# Patient Record
Sex: FEMALE | Race: WHITE | NOT HISPANIC OR LATINO | Employment: UNEMPLOYED | URBAN - NONMETROPOLITAN AREA
[De-identification: names, ages, dates, MRNs, and addresses within clinical notes are randomized per-mention and may not be internally consistent; named-entity substitution may affect disease eponyms.]

---

## 2017-01-27 ENCOUNTER — TRANSCRIBE ORDERS (OUTPATIENT)
Dept: INFUSION THERAPY | Facility: HOSPITAL | Age: 82
End: 2017-01-27

## 2017-01-27 DIAGNOSIS — M81.0 OSTEOPOROSIS, UNSPECIFIED: Primary | ICD-10-CM

## 2017-02-01 ENCOUNTER — HOSPITAL ENCOUNTER (OUTPATIENT)
Dept: INFUSION THERAPY | Facility: HOSPITAL | Age: 82
Discharge: HOME | End: 2017-02-01
Attending: INTERNAL MEDICINE | Admitting: INTERNAL MEDICINE

## 2017-02-01 VITALS
HEIGHT: 62 IN | WEIGHT: 106.8 LBS | HEART RATE: 86 BPM | TEMPERATURE: 97.6 F | DIASTOLIC BLOOD PRESSURE: 84 MMHG | SYSTOLIC BLOOD PRESSURE: 166 MMHG | BODY MASS INDEX: 19.65 KG/M2 | RESPIRATION RATE: 20 BRPM

## 2017-02-01 DIAGNOSIS — M81.0 OSTEOPOROSIS, UNSPECIFIED: ICD-10-CM

## 2017-02-01 PROCEDURE — 96401 CHEMO ANTI-NEOPL SQ/IM: CPT

## 2017-02-01 PROCEDURE — 25010000002 DENOSUMAB 60 MG/ML SOLUTION: Performed by: INTERNAL MEDICINE

## 2017-02-01 RX ORDER — DOXYCYCLINE HYCLATE 50 MG/1
50 CAPSULE ORAL DAILY
COMMUNITY
Start: 2017-01-31 | End: 2017-09-19

## 2017-02-01 RX ORDER — CELECOXIB 200 MG/1
200 CAPSULE ORAL NIGHTLY
COMMUNITY
End: 2018-11-11 | Stop reason: HOSPADM

## 2017-02-01 RX ORDER — LOSARTAN POTASSIUM 25 MG/1
50 TABLET ORAL DAILY
COMMUNITY
End: 2018-11-11 | Stop reason: HOSPADM

## 2017-02-01 RX ORDER — POTASSIUM CHLORIDE 750 MG/1
10 CAPSULE, EXTENDED RELEASE ORAL DAILY PRN
COMMUNITY
End: 2018-11-11 | Stop reason: HOSPADM

## 2017-02-01 RX ORDER — BUDESONIDE AND FORMOTEROL FUMARATE DIHYDRATE 160; 4.5 UG/1; UG/1
2 AEROSOL RESPIRATORY (INHALATION) 2 TIMES DAILY
Status: ON HOLD | COMMUNITY
End: 2018-11-11

## 2017-02-01 RX ORDER — FUROSEMIDE 20 MG/1
20 TABLET ORAL DAILY PRN
COMMUNITY
End: 2018-11-11 | Stop reason: HOSPADM

## 2017-02-01 RX ADMIN — DENOSUMAB 60 MG: 60 INJECTION SUBCUTANEOUS at 10:46

## 2017-02-01 NOTE — PATIENT INSTRUCTIONS
Denosumab injection  What is this medicine?  DENOSUMAB (den oh christy mab) slows bone breakdown. Prolia is used to treat osteoporosis in women after menopause and in men. Xgeva is used to prevent bone fractures and other bone problems caused by cancer bone metastases. Xgeva is also used to treat giant cell tumor of the bone.  This medicine may be used for other purposes; ask your health care provider or pharmacist if you have questions.  What should I tell my health care provider before I take this medicine?  They need to know if you have any of these conditions:  -dental disease  -eczema  -infection or history of infections  -kidney disease or on dialysis  -low blood calcium or vitamin D  -malabsorption syndrome  -scheduled to have surgery or tooth extraction  -taking medicine that contains denosumab  -thyroid or parathyroid disease  -an unusual reaction to denosumab, other medicines, foods, dyes, or preservatives  -pregnant or trying to get pregnant  -breast-feeding  How should I use this medicine?  This medicine is for injection under the skin. It is given by a health care professional in a hospital or clinic setting.  If you are getting Prolia, a special MedGuide will be given to you by the pharmacist with each prescription and refill. Be sure to read this information carefully each time.  For Prolia, talk to your pediatrician regarding the use of this medicine in children. Special care may be needed. For Xgeva, talk to your pediatrician regarding the use of this medicine in children. While this drug may be prescribed for children as young as 13 years for selected conditions, precautions do apply.  Overdosage: If you think you have taken too much of this medicine contact a poison control center or emergency room at once.  NOTE: This medicine is only for you. Do not share this medicine with others.  What if I miss a dose?  It is important not to miss your dose. Call your doctor or health care professional if you are  unable to keep an appointment.  What may interact with this medicine?  Do not take this medicine with any of the following medications:  -other medicines containing denosumab  This medicine may also interact with the following medications:  -medicines that suppress the immune system  -medicines that treat cancer  -steroid medicines like prednisone or cortisone  This list may not describe all possible interactions. Give your health care provider a list of all the medicines, herbs, non-prescription drugs, or dietary supplements you use. Also tell them if you smoke, drink alcohol, or use illegal drugs. Some items may interact with your medicine.  What should I watch for while using this medicine?  Visit your doctor or health care professional for regular checks on your progress. Your doctor or health care professional may order blood tests and other tests to see how you are doing.  Call your doctor or health care professional if you get a cold or other infection while receiving this medicine. Do not treat yourself. This medicine may decrease your body's ability to fight infection.  You should make sure you get enough calcium and vitamin D while you are taking this medicine, unless your doctor tells you not to. Discuss the foods you eat and the vitamins you take with your health care professional.  See your dentist regularly. Brush and floss your teeth as directed. Before you have any dental work done, tell your dentist you are receiving this medicine.  Do not become pregnant while taking this medicine or for 5 months after stopping it. Women should inform their doctor if they wish to become pregnant or think they might be pregnant. There is a potential for serious side effects to an unborn child. Talk to your health care professional or pharmacist for more information.  What side effects may I notice from receiving this medicine?  Side effects that you should report to your doctor or health care professional as soon as  possible:  -allergic reactions like skin rash, itching or hives, swelling of the face, lips, or tongue  -breathing problems  -chest pain  -fast, irregular heartbeat  -feeling faint or lightheaded, falls  -fever, chills, or any other sign of infection  -muscle spasms, tightening, or twitches  -numbness or tingling  -skin blisters or bumps, or is dry, peels, or red  -slow healing or unexplained pain in the mouth or jaw  -unusual bleeding or bruising  Side effects that usually do not require medical attention (Report these to your doctor or health care professional if they continue or are bothersome.):  -muscle pain  -stomach upset, gas  This list may not describe all possible side effects. Call your doctor for medical advice about side effects. You may report side effects to FDA at 6-700-FDA-5093.  Where should I keep my medicine?  This medicine is only given in a clinic, doctor's office, or other health care setting and will not be stored at home.  NOTE: This sheet is a summary. It may not cover all possible information. If you have questions about this medicine, talk to your doctor, pharmacist, or health care provider.     © 2016, [employer]. (2013-06-17 12:37:47)

## 2017-08-25 ENCOUNTER — TRANSCRIBE ORDERS (OUTPATIENT)
Dept: ADMINISTRATIVE | Facility: HOSPITAL | Age: 82
End: 2017-08-25

## 2017-08-25 DIAGNOSIS — Z12.31 VISIT FOR SCREENING MAMMOGRAM: Primary | ICD-10-CM

## 2017-09-11 ENCOUNTER — TRANSCRIBE ORDERS (OUTPATIENT)
Dept: INFUSION THERAPY | Facility: HOSPITAL | Age: 82
End: 2017-09-11

## 2017-09-11 DIAGNOSIS — M81.0 OSTEOPOROSIS, UNSPECIFIED: Primary | ICD-10-CM

## 2017-09-19 ENCOUNTER — HOSPITAL ENCOUNTER (OUTPATIENT)
Dept: INFUSION THERAPY | Facility: HOSPITAL | Age: 82
Discharge: HOME | End: 2017-09-19
Attending: INTERNAL MEDICINE | Admitting: INTERNAL MEDICINE

## 2017-09-19 VITALS
WEIGHT: 101 LBS | RESPIRATION RATE: 20 BRPM | BODY MASS INDEX: 18.58 KG/M2 | HEART RATE: 80 BPM | SYSTOLIC BLOOD PRESSURE: 161 MMHG | HEIGHT: 62 IN | TEMPERATURE: 98.3 F | DIASTOLIC BLOOD PRESSURE: 88 MMHG

## 2017-09-19 DIAGNOSIS — M81.0 OSTEOPOROSIS, UNSPECIFIED: ICD-10-CM

## 2017-09-19 PROCEDURE — 96401 CHEMO ANTI-NEOPL SQ/IM: CPT

## 2017-09-19 PROCEDURE — 25010000002 DENOSUMAB 60 MG/ML SOLUTION: Performed by: INTERNAL MEDICINE

## 2017-09-19 RX ORDER — PREDNISONE 10 MG/1
10 TABLET ORAL DAILY PRN
COMMUNITY
End: 2018-11-11

## 2017-09-19 RX ORDER — PANTOPRAZOLE SODIUM 40 MG/1
40 TABLET, DELAYED RELEASE ORAL DAILY
COMMUNITY

## 2017-09-19 RX ADMIN — DENOSUMAB 60 MG: 60 INJECTION SUBCUTANEOUS at 14:04

## 2017-09-19 NOTE — PATIENT INSTRUCTIONS
Denosumab injection  What is this medicine?  DENOSUMAB (den oh christy mab) slows bone breakdown. Prolia is used to treat osteoporosis in women after menopause and in men. Xgeva is used to prevent bone fractures and other bone problems caused by cancer bone metastases. Xgeva is also used to treat giant cell tumor of the bone.  This medicine may be used for other purposes; ask your health care provider or pharmacist if you have questions.  COMMON BRAND NAME(S): Prolia, XGEVA  What should I tell my health care provider before I take this medicine?  They need to know if you have any of these conditions:  -dental disease  -eczema  -infection or history of infections  -kidney disease or on dialysis  -low blood calcium or vitamin D  -malabsorption syndrome  -scheduled to have surgery or tooth extraction  -taking medicine that contains denosumab  -thyroid or parathyroid disease  -an unusual reaction to denosumab, other medicines, foods, dyes, or preservatives  -pregnant or trying to get pregnant  -breast-feeding  How should I use this medicine?  This medicine is for injection under the skin. It is given by a health care professional in a hospital or clinic setting.  If you are getting Prolia, a special MedGuide will be given to you by the pharmacist with each prescription and refill. Be sure to read this information carefully each time.  For Prolia, talk to your pediatrician regarding the use of this medicine in children. Special care may be needed. For Xgeva, talk to your pediatrician regarding the use of this medicine in children. While this drug may be prescribed for children as young as 13 years for selected conditions, precautions do apply.  Overdosage: If you think you have taken too much of this medicine contact a poison control center or emergency room at once.  NOTE: This medicine is only for you. Do not share this medicine with others.  What if I miss a dose?  It is important not to miss your dose. Call your doctor  or health care professional if you are unable to keep an appointment.  What may interact with this medicine?  Do not take this medicine with any of the following medications:  -other medicines containing denosumab  This medicine may also interact with the following medications:  -medicines that suppress the immune system  -medicines that treat cancer  -steroid medicines like prednisone or cortisone  This list may not describe all possible interactions. Give your health care provider a list of all the medicines, herbs, non-prescription drugs, or dietary supplements you use. Also tell them if you smoke, drink alcohol, or use illegal drugs. Some items may interact with your medicine.  What should I watch for while using this medicine?  Visit your doctor or health care professional for regular checks on your progress. Your doctor or health care professional may order blood tests and other tests to see how you are doing.  Call your doctor or health care professional if you get a cold or other infection while receiving this medicine. Do not treat yourself. This medicine may decrease your body's ability to fight infection.  You should make sure you get enough calcium and vitamin D while you are taking this medicine, unless your doctor tells you not to. Discuss the foods you eat and the vitamins you take with your health care professional.  See your dentist regularly. Brush and floss your teeth as directed. Before you have any dental work done, tell your dentist you are receiving this medicine.  Do not become pregnant while taking this medicine or for 5 months after stopping it. Women should inform their doctor if they wish to become pregnant or think they might be pregnant. There is a potential for serious side effects to an unborn child. Talk to your health care professional or pharmacist for more information.  What side effects may I notice from receiving this medicine?  Side effects that you should report to your doctor  or health care professional as soon as possible:  -allergic reactions like skin rash, itching or hives, swelling of the face, lips, or tongue  -breathing problems  -chest pain  -fast, irregular heartbeat  -feeling faint or lightheaded, falls  -fever, chills, or any other sign of infection  -muscle spasms, tightening, or twitches  -numbness or tingling  -skin blisters or bumps, or is dry, peels, or red  -slow healing or unexplained pain in the mouth or jaw  -unusual bleeding or bruising  Side effects that usually do not require medical attention (report to your doctor or health care professional if they continue or are bothersome):  -muscle pain  -stomach upset, gas  This list may not describe all possible side effects. Call your doctor for medical advice about side effects. You may report side effects to FDA at 1-342-FDA-1091.  Where should I keep my medicine?  This medicine is only given in a clinic, doctor's office, or other health care setting and will not be stored at home.  NOTE: This sheet is a summary. It may not cover all possible information. If you have questions about this medicine, talk to your doctor, pharmacist, or health care provider.     © 2017, [employer]. (2017-01-19 10:06:55)

## 2017-11-14 ENCOUNTER — TRANSCRIBE ORDERS (OUTPATIENT)
Dept: ADMINISTRATIVE | Facility: HOSPITAL | Age: 82
End: 2017-11-14

## 2017-11-14 DIAGNOSIS — M81.0 OSTEOPOROSIS, POST-MENOPAUSAL: Primary | ICD-10-CM

## 2017-12-18 ENCOUNTER — HOSPITAL ENCOUNTER (OUTPATIENT)
Dept: BONE DENSITY | Facility: HOSPITAL | Age: 82
Discharge: HOME | End: 2017-12-18
Attending: INTERNAL MEDICINE

## 2017-12-18 ENCOUNTER — HOSPITAL ENCOUNTER (OUTPATIENT)
Dept: MAMMOGRAPHY | Facility: HOSPITAL | Age: 82
Discharge: HOME | End: 2017-12-18
Attending: INTERNAL MEDICINE | Admitting: INTERNAL MEDICINE

## 2017-12-18 DIAGNOSIS — M81.0 OSTEOPOROSIS, POST-MENOPAUSAL: ICD-10-CM

## 2017-12-18 DIAGNOSIS — Z12.31 VISIT FOR SCREENING MAMMOGRAM: ICD-10-CM

## 2017-12-18 PROCEDURE — 77080 DXA BONE DENSITY AXIAL: CPT | Performed by: RADIOLOGY

## 2017-12-18 PROCEDURE — 77063 BREAST TOMOSYNTHESIS BI: CPT | Performed by: RADIOLOGY

## 2017-12-18 PROCEDURE — G0202 SCR MAMMO BI INCL CAD: HCPCS | Performed by: RADIOLOGY

## 2017-12-18 PROCEDURE — 77063 BREAST TOMOSYNTHESIS BI: CPT

## 2017-12-18 PROCEDURE — 77080 DXA BONE DENSITY AXIAL: CPT

## 2017-12-18 PROCEDURE — G0202 SCR MAMMO BI INCL CAD: HCPCS

## 2018-01-26 ENCOUNTER — TRANSCRIBE ORDERS (OUTPATIENT)
Dept: ADMINISTRATIVE | Facility: HOSPITAL | Age: 83
End: 2018-01-26

## 2018-01-26 DIAGNOSIS — I11.0 BENIGN HYPERTENSIVE HEART DISEASE WITH CONGESTIVE HEART FAILURE (HCC): ICD-10-CM

## 2018-01-26 DIAGNOSIS — R06.02 SHORTNESS OF BREATH: Primary | ICD-10-CM

## 2018-01-30 ENCOUNTER — HOSPITAL ENCOUNTER (OUTPATIENT)
Dept: CARDIOLOGY | Facility: HOSPITAL | Age: 83
Discharge: HOME | End: 2018-01-30
Attending: INTERNAL MEDICINE | Admitting: INTERNAL MEDICINE

## 2018-01-30 DIAGNOSIS — R06.02 SHORTNESS OF BREATH: ICD-10-CM

## 2018-01-30 DIAGNOSIS — I11.0 BENIGN HYPERTENSIVE HEART DISEASE WITH CONGESTIVE HEART FAILURE (HCC): ICD-10-CM

## 2018-01-30 PROCEDURE — 93306 TTE W/DOPPLER COMPLETE: CPT | Performed by: INTERNAL MEDICINE

## 2018-01-30 PROCEDURE — 93306 TTE W/DOPPLER COMPLETE: CPT

## 2018-01-31 LAB
BH CV ECHO MEAS - % IVS THICK: 47.1 %
BH CV ECHO MEAS - % LVPW THICK: 122.2 %
BH CV ECHO MEAS - ACS: 1.8 CM
BH CV ECHO MEAS - AO MAX PG: 11.1 MMHG
BH CV ECHO MEAS - AO MEAN PG: 6 MMHG
BH CV ECHO MEAS - AO ROOT AREA (BSA CORRECTED): 2.2
BH CV ECHO MEAS - AO ROOT AREA: 7.8 CM^2
BH CV ECHO MEAS - AO ROOT DIAM: 3.2 CM
BH CV ECHO MEAS - AO V2 MAX: 166.5 CM/SEC
BH CV ECHO MEAS - AO V2 MEAN: 114.1 CM/SEC
BH CV ECHO MEAS - AO V2 VTI: 39 CM
BH CV ECHO MEAS - BSA(HAYCOCK): 1.4 M^2
BH CV ECHO MEAS - BSA: 1.4 M^2
BH CV ECHO MEAS - BZI_BMI: 18.5 KILOGRAMS/M^2
BH CV ECHO MEAS - BZI_METRIC_HEIGHT: 157.5 CM
BH CV ECHO MEAS - BZI_METRIC_WEIGHT: 45.8 KG
BH CV ECHO MEAS - CONTRAST EF 4CH: 72.5 ML/M^2
BH CV ECHO MEAS - EDV(CUBED): 200.8 ML
BH CV ECHO MEAS - EDV(MOD-SP4): 51 ML
BH CV ECHO MEAS - EDV(TEICH): 170.3 ML
BH CV ECHO MEAS - EF(CUBED): 74 %
BH CV ECHO MEAS - EF(MOD-SP4): 72.5 %
BH CV ECHO MEAS - EF(TEICH): 65 %
BH CV ECHO MEAS - ESV(CUBED): 52.3 ML
BH CV ECHO MEAS - ESV(MOD-SP4): 14 ML
BH CV ECHO MEAS - ESV(TEICH): 59.6 ML
BH CV ECHO MEAS - FS: 36.2 %
BH CV ECHO MEAS - IVS/LVPW: 0.94
BH CV ECHO MEAS - IVSD: 0.56 CM
BH CV ECHO MEAS - IVSS: 0.83 CM
BH CV ECHO MEAS - LA DIMENSION: 4.1 CM
BH CV ECHO MEAS - LA/AO: 1.3
BH CV ECHO MEAS - LV DIASTOLIC VOL/BSA (35-75): 35.7 ML/M^2
BH CV ECHO MEAS - LV MASS(C)D: 120.6 GRAMS
BH CV ECHO MEAS - LV MASS(C)DI: 84.3 GRAMS/M^2
BH CV ECHO MEAS - LV MASS(C)S: 127 GRAMS
BH CV ECHO MEAS - LV MASS(C)SI: 88.9 GRAMS/M^2
BH CV ECHO MEAS - LV SYSTOLIC VOL/BSA (12-30): 9.8 ML/M^2
BH CV ECHO MEAS - LVIDD: 5.9 CM
BH CV ECHO MEAS - LVIDS: 3.7 CM
BH CV ECHO MEAS - LVLD AP4: 7.3 CM
BH CV ECHO MEAS - LVLS AP4: 6.4 CM
BH CV ECHO MEAS - LVOT AREA (M): 2.8 CM^2
BH CV ECHO MEAS - LVOT AREA: 2.7 CM^2
BH CV ECHO MEAS - LVOT DIAM: 1.9 CM
BH CV ECHO MEAS - LVPWD: 0.6 CM
BH CV ECHO MEAS - LVPWS: 1.3 CM
BH CV ECHO MEAS - MV A MAX VEL: 106.1 CM/SEC
BH CV ECHO MEAS - MV DEC SLOPE: 332.3 CM/SEC^2
BH CV ECHO MEAS - MV E MAX VEL: 75.5 CM/SEC
BH CV ECHO MEAS - MV E/A: 0.71
BH CV ECHO MEAS - MV P1/2T MAX VEL: 101.3 CM/SEC
BH CV ECHO MEAS - MV P1/2T: 89.3 MSEC
BH CV ECHO MEAS - MVA P1/2T LCG: 2.2 CM^2
BH CV ECHO MEAS - MVA(P1/2T): 2.5 CM^2
BH CV ECHO MEAS - PA ACC SLOPE: 1496 CM/SEC^2
BH CV ECHO MEAS - PA ACC TIME: 0.07 SEC
BH CV ECHO MEAS - PA PR(ACCEL): 47.3 MMHG
BH CV ECHO MEAS - RAP SYSTOLE: 10 MMHG
BH CV ECHO MEAS - RVDD: 1.7 CM
BH CV ECHO MEAS - RVSP: 51.2 MMHG
BH CV ECHO MEAS - SI(AO): 213.5 ML/M^2
BH CV ECHO MEAS - SI(CUBED): 103.9 ML/M^2
BH CV ECHO MEAS - SI(MOD-SP4): 25.9 ML/M^2
BH CV ECHO MEAS - SI(TEICH): 77.4 ML/M^2
BH CV ECHO MEAS - SV(AO): 305.3 ML
BH CV ECHO MEAS - SV(CUBED): 148.6 ML
BH CV ECHO MEAS - SV(MOD-SP4): 37 ML
BH CV ECHO MEAS - SV(TEICH): 110.7 ML
BH CV ECHO MEAS - TR MAX VEL: 320.8 CM/SEC
MAXIMAL PREDICTED HEART RATE: 137 BPM
STRESS TARGET HR: 116 BPM

## 2018-03-29 ENCOUNTER — TRANSCRIBE ORDERS (OUTPATIENT)
Dept: INFUSION THERAPY | Facility: HOSPITAL | Age: 83
End: 2018-03-29

## 2018-03-29 DIAGNOSIS — M81.0 SENILE OSTEOPOROSIS: Primary | ICD-10-CM

## 2018-04-02 ENCOUNTER — HOSPITAL ENCOUNTER (OUTPATIENT)
Dept: INFUSION THERAPY | Facility: HOSPITAL | Age: 83
Discharge: HOME | End: 2018-04-02
Attending: INTERNAL MEDICINE | Admitting: INTERNAL MEDICINE

## 2018-04-02 VITALS
WEIGHT: 106 LBS | BODY MASS INDEX: 20.81 KG/M2 | DIASTOLIC BLOOD PRESSURE: 87 MMHG | HEART RATE: 76 BPM | HEIGHT: 60 IN | TEMPERATURE: 98.3 F | SYSTOLIC BLOOD PRESSURE: 180 MMHG | RESPIRATION RATE: 20 BRPM

## 2018-04-02 DIAGNOSIS — M81.0 OSTEOPOROSIS, UNSPECIFIED OSTEOPOROSIS TYPE, UNSPECIFIED PATHOLOGICAL FRACTURE PRESENCE: ICD-10-CM

## 2018-04-02 PROCEDURE — 96372 THER/PROPH/DIAG INJ SC/IM: CPT

## 2018-04-02 PROCEDURE — 25010000002 DENOSUMAB 60 MG/ML SOLUTION: Performed by: INTERNAL MEDICINE

## 2018-04-02 RX ADMIN — DENOSUMAB 60 MG: 60 INJECTION SUBCUTANEOUS at 14:32

## 2018-04-02 NOTE — CODE DOCUMENTATION
4/2/18@ 4191  Dr. Medellin notified of rash like areas ntd on arms bilat. Pt denies having rash anywhere else on body.  Pt voices she has had it for a long time & that it Dr. Medellin gave her some cream for it but that it didn't help.  Dr. Medellin notified of rash & request for dermatologist consult.  She prefers Dr. Lackey in [place]. Orders to give shot rec.

## 2018-04-02 NOTE — PATIENT INSTRUCTIONS
Denosumab injection  What is this medicine?  DENOSUMAB (den oh christy mab) slows bone breakdown. Prolia is used to treat osteoporosis in women after menopause and in men. Xgeva is used to treat a high calcium level due to cancer and to prevent bone fractures and other bone problems caused by multiple myeloma or cancer bone metastases. Xgeva is also used to treat giant cell tumor of the bone.  This medicine may be used for other purposes; ask your health care provider or pharmacist if you have questions.  COMMON BRAND NAME(S): Prolia, XGEVA  What should I tell my health care provider before I take this medicine?  They need to know if you have any of these conditions:  -dental disease  -having surgery or tooth extraction  -infection  -kidney disease  -low levels of calcium or Vitamin D in the blood  -malnutrition  -on hemodialysis  -skin conditions or sensitivity  -thyroid or parathyroid disease  -an unusual reaction to denosumab, other medicines, foods, dyes, or preservatives  -pregnant or trying to get pregnant  -breast-feeding  How should I use this medicine?  This medicine is for injection under the skin. It is given by a health care professional in a hospital or clinic setting.  If you are getting Prolia, a special MedGuide will be given to you by the pharmacist with each prescription and refill. Be sure to read this information carefully each time.  For Prolia, talk to your pediatrician regarding the use of this medicine in children. Special care may be needed. For Xgeva, talk to your pediatrician regarding the use of this medicine in children. While this drug may be prescribed for children as young as 13 years for selected conditions, precautions do apply.  Overdosage: If you think you have taken too much of this medicine contact a poison control center or emergency room at once.  NOTE: This medicine is only for you. Do not share this medicine with others.  What if I miss a dose?  It is important not to miss your  dose. Call your doctor or health care professional if you are unable to keep an appointment.  What may interact with this medicine?  Do not take this medicine with any of the following medications:  -other medicines containing denosumab  This medicine may also interact with the following medications:  -medicines that lower your chance of fighting infection  -steroid medicines like prednisone or cortisone  This list may not describe all possible interactions. Give your health care provider a list of all the medicines, herbs, non-prescription drugs, or dietary supplements you use. Also tell them if you smoke, drink alcohol, or use illegal drugs. Some items may interact with your medicine.  What should I watch for while using this medicine?  Visit your doctor or health care professional for regular checks on your progress. Your doctor or health care professional may order blood tests and other tests to see how you are doing.  Call your doctor or health care professional for advice if you get a fever, chills or sore throat, or other symptoms of a cold or flu. Do not treat yourself. This drug may decrease your body's ability to fight infection. Try to avoid being around people who are sick.  You should make sure you get enough calcium and vitamin D while you are taking this medicine, unless your doctor tells you not to. Discuss the foods you eat and the vitamins you take with your health care professional.  See your dentist regularly. Brush and floss your teeth as directed. Before you have any dental work done, tell your dentist you are receiving this medicine.  Do not become pregnant while taking this medicine or for 5 months after stopping it. Talk with your doctor or health care professional about your birth control options while taking this medicine. Women should inform their doctor if they wish to become pregnant or think they might be pregnant. There is a potential for serious side effects to an unborn child. Talk  to your health care professional or pharmacist for more information.  What side effects may I notice from receiving this medicine?  Side effects that you should report to your doctor or health care professional as soon as possible:  -allergic reactions like skin rash, itching or hives, swelling of the face, lips, or tongue  -bone pain  -breathing problems  -dizziness  -jaw pain, especially after dental work  -redness, blistering, peeling of the skin  -signs and symptoms of infection like fever or chills; cough; sore throat; pain or trouble passing urine  -signs of low calcium like fast heartbeat, muscle cramps or muscle pain; pain, tingling, numbness in the hands or feet; seizures  -unusual bleeding or bruising  -unusually weak or tired  Side effects that usually do not require medical attention (report to your doctor or health care professional if they continue or are bothersome):  -constipation  -diarrhea  -headache  -joint pain  -loss of appetite  -muscle pain  -runny nose  -tiredness  -upset stomach  This list may not describe all possible side effects. Call your doctor for medical advice about side effects. You may report side effects to FDA at 3-120-FDA-1663.  Where should I keep my medicine?  This medicine is only given in a clinic, doctor's office, or other health care setting and will not be stored at home.  NOTE: This sheet is a summary. It may not cover all possible information. If you have questions about this medicine, talk to your doctor, pharmacist, or health care provider.  © 2018 [employer] (2018-01-09 19:17:21)

## 2018-10-30 ENCOUNTER — TRANSCRIBE ORDERS (OUTPATIENT)
Dept: INFUSION THERAPY | Facility: HOSPITAL | Age: 83
End: 2018-10-30

## 2018-10-30 DIAGNOSIS — M81.0 SENILE OSTEOPOROSIS: Primary | ICD-10-CM

## 2018-11-10 ENCOUNTER — APPOINTMENT (OUTPATIENT)
Dept: GENERAL RADIOLOGY | Facility: HOSPITAL | Age: 83
End: 2018-11-10

## 2018-11-10 ENCOUNTER — HOSPITAL ENCOUNTER (OUTPATIENT)
Facility: HOSPITAL | Age: 83
Setting detail: OBSERVATION
Discharge: HOME | End: 2018-11-11
Attending: EMERGENCY MEDICINE | Admitting: EMERGENCY MEDICINE

## 2018-11-10 DIAGNOSIS — I16.0 HYPERTENSIVE URGENCY: Primary | ICD-10-CM

## 2018-11-10 LAB
ALBUMIN SERPL-MCNC: 4.1 G/DL (ref 3.4–4.8)
ALBUMIN/GLOB SERPL: 1.2 G/DL (ref 1.5–2.5)
ALP SERPL-CCNC: 69 U/L (ref 35–104)
ALT SERPL W P-5'-P-CCNC: 12 U/L (ref 10–36)
ANION GAP SERPL CALCULATED.3IONS-SCNC: 7.4 MMOL/L (ref 3.6–11.2)
APTT PPP: 33.6 SECONDS (ref 23.8–36.1)
AST SERPL-CCNC: 22 U/L (ref 10–30)
BASOPHILS # BLD AUTO: 0.01 10*3/MM3 (ref 0–0.3)
BASOPHILS NFR BLD AUTO: 0.2 % (ref 0–2)
BILIRUB SERPL-MCNC: 0.5 MG/DL (ref 0.2–1.8)
BUN BLD-MCNC: 27 MG/DL (ref 7–21)
BUN/CREAT SERPL: 33.8 (ref 7–25)
CALCIUM SPEC-SCNC: 9.4 MG/DL (ref 7.7–10)
CHLORIDE SERPL-SCNC: 105 MMOL/L (ref 99–112)
CK MB SERPL-CCNC: 4.64 NG/ML (ref 0–5)
CK MB SERPL-RTO: 7.9 % (ref 0–3)
CK SERPL-CCNC: 59 U/L (ref 24–173)
CO2 SERPL-SCNC: 28.6 MMOL/L (ref 24.3–31.9)
CREAT BLD-MCNC: 0.8 MG/DL (ref 0.43–1.29)
DEPRECATED RDW RBC AUTO: 42.8 FL (ref 37–54)
EOSINOPHIL # BLD AUTO: 0.07 10*3/MM3 (ref 0–0.7)
EOSINOPHIL NFR BLD AUTO: 1.2 % (ref 0–7)
ERYTHROCYTE [DISTWIDTH] IN BLOOD BY AUTOMATED COUNT: 13.6 % (ref 11.5–14.5)
GFR SERPL CREATININE-BSD FRML MDRD: 69 ML/MIN/1.73
GLOBULIN UR ELPH-MCNC: 3.4 GM/DL
GLUCOSE BLD-MCNC: 92 MG/DL (ref 70–110)
HCT VFR BLD AUTO: 37.9 % (ref 37–47)
HGB BLD-MCNC: 12.4 G/DL (ref 12–16)
IMM GRANULOCYTES # BLD: 0.02 10*3/MM3 (ref 0–0.03)
IMM GRANULOCYTES NFR BLD: 0.3 % (ref 0–0.5)
INR PPP: 0.98 (ref 0.9–1.1)
LYMPHOCYTES # BLD AUTO: 1.57 10*3/MM3 (ref 1–3)
LYMPHOCYTES NFR BLD AUTO: 27.4 % (ref 16–46)
MCH RBC QN AUTO: 28.8 PG (ref 27–33)
MCHC RBC AUTO-ENTMCNC: 32.7 G/DL (ref 33–37)
MCV RBC AUTO: 87.9 FL (ref 80–94)
MONOCYTES # BLD AUTO: 0.6 10*3/MM3 (ref 0.1–0.9)
MONOCYTES NFR BLD AUTO: 10.5 % (ref 0–12)
NEUTROPHILS # BLD AUTO: 3.45 10*3/MM3 (ref 1.4–6.5)
NEUTROPHILS NFR BLD AUTO: 60.4 % (ref 40–75)
OSMOLALITY SERPL CALC.SUM OF ELEC: 286 MOSM/KG (ref 273–305)
PLATELET # BLD AUTO: 283 10*3/MM3 (ref 130–400)
PMV BLD AUTO: 10.2 FL (ref 6–10)
POTASSIUM BLD-SCNC: 4.1 MMOL/L (ref 3.5–5.3)
PROT SERPL-MCNC: 7.5 G/DL (ref 6–8)
PROTHROMBIN TIME: 13.2 SECONDS (ref 11–15.4)
RBC # BLD AUTO: 4.31 10*6/MM3 (ref 4.2–5.4)
SODIUM BLD-SCNC: 141 MMOL/L (ref 135–153)
TROPONIN I SERPL-MCNC: 0.25 NG/ML
WBC NRBC COR # BLD: 5.72 10*3/MM3 (ref 4.5–12.5)

## 2018-11-10 PROCEDURE — 93005 ELECTROCARDIOGRAM TRACING: CPT | Performed by: EMERGENCY MEDICINE

## 2018-11-10 PROCEDURE — 93010 ELECTROCARDIOGRAM REPORT: CPT | Performed by: INTERNAL MEDICINE

## 2018-11-10 PROCEDURE — 85610 PROTHROMBIN TIME: CPT | Performed by: EMERGENCY MEDICINE

## 2018-11-10 PROCEDURE — 71045 X-RAY EXAM CHEST 1 VIEW: CPT

## 2018-11-10 PROCEDURE — 82550 ASSAY OF CK (CPK): CPT | Performed by: EMERGENCY MEDICINE

## 2018-11-10 PROCEDURE — 99284 EMERGENCY DEPT VISIT MOD MDM: CPT

## 2018-11-10 PROCEDURE — 80053 COMPREHEN METABOLIC PANEL: CPT | Performed by: EMERGENCY MEDICINE

## 2018-11-10 PROCEDURE — 85025 COMPLETE CBC W/AUTO DIFF WBC: CPT | Performed by: EMERGENCY MEDICINE

## 2018-11-10 PROCEDURE — 85730 THROMBOPLASTIN TIME PARTIAL: CPT | Performed by: EMERGENCY MEDICINE

## 2018-11-10 PROCEDURE — 84484 ASSAY OF TROPONIN QUANT: CPT | Performed by: EMERGENCY MEDICINE

## 2018-11-10 PROCEDURE — 71045 X-RAY EXAM CHEST 1 VIEW: CPT | Performed by: RADIOLOGY

## 2018-11-10 PROCEDURE — 96374 THER/PROPH/DIAG INJ IV PUSH: CPT

## 2018-11-10 PROCEDURE — 82553 CREATINE MB FRACTION: CPT | Performed by: EMERGENCY MEDICINE

## 2018-11-10 RX ORDER — SODIUM CHLORIDE 0.9 % (FLUSH) 0.9 %
10 SYRINGE (ML) INJECTION AS NEEDED
Status: DISCONTINUED | OUTPATIENT
Start: 2018-11-10 | End: 2018-11-11 | Stop reason: HOSPADM

## 2018-11-10 RX ORDER — ASPIRIN 81 MG/1
TABLET, CHEWABLE ORAL
Status: DISPENSED
Start: 2018-11-10 | End: 2018-11-11

## 2018-11-10 RX ORDER — ASPIRIN 81 MG/1
324 TABLET, CHEWABLE ORAL ONCE
Status: COMPLETED | OUTPATIENT
Start: 2018-11-10 | End: 2018-11-10

## 2018-11-10 RX ORDER — LABETALOL HYDROCHLORIDE 5 MG/ML
20 INJECTION, SOLUTION INTRAVENOUS ONCE
Status: COMPLETED | OUTPATIENT
Start: 2018-11-10 | End: 2018-11-10

## 2018-11-10 RX ADMIN — ASPIRIN 324 MG: 81 TABLET, CHEWABLE ORAL at 23:42

## 2018-11-10 RX ADMIN — LABETALOL HYDROCHLORIDE 20 MG: 5 INJECTION, SOLUTION INTRAVENOUS at 23:11

## 2018-11-11 VITALS
HEIGHT: 60 IN | DIASTOLIC BLOOD PRESSURE: 66 MMHG | OXYGEN SATURATION: 95 % | HEART RATE: 82 BPM | RESPIRATION RATE: 18 BRPM | SYSTOLIC BLOOD PRESSURE: 109 MMHG | BODY MASS INDEX: 19.38 KG/M2 | TEMPERATURE: 97.5 F | WEIGHT: 98.7 LBS

## 2018-11-11 PROBLEM — I16.0 HYPERTENSIVE URGENCY: Status: ACTIVE | Noted: 2018-11-11

## 2018-11-11 LAB
BACTERIA UR QL AUTO: ABNORMAL /HPF
BILIRUB UR QL STRIP: NEGATIVE
CK MB SERPL-CCNC: 3.43 NG/ML (ref 0–5)
CK MB SERPL-CCNC: 4.09 NG/ML (ref 0–5)
CK MB SERPL-RTO: 7.5 % (ref 0–3)
CK MB SERPL-RTO: 8.5 % (ref 0–3)
CK SERPL-CCNC: 46 U/L (ref 24–173)
CK SERPL-CCNC: 48 U/L (ref 24–173)
CLARITY UR: CLEAR
COLOR UR: YELLOW
GLUCOSE UR STRIP-MCNC: NEGATIVE MG/DL
HGB UR QL STRIP.AUTO: NEGATIVE
HYALINE CASTS UR QL AUTO: ABNORMAL /LPF
KETONES UR QL STRIP: ABNORMAL
LEUKOCYTE ESTERASE UR QL STRIP.AUTO: ABNORMAL
MYOGLOBIN SERPL-MCNC: 85 NG/ML (ref 0–109)
NITRITE UR QL STRIP: POSITIVE
PH UR STRIP.AUTO: 7 [PH] (ref 5–8)
PROT UR QL STRIP: NEGATIVE
RBC # UR: ABNORMAL /HPF
REF LAB TEST METHOD: ABNORMAL
SP GR UR STRIP: 1.01 (ref 1–1.03)
SQUAMOUS #/AREA URNS HPF: ABNORMAL /HPF
TROPONIN I SERPL-MCNC: 0.18 NG/ML
TROPONIN I SERPL-MCNC: 0.21 NG/ML
TSH SERPL DL<=0.05 MIU/L-ACNC: 2.77 MIU/ML (ref 0.55–4.78)
UROBILINOGEN UR QL STRIP: ABNORMAL
WBC UR QL AUTO: ABNORMAL /HPF

## 2018-11-11 PROCEDURE — G0378 HOSPITAL OBSERVATION PER HR: HCPCS

## 2018-11-11 PROCEDURE — 99236 HOSP IP/OBS SAME DATE HI 85: CPT | Performed by: INTERNAL MEDICINE

## 2018-11-11 PROCEDURE — 93010 ELECTROCARDIOGRAM REPORT: CPT | Performed by: INTERNAL MEDICINE

## 2018-11-11 PROCEDURE — 94799 UNLISTED PULMONARY SVC/PX: CPT

## 2018-11-11 PROCEDURE — 82550 ASSAY OF CK (CPK): CPT | Performed by: INTERNAL MEDICINE

## 2018-11-11 PROCEDURE — 84443 ASSAY THYROID STIM HORMONE: CPT | Performed by: INTERNAL MEDICINE

## 2018-11-11 PROCEDURE — 96375 TX/PRO/DX INJ NEW DRUG ADDON: CPT

## 2018-11-11 PROCEDURE — 81001 URINALYSIS AUTO W/SCOPE: CPT | Performed by: EMERGENCY MEDICINE

## 2018-11-11 PROCEDURE — 83874 ASSAY OF MYOGLOBIN: CPT | Performed by: INTERNAL MEDICINE

## 2018-11-11 PROCEDURE — 84484 ASSAY OF TROPONIN QUANT: CPT | Performed by: INTERNAL MEDICINE

## 2018-11-11 PROCEDURE — 82553 CREATINE MB FRACTION: CPT | Performed by: INTERNAL MEDICINE

## 2018-11-11 RX ORDER — SODIUM CHLORIDE 0.9 % (FLUSH) 0.9 %
3 SYRINGE (ML) INJECTION EVERY 12 HOURS SCHEDULED
Status: DISCONTINUED | OUTPATIENT
Start: 2018-11-11 | End: 2018-11-11 | Stop reason: HOSPADM

## 2018-11-11 RX ORDER — BUDESONIDE AND FORMOTEROL FUMARATE DIHYDRATE 160; 4.5 UG/1; UG/1
2 AEROSOL RESPIRATORY (INHALATION)
Status: DISCONTINUED | OUTPATIENT
Start: 2018-11-11 | End: 2018-11-11 | Stop reason: HOSPADM

## 2018-11-11 RX ORDER — METOPROLOL TARTRATE 5 MG/5ML
5 INJECTION INTRAVENOUS ONCE
Status: COMPLETED | OUTPATIENT
Start: 2018-11-11 | End: 2018-11-11

## 2018-11-11 RX ORDER — PANTOPRAZOLE SODIUM 40 MG/1
40 TABLET, DELAYED RELEASE ORAL DAILY
Status: DISCONTINUED | OUTPATIENT
Start: 2018-11-11 | End: 2018-11-11 | Stop reason: HOSPADM

## 2018-11-11 RX ORDER — ASCORBIC ACID 500 MG
500 TABLET ORAL DAILY
Status: ON HOLD | COMMUNITY
End: 2018-11-11

## 2018-11-11 RX ORDER — NITROFURANTOIN 25; 75 MG/1; MG/1
100 CAPSULE ORAL EVERY 12 HOURS SCHEDULED
Qty: 10 CAPSULE | Refills: 0 | Status: SHIPPED | OUTPATIENT
Start: 2018-11-11 | End: 2018-11-16

## 2018-11-11 RX ORDER — ASPIRIN 81 MG/1
81 TABLET ORAL DAILY
Status: DISCONTINUED | OUTPATIENT
Start: 2018-11-11 | End: 2018-11-11

## 2018-11-11 RX ORDER — NITROFURANTOIN 25; 75 MG/1; MG/1
100 CAPSULE ORAL EVERY 12 HOURS SCHEDULED
Status: DISCONTINUED | OUTPATIENT
Start: 2018-11-11 | End: 2018-11-11 | Stop reason: HOSPADM

## 2018-11-11 RX ORDER — BUDESONIDE AND FORMOTEROL FUMARATE DIHYDRATE 160; 4.5 UG/1; UG/1
2 AEROSOL RESPIRATORY (INHALATION)
Status: CANCELLED | OUTPATIENT
Start: 2018-11-11

## 2018-11-11 RX ORDER — NITROGLYCERIN 0.4 MG/1
0.4 TABLET SUBLINGUAL
Status: DISCONTINUED | OUTPATIENT
Start: 2018-11-11 | End: 2018-11-11 | Stop reason: HOSPADM

## 2018-11-11 RX ORDER — ASPIRIN 81 MG/1
81 TABLET ORAL DAILY
Status: CANCELLED | OUTPATIENT
Start: 2018-11-11

## 2018-11-11 RX ORDER — ASCORBIC ACID 500 MG
500 TABLET ORAL DAILY
Status: CANCELLED | OUTPATIENT
Start: 2018-11-11

## 2018-11-11 RX ORDER — ASPIRIN 81 MG/1
81 TABLET ORAL DAILY
COMMUNITY
End: 2018-11-11 | Stop reason: HOSPADM

## 2018-11-11 RX ORDER — SODIUM CHLORIDE 0.9 % (FLUSH) 0.9 %
3-10 SYRINGE (ML) INJECTION AS NEEDED
Status: DISCONTINUED | OUTPATIENT
Start: 2018-11-11 | End: 2018-11-11 | Stop reason: HOSPADM

## 2018-11-11 RX ADMIN — PANTOPRAZOLE SODIUM 40 MG: 40 TABLET, DELAYED RELEASE ORAL at 07:34

## 2018-11-11 RX ADMIN — APIXABAN 2.5 MG: 2.5 TABLET, FILM COATED ORAL at 12:23

## 2018-11-11 RX ADMIN — METOPROLOL TARTRATE 5 MG: 1 INJECTION, SOLUTION INTRAVENOUS at 00:51

## 2018-11-11 RX ADMIN — Medication 3 ML: at 07:36

## 2018-11-11 RX ADMIN — METOPROLOL TARTRATE 25 MG: 25 TABLET, FILM COATED ORAL at 12:23

## 2018-11-11 RX ADMIN — BUDESONIDE AND FORMOTEROL FUMARATE DIHYDRATE 2 PUFF: 160; 4.5 AEROSOL RESPIRATORY (INHALATION) at 07:00

## 2018-11-11 RX ADMIN — METOPROLOL TARTRATE 25 MG: 25 TABLET, FILM COATED ORAL at 00:47

## 2018-11-11 NOTE — NURSING NOTE
MADE DONTE STARKS AWARE THAT TELEMTRY CALLED AND STATED THAT PT. IS IN AFIB AND HAS BEEN SINCE 7AM. EKG ORDERED.

## 2018-11-11 NOTE — ED PROVIDER NOTES
"Subjective   Pt comes from home for concern for elevated Blood Pressure and Heart rate.  Pt has known htn and takes he BP nightly.  She noticed her HR was 113 last night.    Pt reports she has had \"sinus\".  She has had more frequent use of her inhaler.  She has not had any particular related symptoms        History provided by:  Patient and relative  Hypertension   Severity:  Moderate  Onset quality:  Gradual  Duration:  1 day  Timing:  Intermittent  Progression since onset: waxing and waning.  Chronicity:  Recurrent  Context: normal sodium, not caffeine, not drug abuse, not herbal remedies, not noncompliance, not OTC medications used and not stress    Relieved by:  Nothing  Worsened by:  Nothing  Ineffective treatments:  None tried  Associated symptoms: no abdominal pain, no anxiety, no blurred vision, no chest pain, no confusion, no dizziness, no ear pain, no epistaxis, no fatigue, no fever, no headaches, no hematuria, no hypokalemia, no loss of consciousness, no nausea, no neck pain, no palpitations, no peripheral edema, no shortness of breath, no syncope, no tinnitus, not vomiting and no weakness    Risk factors: no cocaine use, no decongestant use, no diabetes, no kidney disease, no obesity and no tobacco use        Review of Systems   Constitutional: Negative.  Negative for activity change, appetite change, chills, fatigue and fever.   HENT: Positive for congestion and sinus pressure. Negative for ear pain, nosebleeds, tinnitus, trouble swallowing and voice change.    Eyes: Negative.  Negative for blurred vision and visual disturbance.   Respiratory: Negative.  Negative for chest tightness and shortness of breath.    Cardiovascular: Negative.  Negative for chest pain, palpitations and syncope.   Gastrointestinal: Negative.  Negative for abdominal pain, nausea and vomiting.   Endocrine: Negative.    Genitourinary: Negative.  Negative for difficulty urinating and hematuria.   Musculoskeletal: Positive for " arthralgias and myalgias. Negative for neck pain.   Skin: Negative.    Allergic/Immunologic: Negative.    Neurological: Negative.  Negative for dizziness, loss of consciousness, weakness and headaches.   Hematological: Negative.    Psychiatric/Behavioral: Negative.  Negative for confusion. The patient is not nervous/anxious.    All other systems reviewed and are negative.      Past Medical History:   Diagnosis Date   • Arthritis    • Breast cancer (CMS/[facility]) 2011   • CHF (congestive heart failure) (CMS/[facility])    • Chronic kidney disease     acute kidney failure   • Hypertension    • Osteoarthritis    • Osteoporosis    • Rheumatoid arthritis (CMS/[facility])    • Shortness of breath     sleeps with oxygen at night       Allergies   Allergen Reactions   • Penicillins Rash       Past Surgical History:   Procedure Laterality Date   • BREAST LUMPECTOMY Left     benign   • CATARACT EXTRACTION W/ INTRAOCULAR LENS  IMPLANT, BILATERAL Bilateral    • SHOULDER SURGERY Left     metal plate in left shoulder       Family History   Problem Relation Age of Onset   • Breast cancer Maternal Aunt        Social History     Socioeconomic History   • Marital status:      Spouse name: Not on file   • Number of children: Not on file   • Years of education: Not on file   • Highest education level: Not on file   Tobacco Use   • Smoking status: Never Smoker   Substance and Sexual Activity   • Alcohol use: No   • Drug use: No   • Sexual activity: Defer           Objective   Physical Exam   Constitutional: She is oriented to person, place, and time. She appears well-developed and well-nourished. No distress.   HENT:   Head: Normocephalic and atraumatic.   Nose: Nose normal.   Mouth/Throat: Oropharynx is clear and moist.   Eyes: Conjunctivae and EOM are normal. Right eye exhibits no discharge. Left eye exhibits no discharge. No scleral icterus.   Neck: Normal range of motion. Neck supple. No JVD present. No tracheal deviation present. No  thyromegaly present.   Cardiovascular: Regular rhythm, normal heart sounds and intact distal pulses. Exam reveals no friction rub.   No murmur heard.  Regular tachycardia   Pulmonary/Chest: Effort normal and breath sounds normal. No stridor. No respiratory distress. She has no wheezes.   Abdominal: Soft. She exhibits no distension. There is no tenderness. There is no guarding.   Musculoskeletal: Normal range of motion.   Lymphadenopathy:     She has no cervical adenopathy.   Neurological: She is alert and oriented to person, place, and time. No cranial nerve deficit or sensory deficit. She exhibits normal muscle tone. Coordination normal.   Skin: Skin is warm and dry. Capillary refill takes less than 2 seconds. No pallor.   Psychiatric: She has a normal mood and affect. Her behavior is normal. Judgment and thought content normal.   Nursing note and vitals reviewed.      Procedures           ED Course  ED Course as of Nov 11 0133   Sun Nov 11, 2018   0128 Manual /90  [TZ]   0129 D/W Dr Ordaz accepting to tele  [TZ]      ED Course User Index  [TZ] David Newell MD      XR Chest 1 View    (Results Pending)     Labs Reviewed   URINALYSIS W/ MICROSCOPIC IF INDICATED (NO CULTURE) - Abnormal; Notable for the following components:       Result Value    Ketones, UA Trace (*)     Leuk Esterase, UA Trace (*)     Nitrite, UA Positive (*)     All other components within normal limits   COMPREHENSIVE METABOLIC PANEL - Abnormal; Notable for the following components:    BUN 27 (*)     A/G Ratio 1.2 (*)     BUN/Creatinine Ratio 33.8 (*)     All other components within normal limits    Narrative:     The MDRD GFR formula is only valid for adults with stable renal function between ages 18 and 70.   TROPONIN (IN-HOUSE) - Abnormal; Notable for the following components:    Troponin I 0.251 (*)     All other components within normal limits    Narrative:     Ultra Troponin I Reference Range:         <=0.039 ng/mL:  Negative    0.04-0.779 ng/mL: Indeterminate Range. Suspicious of MI.  Clinical correlation required.       >=0.78  ng/mL: Consistent with myocardial injury.  Clinical correlation required.   CBC WITH AUTO DIFFERENTIAL - Abnormal; Notable for the following components:    MCHC 32.7 (*)     MPV 10.2 (*)     All other components within normal limits   CKMB INDEX CALCULATION - Abnormal; Notable for the following components:    CK-MB Index 7.9 (*)     All other components within normal limits   TROPONIN (IN-HOUSE) - Abnormal; Notable for the following components:    Troponin I 0.215 (*)     All other components within normal limits    Narrative:     Ultra Troponin I Reference Range:         <=0.039 ng/mL: Negative    0.04-0.779 ng/mL: Indeterminate Range. Suspicious of MI.  Clinical correlation required.       >=0.78  ng/mL: Consistent with myocardial injury.  Clinical correlation required.   CKMB INDEX CALCULATION - Abnormal; Notable for the following components:    CK-MB Index 8.5 (*)     All other components within normal limits   URINALYSIS, MICROSCOPIC ONLY - Abnormal; Notable for the following components:    Bacteria, UA 4+ (*)     All other components within normal limits   PROTIME-INR - Normal    Narrative:     Suggested INR therapeutic range for stable oral anticoagulant therapy:    Low Intensity therapy:   1.5-2.0  Moderate Intensity therapy:   2.0-3.0  High Intensity therapy:   2.5-4.0   APTT - Normal    Narrative:     PTT Heparin Therapeutic Range:  59 - 95 seconds   CK - Normal   CK MB - Normal   OSMOLALITY, CALCULATED - Normal   CK - Normal   CK MB - Normal   CBC AND DIFFERENTIAL    Narrative:     The following orders were created for panel order CBC & Differential.  Procedure                               Abnormality         Status                     ---------                               -----------         ------                     CBC Auto Differential[[id number]]        Abnormal            Final  result                 Please view results for these tests on the individual orders.        Medication List      ASK your doctor about these medications    calcium citrate-vitamin d 200-250 MG-UNIT tablet tablet  Commonly known as:  CITRACAL     celecoxib 200 MG capsule  Commonly known as:  CeleBREX     furosemide 20 MG tablet  Commonly known as:  LASIX     losartan 25 MG tablet  Commonly known as:  COZAAR     pantoprazole 40 MG EC tablet  Commonly known as:  PROTONIX     potassium chloride 10 MEQ CR capsule  Commonly known as:  MICRO-K     SYMBICORT 160-4.5 MCG/ACT inhaler  Generic drug:  budesonide-formoterol                    MDM  Number of Diagnoses or Management Options  Hypertensive urgency: established and worsening     Amount and/or Complexity of Data Reviewed  Clinical lab tests: ordered and reviewed  Tests in the radiology section of CPT®: ordered and reviewed  Obtain history from someone other than the patient: yes    Risk of Complications, Morbidity, and/or Mortality  Presenting problems: high  Diagnostic procedures: high  Management options: high    Patient Progress  Patient progress: (Fair)        Final diagnoses:   Hypertensive urgency            David Newell MD  11/11/18 0133

## 2018-11-11 NOTE — H&P
"    [facility] HOSPITALIST HISTORY AND PHYSICAL    Patient Identification:  Name:  Anh Saldana  Age:  83 y.o.  Sex:  female  :  1934  MRN:  [id number]   Visit Number:  [id number]  Room number:  3314/2S  Primary Care Physician:  Meme Medellin MD     Subjective     Chief complaint:    Chief Complaint   Patient presents with   • Hypertension   • Rapid Heart Rate     History of presenting illness:  83 y.o. female who presented to [facility] with a high blood pressure and high heart rate.  The patient is hard of hearing but her sister-in-law was at bedside and aided in the history of presenting illness.  Please note that the patient herself is a poor historian.  The patient measures her blood pressure and heart rate frequently and noticed that over the last couple of days her heart rate and blood pressure due to higher.  She did not remember the numbers.  Her family begged her to come to the hospital and thus she came to [facility] emergency department.  Despite the high blood pressures and high heart rates, the patient had no chest pain, no visual changes, and no headaches.  She has experienced some nasal drainage and \"head stuffiness\".  She thought maybe she was catching a virus.  She has been coughing a little bit but not producing any sputum.  She denies nausea, denies vomiting, denies diarrhea.  She denies abdominal pain.  In the emergency department, her heart rate was 129 and her blood pressure is 170/106.  EKG showed sinus tachycardia.  She was given Lopressor 25 by mouth once, Lopressor 5 mg IV once, and labetalol 20 mg IV once.  Patient was then admitted to [facility] with hypertensive urgency.    The patient denies taking any recent cold medication preparations for her cold-like symptoms.  She also denies missing any of her medication.  She only takes half a blood pressure pill a day, though she can't tell me the name of the pill.  She also takes half " a water pill every other day for swelling in her feet.  She says sometimes she'll take a whole water pill.  The last time she took a water pill was 2 days prior to admission.  When her blood pressure was so high, she took a whole blood pressure pill at home as well as an aspirin.  ---------------------------------------------------------------------------------------------------------------------   Review of Systems   Constitutional: Negative for chills, fatigue and fever.        Fast heart rate and high blood pressure   HENT: Positive for hearing loss (Chronic). Negative for postnasal drip, rhinorrhea, sneezing and sore throat.    Eyes: Negative for discharge and redness.   Respiratory: Negative for cough, shortness of breath and wheezing.    Cardiovascular: Negative for chest pain, palpitations and leg swelling.   Gastrointestinal: Negative for diarrhea, nausea and vomiting.   Genitourinary: Negative for decreased urine volume, dysuria and hematuria.   Musculoskeletal: Negative for arthralgias and joint swelling.   Skin: Negative for pallor, rash and wound.   Neurological: Negative for seizures, speech difficulty and headaches.   Hematological: Does not bruise/bleed easily.   Psychiatric/Behavioral: Negative for confusion, self-injury and suicidal ideas. The patient is not nervous/anxious.    ---------------------------------------------------------------------------------------------------------------------   Past Medical History:   Diagnosis Date   • Breast cancer (CMS/[facility]) 2011   • CHF (congestive heart failure) (CMS/H[facility])    • Chronic kidney disease     acute kidney failure   • [place] (hard of hearing)    • Hypertension    • Osteoarthritis    • Osteoporosis    • Rheumatoid arthritis (CMS/H[facility])    • Right-sided Bell's palsy    • Shortness of breath     sleeps with oxygen at night     Past Surgical History:   Procedure Laterality Date   • BREAST LUMPECTOMY Left     benign   • CATARACT EXTRACTION W/ INTRAOCULAR LENS   IMPLANT, BILATERAL Bilateral    • SHOULDER SURGERY Left     metal plate in left shoulder     Family History   Problem Relation Age of Onset   • Breast cancer Maternal Aunt      Socioeconomic History   • Marital status:    Tobacco Use   • Smoking status: Never Smoker   • Smokeless tobacco: Never Used   Substance and Sexual Activity   • Alcohol use: No   • Drug use: No   • Sexual activity: Defer   ---------------------------------------------------------------------------------------------------------------------   Allergies:  Penicillins  ---------------------------------------------------------------------------------------------------------------------   Medications below are reported home medications pulling from within the system; at this time, these medications have not been reconciled unless otherwise specified and are in the verification process for further verifcation as current home medications.    Prior to Admission Medications     Prescriptions Last Dose Informant Patient Reported? Taking?    budesonide-formoterol (SYMBICORT) 160-4.5 MCG/ACT inhaler   Yes No    Inhale 2 puffs 2 (Two) Times a Day.    calcium citrate-vitamin d (CITRACAL) 200-250 MG-UNIT tablet tablet   Yes No    Take 1 tablet by mouth Daily.    celecoxib (CeleBREX) 200 MG capsule   Yes No    Take 200 mg by mouth Daily.    furosemide (LASIX) 20 MG tablet   Yes No    Take 20 mg by mouth Daily As Needed.    losartan (COZAAR) 25 MG tablet   Yes No    Take 50 mg by mouth Daily.    pantoprazole (PROTONIX) 40 MG EC tablet   Yes No    Take 40 mg by mouth Daily.    potassium chloride (MICRO-K) 10 MEQ CR capsule   Yes No    Take 10 mEq by mouth Daily As Needed.     Objective     Vital Signs:  Temp:  [97.5 °F (36.4 °C)-98.3 °F (36.8 °C)] 98.3 °F (36.8 °C)  Heart Rate:  [] 95  Resp:  [16-18] 18  BP: (124-178)/() 135/90    Mean Arterial Pressure (Non-Invasive) for the past 24 hrs (Last 3 readings):   Noninvasive MAP (mmHg)   11/11/18  0203 128   11/11/18 0131 112   11/11/18 0101 104     SpO2:  [96 %-100 %] 96 %  Device (Oxygen Therapy): room air  Body mass index is 19.28 kg/m².    Wt Readings from Last 3 Encounters:   11/11/18 44.8 kg (98 lb 11.2 oz)   04/02/18 48.1 kg (106 lb)   09/19/17 45.8 kg (101 lb)               ---------------------------------------------------------------------------------------------------------------------   Physical Exam:  Constitutional:  Well-developed and well-nourished.  No respiratory distress.  Appears her stated age.  Some temporal muscle wasting bilaterally.   HENT:  Head: Normocephalic and atraumatic.  Mouth:  Moist mucous membranes.  Hard of hearing  Eyes:  Conjunctivae and EOM are normal.  Pupils are equal, round, and reactive to light.  No scleral icterus.  Neck:  Neck supple.  No JVD present.    Cardiovascular:  Normal rate, regular rhythm and normal heart sounds with no murmur.  Pulmonary/Chest:  No respiratory distress, no wheezes, no crackles, with normal breath sounds and good air movement.  Abdominal:  Soft.  Bowel sounds are normal.  No distension and no tenderness.   Musculoskeletal:  No edema, no tenderness, and no deformity.  No red or swollen joints anywhere.    Neurological:  Alert and oriented to person, place, and time.  No cranial nerve deficit except for bilateral hearing impairment.  No tongue deviation.  No facial droop.  No slurred speech.  Able to move her arms and legs on both sides.  Muscle and fat wasting on her bilateral arms.  Skin:  Skin is warm and dry.  No rash noted.  No pallor.   Peripheral vascular:  No edema and strong pulses on all 4 extremities.  Genitourinary:  No mello catheter in place.  ---------------------------------------------------------------------------------------------------------------------  EKG:  sinus tachycardia, heart rate 122, first-degree AV block, QTc 416 ms.  Telemetry:  NS 80's.  I have personally looked at both the EKG and the telemetry  strips.    ECHO:    --------------------------------------------------------------------------------------------------------------------  Results from last 7 days   Lab Units  11/10/18   2340  11/10/18   2145   CK TOTAL U/L  48  59   CKMB ng/mL  4.09  4.64   CK MB INDEX %  8.5*  7.9*   TROPONIN I ng/mL  0.215*  0.251*     Results from last 7 days   Lab Units  11/10/18   2145   WBC 10*3/mm3  5.72   HEMOGLOBIN g/dL  12.4   HEMATOCRIT %  37.9   MCV fL  87.9   MCHC g/dL  32.7*   PLATELETS 10*3/mm3  283   INR   0.98     Results from last 7 days   Lab Units  11/10/18   2145   SODIUM mmol/L  141   POTASSIUM mmol/L  4.1   CHLORIDE mmol/L  105   CO2 mmol/L  28.6   BUN mg/dL  27*   CREATININE mg/dL  0.80   EGFR IF NONAFRICN AM mL/min/1.73  69   CALCIUM mg/dL  9.4   GLUCOSE mg/dL  92   ALBUMIN g/dL  4.10   BILIRUBIN mg/dL  0.5   ALK PHOS U/L  69   AST (SGOT) U/L  22   ALT (SGPT) U/L  12   Estimated Creatinine Clearance: 37.7 mL/min (by C-G formula based on SCr of 0.8 mg/dL).          I have personally looked at the labs and they are summarized above.  ----------------------------------------------------------------------------------------------------------------------  Imaging Results (last 24 hours)     Procedure Component Value Units Date/Time    XR Chest 1 View [[id number]]:  I have personally looked at the chest x-ray.  There is a left proximal humerus plate and screws from repair of a fracture.  There are chronic changes versus fluid throughout the lungs.  There are no obvious infiltrates.  There is not a preliminary radiology read.  I await the final radiology read.   Updated:  11/10/18 3672     Assessment & Plan      -Hypertensive urgency  -Sinus tachycardia  -Viral rhinitis that probably is causing the above  -Indeterminate troponins   -Acute kidney injury with baseline creatinine 0.59 and admission creatinine 0.8  -Moderate tricuspid regurgitation causing moderate pulmonary hypertension  -Advanced  stage  -Osteoporosis  -Rheumatoid arthritis/osteoarthritis  -Wears dentures     I will hold the patient's home blood pressure medication of losartan and also hold the Lasix as she does have kidney failure.  We may be able to add a beta blocker for blood pressure control as she did well with this medication in the emergency department but since her blood pressures are currently controlled we will make this decision and a letter point in her hospitalization.  We will repeat her blood work in the morning.  We will monitor her for any worsening infectious symptoms that she does have a viral rhinitis.  She was given aspirin and we will continue this for now she does have the indeterminate troponins.  We will perform serial cardiac enzymes.  Suspect that the indeterminate troponins may be related to the acute kidney injury as the patient does not have chest pain.  Will use sequential compression devices for DVT prophylaxis.    Tobi Ordaz MD  [facility] Medicine Team  11/11/18  6:12 AM

## 2018-11-11 NOTE — PLAN OF CARE
Problem: Patient Care Overview  Goal: Plan of Care Review  Outcome: Ongoing (interventions implemented as appropriate)    Goal: Individualization and Mutuality  Outcome: Ongoing (interventions implemented as appropriate)    Goal: Interprofessional Rounds/Family Conf  Outcome: Ongoing (interventions implemented as appropriate)      Problem: Fall Risk (Adult)  Goal: Identify Related Risk Factors and Signs and Symptoms  Outcome: Ongoing (interventions implemented as appropriate)    Goal: Absence of Fall  Outcome: Ongoing (interventions implemented as appropriate)      Problem: Cardiac Output Decreased (Adult)  Goal: Identify Related Risk Factors and Signs and Symptoms  Outcome: Ongoing (interventions implemented as appropriate)    Goal: Effective Tissue Perfusion  Outcome: Ongoing (interventions implemented as appropriate)      Problem: Skin Injury Risk (Adult)  Goal: Identify Related Risk Factors and Signs and Symptoms  Outcome: Ongoing (interventions implemented as appropriate)    Goal: Skin Health and Integrity  Outcome: Ongoing (interventions implemented as appropriate)

## 2018-11-11 NOTE — PLAN OF CARE
Problem: Patient Care Overview  Goal: Plan of Care Review   11/11/18 0233   Coping/Psychosocial   Plan of Care Reviewed With patient;family     Goal: Individualization and Mutuality  Outcome: Ongoing (interventions implemented as appropriate)    Goal: Discharge Needs Assessment   11/11/18 0215 11/11/18 0216   Disability   Equipment Currently Used at Home oxygen --    Discharge Needs Assessment   Patient/Family Anticipates Transition to --  home   Patient/Family Anticipated Services at Transition --     Transportation Concerns --  car, none   Transportation Anticipated --  family or friend will provide     Goal: Interprofessional Rounds/Family Conf   11/11/18 0250   Interdisciplinary Rounds/Family Conf   Participants patient       Problem: Fall Risk (Adult)  Goal: Identify Related Risk Factors and Signs and Symptoms   11/11/18 0250   Fall Risk (Adult)   Related Risk Factors (Fall Risk) age-related changes     Goal: Absence of Fall   11/11/18 0250   Fall Risk (Adult)   Absence of Fall making progress toward outcome       Problem: Cardiac Output Decreased (Adult)  Goal: Identify Related Risk Factors and Signs and Symptoms   11/11/18 0250   Cardiac Output Decreased (Adult)   Related Risk Factors (Cardiac Output Decreased) heart failure   Signs and Symptoms (Cardiac Output Decreased) shortness of breath     Goal: Effective Tissue Perfusion   11/11/18 0250   Cardiac Output Decreased (Adult)   Effective Tissue Perfusion making progress toward outcome       Problem: Skin Injury Risk (Adult)  Goal: Identify Related Risk Factors and Signs and Symptoms  Outcome: Ongoing (interventions implemented as appropriate)    Goal: Skin Health and Integrity   11/11/18 0250   Skin Injury Risk (Adult)   Skin Health and Integrity making progress toward outcome

## 2018-11-11 NOTE — DISCHARGE SUMMARY
[facility] HOSPITALISTS DISCHARGE SUMMARY    Patient Identification:  Name:  Anh Saldana  Age:  83 y.o.  Sex:  female  :  1934  MRN:  [id number]  Visit Number:  [id number]    Date of Admission: 11/10/2018  Date of Discharge:  2018     PCP: Meme Medellin MD    DISCHARGE DIAGNOSIS  -Hypertensive urgency with sinus tachycardia, meds induced with bronchodilator vs viral URTI, stable now  -PAF/A.flutter, rate controlled, detected on tele, stable vitals  -possible UTI, early  -mild troponenemia, pt doesn't prefer stress test at this time, no bump, suspected demanding ischemia, no chest pain  -Moderate tricuspid regurgitation causing moderate pulmonary hypertension  -Osteoporosis  -Rheumatoid arthritis/osteoarthritis        HOSPITAL COURSE  Patient is a 83 y.o. female presented to [facility] complaining of elevated BP per home machine check.  Please see the admitting history and physical for further details.  She was noted to have SBP In the 180's/110's with HR in the 120's.  The pt responded nicely to IV labetalol.  She was stopped from her lasix / losartan, placed on metoprolol 25 BID and monitored over the day.  She was noted to have A.flutter per ECG and PAF on tele with controlled rate. TSH was normal and troponin trended down.  She was started on nitrofurantoin for suspected early UTI.  She was counseled about the stress test but preferred not to have it now.  Will refer her to Dr. Jenkins in [facility] for such.  After discussing her rhythm and clotting risk with elevated [id number], she agreed to be on apixaban and will stop aspirin.         VITAL SIGNS:  Temp:  [97.5 °F (36.4 °C)-98.3 °F (36.8 °C)] 97.5 °F (36.4 °C)  Heart Rate:  [] 82  Resp:  [16-18] 18  BP: (109-178)/() 109/66  SpO2:  [95 %-100 %] 95 %  on   ;   Device (Oxygen Therapy): room air    Body mass index is 19.28 kg/m².  Wt Readings from Last 3 Encounters:   18 44.8 kg (98 lb 11.2 oz)   18  48.1 kg (106 lb)   09/19/17 45.8 kg (101 lb)       PHYSICAL EXAM:  Constitutional:  Well-developed and well-nourished. underweight, pleasant not in respiratory distress.  Appears her stated age.   HENT:  Head: Normocephalic and atraumatic.  Mouth:  Moist mucous membranes.  Hard of hearing  Eyes:  Conjunctivae and EOM are normal.  Pupils are equal, round, and reactive to light.  No scleral icterus.  Neck:  Neck supple.  No JVD present.    Cardiovascular:  Normal rate, regular rhythm and normal heart sounds with MDM at MV/TR 2/6 and ESM at AV 2/6  Pulmonary/Chest:  No respiratory distress, no wheezes, no crackles, with normal breath sounds and good air movement.  Abdominal:  Soft.  Bowel sounds are normal.  No distension and no tenderness.   Musculoskeletal:  No edema, no tenderness, and no deformity.  No red or swollen joints anywhere.    Neurological:  Alert and oriented to person, place, and time.  No cranial nerve deficit except for bilateral hearing impairment.  No tongue deviation.  No facial droop.  No slurred speech.  Able to move her arms and legs on both sides.  Muscle and fat wasting on her bilateral arms.  Skin:  Skin is warm and dry.  No rash noted.  No pallor.   Peripheral vascular:  No edema and strong pulses on all 4 extremities.            DISCHARGE DISPOSITION   Stable    DISCHARGE MEDICATIONS:     Discharge Medications      New Medications      Instructions Start Date   apixaban 2.5 MG tablet tablet  Commonly known as:  ELIQUIS   2.5 mg, Oral, Every 12 Hours Scheduled      metoprolol tartrate 25 MG tablet  Commonly known as:  LOPRESSOR   25 mg, Oral, Every 12 Hours Scheduled      nitrofurantoin (macrocrystal-monohydrate) 100 MG capsule  Commonly known as:  MACROBID   100 mg, Oral, Every 12 Hours Scheduled         Continue These Medications      Instructions Start Date   calcium citrate-vitamin d 200-250 MG-UNIT tablet tablet  Commonly known as:  CITRACAL   1 tablet, Oral, Daily      Fluticasone  Furoate-Vilanterol 200-25 MCG/INH aerosol powder    1 puff, Inhalation, Daily      pantoprazole 40 MG EC tablet  Commonly known as:  PROTONIX   40 mg, Oral, Daily         Stop These Medications    aspirin 81 MG EC tablet     celecoxib 200 MG capsule  Commonly known as:  CeleBREX     furosemide 20 MG tablet  Commonly known as:  LASIX     losartan 25 MG tablet  Commonly known as:  COZAAR     potassium chloride 10 MEQ CR capsule  Commonly known as:  MICRO-K            Diet Instructions     Diet: Regular      Discharge Diet:  Regular    2gm Na        Activity Instructions     Activity as Tolerated          No future appointments.    Additional Instructions for the Follow-ups that You Need to Schedule     Discharge Follow-up with PCP   As directed       Currently Documented PCP:    Meme Medellin MD    PCP Phone Number:    [phone]     Follow Up Details:  in a week         Discharge Follow-up with Specified Provider: Dr. Jenkins; 1 Week   As directed      To:  Dr. Jenkins    Follow Up:  1 Week    Follow Up Details:  Stress test eval           Follow-up Information     Meme Medellin MD Follow up in 1 week(s).    Specialty:  Geriatric Medicine  Why:  PATIENT WILL BE NOTIFIED ABOUT APPT ON MONDAY 11/12  Contact information:  [address] [place] [address]  [phone]             Alex Jenkins MD Follow up in 1 week(s).    Specialty:  Cardiology  Why:  stress test eval: PATIENT WILL BE NOTIFIED ABOUT APPT ON MONDAY 11/12  Contact information:  [address] [place] [address]  [phone]                    TEST  RESULTS PENDING AT DISCHARGE       CODE STATUS  Code Status and Medical Interventions:   Ordered at: 11/11/18 0548     Level Of Support Discussed With:    Patient     Code Status:    CPR     Medical Interventions (Level of Support Prior to Arrest):    Full       Mhd Cassie Asher MD  11/11/18  3:08 PM    Please note that this discharge summary required more than 30 minutes to complete.    Please  send a copy of this dictation to the following providers:  Meme Medellin MD

## 2019-01-07 ENCOUNTER — OFFICE VISIT (OUTPATIENT)
Dept: CARDIOLOGY | Facility: CLINIC | Age: 84
End: 2019-01-07

## 2019-01-07 ENCOUNTER — HOSPITAL ENCOUNTER (OUTPATIENT)
Dept: GENERAL RADIOLOGY | Facility: HOSPITAL | Age: 84
Discharge: HOME | End: 2019-01-07
Attending: INTERNAL MEDICINE | Admitting: INTERNAL MEDICINE

## 2019-01-07 VITALS
HEART RATE: 100 BPM | DIASTOLIC BLOOD PRESSURE: 120 MMHG | BODY MASS INDEX: 19.44 KG/M2 | SYSTOLIC BLOOD PRESSURE: 197 MMHG | HEIGHT: 60 IN | OXYGEN SATURATION: 97 % | WEIGHT: 99 LBS

## 2019-01-07 DIAGNOSIS — R06.09 DYSPNEA ON EXERTION: ICD-10-CM

## 2019-01-07 DIAGNOSIS — I10 UNCONTROLLED HYPERTENSION: ICD-10-CM

## 2019-01-07 DIAGNOSIS — I48.19 PERSISTENT ATRIAL FIBRILLATION (HCC): Primary | ICD-10-CM

## 2019-01-07 PROCEDURE — 71046 X-RAY EXAM CHEST 2 VIEWS: CPT | Performed by: RADIOLOGY

## 2019-01-07 PROCEDURE — 71046 X-RAY EXAM CHEST 2 VIEWS: CPT

## 2019-01-07 PROCEDURE — 99204 OFFICE O/P NEW MOD 45 MIN: CPT | Performed by: INTERNAL MEDICINE

## 2019-01-07 PROCEDURE — 93000 ELECTROCARDIOGRAM COMPLETE: CPT | Performed by: INTERNAL MEDICINE

## 2019-01-07 RX ORDER — AMLODIPINE BESYLATE 5 MG/1
5 TABLET ORAL DAILY
Qty: 30 TABLET | Refills: 5 | Status: SHIPPED | OUTPATIENT
Start: 2019-01-07 | End: 2020-01-07 | Stop reason: SDUPTHER

## 2019-01-07 RX ORDER — METOPROLOL TARTRATE 50 MG/1
50 TABLET, FILM COATED ORAL EVERY MORNING
COMMUNITY

## 2019-01-07 NOTE — PROGRESS NOTES
Meme Medellin MD  Anh Saldana  1934 01/07/2019    Patient Active Problem List   Diagnosis   • Osteoporosis, unspecified   • Hypertensive urgency   • Uncontrolled hypertension   • Dyspnea on exertion       Dear Meme Medellin MD:    Ravinder Saldana is a 84 y.o. female with the problems as listed above, presents     Chief complaint: Recent onset atrial fibrillation and uncontrolled hypertension.    History of Present Illness: Ms. Saldana is a pleasant 84-year-old  female who was recently diagnosed of atrial fibrillation for which she was started on metoprolol and Eliquis.  She has been referred here now to address her atrial fibrillation.  On further questioning she denies any significant palpitations, dizziness, shortness of breath or chest pains associated with atrial fibrillation.  She has some intermittent dizziness especially when she stands up but denies any syncope.  She denies any bleeding problems from the Eliquis.  Her blood pressure in the office today is elevated at 197/120.  She has not taken her metoprolol this morning yet.  Her blood pressure at home is reportedly running around 170s systolicand 90s to 100s diastolic.  She denies any chest pains.  She has chronic dyspnea with mild exertion.  Her exercise capacity is limited by arthritis (rheumatoid arthritis) .      Allergies   Allergen Reactions   • Penicillins Swelling and Rash   • Doxycycline Other (See Comments)     ?   • Hydroxychloroquine Unknown (See Comments)     ?   • Latex Rash     ?   :      Current Outpatient Medications:   •  apixaban (ELIQUIS) 2.5 MG tablet tablet, Take 1 tablet by mouth Every 12 (Twelve) Hours., Disp: 60 tablet, Rfl: 0  •  calcium citrate-vitamin d (CITRACAL) 200-250 MG-UNIT tablet tablet, Take 1 tablet by mouth Daily., Disp: , Rfl:   •  Fluticasone Furoate-Vilanterol 200-25 MCG/INH aerosol powder , Inhale 1 puff Daily., Disp: , Rfl:   •  metoprolol tartrate (LOPRESSOR) 50 MG tablet,  Take 50 mg by mouth 2 (Two) Times a Day., Disp: , Rfl:   •  O2 (OXYGEN), Inhale 2 L/min Every Night., Disp: , Rfl:   •  pantoprazole (PROTONIX) 40 MG EC tablet, Take 40 mg by mouth Daily., Disp: , Rfl:   •  amLODIPine (NORVASC) 5 MG tablet, Take 1 tablet by mouth Daily., Disp: 30 tablet, Rfl: 5    Past Medical History:   Diagnosis Date   • Breast cancer (CMS/[facility]) 2011   • CHF (congestive heart failure) (CMS/[facility])    • Chronic kidney disease     acute kidney failure   • [place] (hard of hearing)    • Hypertension    • Osteoarthritis    • Osteoporosis    • Rheumatoid arthritis (CMS/[facility])    • Right-sided Bell's palsy    • Shortness of breath     sleeps with oxygen at night     Past Surgical History:   Procedure Laterality Date   • BREAST LUMPECTOMY Left     benign   • CATARACT EXTRACTION W/ INTRAOCULAR LENS  IMPLANT, BILATERAL Bilateral    • SHOULDER SURGERY Left     metal plate in left shoulder     Family History   Problem Relation Age of Onset   • Breast cancer Maternal Aunt    • Hypertension Mother    • Hypertension Father    • Heart disease Sister         MI in her 80's     Social History     Tobacco Use   • Smoking status: Never Smoker   • Smokeless tobacco: Never Used   Substance Use Topics   • Alcohol use: No   • Drug use: No       Review of Systems   Constitution: Positive for decreased appetite and malaise/fatigue.   Eyes:        Recent eye infection     Cardiovascular: Positive for dyspnea on exertion and palpitations. Negative for chest pain, irregular heartbeat and leg swelling.   Respiratory: Positive for cough and shortness of breath.    Hematologic/Lymphatic: Bruises/bleeds easily.   Musculoskeletal: Positive for arthritis, back pain, joint pain, joint swelling, muscle weakness and stiffness.        Difficulty in walking     Gastrointestinal: Positive for change in bowel habit, constipation and heartburn.   Genitourinary: Positive for bladder incontinence and frequency.   Neurological: Positive for dizziness  "(at times/ upon standing) and headaches.       Objective   Blood pressure (!) 197/120, pulse 100, height 152.4 cm (60\"), weight 44.9 kg (99 lb), SpO2 97 %.  Body mass index is 19.33 kg/m².        Physical Exam   Constitutional: She is oriented to person, place, and time.   Pleasant elderly lady who is thin built, alert, oriented ×3 and is in no apparent distress at this time.   HENT:   Mouth/Throat: Oropharynx is clear and moist.   Eyes: EOM are normal. Pupils are equal, round, and reactive to light.   Neck: Neck supple. No JVD present. No tracheal deviation present. No thyromegaly present.   Cardiovascular: Normal rate, regular rhythm, S1 normal and S2 normal. Exam reveals no gallop and no friction rub.   No murmur heard.  Pulmonary/Chest: Effort normal. She has rales (rales the right base.).   Abdominal: Soft. Bowel sounds are normal. She exhibits no mass. There is no tenderness.   Musculoskeletal: Normal range of motion. She exhibits no edema.   Lymphadenopathy:     She has no cervical adenopathy.   Neurological: She is alert and oriented to person, place, and time.   Skin: Skin is warm and dry. No rash noted.   Psychiatric: She has a normal mood and affect.       Lab Results   Component Value Date     11/10/2018    K 4.1 11/10/2018     11/10/2018    CO2 28.6 11/10/2018    BUN 27 (H) 11/10/2018    CREATININE 0.80 11/10/2018    GLUCOSE 92 11/10/2018    CALCIUM 9.4 11/10/2018    AST 22 11/10/2018    ALT 12 11/10/2018    ALKPHOS 69 11/10/2018     Lab Results   Component Value Date    CKTOTAL 46 11/11/2018     Lab Results   Component Value Date    WBC 5.72 11/10/2018    HGB 12.4 11/10/2018    HCT 37.9 11/10/2018     11/10/2018     Lab Results   Component Value Date    INR 0.98 11/10/2018     No results found for: MG  Lab Results   Component Value Date    TSH 2.775 11/11/2018     During this visit the following were done:  Labs Reviewed [x]    Hospital Records Reviewed [x]          ECG 12 " Lead  Date/Time: 1/7/2019 9:14 AM  Performed by: Alex Jenkins MD  Authorized by: Alex Jenkins MD   Rhythm: atrial fibrillation  BPM: 96  Conduction: conduction normal  ST Segments: ST segments normal              Assessment/Plan :   Diagnosis Plan   1. Persistent atrial fibrillation..    2. Uncontrolled hypertension     3. Dyspnea on exertion  XR Chest PA & Lateral     Recommendations:    1. Will add amlodipine 5 mg daily.    2. Continue with metoprolol tartrate 2 mg by mouth twice a day.  Continue with Eliquis as well.  3. Continue to keep a check on the blood pressure at home with a goal to keep her blood pressure on the top less than 140 and on the bottom less than 90.  4. Given her age, and with patient being asymptomatic with that rate controlled atrial fibrillation, would only aim for rate control and not consider cardioversion or antiarrhythmic therapy.  5. Will get a chest x-ray since she has some rales at the right base.    Return in about 2 months (around 3/7/2019).    As always, Meme Medellin MD  I appreciate very much the opportunity to participate in the cardiovascular care of your patients. Please do not hesitate to call me with any questions with regards to Anh Saldana evaluation and management.       With Best Regards,        Alex Jenkins MD, [facility]    Dragon disclaimer:  Much of this encounter note is an electronic transcription/translation of spoken language to printed text. The electronic translation of spoken language may permit erroneous, or at times, nonsensical words or phrases to be inadvertently transcribed; Although I have reviewed the note for such errors, some may still exist.

## 2019-01-08 ENCOUNTER — DOCUMENTATION (OUTPATIENT)
Dept: CARDIOLOGY | Facility: CLINIC | Age: 84
End: 2019-01-08

## 2019-01-08 ENCOUNTER — TELEPHONE (OUTPATIENT)
Dept: CARDIOLOGY | Facility: CLINIC | Age: 84
End: 2019-01-08

## 2019-01-08 DIAGNOSIS — R06.09 DYSPNEA ON EXERTION: Primary | ICD-10-CM

## 2019-01-08 RX ORDER — FUROSEMIDE 20 MG/1
20 TABLET ORAL 2 TIMES DAILY
Qty: 180 TABLET | Refills: 3 | Status: SHIPPED | OUTPATIENT
Start: 2019-01-08 | End: 2019-11-21 | Stop reason: SDUPTHER

## 2019-01-09 ENCOUNTER — TELEPHONE (OUTPATIENT)
Dept: CARDIOLOGY | Facility: CLINIC | Age: 84
End: 2019-01-09

## 2019-01-09 RX ORDER — POTASSIUM CHLORIDE 750 MG/1
10 TABLET, FILM COATED, EXTENDED RELEASE ORAL 2 TIMES DAILY
Qty: 180 TABLET | Refills: 3 | Status: SHIPPED | OUTPATIENT
Start: 2019-01-09 | End: 2021-06-17 | Stop reason: SDUPTHER

## 2019-01-09 NOTE — TELEPHONE ENCOUNTER
Called pt to advise her and she wanted me to speak with Octavio. I advised him Olivier did send in her some potassium. I advised him that I am going to check with the instructions on how to take the potassium due to theRX stating for her to to take 1 tab BID. He expressed understanding.

## 2019-01-09 NOTE — TELEPHONE ENCOUNTER
Pt called back and wanted to know if she needed to be on Potassium with her water pail due to in the past her PCP would put her on both when she needed to take a water pill. I advised her that one of us will giver her a call back. She expressed understanding.

## 2019-01-09 NOTE — TELEPHONE ENCOUNTER
Per verbal from Olivier, pt ok to take her Potassium 10 mEq once daily for now.  I asked Octavio if he understood the instructions for pt.  He said he was a little confused.  I advised him of Olivier's phone message to pt 1/8/19 about the lasix instructions and labs recheck on Friday.  Also advised him of sooner appt w/Dr. Lopez per Olivier's CXR result note.  Octavio repeated the instructions for pt back to me correctly.  He v/u.  Pt was standing near Octavio during this call, she voiced understanding as well and was agreeable.

## 2019-01-09 NOTE — TELEPHONE ENCOUNTER
----- Message from Kary Ellsworth sent at 1/9/2019 11:24 AM EST -----  Dr.Reddy nation have anything so I put her with  on 1/16/19 at 10:20 is this ok ?

## 2019-01-09 NOTE — TELEPHONE ENCOUNTER
Notes recorded by Kary Ellsworth on 1/9/2019 at 11:24 AM EST   dosent have anything so I put her with  on 1/16/19 at 10:20 is this ok ?  ------    Notes recorded by Tea Tomlin CMA on 1/9/2019 at 11:06 AM EST  Appt req sent to registration.  Will remind pt of Olivier's instructions for her lasix when I receive her new appt.  ------    Notes recorded by Olivier Gardner PA-C on 1/8/2019 at 5:49 PM EST  Please move patient's apointment up to be seen in a week with Dr. Jenkins. Also remind her to take lasix 40 mg daily for 3 days then 20 mg until her apointment. I already called her and told her but just remind her.

## 2019-01-11 ENCOUNTER — LAB (OUTPATIENT)
Dept: LAB | Facility: HOSPITAL | Age: 84
End: 2019-01-11

## 2019-01-11 DIAGNOSIS — R06.09 DYSPNEA ON EXERTION: ICD-10-CM

## 2019-01-11 LAB
ANION GAP SERPL CALCULATED.3IONS-SCNC: 8 MMOL/L (ref 3.6–11.2)
BUN BLD-MCNC: 24 MG/DL (ref 7–21)
BUN/CREAT SERPL: 29.6 (ref 7–25)
CALCIUM SPEC-SCNC: 11 MG/DL (ref 7.7–10)
CHLORIDE SERPL-SCNC: 99 MMOL/L (ref 99–112)
CO2 SERPL-SCNC: 29 MMOL/L (ref 24.3–31.9)
CREAT BLD-MCNC: 0.81 MG/DL (ref 0.43–1.29)
GFR SERPL CREATININE-BSD FRML MDRD: 67 ML/MIN/1.73
GLUCOSE BLD-MCNC: 92 MG/DL (ref 70–110)
OSMOLALITY SERPL CALC.SUM OF ELEC: 275.6 MOSM/KG (ref 273–305)
POTASSIUM BLD-SCNC: 3.9 MMOL/L (ref 3.5–5.3)
SODIUM BLD-SCNC: 136 MMOL/L (ref 135–153)

## 2019-01-11 PROCEDURE — 36415 COLL VENOUS BLD VENIPUNCTURE: CPT

## 2019-01-11 PROCEDURE — 80048 BASIC METABOLIC PNL TOTAL CA: CPT

## 2019-01-16 ENCOUNTER — OFFICE VISIT (OUTPATIENT)
Dept: CARDIOLOGY | Facility: CLINIC | Age: 84
End: 2019-01-16

## 2019-01-16 VITALS
SYSTOLIC BLOOD PRESSURE: 111 MMHG | WEIGHT: 96 LBS | RESPIRATION RATE: 16 BRPM | HEART RATE: 61 BPM | HEIGHT: 60 IN | DIASTOLIC BLOOD PRESSURE: 69 MMHG | BODY MASS INDEX: 18.85 KG/M2

## 2019-01-16 DIAGNOSIS — I10 ESSENTIAL HYPERTENSION: ICD-10-CM

## 2019-01-16 DIAGNOSIS — H91.90 HEARING LOSS, UNSPECIFIED HEARING LOSS TYPE, UNSPECIFIED LATERALITY: ICD-10-CM

## 2019-01-16 DIAGNOSIS — I48.19 PERSISTENT ATRIAL FIBRILLATION (HCC): Primary | ICD-10-CM

## 2019-01-16 DIAGNOSIS — R06.09 DYSPNEA ON EXERTION: ICD-10-CM

## 2019-01-16 DIAGNOSIS — I50.9 CONGESTIVE HEART FAILURE, UNSPECIFIED HF CHRONICITY, UNSPECIFIED HEART FAILURE TYPE (HCC): ICD-10-CM

## 2019-01-16 DIAGNOSIS — N18.9 CHRONIC KIDNEY DISEASE, UNSPECIFIED CKD STAGE: ICD-10-CM

## 2019-01-16 PROCEDURE — 99213 OFFICE O/P EST LOW 20 MIN: CPT | Performed by: INTERNAL MEDICINE

## 2019-01-16 NOTE — PROGRESS NOTES
Anh Saldana  1934 01/16/2019   Ref:No referring provider defined for this encounter.  Pcp: Meme Medellin MD  [address] [place] [address]    Follow up for:  Chief Complaint   Patient presents with   • Follow-up     chest x ray and lab findings   • Fatigue     tires q uickly   • Med Management     list provided        Patient Active Problem List   Diagnosis   • Osteoporosis, unspecified   • Hypertensive urgency   • Essential hypertension   • Dyspnea on exertion   • CHF (congestive heart failure) (CMS/HCC)   • Persistent atrial fibrillation (CMS/HCC)   • Hearing loss   • Chronic kidney disease       HPI     Ms. Saldana presents to office for cardiology follow up. It is noted at last office visit her blood pressure was poorly controlled.  Home readings presented range [id number] systolic. She notes she feels better.  She feels less nervousness and she is more relaxed.  She notes her energy is ok. She is able to ambulate well at home with the aid of a cane.  She has her brother who lives close to her, across the street.  She carries her cordless phone with her everywhere she goes at home, in case she was to fall and/or need help.  She uses home O2, 2L, and an inhaler once daily as needed.  She denies chest pain.  Denies SAINI with ambulation.  She says she sleeps with 1 pillow and is able to breathe well while lying down. She denies significant LE edema, reporting she noticed improvement after Lasix therapy.  She denies palpitations.  She notes some dizziness upon standing.  She notes she tries to hold onto something sturdy when standing, to aid in her balance.  She reports her appetite is good.      Review of Systems   Constitution: Negative for decreased appetite.   HENT: Positive for hearing loss.    Cardiovascular: Negative for chest pain, leg swelling, orthopnea and palpitations. Syncope: denies syncopal episodes.   Neurological: Dizziness: only upon standing.         Current Outpatient Medications:  "  •  amLODIPine (NORVASC) 5 MG tablet, Take 1 tablet by mouth Daily., Disp: 30 tablet, Rfl: 5  •  apixaban (ELIQUIS) 2.5 MG tablet tablet, Take 1 tablet by mouth Every 12 (Twelve) Hours., Disp: 60 tablet, Rfl: 0  •  calcium citrate-vitamin d (CITRACAL) 200-250 MG-UNIT tablet tablet, Take 1 tablet by mouth Daily., Disp: , Rfl:   •  Fluticasone Furoate-Vilanterol 200-25 MCG/INH aerosol powder , Inhale 1 puff Daily., Disp: , Rfl:   •  furosemide (LASIX) 20 MG tablet, Take 1 tablet by mouth 2 (Two) Times a Day., Disp: 180 tablet, Rfl: 3  •  metoprolol tartrate (LOPRESSOR) 50 MG tablet, Take 50 mg by mouth 2 (Two) Times a Day., Disp: , Rfl:   •  O2 (OXYGEN), Inhale 2 L/min Every Night., Disp: , Rfl:   •  pantoprazole (PROTONIX) 40 MG EC tablet, Take 40 mg by mouth Daily., Disp: , Rfl:   •  potassium chloride (K-DUR) 10 MEQ CR tablet, Take 1 tablet by mouth 2 (Two) Times a Day. (Patient taking differently: Take 10 mEq by mouth Daily.), Disp: 180 tablet, Rfl: 3    Allergies   Allergen Reactions   • Penicillins Swelling and Rash   • Doxycycline Other (See Comments)     ?   • Hydroxychloroquine Unknown (See Comments)     ?   • Latex Rash     ?       /69   Pulse 61   Resp 16   Ht 152.4 cm (60\")   Wt 43.5 kg (96 lb)   BMI 18.75 kg/m²        Physical Exam   Constitutional: She is oriented to person, place, and time. She appears well-developed. No distress.   Very thin and frail   HENT:   Extremely hard of hearing   Cardiovascular:   Pulses:       Carotid pulses are 1+ on the right side.  Pulmonary/Chest: Rales: bibasilar coarse rales.   Neurological: She is alert and oriented to person, place, and time.   Psychiatric: She has a normal mood and affect. Her behavior is normal. Judgment and thought content normal.   :    Lab Review:    Procedures    Lab Results   Component Value Date     01/11/2019    K 3.9 01/11/2019    CL 99 01/11/2019    CO2 29.0 01/11/2019    BUN 24 (H) 01/11/2019    CREATININE 0.81 01/11/2019 "    GLUCOSE 92 01/11/2019    CALCIUM 11.0 (H) 01/11/2019    AST 22 11/10/2018    ALT 12 11/10/2018    ALKPHOS 69 11/10/2018     Lab Results   Component Value Date    CKTOTAL 46 11/11/2018     Lab Results   Component Value Date    WBC 5.72 11/10/2018    HGB 12.4 11/10/2018    HCT 37.9 11/10/2018     11/10/2018     Lab Results   Component Value Date    INR 0.98 11/10/2018     No results found for: MG  Lab Results   Component Value Date    TSH 2.775 11/11/2018      No results found for: BNP    Chemistry        Component Value Date/Time     01/11/2019 1028    K 3.9 01/11/2019 1028    CL 99 01/11/2019 1028    CO2 29.0 01/11/2019 1028    BUN 24 (H) 01/11/2019 1028    CREATININE 0.81 01/11/2019 1028        Component Value Date/Time    CALCIUM 11.0 (H) 01/11/2019 1028    ALKPHOS 69 11/10/2018 2145    AST 22 11/10/2018 2145    ALT 12 11/10/2018 2145    BILITOT 0.5 11/10/2018 2145        Echocardiogram 1/31/18:    · Normal left ventricular cavity size and wall thickness noted. All left ventricular wall segments contract normally.  · Estimated EF appears to be in the range of greater than 70%.  · The aortic valve is abnormal in structure. The valve exhibits sclerosis. There is mild calcification of the aortic valve mainly affecting the non coronary cusp(s).Mild aortic valve regurgitation is present. Mild aortic valve stenosis is present.  · The mitral valve is abnormal in structure. Mild mitral annular calcification is present. Mild mitral valve regurgitation is present. Mild mitral valve stenosis is present.  · The tricuspid valve is grossly normal. Moderate tricuspid valve regurgitation is present. Estimated right ventricular systolic pressure from tricuspid regurgitation is moderately elevated (45-55 mmHg).  · Moderate pulmonary hypertension is present.  · There is no evidence of pericardial effusion.        Impression:   Diagnosis Plan   1. Persistent atrial fibrillation (CMS/HCC)     2. Congestive heart  failure, unspecified HF chronicity, unspecified heart failure type (CMS/HCC)     3. Essential hypertension recently suboptimally controlled  Basic Metabolic Panel   4. Dyspnea on exertion  Basic Metabolic Panel   5. Hearing loss, unspecified hearing loss type, unspecified laterality     6. Chronic kidney disease, unspecified CKD stage         Plan:    Continue current therapy  Check BMP.  Keep follow up with PCP; ask pcp about the possible reinitiation of Celebrex for her arthritis.  Return in 1 month.        Return in 4 weeks (on 2/13/2019) for recheck.     This document signed by Surendra Lopez MD January 16, 2019 2:53 PM     Scribed for MD Tea Jurado by . 1/16/2019  2:54 PM    I, Surendra Lopez MD, personally performed the services described in this documentation as scribed by the above named individual in my presence, and it is both accurate and complete.  1/16/2019  2:54 PM

## 2019-02-25 ENCOUNTER — OFFICE VISIT (OUTPATIENT)
Dept: CARDIOLOGY | Facility: CLINIC | Age: 84
End: 2019-02-25

## 2019-02-25 VITALS
DIASTOLIC BLOOD PRESSURE: 82 MMHG | HEART RATE: 95 BPM | WEIGHT: 100 LBS | HEIGHT: 60 IN | OXYGEN SATURATION: 97 % | BODY MASS INDEX: 19.63 KG/M2 | SYSTOLIC BLOOD PRESSURE: 126 MMHG | RESPIRATION RATE: 16 BRPM

## 2019-02-25 DIAGNOSIS — I48.19 PERSISTENT ATRIAL FIBRILLATION (HCC): Primary | ICD-10-CM

## 2019-02-25 DIAGNOSIS — Z79.01 LONG TERM CURRENT USE OF ANTICOAGULANT: ICD-10-CM

## 2019-02-25 DIAGNOSIS — N18.9 CHRONIC KIDNEY DISEASE, UNSPECIFIED CKD STAGE: ICD-10-CM

## 2019-02-25 DIAGNOSIS — I10 ESSENTIAL HYPERTENSION: ICD-10-CM

## 2019-02-25 DIAGNOSIS — I35.0 NONRHEUMATIC AORTIC VALVE STENOSIS: ICD-10-CM

## 2019-02-25 PROCEDURE — 99213 OFFICE O/P EST LOW 20 MIN: CPT | Performed by: PHYSICIAN ASSISTANT

## 2019-02-25 NOTE — PROGRESS NOTES
Meme Medellin MD  Anh Saldana  1934 02/25/2019    Patient Active Problem List   Diagnosis   • Osteoporosis, unspecified   • Hypertensive urgency   • Essential hypertension   • Dyspnea on exertion   • CHF (congestive heart failure) (CMS/HCC)   • Persistent atrial fibrillation (CMS/HCC)   • Hearing loss   • Chronic kidney disease     Dear Meme Medellin MD:    Chief Complaint   Patient presents with   • Atrial Fibrillation   • Shortness of Breath     Subjective     Anh Saldana is a 84 y.o. female with a past medical history significant for persistent/possibly chronic atrial fibrillation, congestive heart failure, essential hypertension, and CKD. She presents to the office today for a follow up visit. She has kept a log of her BP and HR. BP runs 130s-140s systolic and heart rate runs 70s-80s. She has occasional mild palpitations when she gets excited. No chest pains, shortness of breath, dizziness, or near syncope. She has not had any pedal edema. No bleeding issues appreciated.       Current Outpatient Medications:   •  amLODIPine (NORVASC) 5 MG tablet, Take 1 tablet by mouth Daily., Disp: 30 tablet, Rfl: 5  •  apixaban (ELIQUIS) 2.5 MG tablet tablet, Take 1 tablet by mouth Every 12 (Twelve) Hours., Disp: 60 tablet, Rfl: 0  •  calcium citrate-vitamin d (CITRACAL) 200-250 MG-UNIT tablet tablet, Take 1 tablet by mouth Daily., Disp: , Rfl:   •  Fluticasone Furoate-Vilanterol 200-25 MCG/INH aerosol powder , Inhale 1 puff Daily., Disp: , Rfl:   •  furosemide (LASIX) 20 MG tablet, Take 1 tablet by mouth 2 (Two) Times a Day., Disp: 180 tablet, Rfl: 3  •  metoprolol tartrate (LOPRESSOR) 50 MG tablet, Take 50 mg by mouth 2 (Two) Times a Day., Disp: , Rfl:   •  O2 (OXYGEN), Inhale 2 L/min Every Night., Disp: , Rfl:   •  pantoprazole (PROTONIX) 40 MG EC tablet, Take 40 mg by mouth Daily., Disp: , Rfl:   •  potassium chloride (K-DUR) 10 MEQ CR tablet, Take 1 tablet by mouth 2 (Two) Times a Day. (Patient taking  "differently: Take 10 mEq by mouth Daily.), Disp: 180 tablet, Rfl: 3    The following portions of the patient's history were reviewed and updated as appropriate: allergies, current medications, past family history, past medical history, past social history, past surgical history and problem list.    Social History     Socioeconomic History   • Marital status:      Spouse name: Not on file   • Number of children: Not on file   • Years of education: Not on file   • Highest education level: Not on file   Social Needs   • Financial resource strain: Not on file   • Food insecurity - worry: Not on file   • Food insecurity - inability: Not on file   • Transportation needs - medical: Not on file   • Transportation needs - non-medical: Not on file   Occupational History   • Not on file   Tobacco Use   • Smoking status: Never Smoker   • Smokeless tobacco: Never Used   Substance and Sexual Activity   • Alcohol use: No   • Drug use: No   • Sexual activity: Defer   Other Topics Concern   • Not on file   Social History Narrative   • Not on file     Review of Systems   Cardiovascular: Positive for dyspnea on exertion. Negative for chest pain, irregular heartbeat, leg swelling, near-syncope, palpitations and syncope.   Respiratory: Positive for shortness of breath.      Objective   Blood pressure 126/82, pulse 95, resp. rate 16, height 152.4 cm (60\"), weight 45.4 kg (100 lb), SpO2 97 %.  Body mass index is 19.53 kg/m².    Physical Exam   Constitutional: She is oriented to person, place, and time. She appears well-developed and well-nourished. No distress.   HENT:   Head: Normocephalic and atraumatic.   Eyes: Conjunctivae are normal. Right eye exhibits no discharge. Left eye exhibits no discharge.   Neck: Normal range of motion. Neck supple. Carotid bruit is not present.   Cardiovascular: Normal rate and normal heart sounds. Exam reveals no gallop and no friction rub.   No murmur heard.  Irregularly irregular rhythm.  "   Pulmonary/Chest: Effort normal and breath sounds normal. No respiratory distress. She has no wheezes. She has no rales. She exhibits no tenderness.   Musculoskeletal: Normal range of motion. She exhibits no edema.   Thoracic kyphosis noted. Arthritic changes of the hands noted.    Neurological: She is alert and oriented to person, place, and time.   Skin: Skin is warm and dry. No rash noted. She is not diaphoretic. No erythema. No pallor.   Psychiatric: She has a normal mood and affect. Her behavior is normal.   Nursing note and vitals reviewed.    Procedures  Transthoracic Echocardiogram 01/30/18    Assessment:          Diagnosis Plan   1. Persistent atrial fibrillation (CMS/HCC)     2. Long term current use of anticoagulant     3. Nonrheumatic aortic valve stenosis      Mild.    4. Essential hypertension      5. Chronic kidney disease, unspecified CKD stage          Plan:       1. Continue current therapy with metoprolol for rate control and Eliquis for stroke prevention. Eliquis is at low dose due to age 84 years and weight <60kg. Monitor for bleeding.   2. BP has improved on current regimen, will continue to monitor.   3. Follow up in 3 months or sooner if needed.     No Follow-up on file.    I appreciate the opportunity to participate in this patient's cardiovascular care.    Best Regards,    Jami Garcia PA-C

## 2019-05-20 ENCOUNTER — OFFICE VISIT (OUTPATIENT)
Dept: CARDIOLOGY | Facility: CLINIC | Age: 84
End: 2019-05-20

## 2019-05-20 VITALS
RESPIRATION RATE: 16 BRPM | DIASTOLIC BLOOD PRESSURE: 79 MMHG | WEIGHT: 103 LBS | BODY MASS INDEX: 18.25 KG/M2 | HEIGHT: 63 IN | HEART RATE: 83 BPM | SYSTOLIC BLOOD PRESSURE: 121 MMHG

## 2019-05-20 DIAGNOSIS — I50.9 CONGESTIVE HEART FAILURE, UNSPECIFIED HF CHRONICITY, UNSPECIFIED HEART FAILURE TYPE (HCC): ICD-10-CM

## 2019-05-20 DIAGNOSIS — N18.9 CHRONIC KIDNEY DISEASE, UNSPECIFIED CKD STAGE: ICD-10-CM

## 2019-05-20 DIAGNOSIS — I10 ESSENTIAL HYPERTENSION: Primary | ICD-10-CM

## 2019-05-20 DIAGNOSIS — I48.19 PERSISTENT ATRIAL FIBRILLATION (HCC): ICD-10-CM

## 2019-05-20 PROCEDURE — 99213 OFFICE O/P EST LOW 20 MIN: CPT | Performed by: NURSE PRACTITIONER

## 2019-05-20 NOTE — PROGRESS NOTES
Meme Medellin MD  Anh Saldana  1934 05/20/2019    Patient Active Problem List   Diagnosis   • Osteoporosis, unspecified   • Hypertensive urgency   • Essential hypertension   • Dyspnea on exertion   • CHF (congestive heart failure) (CMS/HCC)   • Persistent atrial fibrillation (CMS/HCC)   • Hearing loss   • Chronic kidney disease       Dear Meme Medellin MD:    Subjective     Chief Complaint   Patient presents with   • Atrial Fibrillation     3 mo follow up   • Med Management   • Palpitations     minimal, unchanged   • Shortness of Breath     some           History of Present Illness:    Anh Saldana is a 84 y.o. female with a history of hypertension, congestive heart failure, chronic kidney disease, and chronic atrial fibrillation.  She presents today for routine cardiology follow-up.  She reports she has been doing very well.  She denies any chest pains or shortness of breath.  She has occasional palpitations when she becomes very excited.  Her blood pressure has been well controlled.  She denies any bleeding issues with the Eliquis.          Allergies   Allergen Reactions   • Penicillins Swelling and Rash   • Doxycycline Other (See Comments)     ?   • Hydroxychloroquine Unknown (See Comments)     ?   • Latex Rash     ?   :      Current Outpatient Medications:   •  amLODIPine (NORVASC) 5 MG tablet, Take 1 tablet by mouth Daily., Disp: 30 tablet, Rfl: 5  •  apixaban (ELIQUIS) 2.5 MG tablet tablet, Take 1 tablet by mouth Every 12 (Twelve) Hours., Disp: 60 tablet, Rfl: 0  •  calcium citrate-vitamin d (CITRACAL) 200-250 MG-UNIT tablet tablet, Take 1 tablet by mouth Daily., Disp: , Rfl:   •  Fluticasone Furoate-Vilanterol 200-25 MCG/INH aerosol powder , Inhale 1 puff Daily., Disp: , Rfl:   •  furosemide (LASIX) 20 MG tablet, Take 1 tablet by mouth 2 (Two) Times a Day. (Patient taking differently: Take 20 mg by mouth Daily.), Disp: 180 tablet, Rfl: 3  •  metoprolol tartrate (LOPRESSOR) 50 MG tablet, Take  "50 mg by mouth 2 (Two) Times a Day., Disp: , Rfl:   •  O2 (OXYGEN), Inhale 2 L/min Every Night., Disp: , Rfl:   •  pantoprazole (PROTONIX) 40 MG EC tablet, Take 40 mg by mouth Daily., Disp: , Rfl:   •  potassium chloride (K-DUR) 10 MEQ CR tablet, Take 1 tablet by mouth 2 (Two) Times a Day. (Patient taking differently: Take 10 mEq by mouth Daily.), Disp: 180 tablet, Rfl: 3      The following portions of the patient's history were reviewed and updated as appropriate: allergies, current medications, past family history, past medical history, past social history, past surgical history and problem list.    Social History     Tobacco Use   • Smoking status: Never Smoker   • Smokeless tobacco: Never Used   Substance Use Topics   • Alcohol use: No   • Drug use: No       Review of Systems   Constitution: Negative for weakness and malaise/fatigue.   Cardiovascular: Positive for palpitations. Negative for chest pain, dyspnea on exertion, irregular heartbeat, leg swelling, near-syncope, orthopnea, paroxysmal nocturnal dyspnea and syncope.   Respiratory: Negative for cough, shortness of breath and wheezing.    Neurological: Negative for dizziness and light-headedness.       Objective   Vitals:    05/20/19 1201   BP: 121/79   Pulse: 83   Resp: 16   Weight: 46.7 kg (103 lb)   Height: 160 cm (63\")     Body mass index is 18.25 kg/m².        Physical Exam   Constitutional: She is oriented to person, place, and time. She appears well-developed and well-nourished.   HENT:   Head: Normocephalic and atraumatic.   Cardiovascular: Normal rate and normal heart sounds. An irregularly irregular rhythm present. Exam reveals no S3 and no S4.   No murmur heard.  Pulmonary/Chest: Effort normal and breath sounds normal. She has no wheezes. She has no rales.   Abdominal: Soft. Bowel sounds are normal.   Musculoskeletal: She exhibits no edema.   Neurological: She is alert and oriented to person, place, and time.   Skin: Skin is warm and dry. "   Psychiatric: She has a normal mood and affect. Her behavior is normal.       Lab Results   Component Value Date     01/11/2019    K 3.9 01/11/2019    CL 99 01/11/2019    CO2 29.0 01/11/2019    BUN 24 (H) 01/11/2019    CREATININE 0.81 01/11/2019    GLUCOSE 92 01/11/2019    CALCIUM 11.0 (H) 01/11/2019    AST 22 11/10/2018    ALT 12 11/10/2018    ALKPHOS 69 11/10/2018     Lab Results   Component Value Date    CKTOTAL 46 11/11/2018     Lab Results   Component Value Date    WBC 5.72 11/10/2018    HGB 12.4 11/10/2018    HCT 37.9 11/10/2018     11/10/2018     Lab Results   Component Value Date    INR 0.98 11/10/2018       Lab Results   Component Value Date    TSH 2.775 11/11/2018         Procedures      Assessment/Plan    Diagnosis Plan   1. Essential hypertension     2. Congestive heart failure, unspecified HF chronicity, unspecified heart failure type (CMS/HCC)     3. Persistent atrial fibrillation (CMS/HCC)     4. Chronic kidney disease, unspecified CKD stage                  Recommendations:    1. Continue with amlodipine, Eliquis, and metoprolol.    2. Follow up in 3 months and PRN.      Patient's Body mass index is 18.25 kg/m². BMI is within normal parameters. No follow-up required..         Return in about 3 months (around 8/20/2019) for Recheck.    As always, I appreciate very much the opportunity to participate in the cardiovascular care of your patients.      With Best Regards,    ANA Albetr

## 2019-08-21 ENCOUNTER — OFFICE VISIT (OUTPATIENT)
Dept: CARDIOLOGY | Facility: CLINIC | Age: 84
End: 2019-08-21

## 2019-08-21 VITALS
WEIGHT: 99 LBS | RESPIRATION RATE: 16 BRPM | HEART RATE: 61 BPM | SYSTOLIC BLOOD PRESSURE: 123 MMHG | BODY MASS INDEX: 17.54 KG/M2 | DIASTOLIC BLOOD PRESSURE: 71 MMHG | HEIGHT: 63 IN

## 2019-08-21 DIAGNOSIS — I48.19 PERSISTENT ATRIAL FIBRILLATION (HCC): ICD-10-CM

## 2019-08-21 DIAGNOSIS — R94.31 ABNORMAL EKG: ICD-10-CM

## 2019-08-21 DIAGNOSIS — I10 ESSENTIAL HYPERTENSION: Primary | ICD-10-CM

## 2019-08-21 PROBLEM — N18.9 CHRONIC KIDNEY DISEASE: Status: RESOLVED | Noted: 2019-01-16 | Resolved: 2019-08-21

## 2019-08-21 PROCEDURE — 99214 OFFICE O/P EST MOD 30 MIN: CPT | Performed by: NURSE PRACTITIONER

## 2019-08-21 PROCEDURE — 93000 ELECTROCARDIOGRAM COMPLETE: CPT | Performed by: NURSE PRACTITIONER

## 2019-08-21 RX ORDER — PREDNISONE 1 MG/1
5 TABLET ORAL DAILY
COMMUNITY

## 2019-08-21 NOTE — PROGRESS NOTES
Meme Medellin MD  Anh Saldana  1934 08/21/2019    Patient Active Problem List   Diagnosis   • Osteoporosis, unspecified   • Hypertensive urgency   • Essential hypertension   • Dyspnea on exertion   • CHF (congestive heart failure) (CMS/HCC)   • Persistent atrial fibrillation (CMS/HCC)   • Hearing loss       Dear Meme Medellin MD:    Subjective     Chief Complaint   Patient presents with   • Atrial Fibrillation   • Hypertension           History of Present Illness:    Anh Saldana is a 84 y.o. female with a history of hypertension, congestive heart failure, chronic kidney disease, and chronic atrial fibrillation anticoagulated with Eliquis. She reports she has been doing well. The patient denies chest pain, shortness of breath, palpitations, dizziness, lightheadedness, near syncope, and syncope. She denies any bleeding issues with Eliquis.          Allergies   Allergen Reactions   • Penicillins Swelling and Rash   • Doxycycline Other (See Comments)     ?   • Hydroxychloroquine Unknown (See Comments)     ?   • Latex Rash     ?   :      Current Outpatient Medications:   •  amLODIPine (NORVASC) 5 MG tablet, Take 1 tablet by mouth Daily., Disp: 30 tablet, Rfl: 5  •  apixaban (ELIQUIS) 2.5 MG tablet tablet, Take 1 tablet by mouth Every 12 (Twelve) Hours., Disp: 60 tablet, Rfl: 0  •  calcium citrate-vitamin d (CITRACAL) 200-250 MG-UNIT tablet tablet, Take 1 tablet by mouth Daily., Disp: , Rfl:   •  Cholecalciferol (VITAMIN D PO), Take 50,000 Units by mouth Daily., Disp: , Rfl:   •  Fluticasone Furoate-Vilanterol 200-25 MCG/INH aerosol powder , Inhale 1 puff Daily., Disp: , Rfl:   •  furosemide (LASIX) 20 MG tablet, Take 1 tablet by mouth 2 (Two) Times a Day. (Patient taking differently: Take 20 mg by mouth Daily.), Disp: 180 tablet, Rfl: 3  •  metoprolol tartrate (LOPRESSOR) 50 MG tablet, Take 50 mg by mouth 2 (Two) Times a Day., Disp: , Rfl:   •  O2 (OXYGEN), Inhale 2 L/min Every Night., Disp: , Rfl:   •  " pantoprazole (PROTONIX) 40 MG EC tablet, Take 40 mg by mouth Daily., Disp: , Rfl:   •  potassium chloride (K-DUR) 10 MEQ CR tablet, Take 1 tablet by mouth 2 (Two) Times a Day. (Patient taking differently: Take 10 mEq by mouth Daily.), Disp: 180 tablet, Rfl: 3  •  predniSONE (DELTASONE) 5 MG tablet, Take 5 mg by mouth Daily., Disp: , Rfl:       The following portions of the patient's history were reviewed and updated as appropriate: allergies, current medications, past family history, past medical history, past social history, past surgical history and problem list.    Social History     Tobacco Use   • Smoking status: Never Smoker   • Smokeless tobacco: Never Used   Substance Use Topics   • Alcohol use: No   • Drug use: No       Review of Systems   Constitution: Negative for weakness and malaise/fatigue.   Cardiovascular: Negative for chest pain, dyspnea on exertion, irregular heartbeat, leg swelling, near-syncope, orthopnea, palpitations, paroxysmal nocturnal dyspnea and syncope.   Respiratory: Negative for cough, shortness of breath and wheezing.    Musculoskeletal: Positive for arthritis and myalgias.   Neurological: Negative for dizziness and light-headedness.       Objective   Vitals:    08/21/19 1305   BP: 123/71   BP Location: Right arm   Pulse: 61   Resp: 16   Weight: 44.9 kg (99 lb)   Height: 160 cm (62.99\")     Body mass index is 17.54 kg/m².        Physical Exam   Constitutional: She is oriented to person, place, and time. She appears well-developed and well-nourished.   HENT:   Head: Normocephalic and atraumatic.   Hard of hearing   Cardiovascular: Normal rate, regular rhythm and normal heart sounds. Exam reveals no S3 and no S4.   No murmur heard.  Pulmonary/Chest: Effort normal and breath sounds normal. She has no wheezes. She has no rales.   Abdominal: Soft. Bowel sounds are normal.   Musculoskeletal: She exhibits no edema.   Ambulates with walker   Neurological: She is alert and oriented to person, " place, and time.   Skin: Skin is warm and dry.   Psychiatric: She has a normal mood and affect. Her behavior is normal.             ECG 12 Lead  Date/Time: 8/21/2019 1:18 PM  Performed by: Alicia Lopez APRN  Authorized by: Alicia Lopez APRN   Comparison: compared with previous ECG   Rhythm: atrial flutter  BPM: 61  Comments: Possible old inferior wall MI              Assessment/Plan    Diagnosis Plan   1. Essential hypertension  ECG 12 Lead   2. Persistent atrial fibrillation (CMS/HCC)  ECG 12 Lead    Adult Transthoracic Echo Complete W/ Cont if Necessary Per Protocol   3. Abnormal EKG  ECG 12 Lead    Adult Transthoracic Echo Complete W/ Cont if Necessary Per Protocol                Recommendations:    1. Will evaluate new EKG changes with echocardiogram.    2. Continue with amlodipine, metoprolol, and Eliquis.    3. Follow up in 3 months and PRN.        No Follow-up on file.    As always, I appreciate very much the opportunity to participate in the cardiovascular care of your patients.      With Best Regards,    ANA Albert

## 2019-09-05 ENCOUNTER — HOSPITAL ENCOUNTER (OUTPATIENT)
Dept: CARDIOLOGY | Facility: HOSPITAL | Age: 84
Discharge: HOME | End: 2019-09-05
Admitting: NURSE PRACTITIONER

## 2019-09-05 DIAGNOSIS — R94.31 ABNORMAL EKG: ICD-10-CM

## 2019-09-05 DIAGNOSIS — I48.19 PERSISTENT ATRIAL FIBRILLATION (HCC): ICD-10-CM

## 2019-09-05 PROCEDURE — 93306 TTE W/DOPPLER COMPLETE: CPT

## 2019-09-05 PROCEDURE — 93306 TTE W/DOPPLER COMPLETE: CPT | Performed by: INTERNAL MEDICINE

## 2019-09-08 LAB
BH CV ECHO MEAS - ACS: 1.7 CM
BH CV ECHO MEAS - AO ROOT AREA (BSA CORRECTED): 2.2
BH CV ECHO MEAS - AO ROOT AREA: 7.8 CM^2
BH CV ECHO MEAS - AO ROOT DIAM: 3.1 CM
BH CV ECHO MEAS - BSA(HAYCOCK): 1.4 M^2
BH CV ECHO MEAS - BSA: 1.4 M^2
BH CV ECHO MEAS - BZI_BMI: 18.1 KILOGRAMS/M^2
BH CV ECHO MEAS - BZI_METRIC_HEIGHT: 157.5 CM
BH CV ECHO MEAS - BZI_METRIC_WEIGHT: 44.9 KG
BH CV ECHO MEAS - EDV(CUBED): 12.2 ML
BH CV ECHO MEAS - EDV(MOD-SP4): 27 ML
BH CV ECHO MEAS - EDV(TEICH): 18.2 ML
BH CV ECHO MEAS - EF(CUBED): -6.6 %
BH CV ECHO MEAS - EF(MOD-SP4): 70.4 %
BH CV ECHO MEAS - EF(TEICH): -5.5 %
BH CV ECHO MEAS - ESV(CUBED): 13 ML
BH CV ECHO MEAS - ESV(MOD-SP4): 8 ML
BH CV ECHO MEAS - ESV(TEICH): 19.2 ML
BH CV ECHO MEAS - FS: -2.2 %
BH CV ECHO MEAS - IVS/LVPW: 1
BH CV ECHO MEAS - IVSD: 1.6 CM
BH CV ECHO MEAS - LA DIMENSION: 5.1 CM
BH CV ECHO MEAS - LA/AO: 1.6
BH CV ECHO MEAS - LV DIASTOLIC VOL/BSA (35-75): 19 ML/M^2
BH CV ECHO MEAS - LV MASS(C)D: 130.5 GRAMS
BH CV ECHO MEAS - LV MASS(C)DI: 92 GRAMS/M^2
BH CV ECHO MEAS - LV SYSTOLIC VOL/BSA (12-30): 5.6 ML/M^2
BH CV ECHO MEAS - LVIDD: 2.3 CM
BH CV ECHO MEAS - LVIDS: 2.4 CM
BH CV ECHO MEAS - LVLD AP4: 6.9 CM
BH CV ECHO MEAS - LVLS AP4: 6.3 CM
BH CV ECHO MEAS - LVOT AREA (M): 3.1 CM^2
BH CV ECHO MEAS - LVOT AREA: 3 CM^2
BH CV ECHO MEAS - LVOT DIAM: 2 CM
BH CV ECHO MEAS - LVPWD: 1.6 CM
BH CV ECHO MEAS - MV A MAX VEL: 49.9 CM/SEC
BH CV ECHO MEAS - MV E MAX VEL: 99.7 CM/SEC
BH CV ECHO MEAS - MV E/A: 2
BH CV ECHO MEAS - PA ACC SLOPE: 1414 CM/SEC^2
BH CV ECHO MEAS - PA ACC TIME: 0.06 SEC
BH CV ECHO MEAS - PA PR(ACCEL): 50.5 MMHG
BH CV ECHO MEAS - RAP SYSTOLE: 10 MMHG
BH CV ECHO MEAS - RVDD: 2.7 CM
BH CV ECHO MEAS - RVSP: 70.3 MMHG
BH CV ECHO MEAS - SI(CUBED): -0.57 ML/M^2
BH CV ECHO MEAS - SI(MOD-SP4): 13.4 ML/M^2
BH CV ECHO MEAS - SI(TEICH): -0.71 ML/M^2
BH CV ECHO MEAS - SV(CUBED): -0.81 ML
BH CV ECHO MEAS - SV(MOD-SP4): 19 ML
BH CV ECHO MEAS - SV(TEICH): -1 ML
BH CV ECHO MEAS - TR MAX VEL: 388.2 CM/SEC
MAXIMAL PREDICTED HEART RATE: 136 BPM
STRESS TARGET HR: 116 BPM

## 2019-09-10 ENCOUNTER — TELEPHONE (OUTPATIENT)
Dept: CARDIOLOGY | Facility: CLINIC | Age: 84
End: 2019-09-10

## 2019-09-10 NOTE — TELEPHONE ENCOUNTER
Notes recorded by Bela Soto MA on 9/10/2019 at 3:11 PM EDT  Octavio called back and stated that he would call us back at a later time to let us know if Anh decides to have the [facility] due to her being sick right now.  ------    Notes recorded by Tea Tomlin CMA on 9/10/2019 at 2:39 PM EDT  Called pt to check on status of this. No answer, no VM pickup.  ------    Notes recorded by Tea Tomlin CMA on 9/9/2019 at 2:49 PM EDT  Pt unable to get to phone, pt's brother Octavio (on [employer]) took this message and states he will explain it to pt and get back w/us to let us know if she is willing to have the scan done or not.  ------    Notes recorded by Clare Hartmann MA on 9/9/2019 at 9:44 AM EDT  Called patient no answer left VM        ----- Message from ANA Albert sent at 9/9/2019  8:56 AM EDT -----  Echo shows severe pulmonary hypertension. I would like to evaluate with VQ scan to rule out PE. Thanks.

## 2019-11-21 ENCOUNTER — OFFICE VISIT (OUTPATIENT)
Dept: CARDIOLOGY | Facility: CLINIC | Age: 84
End: 2019-11-21

## 2019-11-21 VITALS
BODY MASS INDEX: 20.18 KG/M2 | HEIGHT: 60 IN | SYSTOLIC BLOOD PRESSURE: 123 MMHG | WEIGHT: 102.8 LBS | RESPIRATION RATE: 16 BRPM | DIASTOLIC BLOOD PRESSURE: 75 MMHG | HEART RATE: 62 BPM

## 2019-11-21 DIAGNOSIS — N18.9 CHRONIC KIDNEY DISEASE, UNSPECIFIED CKD STAGE: ICD-10-CM

## 2019-11-21 DIAGNOSIS — I10 ESSENTIAL HYPERTENSION: ICD-10-CM

## 2019-11-21 DIAGNOSIS — I50.32 CHRONIC DIASTOLIC HF (HEART FAILURE) (HCC): ICD-10-CM

## 2019-11-21 DIAGNOSIS — I48.19 PERSISTENT ATRIAL FIBRILLATION (HCC): Primary | ICD-10-CM

## 2019-11-21 PROCEDURE — 99213 OFFICE O/P EST LOW 20 MIN: CPT | Performed by: NURSE PRACTITIONER

## 2019-11-21 RX ORDER — FUROSEMIDE 20 MG/1
20 TABLET ORAL 2 TIMES DAILY
Qty: 60 TABLET | Refills: 11 | Status: SHIPPED | OUTPATIENT
Start: 2019-11-21 | End: 2020-12-22 | Stop reason: SDUPTHER

## 2019-11-21 NOTE — PROGRESS NOTES
Meme Medellin MD  Anh Saldana  1934 11/21/2019    Patient Active Problem List   Diagnosis   • Osteoporosis, unspecified   • Hypertensive urgency   • Essential hypertension   • Dyspnea on exertion   • CHF (congestive heart failure) (CMS/[facility])   • Persistent atrial fibrillation   • Hearing loss       Dear Meme Medellin MD:    Subjective     Chief Complaint   Patient presents with   • Results     echo  findings   • Congestive Heart Failure     3 mos follow           History of Present Illness:    Anh Saldana is a 84 y.o. female with a history of hypertension, congestive heart failure, chronic kidney disease, and chronic atrial fibrillation anticoagulated with Eliquis.  She presents today for routine cardiology follow-up.  She reports she has been feeling much better recently.  She denies any chest pain, shortness of breath, palpitations, dizziness, or lightheadedness.  Her blood pressure has been very well controlled.  Previously, an EKG revealed possible old myocardial infarction.  She was evaluated further with an echocardiogram.  This revealed a normal EF with grade 1 diastolic dysfunction, mild to moderate mitral valve regurgitation and moderate tricuspid valve regurgitation with severe pulmonary hypertension.          Allergies   Allergen Reactions   • Penicillins Swelling and Rash   • Doxycycline Other (See Comments)     ?   • Hydroxychloroquine Unknown (See Comments)     ?   • Latex Rash     ?   :      Current Outpatient Medications:   •  amLODIPine (NORVASC) 5 MG tablet, Take 1 tablet by mouth Daily., Disp: 30 tablet, Rfl: 5  •  apixaban (ELIQUIS) 2.5 MG tablet tablet, Take 1 tablet by mouth Every 12 (Twelve) Hours., Disp: 60 tablet, Rfl: 0  •  calcium citrate-vitamin d (CITRACAL) 200-250 MG-UNIT tablet tablet, Take 1 tablet by mouth Daily., Disp: , Rfl:   •  Cholecalciferol (VITAMIN D PO), Take 50,000 Units by mouth Daily., Disp: , Rfl:   •  Fluticasone Furoate-Vilanterol 200-25 MCG/INH  "aerosol powder , Inhale 1 puff Daily., Disp: , Rfl:   •  furosemide (LASIX) 20 MG tablet, Take 1 tablet by mouth 2 (Two) Times a Day., Disp: 60 tablet, Rfl: 11  •  metoprolol tartrate (LOPRESSOR) 25 MG tablet, Take 25 mg by mouth Every Night. Takes 1/2 of 50mg tab, Disp: , Rfl:   •  O2 (OXYGEN), Inhale 2 L/min Every Night., Disp: , Rfl:   •  pantoprazole (PROTONIX) 40 MG EC tablet, Take 40 mg by mouth Daily., Disp: , Rfl:   •  potassium chloride (K-DUR) 10 MEQ CR tablet, Take 1 tablet by mouth 2 (Two) Times a Day. (Patient taking differently: Take 10 mEq by mouth Daily.), Disp: 180 tablet, Rfl: 3  •  predniSONE (DELTASONE) 5 MG tablet, Take 5 mg by mouth Daily., Disp: , Rfl:   •  metoprolol tartrate (LOPRESSOR) 50 MG tablet, Take 50 mg by mouth Every Morning., Disp: , Rfl:       The following portions of the patient's history were reviewed and updated as appropriate: allergies, current medications, past family history, past medical history, past social history, past surgical history and problem list.    Social History     Tobacco Use   • Smoking status: Never Smoker   • Smokeless tobacco: Never Used   Substance Use Topics   • Alcohol use: No   • Drug use: No       Review of Systems   Constitution: Negative for weakness and malaise/fatigue.   HENT: Positive for hearing loss.    Cardiovascular: Negative for chest pain, dyspnea on exertion, irregular heartbeat, leg swelling, near-syncope, orthopnea, palpitations, paroxysmal nocturnal dyspnea and syncope.   Respiratory: Positive for shortness of breath. Negative for cough and wheezing.    Neurological: Negative for dizziness and light-headedness.       Objective   Vitals:    11/21/19 1318   BP: 123/75   Pulse: 62   Resp: 16   Weight: 46.6 kg (102 lb 12.8 oz)   Height: 152.4 cm (60\")     Body mass index is 20.08 kg/m².        Physical Exam   Constitutional: She is oriented to person, place, and time. She appears well-developed and well-nourished.   HENT:   Head: " Normocephalic and atraumatic.   Cardiovascular: Normal rate, regular rhythm and normal heart sounds. Exam reveals no S3 and no S4.   No murmur heard.  Pulmonary/Chest: Effort normal and breath sounds normal. She has no wheezes. She has no rales.   Abdominal: Soft. Bowel sounds are normal.   Musculoskeletal: She exhibits no edema.   Neurological: She is alert and oriented to person, place, and time.   Skin: Skin is warm and dry.   Psychiatric: She has a normal mood and affect. Her behavior is normal.       Lab Results   Component Value Date     01/11/2019    K 3.9 01/11/2019    CL 99 01/11/2019    CO2 29.0 01/11/2019    BUN 24 (H) 01/11/2019    CREATININE 0.81 01/11/2019    GLUCOSE 92 01/11/2019    CALCIUM 11.0 (H) 01/11/2019    AST 22 11/10/2018    ALT 12 11/10/2018    ALKPHOS 69 11/10/2018     Lab Results   Component Value Date    CKTOTAL 46 11/11/2018     Lab Results   Component Value Date    WBC 5.72 11/10/2018    HGB 12.4 11/10/2018    HCT 37.9 11/10/2018     11/10/2018     Lab Results   Component Value Date    INR 0.98 11/10/2018        Lab Results   Component Value Date    TSH 2.775 11/11/2018             Procedures      Assessment/Plan    Diagnosis Plan   1. Persistent atrial fibrillation     2. Chronic kidney disease, unspecified CKD stage     3. Essential hypertension     4. Chronic diastolic HF (heart failure) (CMS/[facility])  furosemide (LASIX) 20 MG tablet                Recommendations:    1.  I have reviewed echocardiogram results with the patient and her brother at length today.  I have recommended further evaluation of the pulmonary hypertension with VQ scan and overnight pulse oximetry.  However, the patient states due to her age and overall health status she does not wish to pursue any further testing.  She will let us know if she decides differently in the future.    2.  We will continue amlodipine, low-dose Eliquis, and metoprolol.    3.  Follow-up in 6 months and if needed.      Patient's  Body mass index is 20.08 kg/m². BMI is within normal parameters. No follow-up required..         Return in about 6 months (around 5/21/2020) for Recheck.    As always, I appreciate very much the opportunity to participate in the cardiovascular care of your patients.      With Best Regards,    ANA Albert

## 2020-01-07 RX ORDER — AMLODIPINE BESYLATE 5 MG/1
5 TABLET ORAL DAILY
Qty: 30 TABLET | Refills: 1 | Status: SHIPPED | OUTPATIENT
Start: 2020-01-07 | End: 2020-02-24

## 2020-02-22 DIAGNOSIS — I10 ESSENTIAL HYPERTENSION: Primary | ICD-10-CM

## 2020-02-24 RX ORDER — AMLODIPINE BESYLATE 5 MG/1
TABLET ORAL
Qty: 30 TABLET | Refills: 3 | Status: SHIPPED | OUTPATIENT
Start: 2020-02-24 | End: 2020-06-26

## 2020-04-06 RX ORDER — POTASSIUM CHLORIDE 750 MG/1
10 TABLET, EXTENDED RELEASE ORAL 2 TIMES DAILY
Qty: 60 TABLET | Refills: 3 | Status: SHIPPED | OUTPATIENT
Start: 2020-04-06 | End: 2020-12-22 | Stop reason: SDUPTHER

## 2020-06-24 DIAGNOSIS — I10 ESSENTIAL HYPERTENSION: ICD-10-CM

## 2020-06-26 RX ORDER — AMLODIPINE BESYLATE 5 MG/1
TABLET ORAL
Qty: 30 TABLET | Refills: 3 | Status: SHIPPED | OUTPATIENT
Start: 2020-06-26 | End: 2020-07-06

## 2020-07-05 DIAGNOSIS — I10 ESSENTIAL HYPERTENSION: ICD-10-CM

## 2020-07-06 RX ORDER — AMLODIPINE BESYLATE 5 MG/1
TABLET ORAL
Qty: 30 TABLET | Refills: 2 | Status: SHIPPED | OUTPATIENT
Start: 2020-07-06 | End: 2020-09-28

## 2020-09-26 DIAGNOSIS — I10 ESSENTIAL HYPERTENSION: ICD-10-CM

## 2020-09-28 RX ORDER — AMLODIPINE BESYLATE 5 MG/1
TABLET ORAL
Qty: 30 TABLET | Refills: 0 | Status: SHIPPED | OUTPATIENT
Start: 2020-09-28 | End: 2020-10-15 | Stop reason: SDUPTHER

## 2020-10-15 ENCOUNTER — OFFICE VISIT (OUTPATIENT)
Dept: CARDIOLOGY | Facility: CLINIC | Age: 85
End: 2020-10-15

## 2020-10-15 VITALS
HEIGHT: 60 IN | HEART RATE: 67 BPM | BODY MASS INDEX: 19.83 KG/M2 | WEIGHT: 101 LBS | DIASTOLIC BLOOD PRESSURE: 82 MMHG | SYSTOLIC BLOOD PRESSURE: 157 MMHG

## 2020-10-15 DIAGNOSIS — I10 ESSENTIAL HYPERTENSION: ICD-10-CM

## 2020-10-15 DIAGNOSIS — I48.19 PERSISTENT ATRIAL FIBRILLATION (HCC): Primary | ICD-10-CM

## 2020-10-15 DIAGNOSIS — I50.32 CHRONIC DIASTOLIC HF (HEART FAILURE) (HCC): ICD-10-CM

## 2020-10-15 DIAGNOSIS — N18.9 CHRONIC KIDNEY DISEASE, UNSPECIFIED CKD STAGE: ICD-10-CM

## 2020-10-15 PROCEDURE — 99441 PR PHYS/QHP TELEPHONE EVALUATION 5-10 MIN: CPT | Performed by: NURSE PRACTITIONER

## 2020-10-15 RX ORDER — AMLODIPINE BESYLATE 5 MG/1
5 TABLET ORAL DAILY
Qty: 30 TABLET | Refills: 11 | Status: SHIPPED | OUTPATIENT
Start: 2020-10-15 | End: 2021-07-26

## 2020-10-15 NOTE — PROGRESS NOTES
You have chosen to receive care through a telephone visit. Do you consent to use a telephone visit for your medical care today? No  Meme Medellin MD  Anh Saldana  1934  10/15/2020    Patient Active Problem List   Diagnosis   • Osteoporosis, unspecified   • Hypertensive urgency   • Essential hypertension   • Dyspnea on exertion   • CHF (congestive heart failure) (CMS/HCC)   • Persistent atrial fibrillation (CMS/HCC)   • Hearing loss       Dear Meme Medellin MD:    Subjective     Chief Complaint   Patient presents with   • Follow-up     ROUTINE   • Med Management     VERBAL           History of Present Illness:    Anh Saldana is a 85 y.o. female with a history of hypertension, congestive heart failure, chronic kidney disease, and chronic atrial fibrillation anticoagulated with Eliquis.  She presents today for routine cardiology follow-up via telephone visit due to the ongoing pandemic.  She reports she has been doing very well.  She has been trying to stay home and limit her exposure to others.  She denies any chest pains, palpitations, edema, or weight gain.  She has chronic dyspnea with mild to moderate exertion she states is not changed from her baseline.  She is tolerating her medications well and states her blood pressure has been very well controlled.  She denies any bleeding issues with Eliquis.  She reports her PCP just recently obtained labs.          Allergies   Allergen Reactions   • Penicillins Swelling and Rash   • Doxycycline Other (See Comments)     ?   • Hydroxychloroquine Unknown (See Comments)     ?   • Latex Rash     ?   :      Current Outpatient Medications:   •  amLODIPine (NORVASC) 5 MG tablet, Take 1 tablet by mouth Daily. for blood pressure, Disp: 30 tablet, Rfl: 11  •  apixaban (ELIQUIS) 2.5 MG tablet tablet, Take 1 tablet by mouth Every 12 (Twelve) Hours., Disp: 60 tablet, Rfl: 0  •  calcium citrate-vitamin d (CITRACAL) 200-250 MG-UNIT tablet tablet, Take 1 tablet by mouth  Daily., Disp: , Rfl:   •  Fluticasone Furoate-Vilanterol 200-25 MCG/INH aerosol powder , Inhale 1 puff Daily., Disp: , Rfl:   •  furosemide (LASIX) 20 MG tablet, Take 1 tablet by mouth 2 (Two) Times a Day., Disp: 60 tablet, Rfl: 11  •  metoprolol tartrate (LOPRESSOR) 25 MG tablet, Take 25 mg by mouth Every Night. Takes 1/2 of 50mg tab, Disp: , Rfl:   •  metoprolol tartrate (LOPRESSOR) 50 MG tablet, Take 50 mg by mouth Every Morning., Disp: , Rfl:   •  O2 (OXYGEN), Inhale 2 L/min Every Night., Disp: , Rfl:   •  pantoprazole (PROTONIX) 40 MG EC tablet, Take 40 mg by mouth Daily., Disp: , Rfl:   •  potassium chloride (K-DUR) 10 MEQ CR tablet, Take 1 tablet by mouth 2 (Two) Times a Day. (Patient taking differently: Take 10 mEq by mouth Daily.), Disp: 180 tablet, Rfl: 3  •  potassium chloride (K-DUR,KLOR-CON) 10 MEQ CR tablet, Take 1 tablet by mouth 2 (Two) Times a Day., Disp: 60 tablet, Rfl: 3  •  predniSONE (DELTASONE) 5 MG tablet, Take 5 mg by mouth Daily., Disp: , Rfl:   •  Cholecalciferol (VITAMIN D PO), Take 50,000 Units by mouth Daily., Disp: , Rfl:       The following portions of the patient's history were reviewed and updated as appropriate: allergies, current medications, past family history, past medical history, past social history, past surgical history and problem list.    Social History     Tobacco Use   • Smoking status: Never Smoker   • Smokeless tobacco: Never Used   Substance Use Topics   • Alcohol use: No   • Drug use: No       Review of Systems   Constitution: Negative for decreased appetite and malaise/fatigue.   Cardiovascular: Positive for dyspnea on exertion. Negative for chest pain, irregular heartbeat, leg swelling, near-syncope, orthopnea, palpitations, paroxysmal nocturnal dyspnea and syncope.   Respiratory: Positive for shortness of breath. Negative for cough and wheezing.    Neurological: Negative for dizziness, light-headedness and weakness.       Objective   Vitals:    10/15/20 1415   BP:  "157/82   Pulse: 67   Weight: 45.8 kg (101 lb)   Height: 152.4 cm (60\")     Body mass index is 19.73 kg/m².        Physical Exam    Lab Results   Component Value Date     01/11/2019    K 3.9 01/11/2019    CL 99 01/11/2019    CO2 29.0 01/11/2019    BUN 24 (H) 01/11/2019    CREATININE 0.81 01/11/2019    GLUCOSE 92 01/11/2019    CALCIUM 11.0 (H) 01/11/2019    AST 22 11/10/2018    ALT 12 11/10/2018    ALKPHOS 69 11/10/2018     Lab Results   Component Value Date    CKTOTAL 46 11/11/2018     Lab Results   Component Value Date    WBC 5.72 11/10/2018    HGB 12.4 11/10/2018    HCT 37.9 11/10/2018     11/10/2018     Lab Results   Component Value Date    INR 0.98 11/10/2018     Lab Results   Component Value Date    TSH 2.775 11/11/2018            Procedures      Assessment/Plan    Diagnosis Plan   1. Persistent atrial fibrillation (CMS/HCC)     2. Chronic diastolic HF (heart failure) (CMS/HCC)     3. Essential hypertension  amLODIPine (NORVASC) 5 MG tablet   4. Chronic kidney disease, unspecified CKD stage                  Recommendations:    1. Will continue with low dose Eliquis, amlodipine, and metoprolol.    2. Follow up in 6 months with EKG or sooner if needed.          Patient's Body mass index is 19.73 kg/m². BMI is within normal parameters. No follow-up required..     This visit has been rescheduled as a phone visit to comply with patient safety concerns in accordance with CDC recommendations. Total time of discussion was 6 minutes.        Return in about 6 months (around 4/15/2021) for Recheck.    As always, I appreciate very much the opportunity to participate in the cardiovascular care of your patients.      With Best Regards,    ANA Albert            "

## 2020-12-22 DIAGNOSIS — I50.32 CHRONIC DIASTOLIC HF (HEART FAILURE) (HCC): ICD-10-CM

## 2020-12-22 RX ORDER — FUROSEMIDE 20 MG/1
20 TABLET ORAL 2 TIMES DAILY
Qty: 60 TABLET | Refills: 11 | Status: SHIPPED | OUTPATIENT
Start: 2020-12-22 | End: 2021-07-26 | Stop reason: SDUPTHER

## 2020-12-22 RX ORDER — POTASSIUM CHLORIDE 750 MG/1
10 TABLET, EXTENDED RELEASE ORAL 2 TIMES DAILY
Qty: 60 TABLET | Refills: 3 | Status: SHIPPED | OUTPATIENT
Start: 2020-12-22

## 2021-06-17 DIAGNOSIS — N18.9 CHRONIC KIDNEY DISEASE, UNSPECIFIED CKD STAGE: Primary | ICD-10-CM

## 2021-06-17 RX ORDER — POTASSIUM CHLORIDE 750 MG/1
10 TABLET, FILM COATED, EXTENDED RELEASE ORAL 2 TIMES DAILY
Qty: 60 TABLET | Refills: 0 | Status: SHIPPED | OUTPATIENT
Start: 2021-06-17 | End: 2021-07-26 | Stop reason: SDUPTHER

## 2021-06-17 NOTE — TELEPHONE ENCOUNTER
I will send in one month, but we need labs (last are 2019) before we can send any further refills.

## 2021-07-08 ENCOUNTER — TELEPHONE (OUTPATIENT)
Dept: CARDIOLOGY | Facility: CLINIC | Age: 86
End: 2021-07-08

## 2021-07-08 NOTE — TELEPHONE ENCOUNTER
Called pt back and she gave me verbal permission to speak to her brother. He stated that she has had labs done at her PCP office. I will call them and request her labs.     Requested her labs. Spoke with Rafaela.

## 2021-07-26 ENCOUNTER — OFFICE VISIT (OUTPATIENT)
Dept: CARDIOLOGY | Facility: CLINIC | Age: 86
End: 2021-07-26

## 2021-07-26 VITALS
SYSTOLIC BLOOD PRESSURE: 142 MMHG | WEIGHT: 94 LBS | DIASTOLIC BLOOD PRESSURE: 72 MMHG | TEMPERATURE: 97.6 F | BODY MASS INDEX: 18.46 KG/M2 | HEART RATE: 60 BPM | HEIGHT: 60 IN

## 2021-07-26 DIAGNOSIS — I48.19 PERSISTENT ATRIAL FIBRILLATION (HCC): ICD-10-CM

## 2021-07-26 DIAGNOSIS — I10 ESSENTIAL HYPERTENSION: ICD-10-CM

## 2021-07-26 DIAGNOSIS — I50.32 CHRONIC DIASTOLIC HF (HEART FAILURE) (HCC): ICD-10-CM

## 2021-07-26 DIAGNOSIS — I50.32 CHRONIC HEART FAILURE WITH PRESERVED EJECTION FRACTION (HFPEF) (HCC): Primary | ICD-10-CM

## 2021-07-26 PROCEDURE — 93000 ELECTROCARDIOGRAM COMPLETE: CPT | Performed by: PHYSICIAN ASSISTANT

## 2021-07-26 PROCEDURE — 99214 OFFICE O/P EST MOD 30 MIN: CPT | Performed by: PHYSICIAN ASSISTANT

## 2021-07-26 RX ORDER — SPIRONOLACTONE 25 MG/1
12.5 TABLET ORAL DAILY
Qty: 30 TABLET | Refills: 11 | Status: SHIPPED | OUTPATIENT
Start: 2021-07-26

## 2021-07-26 RX ORDER — FUROSEMIDE 20 MG/1
20 TABLET ORAL DAILY
Qty: 60 TABLET | Refills: 11 | Status: SHIPPED | OUTPATIENT
Start: 2021-07-26

## 2021-07-26 NOTE — PROGRESS NOTES
Meme Medellin MD  Anh Saldana  1934 07/26/2021    Patient Active Problem List   Diagnosis   • Osteoporosis, unspecified   • Hypertensive urgency   • Essential hypertension   • Dyspnea on exertion   • CHF (congestive heart failure) (CMS/HCC)   • Persistent atrial fibrillation (CMS/HCC)   • Hearing loss       Dear Meme Medellin MD:    Subjective     History of Present Illness:    Chief Complaint   Patient presents with   • Follow-up     routine    • Med Management     pt provided list       Anh Saldana is a pleasant 86 y.o. female with a past medical history significant for chronic HFpEF, chronic atrial fibrillation anticoagulated with Eliquis.  She also has history of essential hypertension and GERD.  She comes in for routine cardiology follow-up.    Ms. Saldana is concerned mostly today due to increased pedal edema.  She does deny worsening shortness of breath from baseline however she reports that she has been sleeping with roughly 2 pillows each night on her couch whereas in the past she has only required 1 pillow.  She reports that she did see her PCP who did increase her Lasix to 20 mg twice daily from once daily.  She reports that she has been on this higher dose for roughly 1 month and does still have pedal edema.  She denies PND, chest pains, or syncope.  I do not have any recent blood work with me today however renal function in 2019 was stable.    Allergies   Allergen Reactions   • Penicillins Swelling and Rash   • Doxycycline Other (See Comments)     ?   • Hydroxychloroquine Unknown (See Comments)     ?   • Latex Rash     ?   :      Current Outpatient Medications:   •  apixaban (ELIQUIS) 2.5 MG tablet tablet, Take 1 tablet by mouth Every 12 (Twelve) Hours., Disp: 60 tablet, Rfl: 0  •  calcium citrate-vitamin d (CITRACAL) 200-250 MG-UNIT tablet tablet, Take 1 tablet by mouth Daily., Disp: , Rfl:   •  Fluticasone Furoate-Vilanterol 200-25 MCG/INH aerosol powder , Inhale 1 puff Daily., Disp:  ", Rfl:   •  furosemide (LASIX) 20 MG tablet, Take 1 tablet by mouth Daily., Disp: 60 tablet, Rfl: 11  •  metoprolol tartrate (LOPRESSOR) 50 MG tablet, Take 50 mg by mouth Every Morning., Disp: , Rfl:   •  O2 (OXYGEN), Inhale 2 L/min Every Night., Disp: , Rfl:   •  pantoprazole (PROTONIX) 40 MG EC tablet, Take 40 mg by mouth Daily., Disp: , Rfl:   •  potassium chloride (K-DUR,KLOR-CON) 10 MEQ CR tablet, Take 1 tablet by mouth 2 (Two) Times a Day., Disp: 60 tablet, Rfl: 3  •  Cholecalciferol (VITAMIN D PO), Take 50,000 Units by mouth Daily., Disp: , Rfl:   •  metoprolol tartrate (LOPRESSOR) 25 MG tablet, Take 25 mg by mouth Every Night. Takes 1/2 of 50mg tab, Disp: , Rfl:   •  predniSONE (DELTASONE) 5 MG tablet, Take 5 mg by mouth Daily., Disp: , Rfl:   •  spironolactone (ALDACTONE) 25 MG tablet, Take 0.5 tablets by mouth Daily., Disp: 30 tablet, Rfl: 11    The following portions of the patient's history were reviewed and updated as appropriate: allergies, current medications, past family history, past medical history, past social history, past surgical history and problem list.    Social History     Tobacco Use   • Smoking status: Never Smoker   • Smokeless tobacco: Never Used   Substance Use Topics   • Alcohol use: No   • Drug use: No         Objective   Vitals:    07/26/21 1316   BP: 142/72   Pulse: 60   Temp: 97.6 °F (36.4 °C)   Weight: 42.6 kg (94 lb)   Height: 152.4 cm (60\")     Body mass index is 18.36 kg/m².    Constitutional:       General: Not in acute distress.     Appearance: Healthy appearance. Well-developed and not in distress. Not diaphoretic.   Eyes:      Conjunctiva/sclera: Conjunctivae normal.      Pupils: Pupils are equal, round, and reactive to light.   HENT:      Head: Normocephalic and atraumatic.   Neck:      Vascular: No carotid bruit or JVD.   Pulmonary:      Effort: Pulmonary effort is normal. No respiratory distress.      Breath sounds: Normal breath sounds.   Cardiovascular:      Normal " rate. Regular rhythm.   Edema:     Pretibial: bilateral 2+ edema of the pretibial area.     Ankle: bilateral 2+ edema of the ankle.     Feet: bilateral 2+ edema of the feet.  Skin:     General: Skin is cool.   Neurological:      Mental Status: Alert, oriented to person, place, and time and oriented to person, place and time.         Lab Results   Component Value Date     01/11/2019    K 3.9 01/11/2019    CL 99 01/11/2019    CO2 29.0 01/11/2019    BUN 24 (H) 01/11/2019    CREATININE 0.81 01/11/2019    GLUCOSE 92 01/11/2019    CALCIUM 11.0 (H) 01/11/2019    AST 22 11/10/2018    ALT 12 11/10/2018    ALKPHOS 69 11/10/2018     Lab Results   Component Value Date    CKTOTAL 46 11/11/2018     Lab Results   Component Value Date    WBC 5.72 11/10/2018    HGB 12.4 11/10/2018    HCT 37.9 11/10/2018     11/10/2018     Lab Results   Component Value Date    INR 0.98 11/10/2018     No results found for: MG  Lab Results   Component Value Date    TSH 2.775 11/11/2018      No results found for: BNP    During this visit the following were done:  Labs Reviewed []    Labs Ordered []    Radiology Reports Reviewed []    Radiology Ordered []    PCP Records Reviewed []    Referring Provider Records Reviewed []    ER Records Reviewed []    Hospital Records Reviewed []    History Obtained From Family []    Radiology Images Reviewed []    Other Reviewed []    Records Requested []         ECG 12 Lead    Date/Time: 7/26/2021 1:17 PM  Performed by: Olivier Gardner PA-C  Authorized by: Olivier Gardner PA-C   Comparison: compared with previous ECG   Comparison to previous ECG: Patient is now in sinus rhythm  Rhythm: sinus rhythm  Conduction: non-specific intraventricular conduction delay  Q waves: I and aVL    ST Depression: III, aVF and V3  T inversion: aVF, III and V3    Clinical impression: abnormal EKG            Assessment/Plan    Diagnosis Plan   1. Chronic heart failure with preserved ejection fraction (HFpEF) (CMS/[facility])   Adult Transthoracic Echo Complete W/ Cont if Necessary Per Protocol    Comprehensive Metabolic Panel   2. Essential hypertension     3. Persistent atrial fibrillation (CMS/HCC)     4. Chronic diastolic HF (heart failure) (CMS/HCC)  furosemide (LASIX) 20 MG tablet            Recommendations:  1. Chronic HFpEF  1. I do think it would benefit Ms. Saldana to proceed with echocardiogram to tailor therapy accordingly, although, she did inform me that she does not want invasive procedures performed.  I will order this.  2. I will also discontinue amlodipine which is likely exacerbating her pedal edema.  3. I will start Aldactone at a lower dose of 12.5 mg an hour check BMP in 1 week.  4. I also asked her to reduce Lasix dose to once daily until renal function is checked.  2. Abnormal EKG  1. Patient's EKG does show signs concerning for possible underlying ischemia in inferior leads and septal leads.  I did ask her if she would consider any kind of invasive procedure which she informed that she would not.  I will respect her wishes and thus will forego stress test at this time and continue with medical therapy.  I will initiate aspirin 81 mg.      Return in about 4 weeks (around 8/23/2021).    As always, I appreciate very much the opportunity to participate in the cardiovascular care of your patients.      With Best Regards,    Olivier Gardner PA-C

## 2021-07-27 RX ORDER — ASPIRIN 81 MG/1
81 TABLET ORAL DAILY
Qty: 90 TABLET | Refills: 2 | Status: SHIPPED | OUTPATIENT
Start: 2021-07-27

## 2021-07-30 ENCOUNTER — HOSPITAL ENCOUNTER (OUTPATIENT)
Dept: CARDIOLOGY | Facility: HOSPITAL | Age: 86
Discharge: HOME | End: 2021-07-30
Admitting: PHYSICIAN ASSISTANT

## 2021-07-30 DIAGNOSIS — I50.32 CHRONIC HEART FAILURE WITH PRESERVED EJECTION FRACTION (HFPEF) (HCC): ICD-10-CM

## 2021-07-30 PROCEDURE — 93306 TTE W/DOPPLER COMPLETE: CPT

## 2021-07-30 PROCEDURE — 93306 TTE W/DOPPLER COMPLETE: CPT | Performed by: INTERNAL MEDICINE

## 2021-08-01 LAB
BH CV ECHO MEAS - % IVS THICK: 6.2 %
BH CV ECHO MEAS - % LVPW THICK: 53.4 %
BH CV ECHO MEAS - ACS: 1.6 CM
BH CV ECHO MEAS - AO MAX PG (FULL): 4.6 MMHG
BH CV ECHO MEAS - AO MAX PG: 11.3 MMHG
BH CV ECHO MEAS - AO MEAN PG (FULL): 2 MMHG
BH CV ECHO MEAS - AO MEAN PG: 6 MMHG
BH CV ECHO MEAS - AO ROOT AREA (BSA CORRECTED): 2.3
BH CV ECHO MEAS - AO ROOT AREA: 7.3 CM^2
BH CV ECHO MEAS - AO ROOT DIAM: 3.1 CM
BH CV ECHO MEAS - AO V2 MAX: 168 CM/SEC
BH CV ECHO MEAS - AO V2 MEAN: 113 CM/SEC
BH CV ECHO MEAS - AO V2 VTI: 43 CM
BH CV ECHO MEAS - AVA(I,A): 2.8 CM^2
BH CV ECHO MEAS - AVA(I,D): 2.8 CM^2
BH CV ECHO MEAS - AVA(V,A): 2.7 CM^2
BH CV ECHO MEAS - AVA(V,D): 2.7 CM^2
BH CV ECHO MEAS - BSA(HAYCOCK): 1.3 M^2
BH CV ECHO MEAS - BSA: 1.4 M^2
BH CV ECHO MEAS - BZI_BMI: 18.4 KILOGRAMS/M^2
BH CV ECHO MEAS - BZI_METRIC_HEIGHT: 152.4 CM
BH CV ECHO MEAS - BZI_METRIC_WEIGHT: 42.6 KG
BH CV ECHO MEAS - EDV(CUBED): 68.7 ML
BH CV ECHO MEAS - EDV(MOD-SP4): 36.3 ML
BH CV ECHO MEAS - EDV(TEICH): 74 ML
BH CV ECHO MEAS - EF(CUBED): 82.6 %
BH CV ECHO MEAS - EF(MOD-SP4): 73.7 %
BH CV ECHO MEAS - EF(TEICH): 75.9 %
BH CV ECHO MEAS - ESV(CUBED): 11.9 ML
BH CV ECHO MEAS - ESV(MOD-SP4): 9.6 ML
BH CV ECHO MEAS - ESV(TEICH): 17.8 ML
BH CV ECHO MEAS - FS: 44.2 %
BH CV ECHO MEAS - IVS/LVPW: 1
BH CV ECHO MEAS - IVSD: 1 CM
BH CV ECHO MEAS - IVSS: 1.1 CM
BH CV ECHO MEAS - LA DIMENSION: 5 CM
BH CV ECHO MEAS - LA/AO: 1.2
BH CV ECHO MEAS - LV DIASTOLIC VOL/BSA (35-75): 26.8 ML/M^2
BH CV ECHO MEAS - LV MASS(C)D: 140.8 GRAMS
BH CV ECHO MEAS - LV MASS(C)DI: 104 GRAMS/M^2
BH CV ECHO MEAS - LV MASS(C)S: 95 GRAMS
BH CV ECHO MEAS - LV MASS(C)SI: 70.1 GRAMS/M^2
BH CV ECHO MEAS - LV MAX PG: 6.7 MMHG
BH CV ECHO MEAS - LV MEAN PG: 4 MMHG
BH CV ECHO MEAS - LV SYSTOLIC VOL/BSA (12-30): 7.1 ML/M^2
BH CV ECHO MEAS - LV V1 MAX: 129 CM/SEC
BH CV ECHO MEAS - LV V1 MEAN: 89.4 CM/SEC
BH CV ECHO MEAS - LV V1 VTI: 34.6 CM
BH CV ECHO MEAS - LVIDD: 4.1 CM
BH CV ECHO MEAS - LVIDS: 2.3 CM
BH CV ECHO MEAS - LVLD AP4: 6.2 CM
BH CV ECHO MEAS - LVLS AP4: 4.7 CM
BH CV ECHO MEAS - LVOT AREA (M): 3.5 CM^2
BH CV ECHO MEAS - LVOT AREA: 3.5 CM^2
BH CV ECHO MEAS - LVOT DIAM: 2.1 CM
BH CV ECHO MEAS - LVPWD: 1 CM
BH CV ECHO MEAS - LVPWS: 1.6 CM
BH CV ECHO MEAS - MV A MAX VEL: 85.2 CM/SEC
BH CV ECHO MEAS - MV E MAX VEL: 118 CM/SEC
BH CV ECHO MEAS - MV E/A: 1.4
BH CV ECHO MEAS - PA ACC TIME: 0.06 SEC
BH CV ECHO MEAS - PA PR(ACCEL): 53.8 MMHG
BH CV ECHO MEAS - RAP SYSTOLE: 10 MMHG
BH CV ECHO MEAS - RVSP: 87 MMHG
BH CV ECHO MEAS - SI(AO): 232 ML/M^2
BH CV ECHO MEAS - SI(CUBED): 41.9 ML/M^2
BH CV ECHO MEAS - SI(LVOT): 88.5 ML/M^2
BH CV ECHO MEAS - SI(MOD-SP4): 19.8 ML/M^2
BH CV ECHO MEAS - SI(TEICH): 41.5 ML/M^2
BH CV ECHO MEAS - SV(AO): 314.2 ML
BH CV ECHO MEAS - SV(CUBED): 56.7 ML
BH CV ECHO MEAS - SV(LVOT): 119.8 ML
BH CV ECHO MEAS - SV(MOD-SP4): 26.8 ML
BH CV ECHO MEAS - SV(TEICH): 56.2 ML
BH CV ECHO MEAS - TR MAX VEL: 437 CM/SEC
MAXIMAL PREDICTED HEART RATE: 134 BPM
STRESS TARGET HR: 114 BPM

## 2021-08-02 ENCOUNTER — LAB (OUTPATIENT)
Dept: LAB | Facility: HOSPITAL | Age: 86
End: 2021-08-02

## 2021-08-02 DIAGNOSIS — I50.32 CHRONIC HEART FAILURE WITH PRESERVED EJECTION FRACTION (HFPEF) (HCC): ICD-10-CM

## 2021-08-02 LAB
ALBUMIN SERPL-MCNC: 3.3 G/DL (ref 3.5–5.2)
ALBUMIN/GLOB SERPL: 0.9 G/DL
ALP SERPL-CCNC: 43 U/L (ref 39–117)
ALT SERPL W P-5'-P-CCNC: 13 U/L (ref 1–33)
ANION GAP SERPL CALCULATED.3IONS-SCNC: 9.3 MMOL/L (ref 5–15)
AST SERPL-CCNC: 25 U/L (ref 1–32)
BILIRUB SERPL-MCNC: 0.6 MG/DL (ref 0–1.2)
BUN SERPL-MCNC: 24 MG/DL (ref 8–23)
BUN/CREAT SERPL: 30 (ref 7–25)
CALCIUM SPEC-SCNC: 8.9 MG/DL (ref 8.6–10.5)
CHLORIDE SERPL-SCNC: 103 MMOL/L (ref 98–107)
CO2 SERPL-SCNC: 28.7 MMOL/L (ref 22–29)
CREAT SERPL-MCNC: 0.8 MG/DL (ref 0.57–1)
GFR SERPL CREATININE-BSD FRML MDRD: 68 ML/MIN/1.73
GLOBULIN UR ELPH-MCNC: 3.8 GM/DL
GLUCOSE SERPL-MCNC: 80 MG/DL (ref 65–99)
POTASSIUM SERPL-SCNC: 3.6 MMOL/L (ref 3.5–5.2)
PROT SERPL-MCNC: 7.1 G/DL (ref 6–8.5)
SODIUM SERPL-SCNC: 141 MMOL/L (ref 136–145)

## 2021-08-02 PROCEDURE — 80053 COMPREHEN METABOLIC PANEL: CPT

## 2021-08-02 PROCEDURE — 36415 COLL VENOUS BLD VENIPUNCTURE: CPT

## 2021-08-10 ENCOUNTER — TELEPHONE (OUTPATIENT)
Dept: CARDIOLOGY | Facility: CLINIC | Age: 86
End: 2021-08-10

## 2021-08-10 NOTE — TELEPHONE ENCOUNTER
She can take an additional tablet to her Lasix for the next 2 to 3 days if she develops increased shortness of breath she needs to go to the ER.

## 2022-09-28 ENCOUNTER — TRANSCRIBE ORDERS (OUTPATIENT)
Dept: ADMINISTRATIVE | Facility: HOSPITAL | Age: 87
End: 2022-09-28

## 2022-09-28 DIAGNOSIS — M81.0 SENILE OSTEOPOROSIS: ICD-10-CM

## 2022-09-28 DIAGNOSIS — Z12.31 SCREENING MAMMOGRAM FOR HIGH-RISK PATIENT: Primary | ICD-10-CM

## 2023-01-01 ENCOUNTER — LAB REQUISITION (OUTPATIENT)
Dept: LAB | Facility: HOSPITAL | Age: 88
DRG: 177 | End: 2023-01-01
Payer: MEDICARE

## 2023-01-01 ENCOUNTER — APPOINTMENT (OUTPATIENT)
Dept: GENERAL RADIOLOGY | Facility: HOSPITAL | Age: 88
DRG: 177 | End: 2023-01-01
Payer: MEDICARE

## 2023-01-01 ENCOUNTER — HOSPITAL ENCOUNTER (INPATIENT)
Facility: HOSPITAL | Age: 88
LOS: 6 days | DRG: 177 | End: 2023-05-31
Attending: EMERGENCY MEDICINE | Admitting: STUDENT IN AN ORGANIZED HEALTH CARE EDUCATION/TRAINING PROGRAM
Payer: MEDICARE

## 2023-01-01 ENCOUNTER — APPOINTMENT (OUTPATIENT)
Dept: CT IMAGING | Facility: HOSPITAL | Age: 88
DRG: 177 | End: 2023-01-01
Payer: MEDICARE

## 2023-01-01 VITALS
HEART RATE: 70 BPM | SYSTOLIC BLOOD PRESSURE: 108 MMHG | WEIGHT: 93.8 LBS | TEMPERATURE: 97.7 F | DIASTOLIC BLOOD PRESSURE: 60 MMHG | OXYGEN SATURATION: 86 % | HEIGHT: 61 IN | BODY MASS INDEX: 17.71 KG/M2 | RESPIRATION RATE: 20 BRPM

## 2023-01-01 DIAGNOSIS — J96.21 ACUTE ON CHRONIC RESPIRATORY FAILURE WITH HYPOXIA: ICD-10-CM

## 2023-01-01 DIAGNOSIS — J96.21 ACUTE AND CHRONIC RESPIRATORY FAILURE WITH HYPOXIA: ICD-10-CM

## 2023-01-01 DIAGNOSIS — I50.9 ACUTE ON CHRONIC CONGESTIVE HEART FAILURE, UNSPECIFIED HEART FAILURE TYPE: Primary | ICD-10-CM

## 2023-01-01 DIAGNOSIS — N18.2 CHRONIC KIDNEY DISEASE, STAGE 2 (MILD): ICD-10-CM

## 2023-01-01 LAB
1,25(OH)2D SERPL-MCNC: 23.4 PG/ML (ref 24.8–81.5)
A-A DO2: 189.8 MMHG (ref 0–300)
A-A DO2: 83.7 MMHG (ref 0–300)
A-A DO2: 91.9 MMHG (ref 0–300)
ABSOLUTE LUNG FLUID CONTENT: 30 % (ref 20–35)
ALBUMIN SERPL-MCNC: 3 G/DL (ref 3.5–5.2)
ALBUMIN SERPL-MCNC: 3.1 G/DL (ref 3.5–5.2)
ALBUMIN SERPL-MCNC: 3.1 G/DL (ref 3.5–5.2)
ALBUMIN SERPL-MCNC: 3.2 G/DL (ref 3.5–5.2)
ALBUMIN SERPL-MCNC: 3.3 G/DL (ref 3.5–5.2)
ALBUMIN/GLOB SERPL: 0.9 G/DL
ALBUMIN/GLOB SERPL: 1 G/DL
ALP SERPL-CCNC: 101 U/L (ref 39–117)
ALP SERPL-CCNC: 102 U/L (ref 39–117)
ALP SERPL-CCNC: 102 U/L (ref 39–117)
ALP SERPL-CCNC: 107 U/L (ref 39–117)
ALP SERPL-CCNC: 108 U/L (ref 39–117)
ALP SERPL-CCNC: 108 U/L (ref 39–117)
ALP SERPL-CCNC: 109 U/L (ref 39–117)
ALP SERPL-CCNC: 110 U/L (ref 39–117)
ALT SERPL W P-5'-P-CCNC: 13 U/L (ref 1–33)
ALT SERPL W P-5'-P-CCNC: 15 U/L (ref 1–33)
ALT SERPL W P-5'-P-CCNC: 15 U/L (ref 1–33)
ALT SERPL W P-5'-P-CCNC: 17 U/L (ref 1–33)
ALT SERPL W P-5'-P-CCNC: 22 U/L (ref 1–33)
ANION GAP SERPL CALCULATED.3IONS-SCNC: 10.7 MMOL/L (ref 5–15)
ANION GAP SERPL CALCULATED.3IONS-SCNC: 13.5 MMOL/L (ref 5–15)
ANION GAP SERPL CALCULATED.3IONS-SCNC: 14.2 MMOL/L (ref 5–15)
ANION GAP SERPL CALCULATED.3IONS-SCNC: 16.8 MMOL/L (ref 5–15)
ANION GAP SERPL CALCULATED.3IONS-SCNC: 17.1 MMOL/L (ref 5–15)
ANION GAP SERPL CALCULATED.3IONS-SCNC: 17.6 MMOL/L (ref 5–15)
ANION GAP SERPL CALCULATED.3IONS-SCNC: 18.5 MMOL/L (ref 5–15)
ANION GAP SERPL CALCULATED.3IONS-SCNC: 9.8 MMOL/L (ref 5–15)
ARTERIAL PATENCY WRIST A: POSITIVE
AST SERPL-CCNC: 23 U/L (ref 1–32)
AST SERPL-CCNC: 25 U/L (ref 1–32)
AST SERPL-CCNC: 26 U/L (ref 1–32)
AST SERPL-CCNC: 26 U/L (ref 1–32)
AST SERPL-CCNC: 27 U/L (ref 1–32)
AST SERPL-CCNC: 35 U/L (ref 1–32)
AST SERPL-CCNC: 39 U/L (ref 1–32)
AST SERPL-CCNC: 45 U/L (ref 1–32)
ATMOSPHERIC PRESS: 726 MMHG
ATMOSPHERIC PRESS: 726 MMHG
ATMOSPHERIC PRESS: 729 MMHG
BACTERIA SPEC AEROBE CULT: NORMAL
BACTERIA SPEC AEROBE CULT: NORMAL
BASE EXCESS BLDA CALC-SCNC: 1.5 MMOL/L (ref 0–2)
BASE EXCESS BLDA CALC-SCNC: 2.1 MMOL/L (ref 0–2)
BASE EXCESS BLDA CALC-SCNC: 6.1 MMOL/L (ref 0–2)
BASOPHILS # BLD AUTO: 0.07 10*3/MM3 (ref 0–0.2)
BASOPHILS NFR BLD AUTO: 0.7 % (ref 0–1.5)
BDY SITE: ABNORMAL
BILIRUB SERPL-MCNC: 0.4 MG/DL (ref 0–1.2)
BILIRUB SERPL-MCNC: 0.5 MG/DL (ref 0–1.2)
BILIRUB SERPL-MCNC: 0.6 MG/DL (ref 0–1.2)
BILIRUB SERPL-MCNC: 0.6 MG/DL (ref 0–1.2)
BUN SERPL-MCNC: 43 MG/DL (ref 8–23)
BUN SERPL-MCNC: 44 MG/DL (ref 8–23)
BUN SERPL-MCNC: 44 MG/DL (ref 8–23)
BUN SERPL-MCNC: 51 MG/DL (ref 8–23)
BUN SERPL-MCNC: 57 MG/DL (ref 8–23)
BUN SERPL-MCNC: 64 MG/DL (ref 8–23)
BUN SERPL-MCNC: 71 MG/DL (ref 8–23)
BUN SERPL-MCNC: 80 MG/DL (ref 8–23)
BUN/CREAT SERPL: 32.8 (ref 7–25)
BUN/CREAT SERPL: 33.1 (ref 7–25)
BUN/CREAT SERPL: 33.6 (ref 7–25)
BUN/CREAT SERPL: 35 (ref 7–25)
BUN/CREAT SERPL: 35.2 (ref 7–25)
BUN/CREAT SERPL: 36.8 (ref 7–25)
BUN/CREAT SERPL: 37.7 (ref 7–25)
BUN/CREAT SERPL: 38 (ref 7–25)
CALCIUM SPEC-SCNC: 8.9 MG/DL (ref 8.6–10.5)
CALCIUM SPEC-SCNC: 9 MG/DL (ref 8.6–10.5)
CALCIUM SPEC-SCNC: 9 MG/DL (ref 8.6–10.5)
CALCIUM SPEC-SCNC: 9.1 MG/DL (ref 8.6–10.5)
CALCIUM SPEC-SCNC: 9.3 MG/DL (ref 8.6–10.5)
CHLORIDE SERPL-SCNC: 100 MMOL/L (ref 98–107)
CHLORIDE SERPL-SCNC: 101 MMOL/L (ref 98–107)
CHLORIDE SERPL-SCNC: 96 MMOL/L (ref 98–107)
CHLORIDE SERPL-SCNC: 98 MMOL/L (ref 98–107)
CHLORIDE SERPL-SCNC: 99 MMOL/L (ref 98–107)
CO2 BLDA-SCNC: 28.4 MMOL/L (ref 22–33)
CO2 BLDA-SCNC: 29.1 MMOL/L (ref 22–33)
CO2 BLDA-SCNC: 32.3 MMOL/L (ref 22–33)
CO2 SERPL-SCNC: 22.5 MMOL/L (ref 22–29)
CO2 SERPL-SCNC: 22.9 MMOL/L (ref 22–29)
CO2 SERPL-SCNC: 23.4 MMOL/L (ref 22–29)
CO2 SERPL-SCNC: 24.2 MMOL/L (ref 22–29)
CO2 SERPL-SCNC: 25.8 MMOL/L (ref 22–29)
CO2 SERPL-SCNC: 26.5 MMOL/L (ref 22–29)
CO2 SERPL-SCNC: 27.2 MMOL/L (ref 22–29)
CO2 SERPL-SCNC: 27.3 MMOL/L (ref 22–29)
COHGB MFR BLD: 0.9 % (ref 0–5)
COHGB MFR BLD: 1.2 % (ref 0–5)
COHGB MFR BLD: 1.3 % (ref 0–5)
CREAT SERPL-MCNC: 1.28 MG/DL (ref 0.57–1)
CREAT SERPL-MCNC: 1.33 MG/DL (ref 0.57–1)
CREAT SERPL-MCNC: 1.34 MG/DL (ref 0.57–1)
CREAT SERPL-MCNC: 1.45 MG/DL (ref 0.57–1)
CREAT SERPL-MCNC: 1.63 MG/DL (ref 0.57–1)
CREAT SERPL-MCNC: 1.74 MG/DL (ref 0.57–1)
CREAT SERPL-MCNC: 1.87 MG/DL (ref 0.57–1)
CREAT SERPL-MCNC: 2.12 MG/DL (ref 0.57–1)
CRP SERPL-MCNC: 2.61 MG/DL (ref 0–0.5)
CRP SERPL-MCNC: 3.71 MG/DL (ref 0–0.5)
CRP SERPL-MCNC: 3.84 MG/DL (ref 0–0.5)
D-LACTATE SERPL-SCNC: 1.1 MMOL/L (ref 0.5–2)
D-LACTATE SERPL-SCNC: 2.1 MMOL/L (ref 0.5–2)
DEPRECATED RDW RBC AUTO: 62.3 FL (ref 37–54)
DEPRECATED RDW RBC AUTO: 63.9 FL (ref 37–54)
DEPRECATED RDW RBC AUTO: 64.3 FL (ref 37–54)
DEPRECATED RDW RBC AUTO: 64.5 FL (ref 37–54)
DEPRECATED RDW RBC AUTO: 65.1 FL (ref 37–54)
EGFRCR SERPLBLD CKD-EPI 2021: 22 ML/MIN/1.73
EGFRCR SERPLBLD CKD-EPI 2021: 25.6 ML/MIN/1.73
EGFRCR SERPLBLD CKD-EPI 2021: 27.9 ML/MIN/1.73
EGFRCR SERPLBLD CKD-EPI 2021: 30.2 ML/MIN/1.73
EGFRCR SERPLBLD CKD-EPI 2021: 34.8 ML/MIN/1.73
EGFRCR SERPLBLD CKD-EPI 2021: 38.2 ML/MIN/1.73
EGFRCR SERPLBLD CKD-EPI 2021: 38.6 ML/MIN/1.73
EGFRCR SERPLBLD CKD-EPI 2021: 40.4 ML/MIN/1.73
EOSINOPHIL # BLD AUTO: 0.04 10*3/MM3 (ref 0–0.4)
EOSINOPHIL NFR BLD AUTO: 0.4 % (ref 0.3–6.2)
ERYTHROCYTE [DISTWIDTH] IN BLOOD BY AUTOMATED COUNT: 16.7 % (ref 12.3–15.4)
ERYTHROCYTE [DISTWIDTH] IN BLOOD BY AUTOMATED COUNT: 16.8 % (ref 12.3–15.4)
FLUAV RNA RESP QL NAA+PROBE: NOT DETECTED
FLUBV RNA RESP QL NAA+PROBE: NOT DETECTED
FOLATE SERPL-MCNC: >20 NG/ML (ref 4.78–24.2)
GAS FLOW AIRWAY: 2 LPM
GAS FLOW AIRWAY: 5 LPM
GAS FLOW AIRWAY: 6 LPM
GEN 5 2HR TROPONIN T REFLEX: 77 NG/L
GLOBULIN UR ELPH-MCNC: 3.3 GM/DL
GLOBULIN UR ELPH-MCNC: 3.3 GM/DL
GLOBULIN UR ELPH-MCNC: 3.4 GM/DL
GLOBULIN UR ELPH-MCNC: 3.5 GM/DL
GLOBULIN UR ELPH-MCNC: 3.6 GM/DL
GLUCOSE BLDC GLUCOMTR-MCNC: 98 MG/DL (ref 70–130)
GLUCOSE SERPL-MCNC: 109 MG/DL (ref 65–99)
GLUCOSE SERPL-MCNC: 109 MG/DL (ref 65–99)
GLUCOSE SERPL-MCNC: 118 MG/DL (ref 65–99)
GLUCOSE SERPL-MCNC: 126 MG/DL (ref 65–99)
GLUCOSE SERPL-MCNC: 152 MG/DL (ref 65–99)
GLUCOSE SERPL-MCNC: 155 MG/DL (ref 65–99)
GLUCOSE SERPL-MCNC: 158 MG/DL (ref 65–99)
GLUCOSE SERPL-MCNC: 166 MG/DL (ref 65–99)
HCO3 BLDA-SCNC: 27 MMOL/L (ref 20–26)
HCO3 BLDA-SCNC: 27.7 MMOL/L (ref 20–26)
HCO3 BLDA-SCNC: 31 MMOL/L (ref 20–26)
HCT VFR BLD AUTO: 37 % (ref 34–46.6)
HCT VFR BLD AUTO: 37.3 % (ref 34–46.6)
HCT VFR BLD AUTO: 38.1 % (ref 34–46.6)
HCT VFR BLD AUTO: 39 % (ref 34–46.6)
HCT VFR BLD AUTO: 40.3 % (ref 34–46.6)
HCT VFR BLD CALC: 35.5 % (ref 38–51)
HCT VFR BLD CALC: 38.4 % (ref 38–51)
HCT VFR BLD CALC: 39.9 % (ref 38–51)
HGB BLD-MCNC: 11.4 G/DL (ref 12–15.9)
HGB BLD-MCNC: 11.6 G/DL (ref 12–15.9)
HGB BLD-MCNC: 12 G/DL (ref 12–15.9)
HGB BLD-MCNC: 12.1 G/DL (ref 12–15.9)
HGB BLD-MCNC: 12.7 G/DL (ref 12–15.9)
HGB BLDA-MCNC: 11.6 G/DL (ref 13.5–17.5)
HGB BLDA-MCNC: 12.5 G/DL (ref 13.5–17.5)
HGB BLDA-MCNC: 13 G/DL (ref 13.5–17.5)
IMM GRANULOCYTES # BLD AUTO: 0.13 10*3/MM3 (ref 0–0.05)
IMM GRANULOCYTES NFR BLD AUTO: 1.2 % (ref 0–0.5)
INHALED O2 CONCENTRATION: 28 %
INHALED O2 CONCENTRATION: 40 %
INHALED O2 CONCENTRATION: 44 %
LYMPHOCYTES # BLD AUTO: 1.13 10*3/MM3 (ref 0.7–3.1)
LYMPHOCYTES NFR BLD AUTO: 10.7 % (ref 19.6–45.3)
Lab: ABNORMAL
MAGNESIUM SERPL-MCNC: 2.1 MG/DL (ref 1.6–2.4)
MAGNESIUM SERPL-MCNC: 2.2 MG/DL (ref 1.6–2.4)
MAGNESIUM SERPL-MCNC: 2.3 MG/DL (ref 1.6–2.4)
MCH RBC QN AUTO: 32.3 PG (ref 26.6–33)
MCH RBC QN AUTO: 32.3 PG (ref 26.6–33)
MCH RBC QN AUTO: 33 PG (ref 26.6–33)
MCH RBC QN AUTO: 33.1 PG (ref 26.6–33)
MCH RBC QN AUTO: 33.2 PG (ref 26.6–33)
MCHC RBC AUTO-ENTMCNC: 30.6 G/DL (ref 31.5–35.7)
MCHC RBC AUTO-ENTMCNC: 30.8 G/DL (ref 31.5–35.7)
MCHC RBC AUTO-ENTMCNC: 31.4 G/DL (ref 31.5–35.7)
MCHC RBC AUTO-ENTMCNC: 31.5 G/DL (ref 31.5–35.7)
MCHC RBC AUTO-ENTMCNC: 31.8 G/DL (ref 31.5–35.7)
MCV RBC AUTO: 103.1 FL (ref 79–97)
MCV RBC AUTO: 104.4 FL (ref 79–97)
MCV RBC AUTO: 104.7 FL (ref 79–97)
MCV RBC AUTO: 105.7 FL (ref 79–97)
MCV RBC AUTO: 107.7 FL (ref 79–97)
METHGB BLD QL: 0 % (ref 0–3)
METHGB BLD QL: 0.3 % (ref 0–3)
METHGB BLD QL: 0.3 % (ref 0–3)
MODALITY: ABNORMAL
MONOCYTES # BLD AUTO: 0.65 10*3/MM3 (ref 0.1–0.9)
MONOCYTES NFR BLD AUTO: 6.1 % (ref 5–12)
NEUTROPHILS NFR BLD AUTO: 8.57 10*3/MM3 (ref 1.7–7)
NEUTROPHILS NFR BLD AUTO: 80.9 % (ref 42.7–76)
NOTE: ABNORMAL
NOTIFIED WHO: ABNORMAL
NRBC BLD AUTO-RTO: 0 /100 WBC (ref 0–0.2)
NT-PROBNP SERPL-MCNC: ABNORMAL PG/ML (ref 0–1800)
NT-PROBNP SERPL-MCNC: ABNORMAL PG/ML (ref 0–1800)
OXYHGB MFR BLDV: 88.7 % (ref 94–99)
OXYHGB MFR BLDV: 89.3 % (ref 94–99)
OXYHGB MFR BLDV: 98.5 % (ref 94–99)
PCO2 BLDA: 44.8 MM HG (ref 35–45)
PCO2 BLDA: 44.9 MM HG (ref 35–45)
PCO2 BLDA: 46.4 MM HG (ref 35–45)
PCO2 TEMP ADJ BLD: ABNORMAL MM[HG]
PH BLDA: 7.38 PH UNITS (ref 7.35–7.45)
PH BLDA: 7.39 PH UNITS (ref 7.35–7.45)
PH BLDA: 7.45 PH UNITS (ref 7.35–7.45)
PH, TEMP CORRECTED: ABNORMAL
PHOSPHATE SERPL-MCNC: 5 MG/DL (ref 2.5–4.5)
PLATELET # BLD AUTO: 190 10*3/MM3 (ref 140–450)
PLATELET # BLD AUTO: 242 10*3/MM3 (ref 140–450)
PLATELET # BLD AUTO: 270 10*3/MM3 (ref 140–450)
PLATELET # BLD AUTO: 271 10*3/MM3 (ref 140–450)
PLATELET # BLD AUTO: 277 10*3/MM3 (ref 140–450)
PMV BLD AUTO: 10 FL (ref 6–12)
PMV BLD AUTO: 10.2 FL (ref 6–12)
PMV BLD AUTO: 10.8 FL (ref 6–12)
PMV BLD AUTO: 11.6 FL (ref 6–12)
PMV BLD AUTO: 9.7 FL (ref 6–12)
PO2 BLDA: 129 MM HG (ref 83–108)
PO2 BLDA: 57.2 MM HG (ref 83–108)
PO2 BLDA: 59.8 MM HG (ref 83–108)
PO2 TEMP ADJ BLD: ABNORMAL MM[HG]
POTASSIUM SERPL-SCNC: 3.5 MMOL/L (ref 3.5–5.2)
POTASSIUM SERPL-SCNC: 3.7 MMOL/L (ref 3.5–5.2)
POTASSIUM SERPL-SCNC: 3.8 MMOL/L (ref 3.5–5.2)
POTASSIUM SERPL-SCNC: 3.9 MMOL/L (ref 3.5–5.2)
POTASSIUM SERPL-SCNC: 3.9 MMOL/L (ref 3.5–5.2)
POTASSIUM SERPL-SCNC: 4 MMOL/L (ref 3.5–5.2)
POTASSIUM SERPL-SCNC: 4.4 MMOL/L (ref 3.5–5.2)
POTASSIUM SERPL-SCNC: 4.4 MMOL/L (ref 3.5–5.2)
PROCALCITONIN SERPL-MCNC: 0.21 NG/ML (ref 0–0.25)
PROCALCITONIN SERPL-MCNC: 0.21 NG/ML (ref 0–0.25)
PROT SERPL-MCNC: 6.3 G/DL (ref 6–8.5)
PROT SERPL-MCNC: 6.5 G/DL (ref 6–8.5)
PROT SERPL-MCNC: 6.5 G/DL (ref 6–8.5)
PROT SERPL-MCNC: 6.6 G/DL (ref 6–8.5)
PROT SERPL-MCNC: 6.7 G/DL (ref 6–8.5)
PROT SERPL-MCNC: 6.9 G/DL (ref 6–8.5)
QT INTERVAL: 366 MS
QT INTERVAL: 398 MS
QT INTERVAL: 414 MS
QT INTERVAL: 420 MS
QTC INTERVAL: 436 MS
QTC INTERVAL: 447 MS
QTC INTERVAL: 459 MS
QTC INTERVAL: 465 MS
RBC # BLD AUTO: 3.53 10*6/MM3 (ref 3.77–5.28)
RBC # BLD AUTO: 3.59 10*6/MM3 (ref 3.77–5.28)
RBC # BLD AUTO: 3.62 10*6/MM3 (ref 3.77–5.28)
RBC # BLD AUTO: 3.65 10*6/MM3 (ref 3.77–5.28)
RBC # BLD AUTO: 3.85 10*6/MM3 (ref 3.77–5.28)
SAO2 % BLDCOA: 90 % (ref 94–99)
SAO2 % BLDCOA: 90.8 % (ref 94–99)
SAO2 % BLDCOA: >99.2 % (ref 94–99)
SARS-COV-2 RNA RESP QL NAA+PROBE: DETECTED
SODIUM SERPL-SCNC: 136 MMOL/L (ref 136–145)
SODIUM SERPL-SCNC: 136 MMOL/L (ref 136–145)
SODIUM SERPL-SCNC: 137 MMOL/L (ref 136–145)
SODIUM SERPL-SCNC: 140 MMOL/L (ref 136–145)
SODIUM SERPL-SCNC: 140 MMOL/L (ref 136–145)
SODIUM SERPL-SCNC: 141 MMOL/L (ref 136–145)
TROPONIN T DELTA: -1 NG/L
TROPONIN T SERPL HS-MCNC: 78 NG/L
VENTILATOR MODE: ABNORMAL
VIT B12 BLD-MCNC: 1247 PG/ML (ref 211–946)
WBC NRBC COR # BLD: 10.59 10*3/MM3 (ref 3.4–10.8)
WBC NRBC COR # BLD: 10.73 10*3/MM3 (ref 3.4–10.8)
WBC NRBC COR # BLD: 11.42 10*3/MM3 (ref 3.4–10.8)
WBC NRBC COR # BLD: 8.63 10*3/MM3 (ref 3.4–10.8)
WBC NRBC COR # BLD: 9.82 10*3/MM3 (ref 3.4–10.8)

## 2023-01-01 PROCEDURE — 99285 EMERGENCY DEPT VISIT HI MDM: CPT

## 2023-01-01 PROCEDURE — 82607 VITAMIN B-12: CPT | Performed by: INTERNAL MEDICINE

## 2023-01-01 PROCEDURE — 99232 SBSQ HOSP IP/OBS MODERATE 35: CPT | Performed by: INTERNAL MEDICINE

## 2023-01-01 PROCEDURE — 36600 WITHDRAWAL OF ARTERIAL BLOOD: CPT

## 2023-01-01 PROCEDURE — 93010 ELECTROCARDIOGRAM REPORT: CPT | Performed by: INTERNAL MEDICINE

## 2023-01-01 PROCEDURE — 25010000002 REMDESIVIR 100 MG RECONSTITUTED SOLUTION: Performed by: STUDENT IN AN ORGANIZED HEALTH CARE EDUCATION/TRAINING PROGRAM

## 2023-01-01 PROCEDURE — 94799 UNLISTED PULMONARY SVC/PX: CPT

## 2023-01-01 PROCEDURE — 25010000002 ADENOSINE PER 6 MG

## 2023-01-01 PROCEDURE — 71045 X-RAY EXAM CHEST 1 VIEW: CPT

## 2023-01-01 PROCEDURE — 94761 N-INVAS EAR/PLS OXIMETRY MLT: CPT

## 2023-01-01 PROCEDURE — 84484 ASSAY OF TROPONIN QUANT: CPT | Performed by: EMERGENCY MEDICINE

## 2023-01-01 PROCEDURE — 99497 ADVNCD CARE PLAN 30 MIN: CPT

## 2023-01-01 PROCEDURE — 97162 PT EVAL MOD COMPLEX 30 MIN: CPT

## 2023-01-01 PROCEDURE — 82805 BLOOD GASES W/O2 SATURATION: CPT

## 2023-01-01 PROCEDURE — 99239 HOSP IP/OBS DSCHRG MGMT >30: CPT | Performed by: INTERNAL MEDICINE

## 2023-01-01 PROCEDURE — 86140 C-REACTIVE PROTEIN: CPT | Performed by: STUDENT IN AN ORGANIZED HEALTH CARE EDUCATION/TRAINING PROGRAM

## 2023-01-01 PROCEDURE — 36415 COLL VENOUS BLD VENIPUNCTURE: CPT

## 2023-01-01 PROCEDURE — 25010000002 DEXAMETHASONE SODIUM PHOSPHATE 10 MG/ML SOLUTION: Performed by: STUDENT IN AN ORGANIZED HEALTH CARE EDUCATION/TRAINING PROGRAM

## 2023-01-01 PROCEDURE — 93005 ELECTROCARDIOGRAM TRACING: CPT | Performed by: EMERGENCY MEDICINE

## 2023-01-01 PROCEDURE — 93005 ELECTROCARDIOGRAM TRACING: CPT | Performed by: STUDENT IN AN ORGANIZED HEALTH CARE EDUCATION/TRAINING PROGRAM

## 2023-01-01 PROCEDURE — 94726 PLETHYSMOGRAPHY LUNG VOLUMES: CPT

## 2023-01-01 PROCEDURE — 85025 COMPLETE CBC W/AUTO DIFF WBC: CPT | Performed by: INTERNAL MEDICINE

## 2023-01-01 PROCEDURE — 80053 COMPREHEN METABOLIC PANEL: CPT | Performed by: STUDENT IN AN ORGANIZED HEALTH CARE EDUCATION/TRAINING PROGRAM

## 2023-01-01 PROCEDURE — 83050 HGB METHEMOGLOBIN QUAN: CPT

## 2023-01-01 PROCEDURE — 71045 X-RAY EXAM CHEST 1 VIEW: CPT | Performed by: RADIOLOGY

## 2023-01-01 PROCEDURE — 85025 COMPLETE CBC W/AUTO DIFF WBC: CPT | Performed by: EMERGENCY MEDICINE

## 2023-01-01 PROCEDURE — 99233 SBSQ HOSP IP/OBS HIGH 50: CPT | Performed by: STUDENT IN AN ORGANIZED HEALTH CARE EDUCATION/TRAINING PROGRAM

## 2023-01-01 PROCEDURE — 25010000002 REMDESIVIR 100 MG RECONSTITUTED SOLUTION: Performed by: INTERNAL MEDICINE

## 2023-01-01 PROCEDURE — 25010000002 MORPHINE PER 10 MG: Performed by: INTERNAL MEDICINE

## 2023-01-01 PROCEDURE — 99232 SBSQ HOSP IP/OBS MODERATE 35: CPT | Performed by: STUDENT IN AN ORGANIZED HEALTH CARE EDUCATION/TRAINING PROGRAM

## 2023-01-01 PROCEDURE — 83735 ASSAY OF MAGNESIUM: CPT

## 2023-01-01 PROCEDURE — 94640 AIRWAY INHALATION TREATMENT: CPT

## 2023-01-01 PROCEDURE — 83735 ASSAY OF MAGNESIUM: CPT | Performed by: STUDENT IN AN ORGANIZED HEALTH CARE EDUCATION/TRAINING PROGRAM

## 2023-01-01 PROCEDURE — 82375 ASSAY CARBOXYHB QUANT: CPT

## 2023-01-01 PROCEDURE — 85027 COMPLETE CBC AUTOMATED: CPT | Performed by: STUDENT IN AN ORGANIZED HEALTH CARE EDUCATION/TRAINING PROGRAM

## 2023-01-01 PROCEDURE — 71250 CT THORAX DX C-: CPT

## 2023-01-01 PROCEDURE — 86140 C-REACTIVE PROTEIN: CPT | Performed by: INTERNAL MEDICINE

## 2023-01-01 PROCEDURE — 84145 PROCALCITONIN (PCT): CPT | Performed by: INTERNAL MEDICINE

## 2023-01-01 PROCEDURE — 83880 ASSAY OF NATRIURETIC PEPTIDE: CPT | Performed by: EMERGENCY MEDICINE

## 2023-01-01 PROCEDURE — 71250 CT THORAX DX C-: CPT | Performed by: RADIOLOGY

## 2023-01-01 PROCEDURE — 97530 THERAPEUTIC ACTIVITIES: CPT

## 2023-01-01 PROCEDURE — 80053 COMPREHEN METABOLIC PANEL: CPT | Performed by: EMERGENCY MEDICINE

## 2023-01-01 PROCEDURE — 83605 ASSAY OF LACTIC ACID: CPT | Performed by: EMERGENCY MEDICINE

## 2023-01-01 PROCEDURE — 84145 PROCALCITONIN (PCT): CPT | Performed by: STUDENT IN AN ORGANIZED HEALTH CARE EDUCATION/TRAINING PROGRAM

## 2023-01-01 PROCEDURE — 87040 BLOOD CULTURE FOR BACTERIA: CPT | Performed by: EMERGENCY MEDICINE

## 2023-01-01 PROCEDURE — 82746 ASSAY OF FOLIC ACID SERUM: CPT | Performed by: INTERNAL MEDICINE

## 2023-01-01 PROCEDURE — 99223 1ST HOSP IP/OBS HIGH 75: CPT | Performed by: STUDENT IN AN ORGANIZED HEALTH CARE EDUCATION/TRAINING PROGRAM

## 2023-01-01 PROCEDURE — 87636 SARSCOV2 & INF A&B AMP PRB: CPT | Performed by: EMERGENCY MEDICINE

## 2023-01-01 PROCEDURE — 80053 COMPREHEN METABOLIC PANEL: CPT | Performed by: INTERNAL MEDICINE

## 2023-01-01 PROCEDURE — 92610 EVALUATE SWALLOWING FUNCTION: CPT

## 2023-01-01 PROCEDURE — 25010000002 LEVOFLOXACIN PER 250 MG: Performed by: EMERGENCY MEDICINE

## 2023-01-01 PROCEDURE — 93005 ELECTROCARDIOGRAM TRACING: CPT | Performed by: HOSPITALIST

## 2023-01-01 PROCEDURE — 84100 ASSAY OF PHOSPHORUS: CPT | Performed by: STUDENT IN AN ORGANIZED HEALTH CARE EDUCATION/TRAINING PROGRAM

## 2023-01-01 PROCEDURE — 82652 VIT D 1 25-DIHYDROXY: CPT | Performed by: INTERNAL MEDICINE

## 2023-01-01 PROCEDURE — 83735 ASSAY OF MAGNESIUM: CPT | Performed by: HOSPITALIST

## 2023-01-01 PROCEDURE — 82948 REAGENT STRIP/BLOOD GLUCOSE: CPT

## 2023-01-01 PROCEDURE — 83880 ASSAY OF NATRIURETIC PEPTIDE: CPT | Performed by: STUDENT IN AN ORGANIZED HEALTH CARE EDUCATION/TRAINING PROGRAM

## 2023-01-01 RX ORDER — HYDRALAZINE HYDROCHLORIDE 10 MG/1
10 TABLET, FILM COATED ORAL EVERY 8 HOURS SCHEDULED
Status: DISCONTINUED | OUTPATIENT
Start: 2023-01-01 | End: 2023-01-01 | Stop reason: HOSPADM

## 2023-01-01 RX ORDER — SODIUM CHLORIDE 0.9 % (FLUSH) 0.9 %
10 SYRINGE (ML) INJECTION AS NEEDED
Status: DISCONTINUED | OUTPATIENT
Start: 2023-01-01 | End: 2023-01-01 | Stop reason: HOSPADM

## 2023-01-01 RX ORDER — SODIUM CHLORIDE, SODIUM LACTATE, POTASSIUM CHLORIDE, CALCIUM CHLORIDE 600; 310; 30; 20 MG/100ML; MG/100ML; MG/100ML; MG/100ML
75 INJECTION, SOLUTION INTRAVENOUS CONTINUOUS
Status: DISCONTINUED | OUTPATIENT
Start: 2023-01-01 | End: 2023-01-01

## 2023-01-01 RX ORDER — ASPIRIN 81 MG/1
81 TABLET ORAL DAILY
Status: CANCELLED | OUTPATIENT
Start: 2023-01-01

## 2023-01-01 RX ORDER — METOPROLOL TARTRATE 50 MG/1
50 TABLET, FILM COATED ORAL 2 TIMES DAILY
Status: DISCONTINUED | OUTPATIENT
Start: 2023-01-01 | End: 2023-01-01

## 2023-01-01 RX ORDER — POLYETHYLENE GLYCOL 3350 17 G/17G
17 POWDER, FOR SOLUTION ORAL DAILY
Status: CANCELLED | OUTPATIENT
Start: 2023-01-01 | End: 2023-06-20

## 2023-01-01 RX ORDER — IPRATROPIUM BROMIDE AND ALBUTEROL SULFATE 2.5; .5 MG/3ML; MG/3ML
3 SOLUTION RESPIRATORY (INHALATION)
Status: DISCONTINUED | OUTPATIENT
Start: 2023-01-01 | End: 2023-01-01

## 2023-01-01 RX ORDER — NYSTATIN 100000 [USP'U]/G
POWDER TOPICAL EVERY 12 HOURS SCHEDULED
Status: DISCONTINUED | OUTPATIENT
Start: 2023-01-01 | End: 2023-01-01 | Stop reason: HOSPADM

## 2023-01-01 RX ORDER — BUDESONIDE 0.5 MG/2ML
0.5 INHALANT ORAL 2 TIMES DAILY
COMMUNITY

## 2023-01-01 RX ORDER — PREDNISONE 5 MG/1
2.5 TABLET ORAL
Status: CANCELLED | OUTPATIENT
Start: 2023-01-01

## 2023-01-01 RX ORDER — BUDESONIDE 0.5 MG/2ML
0.5 INHALANT ORAL 2 TIMES DAILY
Status: DISCONTINUED | OUTPATIENT
Start: 2023-01-01 | End: 2023-01-01 | Stop reason: HOSPADM

## 2023-01-01 RX ORDER — SODIUM CHLORIDE 0.9 % (FLUSH) 0.9 %
10 SYRINGE (ML) INJECTION EVERY 12 HOURS SCHEDULED
Status: DISCONTINUED | OUTPATIENT
Start: 2023-01-01 | End: 2023-01-01 | Stop reason: HOSPADM

## 2023-01-01 RX ORDER — PANTOPRAZOLE SODIUM 40 MG/1
40 TABLET, DELAYED RELEASE ORAL DAILY
Status: CANCELLED | OUTPATIENT
Start: 2023-01-01

## 2023-01-01 RX ORDER — BUMETANIDE 1 MG/1
1 TABLET ORAL DAILY
Status: DISCONTINUED | OUTPATIENT
Start: 2023-01-01 | End: 2023-01-01

## 2023-01-01 RX ORDER — BUMETANIDE 1 MG/1
1 TABLET ORAL
Status: CANCELLED | OUTPATIENT
Start: 2023-01-01

## 2023-01-01 RX ORDER — ACETAMINOPHEN 500 MG
500 TABLET ORAL EVERY 6 HOURS PRN
COMMUNITY

## 2023-01-01 RX ORDER — AMIODARONE HYDROCHLORIDE 200 MG/1
400 TABLET ORAL
Status: DISCONTINUED | OUTPATIENT
Start: 2023-01-01 | End: 2023-01-01

## 2023-01-01 RX ORDER — DEXAMETHASONE SODIUM PHOSPHATE 10 MG/ML
6 INJECTION, SOLUTION INTRAMUSCULAR; INTRAVENOUS
Status: DISCONTINUED | OUTPATIENT
Start: 2023-01-01 | End: 2023-01-01 | Stop reason: HOSPADM

## 2023-01-01 RX ORDER — BISACODYL 10 MG
10 SUPPOSITORY, RECTAL RECTAL DAILY PRN
COMMUNITY

## 2023-01-01 RX ORDER — MORPHINE SULFATE 2 MG/ML
1 INJECTION, SOLUTION INTRAMUSCULAR; INTRAVENOUS ONCE
Status: COMPLETED | OUTPATIENT
Start: 2023-01-01 | End: 2023-01-01

## 2023-01-01 RX ORDER — BUMETANIDE 0.25 MG/ML
2 INJECTION INTRAMUSCULAR; INTRAVENOUS ONCE
Status: COMPLETED | OUTPATIENT
Start: 2023-01-01 | End: 2023-01-01

## 2023-01-01 RX ORDER — ACETAMINOPHEN 500 MG
500 TABLET ORAL EVERY 6 HOURS PRN
Status: DISCONTINUED | OUTPATIENT
Start: 2023-01-01 | End: 2023-01-01 | Stop reason: HOSPADM

## 2023-01-01 RX ORDER — ASPIRIN 81 MG/1
81 TABLET ORAL DAILY
Status: DISCONTINUED | OUTPATIENT
Start: 2023-01-01 | End: 2023-01-01 | Stop reason: HOSPADM

## 2023-01-01 RX ORDER — AMOXICILLIN 250 MG
2 CAPSULE ORAL 2 TIMES DAILY
Status: DISCONTINUED | OUTPATIENT
Start: 2023-01-01 | End: 2023-01-01 | Stop reason: HOSPADM

## 2023-01-01 RX ORDER — BISACODYL 10 MG
10 SUPPOSITORY, RECTAL RECTAL DAILY PRN
Status: CANCELLED | OUTPATIENT
Start: 2023-01-01

## 2023-01-01 RX ORDER — BUDESONIDE 0.5 MG/2ML
0.5 INHALANT ORAL 2 TIMES DAILY
Status: CANCELLED | OUTPATIENT
Start: 2023-01-01

## 2023-01-01 RX ORDER — ATORVASTATIN CALCIUM 20 MG/1
20 TABLET, FILM COATED ORAL NIGHTLY
Status: DISCONTINUED | OUTPATIENT
Start: 2023-01-01 | End: 2023-01-01 | Stop reason: HOSPADM

## 2023-01-01 RX ORDER — LEVOTHYROXINE SODIUM 0.03 MG/1
TABLET ORAL
COMMUNITY
Start: 2023-01-01 | End: 2023-01-01

## 2023-01-01 RX ORDER — BUMETANIDE 1 MG/1
1 TABLET ORAL
Status: DISCONTINUED | OUTPATIENT
Start: 2023-01-01 | End: 2023-01-01

## 2023-01-01 RX ORDER — POLYETHYLENE GLYCOL 3350 17 G/17G
17 POWDER, FOR SOLUTION ORAL DAILY PRN
Status: DISCONTINUED | OUTPATIENT
Start: 2023-01-01 | End: 2023-01-01 | Stop reason: HOSPADM

## 2023-01-01 RX ORDER — BISACODYL 5 MG/1
5 TABLET, DELAYED RELEASE ORAL DAILY PRN
Status: DISCONTINUED | OUTPATIENT
Start: 2023-01-01 | End: 2023-01-01 | Stop reason: HOSPADM

## 2023-01-01 RX ORDER — AMIODARONE HYDROCHLORIDE 200 MG/1
200 TABLET ORAL
Status: DISCONTINUED | OUTPATIENT
Start: 2023-06-06 | End: 2023-01-01

## 2023-01-01 RX ORDER — LACTULOSE 10 G/15ML
20 SOLUTION ORAL DAILY PRN
COMMUNITY

## 2023-01-01 RX ORDER — ARFORMOTEROL TARTRATE 15 UG/2ML
15 SOLUTION RESPIRATORY (INHALATION)
Status: CANCELLED | OUTPATIENT
Start: 2023-01-01

## 2023-01-01 RX ORDER — SODIUM CHLORIDE 9 MG/ML
40 INJECTION, SOLUTION INTRAVENOUS AS NEEDED
Status: DISCONTINUED | OUTPATIENT
Start: 2023-01-01 | End: 2023-01-01 | Stop reason: HOSPADM

## 2023-01-01 RX ORDER — ACETAMINOPHEN 500 MG
500 TABLET ORAL EVERY 6 HOURS PRN
Status: CANCELLED | OUTPATIENT
Start: 2023-01-01

## 2023-01-01 RX ORDER — PREDNISONE 5 MG/1
2.5 TABLET ORAL DAILY
Status: CANCELLED | OUTPATIENT
Start: 2023-01-01

## 2023-01-01 RX ORDER — ISOSORBIDE MONONITRATE 30 MG/1
30 TABLET, EXTENDED RELEASE ORAL
Status: DISCONTINUED | OUTPATIENT
Start: 2023-01-01 | End: 2023-01-01 | Stop reason: HOSPADM

## 2023-01-01 RX ORDER — ARFORMOTEROL TARTRATE 15 UG/2ML
15 SOLUTION RESPIRATORY (INHALATION)
COMMUNITY

## 2023-01-01 RX ORDER — ALBUMIN (HUMAN) 12.5 G/50ML
12.5 SOLUTION INTRAVENOUS ONCE
Status: DISCONTINUED | OUTPATIENT
Start: 2023-01-01 | End: 2023-01-01 | Stop reason: HOSPADM

## 2023-01-01 RX ORDER — AMOXICILLIN 250 MG
1 CAPSULE ORAL 2 TIMES DAILY
Status: CANCELLED | OUTPATIENT
Start: 2023-01-01 | End: 2023-06-18

## 2023-01-01 RX ORDER — LACTULOSE 10 G/15ML
20 SOLUTION ORAL DAILY PRN
Status: CANCELLED | OUTPATIENT
Start: 2023-01-01

## 2023-01-01 RX ORDER — PANTOPRAZOLE SODIUM 40 MG/1
40 TABLET, DELAYED RELEASE ORAL
Status: DISCONTINUED | OUTPATIENT
Start: 2023-01-01 | End: 2023-01-01 | Stop reason: HOSPADM

## 2023-01-01 RX ORDER — ADENOSINE 3 MG/ML
INJECTION, SOLUTION INTRAVENOUS
Status: COMPLETED
Start: 2023-01-01 | End: 2023-01-01

## 2023-01-01 RX ORDER — HYDRALAZINE HYDROCHLORIDE 10 MG/1
10 TABLET, FILM COATED ORAL 3 TIMES DAILY
COMMUNITY

## 2023-01-01 RX ORDER — LEVOFLOXACIN 5 MG/ML
750 INJECTION, SOLUTION INTRAVENOUS ONCE
Status: COMPLETED | OUTPATIENT
Start: 2023-01-01 | End: 2023-01-01

## 2023-01-01 RX ORDER — BISACODYL 10 MG
10 SUPPOSITORY, RECTAL RECTAL DAILY PRN
Status: DISCONTINUED | OUTPATIENT
Start: 2023-01-01 | End: 2023-01-01 | Stop reason: HOSPADM

## 2023-01-01 RX ORDER — MORPHINE SULFATE 2 MG/ML
2 INJECTION, SOLUTION INTRAMUSCULAR; INTRAVENOUS ONCE
Status: COMPLETED | OUTPATIENT
Start: 2023-01-01 | End: 2023-01-01

## 2023-01-01 RX ORDER — ISOSORBIDE MONONITRATE 30 MG/1
30 TABLET, EXTENDED RELEASE ORAL
Status: CANCELLED | OUTPATIENT
Start: 2023-01-01

## 2023-01-01 RX ORDER — ATORVASTATIN CALCIUM 20 MG/1
20 TABLET, FILM COATED ORAL NIGHTLY
Status: CANCELLED | OUTPATIENT
Start: 2023-01-01

## 2023-01-01 RX ADMIN — ISOSORBIDE MONONITRATE 30 MG: 30 TABLET, EXTENDED RELEASE ORAL at 09:41

## 2023-01-01 RX ADMIN — BUMETANIDE 1 MG: 1 TABLET ORAL at 12:16

## 2023-01-01 RX ADMIN — VITAMINS A AND D OINTMENT: 15.5; 53.4 OINTMENT TOPICAL at 17:57

## 2023-01-01 RX ADMIN — MORPHINE SULFATE 2 MG: 2 INJECTION, SOLUTION INTRAMUSCULAR; INTRAVENOUS at 03:01

## 2023-01-01 RX ADMIN — IPRATROPIUM BROMIDE 0.5 MG: 0.5 SOLUTION RESPIRATORY (INHALATION) at 18:39

## 2023-01-01 RX ADMIN — ASPIRIN 81 MG: 81 TABLET, COATED ORAL at 15:53

## 2023-01-01 RX ADMIN — BUDESONIDE 0.5 MG: 0.5 SUSPENSION RESPIRATORY (INHALATION) at 07:03

## 2023-01-01 RX ADMIN — IPRATROPIUM BROMIDE 0.5 MG: 0.5 SOLUTION RESPIRATORY (INHALATION) at 18:33

## 2023-01-01 RX ADMIN — IPRATROPIUM BROMIDE 0.5 MG: 0.5 SOLUTION RESPIRATORY (INHALATION) at 07:02

## 2023-01-01 RX ADMIN — NYSTATIN: 100000 POWDER TOPICAL at 09:41

## 2023-01-01 RX ADMIN — IPRATROPIUM BROMIDE AND ALBUTEROL SULFATE 3 ML: .5; 2.5 SOLUTION RESPIRATORY (INHALATION) at 14:30

## 2023-01-01 RX ADMIN — METOPROLOL TARTRATE 25 MG: 50 TABLET, FILM COATED ORAL at 09:13

## 2023-01-01 RX ADMIN — ASPIRIN 81 MG: 81 TABLET, COATED ORAL at 08:05

## 2023-01-01 RX ADMIN — PANTOPRAZOLE SODIUM 40 MG: 40 TABLET, DELAYED RELEASE ORAL at 05:38

## 2023-01-01 RX ADMIN — VITAMINS A AND D OINTMENT: 15.5; 53.4 OINTMENT TOPICAL at 15:22

## 2023-01-01 RX ADMIN — DEXAMETHASONE SODIUM PHOSPHATE 6 MG: 10 INJECTION INTRAMUSCULAR; INTRAVENOUS at 09:40

## 2023-01-01 RX ADMIN — ASPIRIN 81 MG: 81 TABLET, COATED ORAL at 09:41

## 2023-01-01 RX ADMIN — APIXABAN 2.5 MG: 2.5 TABLET, FILM COATED ORAL at 21:08

## 2023-01-01 RX ADMIN — SODIUM CHLORIDE, POTASSIUM CHLORIDE, SODIUM LACTATE AND CALCIUM CHLORIDE 75 ML/HR: 600; 310; 30; 20 INJECTION, SOLUTION INTRAVENOUS at 10:18

## 2023-01-01 RX ADMIN — HYDRALAZINE HYDROCHLORIDE 10 MG: 25 TABLET, FILM COATED ORAL at 22:39

## 2023-01-01 RX ADMIN — ASPIRIN 81 MG: 81 TABLET, COATED ORAL at 09:05

## 2023-01-01 RX ADMIN — SODIUM CHLORIDE 5 MG/HR: 900 INJECTION, SOLUTION INTRAVENOUS at 14:51

## 2023-01-01 RX ADMIN — IPRATROPIUM BROMIDE AND ALBUTEROL SULFATE 3 ML: .5; 2.5 SOLUTION RESPIRATORY (INHALATION) at 20:17

## 2023-01-01 RX ADMIN — BUMETANIDE 1 MG: 1 TABLET ORAL at 09:13

## 2023-01-01 RX ADMIN — IPRATROPIUM BROMIDE 0.5 MG: 0.5 SOLUTION RESPIRATORY (INHALATION) at 07:03

## 2023-01-01 RX ADMIN — ATORVASTATIN CALCIUM 20 MG: 20 TABLET, FILM COATED ORAL at 22:39

## 2023-01-01 RX ADMIN — VITAMINS A AND D OINTMENT: 15.5; 53.4 OINTMENT TOPICAL at 21:05

## 2023-01-01 RX ADMIN — VITAMINS A AND D OINTMENT: 15.5; 53.4 OINTMENT TOPICAL at 15:59

## 2023-01-01 RX ADMIN — NYSTATIN: 100000 POWDER TOPICAL at 20:35

## 2023-01-01 RX ADMIN — METOPROLOL TARTRATE 50 MG: 50 TABLET, FILM COATED ORAL at 08:06

## 2023-01-01 RX ADMIN — APIXABAN 2.5 MG: 2.5 TABLET, FILM COATED ORAL at 08:06

## 2023-01-01 RX ADMIN — VITAMINS A AND D OINTMENT: 15.5; 53.4 OINTMENT TOPICAL at 15:20

## 2023-01-01 RX ADMIN — IPRATROPIUM BROMIDE 0.5 MG: 0.5 SOLUTION RESPIRATORY (INHALATION) at 07:40

## 2023-01-01 RX ADMIN — HYDRALAZINE HYDROCHLORIDE 10 MG: 25 TABLET, FILM COATED ORAL at 06:24

## 2023-01-01 RX ADMIN — ISOSORBIDE MONONITRATE 30 MG: 30 TABLET, EXTENDED RELEASE ORAL at 09:05

## 2023-01-01 RX ADMIN — Medication 10 ML: at 09:41

## 2023-01-01 RX ADMIN — IPRATROPIUM BROMIDE 0.5 MG: 0.5 SOLUTION RESPIRATORY (INHALATION) at 18:47

## 2023-01-01 RX ADMIN — APIXABAN 2.5 MG: 2.5 TABLET, FILM COATED ORAL at 22:46

## 2023-01-01 RX ADMIN — VITAMINS A AND D OINTMENT: 15.5; 53.4 OINTMENT TOPICAL at 22:17

## 2023-01-01 RX ADMIN — PANTOPRAZOLE SODIUM 40 MG: 40 TABLET, DELAYED RELEASE ORAL at 08:06

## 2023-01-01 RX ADMIN — LEVOFLOXACIN 750 MG: 750 INJECTION, SOLUTION INTRAVENOUS at 14:26

## 2023-01-01 RX ADMIN — ATORVASTATIN CALCIUM 20 MG: 20 TABLET, FILM COATED ORAL at 21:08

## 2023-01-01 RX ADMIN — IPRATROPIUM BROMIDE 0.5 MG: 0.5 SOLUTION RESPIRATORY (INHALATION) at 13:20

## 2023-01-01 RX ADMIN — HYDRALAZINE HYDROCHLORIDE 10 MG: 25 TABLET, FILM COATED ORAL at 15:21

## 2023-01-01 RX ADMIN — METOPROLOL TARTRATE 50 MG: 50 TABLET, FILM COATED ORAL at 22:17

## 2023-01-01 RX ADMIN — METOPROLOL TARTRATE 25 MG: 25 TABLET, FILM COATED ORAL at 09:07

## 2023-01-01 RX ADMIN — IPRATROPIUM BROMIDE 0.5 MG: 0.5 SOLUTION RESPIRATORY (INHALATION) at 00:11

## 2023-01-01 RX ADMIN — ISOSORBIDE MONONITRATE 30 MG: 30 TABLET, EXTENDED RELEASE ORAL at 09:12

## 2023-01-01 RX ADMIN — VITAMINS A AND D OINTMENT: 15.5; 53.4 OINTMENT TOPICAL at 09:46

## 2023-01-01 RX ADMIN — PANTOPRAZOLE SODIUM 40 MG: 40 TABLET, DELAYED RELEASE ORAL at 09:13

## 2023-01-01 RX ADMIN — DOCUSATE SODIUM 50 MG AND SENNOSIDES 8.6 MG 2 TABLET: 8.6; 5 TABLET, FILM COATED ORAL at 09:05

## 2023-01-01 RX ADMIN — HYDRALAZINE HYDROCHLORIDE 10 MG: 25 TABLET, FILM COATED ORAL at 14:45

## 2023-01-01 RX ADMIN — BUMETANIDE 2 MG: 0.25 INJECTION INTRAMUSCULAR; INTRAVENOUS at 16:26

## 2023-01-01 RX ADMIN — Medication 10 ML: at 09:40

## 2023-01-01 RX ADMIN — IPRATROPIUM BROMIDE 0.5 MG: 0.5 SOLUTION RESPIRATORY (INHALATION) at 00:36

## 2023-01-01 RX ADMIN — Medication 10 ML: at 08:42

## 2023-01-01 RX ADMIN — METOPROLOL TARTRATE 25 MG: 25 TABLET, FILM COATED ORAL at 20:36

## 2023-01-01 RX ADMIN — HYDRALAZINE HYDROCHLORIDE 10 MG: 25 TABLET, FILM COATED ORAL at 13:54

## 2023-01-01 RX ADMIN — IPRATROPIUM BROMIDE 0.5 MG: 0.5 SOLUTION RESPIRATORY (INHALATION) at 00:16

## 2023-01-01 RX ADMIN — MORPHINE SULFATE 1 MG: 2 INJECTION, SOLUTION INTRAMUSCULAR; INTRAVENOUS at 23:57

## 2023-01-01 RX ADMIN — METOPROLOL TARTRATE 25 MG: 50 TABLET, FILM COATED ORAL at 22:39

## 2023-01-01 RX ADMIN — IPRATROPIUM BROMIDE 0.5 MG: 0.5 SOLUTION RESPIRATORY (INHALATION) at 13:40

## 2023-01-01 RX ADMIN — APIXABAN 2.5 MG: 2.5 TABLET, FILM COATED ORAL at 09:13

## 2023-01-01 RX ADMIN — BUDESONIDE 0.5 MG: 0.5 SUSPENSION RESPIRATORY (INHALATION) at 18:27

## 2023-01-01 RX ADMIN — METOPROLOL TARTRATE 25 MG: 25 TABLET, FILM COATED ORAL at 09:41

## 2023-01-01 RX ADMIN — PANTOPRAZOLE SODIUM 40 MG: 40 TABLET, DELAYED RELEASE ORAL at 09:41

## 2023-01-01 RX ADMIN — IPRATROPIUM BROMIDE 0.5 MG: 0.5 SOLUTION RESPIRATORY (INHALATION) at 10:29

## 2023-01-01 RX ADMIN — IPRATROPIUM BROMIDE AND ALBUTEROL SULFATE 3 ML: .5; 2.5 SOLUTION RESPIRATORY (INHALATION) at 18:35

## 2023-01-01 RX ADMIN — BUDESONIDE 0.5 MG: 0.5 SUSPENSION RESPIRATORY (INHALATION) at 18:40

## 2023-01-01 RX ADMIN — BUDESONIDE 0.5 MG: 0.5 SUSPENSION RESPIRATORY (INHALATION) at 07:39

## 2023-01-01 RX ADMIN — ISOSORBIDE MONONITRATE 30 MG: 30 TABLET, EXTENDED RELEASE ORAL at 08:06

## 2023-01-01 RX ADMIN — SODIUM CHLORIDE, POTASSIUM CHLORIDE, SODIUM LACTATE AND CALCIUM CHLORIDE 500 ML: 600; 310; 30; 20 INJECTION, SOLUTION INTRAVENOUS at 09:00

## 2023-01-01 RX ADMIN — ADENOSINE 6 MG: 3 INJECTION INTRAVENOUS at 06:47

## 2023-01-01 RX ADMIN — Medication 10 ML: at 20:36

## 2023-01-01 RX ADMIN — IPRATROPIUM BROMIDE 0.5 MG: 0.5 SOLUTION RESPIRATORY (INHALATION) at 18:27

## 2023-01-01 RX ADMIN — APIXABAN 2.5 MG: 2.5 TABLET, FILM COATED ORAL at 09:05

## 2023-01-01 RX ADMIN — DOCUSATE SODIUM 50 MG AND SENNOSIDES 8.6 MG 2 TABLET: 8.6; 5 TABLET, FILM COATED ORAL at 09:41

## 2023-01-01 RX ADMIN — APIXABAN 2.5 MG: 2.5 TABLET, FILM COATED ORAL at 22:39

## 2023-01-01 RX ADMIN — BUMETANIDE 1 MG: 1 TABLET ORAL at 09:41

## 2023-01-01 RX ADMIN — DOCUSATE SODIUM 50 MG AND SENNOSIDES 8.6 MG 2 TABLET: 8.6; 5 TABLET, FILM COATED ORAL at 08:05

## 2023-01-01 RX ADMIN — ATORVASTATIN CALCIUM 20 MG: 20 TABLET, FILM COATED ORAL at 20:36

## 2023-01-01 RX ADMIN — VITAMINS A AND D OINTMENT: 15.5; 53.4 OINTMENT TOPICAL at 08:42

## 2023-01-01 RX ADMIN — METOPROLOL TARTRATE 50 MG: 50 TABLET, FILM COATED ORAL at 22:44

## 2023-01-01 RX ADMIN — BUDESONIDE 0.5 MG: 0.5 SUSPENSION RESPIRATORY (INHALATION) at 07:20

## 2023-01-01 RX ADMIN — REMDESIVIR 200 MG: 100 INJECTION, POWDER, LYOPHILIZED, FOR SOLUTION INTRAVENOUS at 20:45

## 2023-01-01 RX ADMIN — NYSTATIN: 100000 POWDER TOPICAL at 09:07

## 2023-01-01 RX ADMIN — IPRATROPIUM BROMIDE AND ALBUTEROL SULFATE 3 ML: .5; 2.5 SOLUTION RESPIRATORY (INHALATION) at 00:31

## 2023-01-01 RX ADMIN — DEXAMETHASONE SODIUM PHOSPHATE 6 MG: 10 INJECTION INTRAMUSCULAR; INTRAVENOUS at 22:43

## 2023-01-01 RX ADMIN — VITAMINS A AND D OINTMENT: 15.5; 53.4 OINTMENT TOPICAL at 09:41

## 2023-01-01 RX ADMIN — BUMETANIDE 2 MG: 0.25 INJECTION INTRAMUSCULAR; INTRAVENOUS at 09:06

## 2023-01-01 RX ADMIN — HYDRALAZINE HYDROCHLORIDE 10 MG: 25 TABLET, FILM COATED ORAL at 22:44

## 2023-01-01 RX ADMIN — Medication 10 ML: at 21:05

## 2023-01-01 RX ADMIN — Medication 10 ML: at 22:17

## 2023-01-01 RX ADMIN — DOCUSATE SODIUM 50 MG AND SENNOSIDES 8.6 MG 2 TABLET: 8.6; 5 TABLET, FILM COATED ORAL at 22:39

## 2023-01-01 RX ADMIN — DOCUSATE SODIUM 50 MG AND SENNOSIDES 8.6 MG 2 TABLET: 8.6; 5 TABLET, FILM COATED ORAL at 09:12

## 2023-01-01 RX ADMIN — APIXABAN 2.5 MG: 2.5 TABLET, FILM COATED ORAL at 09:41

## 2023-01-01 RX ADMIN — APIXABAN 2.5 MG: 2.5 TABLET, FILM COATED ORAL at 09:40

## 2023-01-01 RX ADMIN — IPRATROPIUM BROMIDE 0.5 MG: 0.5 SOLUTION RESPIRATORY (INHALATION) at 00:09

## 2023-01-01 RX ADMIN — IPRATROPIUM BROMIDE AND ALBUTEROL SULFATE 3 ML: .5; 2.5 SOLUTION RESPIRATORY (INHALATION) at 07:20

## 2023-01-01 RX ADMIN — DOCUSATE SODIUM 50 MG AND SENNOSIDES 8.6 MG 2 TABLET: 8.6; 5 TABLET, FILM COATED ORAL at 22:17

## 2023-01-01 RX ADMIN — HYDRALAZINE HYDROCHLORIDE 10 MG: 25 TABLET, FILM COATED ORAL at 05:48

## 2023-01-01 RX ADMIN — REMDESIVIR 100 MG: 100 INJECTION, POWDER, LYOPHILIZED, FOR SOLUTION INTRAVENOUS at 22:38

## 2023-01-01 RX ADMIN — REMDESIVIR 200 MG: 100 INJECTION, POWDER, LYOPHILIZED, FOR SOLUTION INTRAVENOUS at 10:55

## 2023-01-01 RX ADMIN — NYSTATIN: 100000 POWDER TOPICAL at 21:05

## 2023-01-01 RX ADMIN — Medication 10 ML: at 22:46

## 2023-01-01 RX ADMIN — ATORVASTATIN CALCIUM 20 MG: 20 TABLET, FILM COATED ORAL at 22:44

## 2023-01-01 RX ADMIN — IPRATROPIUM BROMIDE 0.5 MG: 0.5 SOLUTION RESPIRATORY (INHALATION) at 13:29

## 2023-01-01 RX ADMIN — IPRATROPIUM BROMIDE AND ALBUTEROL SULFATE 3 ML: .5; 2.5 SOLUTION RESPIRATORY (INHALATION) at 00:16

## 2023-01-01 RX ADMIN — BUDESONIDE 0.5 MG: 0.5 SUSPENSION RESPIRATORY (INHALATION) at 20:17

## 2023-01-01 RX ADMIN — HYDRALAZINE HYDROCHLORIDE 10 MG: 25 TABLET, FILM COATED ORAL at 15:53

## 2023-01-01 RX ADMIN — VITAMINS A AND D OINTMENT: 15.5; 53.4 OINTMENT TOPICAL at 20:36

## 2023-01-01 RX ADMIN — DOCUSATE SODIUM 50 MG AND SENNOSIDES 8.6 MG 2 TABLET: 8.6; 5 TABLET, FILM COATED ORAL at 22:46

## 2023-01-01 RX ADMIN — ISOSORBIDE MONONITRATE 30 MG: 30 TABLET, EXTENDED RELEASE ORAL at 09:40

## 2023-01-01 RX ADMIN — BUDESONIDE 0.5 MG: 0.5 SUSPENSION RESPIRATORY (INHALATION) at 18:33

## 2023-01-01 RX ADMIN — DEXAMETHASONE SODIUM PHOSPHATE 6 MG: 10 INJECTION INTRAMUSCULAR; INTRAVENOUS at 09:12

## 2023-01-01 RX ADMIN — VITAMINS A AND D OINTMENT: 15.5; 53.4 OINTMENT TOPICAL at 09:07

## 2023-01-01 RX ADMIN — Medication 10 ML: at 22:38

## 2023-01-01 RX ADMIN — Medication 10 ML: at 09:06

## 2023-01-01 RX ADMIN — METOPROLOL TARTRATE 25 MG: 25 TABLET, FILM COATED ORAL at 21:08

## 2023-01-01 RX ADMIN — VITAMINS A AND D OINTMENT: 15.5; 53.4 OINTMENT TOPICAL at 22:40

## 2023-01-01 RX ADMIN — APIXABAN 2.5 MG: 2.5 TABLET, FILM COATED ORAL at 22:17

## 2023-01-01 RX ADMIN — HYDRALAZINE HYDROCHLORIDE 10 MG: 25 TABLET, FILM COATED ORAL at 21:08

## 2023-01-01 RX ADMIN — APIXABAN 2.5 MG: 2.5 TABLET, FILM COATED ORAL at 20:36

## 2023-01-01 RX ADMIN — NYSTATIN: 100000 POWDER TOPICAL at 12:15

## 2023-01-01 RX ADMIN — DEXAMETHASONE SODIUM PHOSPHATE 6 MG: 10 INJECTION INTRAMUSCULAR; INTRAVENOUS at 11:55

## 2023-01-01 RX ADMIN — HYDRALAZINE HYDROCHLORIDE 10 MG: 25 TABLET, FILM COATED ORAL at 20:36

## 2023-01-01 RX ADMIN — DEXAMETHASONE SODIUM PHOSPHATE 6 MG: 10 INJECTION INTRAMUSCULAR; INTRAVENOUS at 09:05

## 2023-01-01 RX ADMIN — IPRATROPIUM BROMIDE 0.5 MG: 0.5 SOLUTION RESPIRATORY (INHALATION) at 13:10

## 2023-01-01 RX ADMIN — HYDRALAZINE HYDROCHLORIDE 10 MG: 25 TABLET, FILM COATED ORAL at 05:38

## 2023-01-01 RX ADMIN — ATORVASTATIN CALCIUM 20 MG: 20 TABLET, FILM COATED ORAL at 22:17

## 2023-01-01 RX ADMIN — BUDESONIDE 0.5 MG: 0.5 SUSPENSION RESPIRATORY (INHALATION) at 18:35

## 2023-01-01 RX ADMIN — REMDESIVIR 100 MG: 100 INJECTION, POWDER, LYOPHILIZED, FOR SOLUTION INTRAVENOUS at 22:17

## 2023-01-01 RX ADMIN — HYDRALAZINE HYDROCHLORIDE 10 MG: 25 TABLET, FILM COATED ORAL at 22:17

## 2023-01-01 RX ADMIN — Medication 10 ML: at 09:13

## 2023-01-01 RX ADMIN — VITAMINS A AND D OINTMENT: 15.5; 53.4 OINTMENT TOPICAL at 15:43

## 2023-01-01 RX ADMIN — BUDESONIDE 0.5 MG: 0.5 SUSPENSION RESPIRATORY (INHALATION) at 18:47

## 2023-05-10 ENCOUNTER — APPOINTMENT (OUTPATIENT)
Dept: ULTRASOUND IMAGING | Facility: HOSPITAL | Age: 88
DRG: 871 | End: 2023-05-10
Payer: MEDICARE

## 2023-05-10 ENCOUNTER — APPOINTMENT (OUTPATIENT)
Dept: GENERAL RADIOLOGY | Facility: HOSPITAL | Age: 88
DRG: 871 | End: 2023-05-10
Payer: MEDICARE

## 2023-05-10 ENCOUNTER — HOSPITAL ENCOUNTER (INPATIENT)
Facility: HOSPITAL | Age: 88
LOS: 9 days | Discharge: NURSING, REHAB OR LONG-TERM CARE | DRG: 871 | End: 2023-05-19
Attending: EMERGENCY MEDICINE | Admitting: STUDENT IN AN ORGANIZED HEALTH CARE EDUCATION/TRAINING PROGRAM
Payer: MEDICARE

## 2023-05-10 DIAGNOSIS — A41.9 SEPSIS, DUE TO UNSPECIFIED ORGANISM, UNSPECIFIED WHETHER ACUTE ORGAN DYSFUNCTION PRESENT: ICD-10-CM

## 2023-05-10 DIAGNOSIS — I50.9 CONGESTIVE HEART FAILURE, UNSPECIFIED HF CHRONICITY, UNSPECIFIED HEART FAILURE TYPE: ICD-10-CM

## 2023-05-10 DIAGNOSIS — I27.20 PULMONARY HYPERTENSION: ICD-10-CM

## 2023-05-10 DIAGNOSIS — R09.02 HYPOXEMIA: ICD-10-CM

## 2023-05-10 DIAGNOSIS — N17.9 AKI (ACUTE KIDNEY INJURY): ICD-10-CM

## 2023-05-10 DIAGNOSIS — I21.4 NON-STEMI (NON-ST ELEVATED MYOCARDIAL INFARCTION): Primary | ICD-10-CM

## 2023-05-10 PROBLEM — J96.01 ACUTE RESPIRATORY FAILURE WITH HYPOXEMIA: Status: ACTIVE | Noted: 2023-01-01

## 2023-05-10 PROBLEM — I50.33 ACUTE ON CHRONIC HEART FAILURE WITH PRESERVED EJECTION FRACTION (HFPEF): Status: ACTIVE | Noted: 2023-05-10

## 2023-05-10 LAB
A-A DO2: 127.5 MMHG (ref 0–300)
A-A DO2: 143.4 MMHG (ref 0–300)
A-A DO2: 94.8 MMHG (ref 0–300)
ALBUMIN SERPL-MCNC: 3.4 G/DL (ref 3.5–5.2)
ALBUMIN/GLOB SERPL: 0.8 G/DL
ALP SERPL-CCNC: 165 U/L (ref 39–117)
ALT SERPL W P-5'-P-CCNC: 10 U/L (ref 1–33)
ANION GAP SERPL CALCULATED.3IONS-SCNC: 18.5 MMOL/L (ref 5–15)
APTT PPP: 40.4 SECONDS (ref 26.5–34.5)
APTT PPP: >100 SECONDS (ref 26.5–34.5)
ARTERIAL PATENCY WRIST A: ABNORMAL
ARTERIAL PATENCY WRIST A: POSITIVE
ARTERIAL PATENCY WRIST A: POSITIVE
AST SERPL-CCNC: 32 U/L (ref 1–32)
ATMOSPHERIC PRESS: 728 MMHG
ATMOSPHERIC PRESS: 729 MMHG
ATMOSPHERIC PRESS: 731 MMHG
BACTERIA UR QL AUTO: ABNORMAL /HPF
BASE EXCESS BLDA CALC-SCNC: -0.1 MMOL/L (ref 0–2)
BASE EXCESS BLDA CALC-SCNC: 3.1 MMOL/L (ref 0–2)
BASE EXCESS BLDA CALC-SCNC: 4.3 MMOL/L (ref 0–2)
BASOPHILS # BLD AUTO: 0.03 10*3/MM3 (ref 0–0.2)
BASOPHILS NFR BLD AUTO: 0.1 % (ref 0–1.5)
BDY SITE: ABNORMAL
BILIRUB SERPL-MCNC: 1 MG/DL (ref 0–1.2)
BILIRUB UR QL STRIP: NEGATIVE
BUN SERPL-MCNC: 35 MG/DL (ref 8–23)
BUN/CREAT SERPL: 22.6 (ref 7–25)
CALCIUM SPEC-SCNC: 9.5 MG/DL (ref 8.6–10.5)
CHLORIDE SERPL-SCNC: 103 MMOL/L (ref 98–107)
CLARITY UR: CLEAR
CO2 BLDA-SCNC: 24.1 MMOL/L (ref 22–33)
CO2 BLDA-SCNC: 27.8 MMOL/L (ref 22–33)
CO2 BLDA-SCNC: 29.3 MMOL/L (ref 22–33)
CO2 SERPL-SCNC: 22.5 MMOL/L (ref 22–29)
COHGB MFR BLD: 1.4 % (ref 0–5)
COHGB MFR BLD: 1.5 % (ref 0–5)
COHGB MFR BLD: 1.6 % (ref 0–5)
COLOR UR: YELLOW
CREAT SERPL-MCNC: 1.55 MG/DL (ref 0.57–1)
D-LACTATE SERPL-SCNC: 1.9 MMOL/L (ref 0.5–2)
DEPRECATED RDW RBC AUTO: 65.4 FL (ref 37–54)
EGFRCR SERPLBLD CKD-EPI 2021: 32.1 ML/MIN/1.73
EOSINOPHIL # BLD AUTO: 0 10*3/MM3 (ref 0–0.4)
EOSINOPHIL NFR BLD AUTO: 0 % (ref 0.3–6.2)
ERYTHROCYTE [DISTWIDTH] IN BLOOD BY AUTOMATED COUNT: 16.7 % (ref 12.3–15.4)
FLUAV RNA RESP QL NAA+PROBE: NOT DETECTED
FLUBV RNA RESP QL NAA+PROBE: NOT DETECTED
GAS FLOW AIRWAY: 2 LPM
GAS FLOW AIRWAY: 3 LPM
GAS FLOW AIRWAY: 3.5 LPM
GEN 5 2HR TROPONIN T REFLEX: 75 NG/L
GLOBULIN UR ELPH-MCNC: 4.1 GM/DL
GLUCOSE SERPL-MCNC: 133 MG/DL (ref 65–99)
GLUCOSE UR STRIP-MCNC: NEGATIVE MG/DL
HCO3 BLDA-SCNC: 23.1 MMOL/L (ref 20–26)
HCO3 BLDA-SCNC: 26.7 MMOL/L (ref 20–26)
HCO3 BLDA-SCNC: 28.1 MMOL/L (ref 20–26)
HCT VFR BLD AUTO: 40.4 % (ref 34–46.6)
HCT VFR BLD CALC: 37.4 % (ref 38–51)
HCT VFR BLD CALC: 39.8 % (ref 38–51)
HCT VFR BLD CALC: 40.3 % (ref 38–51)
HGB BLD-MCNC: 12.7 G/DL (ref 12–15.9)
HGB BLDA-MCNC: 12.2 G/DL (ref 13.5–17.5)
HGB BLDA-MCNC: 13 G/DL (ref 13.5–17.5)
HGB BLDA-MCNC: 13.1 G/DL (ref 13.5–17.5)
HGB UR QL STRIP.AUTO: ABNORMAL
HOLD SPECIMEN: NORMAL
HOLD SPECIMEN: NORMAL
HYALINE CASTS UR QL AUTO: ABNORMAL /LPF
IMM GRANULOCYTES # BLD AUTO: 0.14 10*3/MM3 (ref 0–0.05)
IMM GRANULOCYTES NFR BLD AUTO: 0.7 % (ref 0–0.5)
INHALED O2 CONCENTRATION: 28 %
INHALED O2 CONCENTRATION: 32 %
INHALED O2 CONCENTRATION: 38 %
INR PPP: 1.3 (ref 0.9–1.1)
KETONES UR QL STRIP: NEGATIVE
LEUKOCYTE ESTERASE UR QL STRIP.AUTO: ABNORMAL
LYMPHOCYTES # BLD AUTO: 1.4 10*3/MM3 (ref 0.7–3.1)
LYMPHOCYTES NFR BLD AUTO: 6.8 % (ref 19.6–45.3)
Lab: ABNORMAL
MAGNESIUM SERPL-MCNC: 2 MG/DL (ref 1.6–2.4)
MCH RBC QN AUTO: 33.6 PG (ref 26.6–33)
MCHC RBC AUTO-ENTMCNC: 31.4 G/DL (ref 31.5–35.7)
MCV RBC AUTO: 106.9 FL (ref 79–97)
METHGB BLD QL: 0.1 % (ref 0–3)
METHGB BLD QL: <-0.1 % (ref 0–3)
METHGB BLD QL: <-0.1 % (ref 0–3)
MODALITY: ABNORMAL
MONOCYTES # BLD AUTO: 1.44 10*3/MM3 (ref 0.1–0.9)
MONOCYTES NFR BLD AUTO: 7 % (ref 5–12)
NEUTROPHILS NFR BLD AUTO: 17.54 10*3/MM3 (ref 1.7–7)
NEUTROPHILS NFR BLD AUTO: 85.4 % (ref 42.7–76)
NITRITE UR QL STRIP: POSITIVE
NOTE: ABNORMAL
NOTIFIED BY: ABNORMAL
NOTIFIED WHO: ABNORMAL
NRBC BLD AUTO-RTO: 0 /100 WBC (ref 0–0.2)
NT-PROBNP SERPL-MCNC: ABNORMAL PG/ML (ref 0–1800)
OXYHGB MFR BLDV: 90.2 % (ref 94–99)
OXYHGB MFR BLDV: 91.1 % (ref 94–99)
OXYHGB MFR BLDV: 94.8 % (ref 94–99)
PCO2 BLDA: 32.6 MM HG (ref 35–45)
PCO2 BLDA: 36.3 MM HG (ref 35–45)
PCO2 BLDA: 38.4 MM HG (ref 35–45)
PCO2 TEMP ADJ BLD: ABNORMAL MM[HG]
PH BLDA: 7.46 PH UNITS (ref 7.35–7.45)
PH BLDA: 7.47 PH UNITS (ref 7.35–7.45)
PH BLDA: 7.47 PH UNITS (ref 7.35–7.45)
PH UR STRIP.AUTO: 6.5 [PH] (ref 5–8)
PH, TEMP CORRECTED: ABNORMAL
PLATELET # BLD AUTO: 247 10*3/MM3 (ref 140–450)
PMV BLD AUTO: 9.9 FL (ref 6–12)
PO2 BLDA: 54.5 MM HG (ref 83–108)
PO2 BLDA: 55.9 MM HG (ref 83–108)
PO2 BLDA: 73 MM HG (ref 83–108)
PO2 TEMP ADJ BLD: ABNORMAL MM[HG]
POTASSIUM SERPL-SCNC: 4.5 MMOL/L (ref 3.5–5.2)
PROT SERPL-MCNC: 7.5 G/DL (ref 6–8.5)
PROT UR QL STRIP: ABNORMAL
PROTHROMBIN TIME: 16.8 SECONDS (ref 12.1–14.7)
QT INTERVAL: 370 MS
QTC INTERVAL: 445 MS
RBC # BLD AUTO: 3.78 10*6/MM3 (ref 3.77–5.28)
RBC # UR STRIP: ABNORMAL /HPF
REF LAB TEST METHOD: ABNORMAL
SAO2 % BLDCOA: 91.2 % (ref 94–99)
SAO2 % BLDCOA: 92.2 % (ref 94–99)
SAO2 % BLDCOA: 96.3 % (ref 94–99)
SARS-COV-2 RNA RESP QL NAA+PROBE: NOT DETECTED
SODIUM SERPL-SCNC: 144 MMOL/L (ref 136–145)
SP GR UR STRIP: 1.01 (ref 1–1.03)
SQUAMOUS #/AREA URNS HPF: ABNORMAL /HPF
TROPONIN T DELTA: 7 NG/L
TROPONIN T SERPL HS-MCNC: 68 NG/L
TSH SERPL DL<=0.05 MIU/L-ACNC: 3.95 UIU/ML (ref 0.27–4.2)
UROBILINOGEN UR QL STRIP: ABNORMAL
VENTILATOR MODE: ABNORMAL
WBC # UR STRIP: ABNORMAL /HPF
WBC NRBC COR # BLD: 20.55 10*3/MM3 (ref 3.4–10.8)
WHOLE BLOOD HOLD COAG: NORMAL
WHOLE BLOOD HOLD SPECIMEN: NORMAL

## 2023-05-10 PROCEDURE — 25010000002 FUROSEMIDE PER 20 MG: Performed by: PHYSICIAN ASSISTANT

## 2023-05-10 PROCEDURE — 81001 URINALYSIS AUTO W/SCOPE: CPT | Performed by: PHYSICIAN ASSISTANT

## 2023-05-10 PROCEDURE — 82805 BLOOD GASES W/O2 SATURATION: CPT

## 2023-05-10 PROCEDURE — 36600 WITHDRAWAL OF ARTERIAL BLOOD: CPT

## 2023-05-10 PROCEDURE — 83050 HGB METHEMOGLOBIN QUAN: CPT

## 2023-05-10 PROCEDURE — 71045 X-RAY EXAM CHEST 1 VIEW: CPT

## 2023-05-10 PROCEDURE — 93010 ELECTROCARDIOGRAM REPORT: CPT | Performed by: SPECIALIST

## 2023-05-10 PROCEDURE — 82375 ASSAY CARBOXYHB QUANT: CPT

## 2023-05-10 PROCEDURE — 87636 SARSCOV2 & INF A&B AMP PRB: CPT | Performed by: PHYSICIAN ASSISTANT

## 2023-05-10 PROCEDURE — 25010000002 VANCOMYCIN 5 G RECONSTITUTED SOLUTION: Performed by: PHYSICIAN ASSISTANT

## 2023-05-10 PROCEDURE — 85730 THROMBOPLASTIN TIME PARTIAL: CPT | Performed by: PHYSICIAN ASSISTANT

## 2023-05-10 PROCEDURE — 83735 ASSAY OF MAGNESIUM: CPT | Performed by: PHYSICIAN ASSISTANT

## 2023-05-10 PROCEDURE — 94799 UNLISTED PULMONARY SVC/PX: CPT

## 2023-05-10 PROCEDURE — 93970 EXTREMITY STUDY: CPT

## 2023-05-10 PROCEDURE — 87086 URINE CULTURE/COLONY COUNT: CPT | Performed by: PHYSICIAN ASSISTANT

## 2023-05-10 PROCEDURE — 85610 PROTHROMBIN TIME: CPT | Performed by: PHYSICIAN ASSISTANT

## 2023-05-10 PROCEDURE — 80053 COMPREHEN METABOLIC PANEL: CPT | Performed by: PHYSICIAN ASSISTANT

## 2023-05-10 PROCEDURE — 83880 ASSAY OF NATRIURETIC PEPTIDE: CPT | Performed by: PHYSICIAN ASSISTANT

## 2023-05-10 PROCEDURE — 87040 BLOOD CULTURE FOR BACTERIA: CPT | Performed by: PHYSICIAN ASSISTANT

## 2023-05-10 PROCEDURE — 85025 COMPLETE CBC W/AUTO DIFF WBC: CPT | Performed by: PHYSICIAN ASSISTANT

## 2023-05-10 PROCEDURE — 25010000002 HEPARIN (PORCINE) PER 1000 UNITS: Performed by: PHYSICIAN ASSISTANT

## 2023-05-10 PROCEDURE — 71045 X-RAY EXAM CHEST 1 VIEW: CPT | Performed by: RADIOLOGY

## 2023-05-10 PROCEDURE — 84484 ASSAY OF TROPONIN QUANT: CPT | Performed by: PHYSICIAN ASSISTANT

## 2023-05-10 PROCEDURE — 93005 ELECTROCARDIOGRAM TRACING: CPT | Performed by: PHYSICIAN ASSISTANT

## 2023-05-10 PROCEDURE — 36415 COLL VENOUS BLD VENIPUNCTURE: CPT

## 2023-05-10 PROCEDURE — 99223 1ST HOSP IP/OBS HIGH 75: CPT | Performed by: STUDENT IN AN ORGANIZED HEALTH CARE EDUCATION/TRAINING PROGRAM

## 2023-05-10 PROCEDURE — 87150 DNA/RNA AMPLIFIED PROBE: CPT | Performed by: PHYSICIAN ASSISTANT

## 2023-05-10 PROCEDURE — 84443 ASSAY THYROID STIM HORMONE: CPT | Performed by: PHYSICIAN ASSISTANT

## 2023-05-10 PROCEDURE — 93005 ELECTROCARDIOGRAM TRACING: CPT | Performed by: EMERGENCY MEDICINE

## 2023-05-10 PROCEDURE — 87088 URINE BACTERIA CULTURE: CPT | Performed by: PHYSICIAN ASSISTANT

## 2023-05-10 PROCEDURE — 99285 EMERGENCY DEPT VISIT HI MDM: CPT

## 2023-05-10 PROCEDURE — 94660 CPAP INITIATION&MGMT: CPT

## 2023-05-10 PROCEDURE — 87186 SC STD MICRODIL/AGAR DIL: CPT | Performed by: PHYSICIAN ASSISTANT

## 2023-05-10 PROCEDURE — 85730 THROMBOPLASTIN TIME PARTIAL: CPT | Performed by: STUDENT IN AN ORGANIZED HEALTH CARE EDUCATION/TRAINING PROGRAM

## 2023-05-10 PROCEDURE — 93970 EXTREMITY STUDY: CPT | Performed by: RADIOLOGY

## 2023-05-10 PROCEDURE — P9612 CATHETERIZE FOR URINE SPEC: HCPCS

## 2023-05-10 PROCEDURE — 83605 ASSAY OF LACTIC ACID: CPT | Performed by: PHYSICIAN ASSISTANT

## 2023-05-10 RX ORDER — SPIRONOLACTONE 50 MG/1
50 TABLET, FILM COATED ORAL DAILY
COMMUNITY
End: 2023-05-19 | Stop reason: HOSPADM

## 2023-05-10 RX ORDER — SODIUM CHLORIDE 0.9 % (FLUSH) 0.9 %
10 SYRINGE (ML) INJECTION EVERY 12 HOURS SCHEDULED
Status: DISCONTINUED | OUTPATIENT
Start: 2023-05-11 | End: 2023-05-19 | Stop reason: HOSPADM

## 2023-05-10 RX ORDER — NITROGLYCERIN 0.4 MG/1
0.4 TABLET SUBLINGUAL
Status: DISCONTINUED | OUTPATIENT
Start: 2023-05-10 | End: 2023-05-19 | Stop reason: HOSPADM

## 2023-05-10 RX ORDER — FUROSEMIDE 40 MG/1
40 TABLET ORAL DAILY
COMMUNITY
End: 2023-05-19 | Stop reason: HOSPADM

## 2023-05-10 RX ORDER — BUMETANIDE 1 MG/1
1 TABLET ORAL DAILY
Status: ON HOLD | COMMUNITY
End: 2023-05-10

## 2023-05-10 RX ORDER — HEPARIN SODIUM 5000 [USP'U]/ML
30 INJECTION, SOLUTION INTRAVENOUS; SUBCUTANEOUS AS NEEDED
Status: DISCONTINUED | OUTPATIENT
Start: 2023-05-10 | End: 2023-05-12

## 2023-05-10 RX ORDER — SPIRONOLACTONE 25 MG/1
50 TABLET ORAL DAILY
Status: CANCELLED | OUTPATIENT
Start: 2023-05-10

## 2023-05-10 RX ORDER — HEPARIN SODIUM 5000 [USP'U]/ML
60 INJECTION, SOLUTION INTRAVENOUS; SUBCUTANEOUS AS NEEDED
Status: DISCONTINUED | OUTPATIENT
Start: 2023-05-10 | End: 2023-05-12

## 2023-05-10 RX ORDER — SODIUM CHLORIDE 9 MG/ML
40 INJECTION, SOLUTION INTRAVENOUS AS NEEDED
Status: DISCONTINUED | OUTPATIENT
Start: 2023-05-10 | End: 2023-05-19 | Stop reason: HOSPADM

## 2023-05-10 RX ORDER — SODIUM CHLORIDE 0.9 % (FLUSH) 0.9 %
10 SYRINGE (ML) INJECTION AS NEEDED
Status: DISCONTINUED | OUTPATIENT
Start: 2023-05-10 | End: 2023-05-19 | Stop reason: HOSPADM

## 2023-05-10 RX ORDER — HEPARIN SODIUM 5000 [USP'U]/ML
60 INJECTION, SOLUTION INTRAVENOUS; SUBCUTANEOUS ONCE
Status: COMPLETED | OUTPATIENT
Start: 2023-05-10 | End: 2023-05-10

## 2023-05-10 RX ORDER — FUROSEMIDE 10 MG/ML
80 INJECTION INTRAMUSCULAR; INTRAVENOUS ONCE
Status: COMPLETED | OUTPATIENT
Start: 2023-05-10 | End: 2023-05-10

## 2023-05-10 RX ORDER — ASPIRIN 81 MG/1
324 TABLET, CHEWABLE ORAL ONCE
Status: COMPLETED | OUTPATIENT
Start: 2023-05-10 | End: 2023-05-10

## 2023-05-10 RX ORDER — HEPARIN SOD,PORCINE/0.9 % NACL 25000/250
12 INTRAVENOUS SOLUTION INTRAVENOUS
Status: DISCONTINUED | OUTPATIENT
Start: 2023-05-10 | End: 2023-05-12

## 2023-05-10 RX ADMIN — HEPARIN SODIUM 2400 UNITS: 5000 INJECTION INTRAVENOUS; SUBCUTANEOUS at 15:41

## 2023-05-10 RX ADMIN — ASPIRIN 324 MG: 81 TABLET, CHEWABLE ORAL at 15:42

## 2023-05-10 RX ADMIN — VANCOMYCIN HYDROCHLORIDE 750 MG: 5 INJECTION, POWDER, LYOPHILIZED, FOR SOLUTION INTRAVENOUS at 13:37

## 2023-05-10 RX ADMIN — FUROSEMIDE 80 MG: 10 INJECTION, SOLUTION INTRAVENOUS at 12:59

## 2023-05-10 RX ADMIN — Medication 10 ML: at 23:39

## 2023-05-10 RX ADMIN — AZTREONAM 2 G: 2 INJECTION, POWDER, LYOPHILIZED, FOR SOLUTION INTRAMUSCULAR; INTRAVENOUS at 12:56

## 2023-05-10 RX ADMIN — HEPARIN SODIUM 12 UNITS/KG/HR: 5000 INJECTION INTRAVENOUS; SUBCUTANEOUS at 15:42

## 2023-05-10 NOTE — Clinical Note
Level of Care: Telemetry [5]   Diagnosis: Acute on chronic heart failure with preserved ejection fraction (HFpEF) [[id number]]   Certification: I Certify That Inpatient Hospital Services Are Medically Necessary For Greater Than 2 Midnights

## 2023-05-10 NOTE — ED NOTES
Unsuccessful attempt at straight cath at this time. RAUDEL Hein aware. Pt placed on purewick, tolerated well.

## 2023-05-10 NOTE — ED PROVIDER NOTES
Subjective   History of Present Illness  88-year-old female presents secondary to worsening shortness of breath with lower extremity swelling.  Patient reports having increased shortness of breath and requiring oxygen constantly over the last 6 weeks.  She was previously only on oxygen at night.  She denies any chest pain pressure tightness or squeezing currently.  She has a history of hypertension, CHF, CKD, osteoporosis, rheumatoid arthritis.  Patient is profoundly hard of hearing.  Most of her history was provided by family.  She arrived by private vehicle after being evaluated at Dr. Bailey's office.        Review of Systems   Constitutional: Negative.  Negative for fever.   HENT: Negative.    Respiratory: Positive for shortness of breath.    Cardiovascular: Negative.  Negative for chest pain.   Gastrointestinal: Negative.  Negative for abdominal pain.   Endocrine: Negative.    Genitourinary: Negative.  Negative for dysuria.   Skin: Negative.    Neurological: Negative.    Psychiatric/Behavioral: Negative.    All other systems reviewed and are negative.      Past Medical History:   Diagnosis Date   • Breast cancer 2011   • CHF (congestive heart failure)    • Chronic kidney disease     acute kidney failure   • [place] (hard of hearing)    • Hypertension    • Osteoarthritis    • Osteoporosis    • Rheumatoid arthritis    • Right-sided Bell's palsy    • Shortness of breath     sleeps with oxygen at night       Allergies   Allergen Reactions   • Penicillins Swelling and Rash   • Doxycycline Other (See Comments)     ?   • Hydroxychloroquine Unknown (See Comments)     ?   • Latex Rash     ?       Past Surgical History:   Procedure Laterality Date   • BREAST LUMPECTOMY Left     benign   • CATARACT EXTRACTION W/ INTRAOCULAR LENS  IMPLANT, BILATERAL Bilateral    • SHOULDER SURGERY Left     metal plate in left shoulder       Family History   Problem Relation Age of Onset   • Breast cancer Maternal Aunt    • Hypertension Mother     • Hypertension Father    • Heart disease Sister         MI in her 80's       Social History     Socioeconomic History   • Marital status:    Tobacco Use   • Smoking status: Never   • Smokeless tobacco: Never   Substance and Sexual Activity   • Alcohol use: No   • Drug use: No   • Sexual activity: Defer           Objective   Physical Exam  Vitals and nursing note reviewed.   Constitutional:       General: She is not in acute distress.     Appearance: She is well-developed. She is not diaphoretic.   HENT:      Head: Normocephalic and atraumatic.      Right Ear: External ear normal.      Left Ear: External ear normal.      Nose: Nose normal.   Eyes:      Conjunctiva/sclera: Conjunctivae normal.      Pupils: Pupils are equal, round, and reactive to light.   Neck:      Vascular: No JVD.      Trachea: No tracheal deviation.   Cardiovascular:      Rate and Rhythm: Normal rate and regular rhythm.      Heart sounds: Normal heart sounds. No murmur heard.  Pulmonary:      Effort: Pulmonary effort is normal. No respiratory distress.      Breath sounds: Rhonchi present. No wheezing.   Abdominal:      General: Bowel sounds are normal.      Palpations: Abdomen is soft.      Tenderness: There is no abdominal tenderness.   Musculoskeletal:         General: No deformity. Normal range of motion.      Cervical back: Normal range of motion and neck supple.      Right lower leg: Edema present.      Left lower leg: Edema present.   Skin:     General: Skin is warm and dry.      Coloration: Skin is not pale.      Findings: No erythema or rash.   Neurological:      Mental Status: She is alert and oriented to person, place, and time.      Cranial Nerves: No cranial nerve deficit.   Psychiatric:         Behavior: Behavior normal.         Thought Content: Thought content normal.         Procedures           ED Course  ED Course as of 05/10/23 1526   Wed May 10, 2023   1155 ECG 12 Lead Dyspnea  Vent. Rate :  87 BPM     Atrial Rate :   87 BPM     P-R Int : 152 ms          QRS Dur : 102 ms      QT Int : 370 ms       P-R-T Axes :  52  49 -23 degrees     QTc Int : 445 ms     Sinus rhythm with premature atrial complexes  Incomplete right bundle branch block  Septal infarct (cited on or before 10-NOV-2018)  T wave abnormality, consider inferior ischemia  Abnormal ECG  When compared with ECG of 11-NOV-2018 11:28,  Sinus rhythm has replaced Atrial flutter  Incomplete right bundle branch block is now present  Questionable change in initial forces of Anteroseptal leads [ES]   1456 Discussed with Dr. Ernandez.  He recommends aspirin and heparin.  Reached out to the hospitalist [JI]   2383 Accepted by Dr Rm [JI]      ED Course User Index  [ES] Segun Rivers MD  [JI] Blas Cooper PA           Results for orders placed or performed during the hospital encounter of 05/10/23   COVID-19 and FLU A/B PCR - Swab, Nasopharynx    Specimen: Nasopharynx; Swab   Result Value Ref Range    COVID19 Not Detected Not Detected - Ref. Range    Influenza A PCR Not Detected Not Detected    Influenza B PCR Not Detected Not Detected   Comprehensive Metabolic Panel    Specimen: Blood   Result Value Ref Range    Glucose 133 (H) 65 - 99 mg/dL    BUN 35 (H) 8 - 23 mg/dL    Creatinine 1.55 (H) 0.57 - 1.00 mg/dL    Sodium 144 136 - 145 mmol/L    Potassium 4.5 3.5 - 5.2 mmol/L    Chloride 103 98 - 107 mmol/L    CO2 22.5 22.0 - 29.0 mmol/L    Calcium 9.5 8.6 - 10.5 mg/dL    Total Protein 7.5 6.0 - 8.5 g/dL    Albumin 3.4 (L) 3.5 - 5.2 g/dL    ALT (SGPT) 10 1 - 33 U/L    AST (SGOT) 32 1 - 32 U/L    Alkaline Phosphatase 165 (H) 39 - 117 U/L    Total Bilirubin 1.0 0.0 - 1.2 mg/dL    Globulin 4.1 gm/dL    A/G Ratio 0.8 g/dL    BUN/Creatinine Ratio 22.6 7.0 - 25.0    Anion Gap 18.5 (H) 5.0 - 15.0 mmol/L    eGFR 32.1 (L) >60.0 mL/min/1.73   BNP    Specimen: Blood   Result Value Ref Range    proBNP 56,301.0 (H) 0.0 - 1,800.0 pg/mL   TSH    Specimen: Blood   Result Value Ref  Range    TSH 3.950 0.270 - 4.200 uIU/mL   Magnesium    Specimen: Blood   Result Value Ref Range    Magnesium 2.0 1.6 - 2.4 mg/dL   CBC Auto Differential    Specimen: Blood   Result Value Ref Range    WBC 20.55 (H) 3.40 - 10.80 10*3/mm3    RBC 3.78 3.77 - 5.28 10*6/mm3    Hemoglobin 12.7 12.0 - 15.9 g/dL    Hematocrit 40.4 34.0 - 46.6 %    .9 (H) 79.0 - 97.0 fL    MCH 33.6 (H) 26.6 - 33.0 pg    MCHC 31.4 (L) 31.5 - 35.7 g/dL    RDW 16.7 (H) 12.3 - 15.4 %    RDW-SD 65.4 (H) 37.0 - 54.0 fl    MPV 9.9 6.0 - 12.0 fL    Platelets 247 140 - 450 10*3/mm3    Neutrophil % 85.4 (H) 42.7 - 76.0 %    Lymphocyte % 6.8 (L) 19.6 - 45.3 %    Monocyte % 7.0 5.0 - 12.0 %    Eosinophil % 0.0 (L) 0.3 - 6.2 %    Basophil % 0.1 0.0 - 1.5 %    Immature Grans % 0.7 (H) 0.0 - 0.5 %    Neutrophils, Absolute 17.54 (H) 1.70 - 7.00 10*3/mm3    Lymphocytes, Absolute 1.40 0.70 - 3.10 10*3/mm3    Monocytes, Absolute 1.44 (H) 0.10 - 0.90 10*3/mm3    Eosinophils, Absolute 0.00 0.00 - 0.40 10*3/mm3    Basophils, Absolute 0.03 0.00 - 0.20 10*3/mm3    Immature Grans, Absolute 0.14 (H) 0.00 - 0.05 10*3/mm3    nRBC 0.0 0.0 - 0.2 /100 WBC   High Sensitivity Troponin T    Specimen: Blood   Result Value Ref Range    HS Troponin T 68 (C) <10 ng/L   Blood Gas, Arterial With Co-Ox    Specimen: Arterial Blood   Result Value Ref Range    Site Left Radial     Deondre's Test Positive     pH, Arterial 7.474 (H) 7.350 - 7.450 pH units    pCO2, Arterial 36.3 35.0 - 45.0 mm Hg    pO2, Arterial 55.9 (L) 83.0 - 108.0 mm Hg    HCO3, Arterial 26.7 (H) 20.0 - 26.0 mmol/L    Base Excess, Arterial 3.1 (H) 0.0 - 2.0 mmol/L    O2 Saturation, Arterial 92.2 (L) 94.0 - 99.0 %    Hemoglobin, Blood Gas 13.0 (L) 13.5 - 17.5 g/dL    Hematocrit, Blood Gas 39.8 38.0 - 51.0 %    Oxyhemoglobin 91.1 (L) 94 - 99 %    Methemoglobin <-0.10 (L) 0.00 - 3.00 %    Carboxyhemoglobin 1.6 0 - 5 %    A-a DO2 94.8 0.0 - 300.0 mmHg    CO2 Content 27.8 22 - 33 mmol/L    Barometric Pressure for  Blood Gas 731 mmHg    Modality Nasal Cannula     FIO2 28 %    Flow Rate 2.0 lpm    Ventilator Mode NA     Note      Collected by [id number]     pH, Temp Corrected      pCO2, Temperature Corrected      pO2, Temperature Corrected     Lactic Acid, Plasma    Specimen: Arm, Right; Blood   Result Value Ref Range    Lactate 1.9 0.5 - 2.0 mmol/L   High Sensitivity Troponin T 2Hr    Specimen: Arm, Left; Blood   Result Value Ref Range    HS Troponin T 75 (C) <10 ng/L    Troponin T Delta 7 (C) >=-4 - <+4 ng/L   Protime-INR    Specimen: Blood   Result Value Ref Range    Protime 16.8 (H) 12.1 - 14.7 Seconds    INR 1.30 (H) 0.90 - 1.10   aPTT    Specimen: Blood   Result Value Ref Range    PTT 40.4 (H) 26.5 - 34.5 seconds   ECG 12 Lead Dyspnea   Result Value Ref Range    QT Interval 370 ms    QTC Interval 445 ms   Green Top (Gel)   Result Value Ref Range    Extra Tube Hold for add-ons.    Lavender Top   Result Value Ref Range    Extra Tube hold for add-on    Gold Top - SST   Result Value Ref Range    Extra Tube Hold for add-ons.    Light Blue Top   Result Value Ref Range    Extra Tube Hold for add-ons.      XR Chest 1 View    Result Date: 5/10/2023  Small right effusion and left basilar airspace disease  This report was finalized on 5/10/2023 12:13 PM by Dr. Salvador Bill MD.      US Venous Doppler Lower Extremity Bilateral (duplex)    Result Date: 5/10/2023  No DVT in the lower extremities on today's exam.  This report was finalized on 5/10/2023 12:23 PM by Dr. Salvador Bill MD.                                      Medical Decision Making  88-year-old female presents secondary to worsening shortness of breath with lower extremity swelling.  Patient reports having increased shortness of breath and requiring oxygen constantly over the last 6 weeks.  She was previously only on oxygen at night.  She denies any chest pain pressure tightness or squeezing currently.  She has a history of hypertension, CHF, CKD, osteoporosis, rheumatoid  arthritis.  Patient is profoundly hard of hearing.  Most of her history was provided by family.  She arrived by private vehicle after being evaluated at Dr. Bailey's office.    Amount and/or Complexity of Data Reviewed  Labs: ordered. Decision-making details documented in ED Course.  Radiology: ordered. Decision-making details documented in ED Course.  ECG/medicine tests: ordered. Decision-making details documented in ED Course.  Discussion of management or test interpretation with external provider(s): Dr Ernandez, Dr Rm    Risk  OTC drugs.  Prescription drug management.  Decision regarding hospitalization.    Risk Details: Patient was agreeable to treatment plan of admission to the hospital for further evaluation and treatment.        Final diagnoses:   Non-STEMI (non-ST elevated myocardial infarction)   Congestive heart failure, unspecified HF chronicity, unspecified heart failure type   Sepsis, due to unspecified organism, unspecified whether acute organ dysfunction present   TANA (acute kidney injury)   Hypoxemia       ED Disposition  ED Disposition     ED Disposition   Decision to Admit    Condition   --    Comment   Level of Care: Telemetry [5]   Diagnosis: Acute on chronic heart failure with preserved ejection fraction (HFpEF) [2300062]   Certification: I Certify That Inpatient Hospital Services Are Medically Necessary For Greater Than 2 Midnights               No follow-up provider specified.       Medication List      No changes were made to your prescriptions during this visit.          Blas Cooper PA  05/10/23 1526

## 2023-05-10 NOTE — PLAN OF CARE
Phase 1 - Admission/Acute - Current (Heart Failure Pathway)  Output is greater than intake.  Outcome: Not Met  Variance Not applicable  Patient reports improvement in symptoms since arrival.  Outcome: Not Met  Variance Physical/mental limitations  An increase-progression in activity with patient's baseline in mind. Patient performing daily AMPAC-6 goal.  Outcome: Not Met  Variance Physical/mental limitations  Initiate Guideline Directed Medical Therapy (ex.ACE, ARBs, ARNI, Beta Blockers, SGLT2 Inhibitors).  Outcome: Not Met  Variance No documentation   Goal Outcome Evaluation:   Pt alert but unable to determine orientation due to being on the bipap and im unable to understand the pt at this time. Family at bedside. Pt and family welcomed to the pcu. Safety maintained, bed locked in low position, and call light in reach.

## 2023-05-10 NOTE — H&P
[facility] Medicine Services  History & Physical    Patient Identification:  Name:  Anh Saldana  Age:  88 y.o.  Sex:  female  :  1934  MRN:  [id number]   Visit Number:  [id number]  Admit Date: 5/10/2023   Primary Care Physician:  Meme Medellin MD    Subjective     Chief complaint: Edema    History of presenting illness:      Anh Saldana is a 88 y.o. female with past medical history significant for HFpEF, CKD stage II, hard of hearing, HTN, osteoarthritis, osteoporosis, rheumatoid arthritis, chronic respiratory failure on O2 at night.    Upon arrival to the ED, vital signs were temp 98, heart rate 112, respirations 18, /109, SPO2 90% on nasal cannula.  Supplemental O2 has been titrated up, currently requiring BiPAP at 35% FiO2 saturating at 97%.  Initial ABG in the ED with pH 4.74, PO2 55.9, bicarb 26.7.  Repeat ABG with pH 7.459, PO2 54.5.  Initial HS troponin elevated at 68 with repeat at 75, significant delta of 7.  proBNP elevated at 56,301.  Chemistry with anion gap elevated at 18.5.  Creatinine consistent with TANA at 1.55 from baseline 0.8.  CBC with WBC count elevated at 20.55, left shift noted 85.4.  UA is nitrite positive with 1+ leukocytes, 2+ protein, 13-20 RBCs, 13-20 WBCs and 2+ bacteria.  Culture pending.  Blood cultures pending.  CXR notes small right effusion and left basilar airspace disease.  Venous Doppler lower extremities was without DVT.  EKG with sinus rhythm with PACs, incomplete RBBB.    On my exam patient is resting in no acute distress on BiPAP.  Very lethargic and unable to participate in exam.  Some history provided by family at the bedside.  They report patient has been increasingly short of breath for the past 6 weeks, now using her as needed oxygen continuously.  Previously had only required at bedtime.  Also with increased lower extremity edema during the same timeframe.  They deny that she had had any constitutional symptoms including  fever chills, cough.  They do note that last p.m. patient complained of decreased urine output and cloudy urine.  They report patient is on diuretics at home though has not missed any doses.    Known Emergency Department medications received prior to my evaluation included full dose aspirin, aztreonam, IV Lasix 80 mg, heparin bolus, vancomycin, now on heparin drip.   Room location at the time of my evaluation was [facility] [place].     ---------------------------------------------------------------------------------------------------------------------   Review of Systems   Unable to perform ROS: Acuity of condition        ---------------------------------------------------------------------------------------------------------------------   Past Medical History:   Diagnosis Date   • Breast cancer 2011   • CHF (congestive heart failure)    • Chronic kidney disease     acute kidney failure   • [place] (hard of hearing)    • Hypertension    • Osteoarthritis    • Osteoporosis    • Rheumatoid arthritis    • Right-sided Bell's palsy    • Shortness of breath     sleeps with oxygen at night     Past Surgical History:   Procedure Laterality Date   • BREAST LUMPECTOMY Left     benign   • CATARACT EXTRACTION W/ INTRAOCULAR LENS  IMPLANT, BILATERAL Bilateral    • SHOULDER SURGERY Left     metal plate in left shoulder     Family History   Problem Relation Age of Onset   • Breast cancer Maternal Aunt    • Hypertension Mother    • Hypertension Father    • Heart disease Sister         MI in her 80's     Social History     Socioeconomic History   • Marital status:    Tobacco Use   • Smoking status: Never   • Smokeless tobacco: Never   Substance and Sexual Activity   • Alcohol use: No   • Drug use: No   • Sexual activity: Defer     ---------------------------------------------------------------------------------------------------------------------   Allergies:  Penicillins, Doxycycline, Hydroxychloroquine, and  Latex  ---------------------------------------------------------------------------------------------------------------------   Home medications:    Medications below are reported home medications pulling from within the system; at this time, these medications have not been reconciled unless otherwise specified and are in the verification process for further verifcation as current home medications.  (Not in a hospital admission)      Hospital Scheduled Meds:     heparin, 12 Units/kg/hr, Last Rate: 12 Units/kg/hr (05/10/23 5353)        Current listed hospital scheduled medications may not yet reflect those currently placed in orders that are signed and held awaiting patient's arrival to floor.   ---------------------------------------------------------------------------------------------------------------------     Objective     Vital Signs:  Temp:  [98 °F (36.7 °C)] 98 °F (36.7 °C)  Heart Rate:  [] 91  Resp:  [18-34] 34  BP: (133-174)/() 144/79      05/10/23  1108   Weight: 40.8 kg (90 lb)     Body mass index is 17.58 kg/m².  ---------------------------------------------------------------------------------------------------------------------       Physical Exam  Vitals and nursing note reviewed.   Constitutional:       General: She is not in acute distress.     Appearance: She is ill-appearing.   HENT:      Head: Normocephalic and atraumatic.   Cardiovascular:      Rate and Rhythm: Normal rate and regular rhythm.   Pulmonary:      Effort: No respiratory distress.      Breath sounds: No wheezing.   Musculoskeletal:      Right lower leg: Edema present.      Left lower leg: Edema present.   Skin:     General: Skin is warm and dry.               ---------------------------------------------------------------------------------------------------------------------  EKG:        I have personally looked at the  EKG.  ---------------------------------------------------------------------------------------------------------------------   Results from last 7 days   Lab Units 05/10/23  1243 05/10/23  1134   LACTATE mmol/L 1.9  --    WBC 10*3/mm3  --  20.55*   HEMOGLOBIN g/dL  --  12.7   HEMATOCRIT %  --  40.4   MCV fL  --  106.9*   MCHC g/dL  --  31.4*   PLATELETS 10*3/mm3  --  247   INR   --  1.30*     Results from last 7 days   Lab Units 05/10/23  1608 05/10/23  1134   PH, ARTERIAL pH units 7.459* 7.474*   PO2 ART mm Hg 54.5* 55.9*   PCO2, ARTERIAL mm Hg 32.6* 36.3   HCO3 ART mmol/L 23.1 26.7*     Results from last 7 days   Lab Units 05/10/23  1134   SODIUM mmol/L 144   POTASSIUM mmol/L 4.5   MAGNESIUM mg/dL 2.0   CHLORIDE mmol/L 103   CO2 mmol/L 22.5   BUN mg/dL 35*   CREATININE mg/dL 1.55*   CALCIUM mg/dL 9.5   GLUCOSE mg/dL 133*   ALBUMIN g/dL 3.4*   BILIRUBIN mg/dL 1.0   ALK PHOS U/L 165*   AST (SGOT) U/L 32   ALT (SGPT) U/L 10   Estimated Creatinine Clearance: 16.2 mL/min (A) (by C-G formula based on SCr of 1.55 mg/dL (H)).  No results found for: AMMONIA  Results from last 7 days   Lab Units 05/10/23  1408 05/10/23  1134   HSTROP T ng/L 75* 68*     Results from last 7 days   Lab Units 05/10/23  1134   PROBNP pg/mL 56,301.0*     No results found for: HGBA1C  Lab Results   Component Value Date    TSH 3.950 05/10/2023     No results found for: PREGTESTUR, PREGSERUM, HCG, HCGQUANT  Pain Management Panel          View : No data to display.                    No results found for: BLOODCX  No results found for: URINECX  No results found for: WOUNDCX  No results found for: STOOLCX      ---------------------------------------------------------------------------------------------------------------------  Imaging Results (Last 7 Days)     Procedure Component Value Units Date/Time    XR Chest 1 View [[id number]] Collected: 05/10/23 1631     Updated: 05/10/23 1634    Narrative:      XR CHEST 1 VW-     CLINICAL INDICATION: increased  sob; I21.4-Non-ST elevation (NSTEMI)  myocardial infarction; I50.9-Heart failure, unspecified; A41.9-Sepsis,  unspecified organism; N17.9-Acute kidney failure, unspecified;  R09.02-Hypoxemia        COMPARISON: 05/10/2023      TECHNIQUE: Single frontal view of the chest.     FINDINGS:      LUNGS: Bibasilar airspace disease      HEART AND MEDIASTINUM: Heart and mediastinal contours are unremarkable        SKELETON: Bony and soft tissue structures are unremarkable.             Impression:      Bibasilar airspace disease similar to the prior study     This report was finalized on 5/10/2023 4:32 PM by Dr. Salvador Bill MD.       XR Chest 1 View [[id number]] Collected: 05/10/23 1212     Updated: 05/10/23 1248    Narrative:      XR CHEST 1 VW-     CLINICAL INDICATION: edema        COMPARISON: 01/07/2019      TECHNIQUE: Single frontal view of the chest.     FINDINGS:      LUNGS: Small bibasilar effusions and left basilar airspace disease      HEART AND MEDIASTINUM: Heart and mediastinal contours are unremarkable        SKELETON: Bony and soft tissue structures are unremarkable.             Impression:      Small right effusion and left basilar airspace disease     This report was finalized on 5/10/2023 12:13 PM by Dr. Salvador Bill MD.       US Venous Doppler Lower Extremity Bilateral (duplex) [[id number]] Collected: 05/10/23 1223     Updated: 05/10/23 1248    Narrative:      US VENOUS DOPPLER LOWER EXTREMITY BILATERAL (DUPLEX)-     CLINICAL INDICATION: swelling, pain        COMPARISON: None available      TECHNIQUE: Color Doppler imaging was used with compression and  augmentation to evaluate the lower extremity deep venous system.     FINDINGS:   There is patent spontaneous flow from the common femoral vein through  the posterior tibial veins.  There was no internal clot or area of noncompressibility.  Normal augmentation was elicited where applicable.       Impression:      No DVT in the lower extremities on today's exam.       This report was finalized on 5/10/2023 12:23 PM by Dr. Salvador Bill MD.             Cultures:  No results found for: BLOODCX, URINECX, WOUNDCX, MRSACX, RESPCX, STOOLCX    Last echocardiogram:  Results for orders placed during the hospital encounter of 07/30/21    Adult Transthoracic Echo Complete W/ Cont if Necessary Per Protocol    Interpretation Summary  · Normal left ventricular cavity size and wall thickness noted. All left ventricular wall segments contract normally  · Left ventricular ejection fraction appears to be 66 - 70%.  · Left ventricular diastolic function is consistent with (grade II w/high LAP) pseudonormalization.  · There is moderate thickening and calcification of the non-coronary cusp(s) of the aortic valve.  · Mild aortic valve regurgitation is present. Mild aortic valve stenosis is present.  · There are myxomatous changes of the mitral valve apparatus present. Moderate mitral valve regurgitation is present. No significant mitral valve stenosis is present.  · Moderate to severe tricuspid valve regurgitation is present. Estimated right ventricular systolic pressure from tricuspid regurgitation is markedly elevated (100 mmHg).  · Severe pulmonary hypertension is present.  · There is no evidence of pericardial effusion.          I have personally reviewed the above radiology images and read the final radiology report on 05/10/23  ---------------------------------------------------------------------------------------------------------------------  Assessment / Plan     Active Hospital Problems    Diagnosis  POA   • **Acute on chronic heart failure with preserved ejection fraction (HFpEF) [I50.33]  Yes   • Acute respiratory failure with hypoxemia [J96.01]  Yes       ASSESSMENT/PLAN:    Acute on chronic HFpEF, POA  Left basilar pneumonia, POA  Acute on chronic hypoxic respiratory failure, POA, likely multifactorial due to above  NSTEMI type I versus type II  Acute urinary tract infection,  POA  Patient presents from PCP secondary to lower extremity edema and increased oxygen requirements over the past 6 weeks.  Previously only required O2 at bedtime though now requiring it continuously.  ABG in the ED consistent with hypoxic respiratory failure.  Patient currently requiring BiPAP at 35% FiO2.  CXR in the ED also noted small right pleural effusion as well as left basilar airspace disease.  Patient was covered in the ED with aztreonam, vancomycin  Is s/p 80 mg IV Lasix x1.  Initial HS troponin elevated at 68 with repeat at 75, significant delta of 7.  Patient was given heparin bolus and initiated on heparin drip in the ED.  EKG acute ST elevation.  Also with noted UA on arrival, leukocyte and nitrite positive.  Urine culture pending.  Per family patient with recent complaint of decreased urine output and cloudy urine.  Patient will be admitted to the PCU with continuous cardiac monitoring/pulse oximetry for further work-up and management.  Consult inpatient cardiology, assistance appreciated.  Heart failure pathway initiated on admission  Continue heparin drip and monitor per protocol.  Repeat labs in the a.m.  Obtain a ReDs vest value  Monitor I's and O's closely, daily weights  Continue antimicrobial coverage for left basilar pneumonia.  Follow culture results.  Continue BiPAP and wean as able.    TANA on CKD stage II, POA  Baseline creatinine appears to be around 0.8.  On arrival was 1.55.  We will consult nephrology for further assistance, appreciated.  Avoid nephrotoxins as able  Monitor I's and O's    Chronic:  Hard of hearing  HTN  Osteoarthritis  Osteoporosis  Rheumatoid arthritis  Restart home meds as indicated per med rec  Monitor vital signs closely  Supportive care  ----------  -DVT prophylaxis: Currently on heparin drip  -Activity: As tolerated  -Expected length of stay:INPATIENT status due to the need for care which can only be reasonably provided in an hospital setting such as  aggressive/expedited ancillary services and/or consultation services, the necessity for IV medications, close physician monitoring and/or the possible need for procedures.  In such, I feel patient’s risk for adverse outcomes and need for care warrant INPATIENT evaluation and predict the patient’s care encounter to likely last beyond 2 midnights.   -Disposition pending course    High risk secondary to acute on chronic HFpEF, pneumonia, acute on chronic hypoxic respiratory failure, TANA on CKD stage II    Code Status and Medical Interventions:   Ordered at: 05/10/23 1720     Medical Intervention Limits:    NO intubation (DNI)     Level Of Support Discussed With:    Next of Kin (If No Surrogate)     Code Status (Patient has no pulse and is not breathing):    No CPR (Do Not Attempt to Resuscitate)     Medical Interventions (Patient has pulse or is breathing):    Limited Support     Release to patient:    Routine Release       Royer Patel PA-C   05/10/23  17:28 EDT

## 2023-05-11 ENCOUNTER — APPOINTMENT (OUTPATIENT)
Dept: ULTRASOUND IMAGING | Facility: HOSPITAL | Age: 88
DRG: 871 | End: 2023-05-11
Payer: MEDICARE

## 2023-05-11 LAB
ANION GAP SERPL CALCULATED.3IONS-SCNC: 12.8 MMOL/L (ref 5–15)
APTT PPP: 57.2 SECONDS (ref 26.5–34.5)
APTT PPP: 67 SECONDS (ref 26.5–34.5)
BACTERIA BLD CULT: ABNORMAL
BACTERIA SPEC AEROBE CULT: NORMAL
BASOPHILS # BLD AUTO: 0.07 10*3/MM3 (ref 0–0.2)
BASOPHILS NFR BLD AUTO: 0.3 % (ref 0–1.5)
BUN SERPL-MCNC: 40 MG/DL (ref 8–23)
BUN/CREAT SERPL: 22.7 (ref 7–25)
CALCIUM SPEC-SCNC: 9 MG/DL (ref 8.6–10.5)
CHLORIDE SERPL-SCNC: 106 MMOL/L (ref 98–107)
CHOLEST SERPL-MCNC: 145 MG/DL (ref 0–200)
CO2 SERPL-SCNC: 25.2 MMOL/L (ref 22–29)
CREAT SERPL-MCNC: 1.76 MG/DL (ref 0.57–1)
DEPRECATED RDW RBC AUTO: 65.3 FL (ref 37–54)
EGFRCR SERPLBLD CKD-EPI 2021: 27.6 ML/MIN/1.73
EOSINOPHIL # BLD AUTO: 0 10*3/MM3 (ref 0–0.4)
EOSINOPHIL NFR BLD AUTO: 0 % (ref 0.3–6.2)
ERYTHROCYTE [DISTWIDTH] IN BLOOD BY AUTOMATED COUNT: 17.2 % (ref 12.3–15.4)
GLUCOSE SERPL-MCNC: 123 MG/DL (ref 65–99)
HCT VFR BLD AUTO: 38.4 % (ref 34–46.6)
HDLC SERPL-MCNC: 56 MG/DL (ref 40–60)
HGB BLD-MCNC: 12 G/DL (ref 12–15.9)
IMM GRANULOCYTES # BLD AUTO: 0.23 10*3/MM3 (ref 0–0.05)
IMM GRANULOCYTES NFR BLD AUTO: 0.9 % (ref 0–0.5)
LDLC SERPL CALC-MCNC: 67 MG/DL (ref 0–100)
LDLC/HDLC SERPL: 1.14 {RATIO}
LYMPHOCYTES # BLD AUTO: 2.42 10*3/MM3 (ref 0.7–3.1)
LYMPHOCYTES NFR BLD AUTO: 9.2 % (ref 19.6–45.3)
MAGNESIUM SERPL-MCNC: 2 MG/DL (ref 1.6–2.4)
MCH RBC QN AUTO: 33.1 PG (ref 26.6–33)
MCHC RBC AUTO-ENTMCNC: 31.3 G/DL (ref 31.5–35.7)
MCV RBC AUTO: 105.8 FL (ref 79–97)
MONOCYTES # BLD AUTO: 1.67 10*3/MM3 (ref 0.1–0.9)
MONOCYTES NFR BLD AUTO: 6.3 % (ref 5–12)
NEUTROPHILS NFR BLD AUTO: 21.99 10*3/MM3 (ref 1.7–7)
NEUTROPHILS NFR BLD AUTO: 83.3 % (ref 42.7–76)
NRBC BLD AUTO-RTO: 0 /100 WBC (ref 0–0.2)
PLATELET # BLD AUTO: 189 10*3/MM3 (ref 140–450)
PMV BLD AUTO: 9.4 FL (ref 6–12)
POTASSIUM SERPL-SCNC: 4.1 MMOL/L (ref 3.5–5.2)
QT INTERVAL: 304 MS
QT INTERVAL: 308 MS
QT INTERVAL: 318 MS
QT INTERVAL: 402 MS
QTC INTERVAL: 399 MS
QTC INTERVAL: 430 MS
QTC INTERVAL: 480 MS
QTC INTERVAL: 504 MS
RBC # BLD AUTO: 3.63 10*6/MM3 (ref 3.77–5.28)
SODIUM SERPL-SCNC: 144 MMOL/L (ref 136–145)
TRIGL SERPL-MCNC: 126 MG/DL (ref 0–150)
TROPONIN T SERPL HS-MCNC: 89 NG/L
VLDLC SERPL-MCNC: 22 MG/DL (ref 5–40)
WBC NRBC COR # BLD: 26.38 10*3/MM3 (ref 3.4–10.8)

## 2023-05-11 PROCEDURE — 76775 US EXAM ABDO BACK WALL LIM: CPT

## 2023-05-11 PROCEDURE — 63710000001 PREDNISONE PER 5 MG: Performed by: STUDENT IN AN ORGANIZED HEALTH CARE EDUCATION/TRAINING PROGRAM

## 2023-05-11 PROCEDURE — 85730 THROMBOPLASTIN TIME PARTIAL: CPT | Performed by: STUDENT IN AN ORGANIZED HEALTH CARE EDUCATION/TRAINING PROGRAM

## 2023-05-11 PROCEDURE — 84484 ASSAY OF TROPONIN QUANT: CPT | Performed by: STUDENT IN AN ORGANIZED HEALTH CARE EDUCATION/TRAINING PROGRAM

## 2023-05-11 PROCEDURE — 25010000002 CEFTRIAXONE PER 250 MG: Performed by: INTERNAL MEDICINE

## 2023-05-11 PROCEDURE — 94660 CPAP INITIATION&MGMT: CPT

## 2023-05-11 PROCEDURE — 76775 US EXAM ABDO BACK WALL LIM: CPT | Performed by: RADIOLOGY

## 2023-05-11 PROCEDURE — 93010 ELECTROCARDIOGRAM REPORT: CPT | Performed by: SPECIALIST

## 2023-05-11 PROCEDURE — 94799 UNLISTED PULMONARY SVC/PX: CPT

## 2023-05-11 PROCEDURE — 93005 ELECTROCARDIOGRAM TRACING: CPT | Performed by: STUDENT IN AN ORGANIZED HEALTH CARE EDUCATION/TRAINING PROGRAM

## 2023-05-11 PROCEDURE — 80048 BASIC METABOLIC PNL TOTAL CA: CPT | Performed by: STUDENT IN AN ORGANIZED HEALTH CARE EDUCATION/TRAINING PROGRAM

## 2023-05-11 PROCEDURE — 85025 COMPLETE CBC W/AUTO DIFF WBC: CPT | Performed by: STUDENT IN AN ORGANIZED HEALTH CARE EDUCATION/TRAINING PROGRAM

## 2023-05-11 PROCEDURE — 80061 LIPID PANEL: CPT | Performed by: INTERNAL MEDICINE

## 2023-05-11 PROCEDURE — 83735 ASSAY OF MAGNESIUM: CPT | Performed by: HOSPITALIST

## 2023-05-11 PROCEDURE — 97162 PT EVAL MOD COMPLEX 30 MIN: CPT

## 2023-05-11 PROCEDURE — 99232 SBSQ HOSP IP/OBS MODERATE 35: CPT | Performed by: STUDENT IN AN ORGANIZED HEALTH CARE EDUCATION/TRAINING PROGRAM

## 2023-05-11 PROCEDURE — 94640 AIRWAY INHALATION TREATMENT: CPT

## 2023-05-11 PROCEDURE — 94761 N-INVAS EAR/PLS OXIMETRY MLT: CPT

## 2023-05-11 PROCEDURE — 99222 1ST HOSP IP/OBS MODERATE 55: CPT | Performed by: INTERNAL MEDICINE

## 2023-05-11 RX ORDER — METOPROLOL TARTRATE 5 MG/5ML
2.5 INJECTION INTRAVENOUS ONCE
Status: DISCONTINUED | OUTPATIENT
Start: 2023-05-11 | End: 2023-05-11

## 2023-05-11 RX ORDER — FUROSEMIDE 40 MG/1
40 TABLET ORAL DAILY
Status: CANCELLED | OUTPATIENT
Start: 2023-05-11

## 2023-05-11 RX ORDER — METOPROLOL TARTRATE 50 MG/1
50 TABLET, FILM COATED ORAL 2 TIMES DAILY
Status: CANCELLED | OUTPATIENT
Start: 2023-05-11

## 2023-05-11 RX ORDER — METOPROLOL TARTRATE 5 MG/5ML
5 INJECTION INTRAVENOUS ONCE
Status: COMPLETED | OUTPATIENT
Start: 2023-05-11 | End: 2023-05-11

## 2023-05-11 RX ORDER — PANTOPRAZOLE SODIUM 40 MG/1
40 TABLET, DELAYED RELEASE ORAL DAILY
Status: CANCELLED | OUTPATIENT
Start: 2023-05-11

## 2023-05-11 RX ORDER — BUDESONIDE AND FORMOTEROL FUMARATE DIHYDRATE 160; 4.5 UG/1; UG/1
2 AEROSOL RESPIRATORY (INHALATION)
Status: CANCELLED | OUTPATIENT
Start: 2023-05-11

## 2023-05-11 RX ORDER — PANTOPRAZOLE SODIUM 40 MG/1
40 TABLET, DELAYED RELEASE ORAL
Status: DISCONTINUED | OUTPATIENT
Start: 2023-05-11 | End: 2023-05-19 | Stop reason: HOSPADM

## 2023-05-11 RX ORDER — BUDESONIDE AND FORMOTEROL FUMARATE DIHYDRATE 160; 4.5 UG/1; UG/1
2 AEROSOL RESPIRATORY (INHALATION)
Status: DISCONTINUED | OUTPATIENT
Start: 2023-05-11 | End: 2023-05-19 | Stop reason: HOSPADM

## 2023-05-11 RX ORDER — PREDNISONE 5 MG/1
2.5 TABLET ORAL DAILY
Status: CANCELLED | OUTPATIENT
Start: 2023-05-11

## 2023-05-11 RX ORDER — BUMETANIDE 0.25 MG/ML
1 INJECTION INTRAMUSCULAR; INTRAVENOUS ONCE
Status: COMPLETED | OUTPATIENT
Start: 2023-05-11 | End: 2023-05-11

## 2023-05-11 RX ORDER — ATORVASTATIN CALCIUM 20 MG/1
20 TABLET, FILM COATED ORAL NIGHTLY
Status: DISCONTINUED | OUTPATIENT
Start: 2023-05-11 | End: 2023-05-19 | Stop reason: HOSPADM

## 2023-05-11 RX ORDER — METOPROLOL TARTRATE 50 MG/1
50 TABLET, FILM COATED ORAL 2 TIMES DAILY
Status: DISCONTINUED | OUTPATIENT
Start: 2023-05-11 | End: 2023-05-19 | Stop reason: HOSPADM

## 2023-05-11 RX ORDER — PREDNISONE 5 MG/1
2.5 TABLET ORAL
Status: DISCONTINUED | OUTPATIENT
Start: 2023-05-11 | End: 2023-05-19 | Stop reason: HOSPADM

## 2023-05-11 RX ADMIN — METOPROLOL TARTRATE 5 MG: 1 INJECTION, SOLUTION INTRAVENOUS at 04:42

## 2023-05-11 RX ADMIN — ATORVASTATIN CALCIUM 20 MG: 20 TABLET, FILM COATED ORAL at 21:26

## 2023-05-11 RX ADMIN — METOPROLOL TARTRATE 50 MG: 50 TABLET, FILM COATED ORAL at 11:26

## 2023-05-11 RX ADMIN — PANTOPRAZOLE SODIUM 40 MG: 40 TABLET, DELAYED RELEASE ORAL at 11:26

## 2023-05-11 RX ADMIN — Medication 10 ML: at 21:27

## 2023-05-11 RX ADMIN — BUDESONIDE AND FORMOTEROL FUMARATE DIHYDRATE 2 PUFF: 160; 4.5 AEROSOL RESPIRATORY (INHALATION) at 10:46

## 2023-05-11 RX ADMIN — BUDESONIDE AND FORMOTEROL FUMARATE DIHYDRATE 2 PUFF: 160; 4.5 AEROSOL RESPIRATORY (INHALATION) at 19:02

## 2023-05-11 RX ADMIN — AZTREONAM 2 G: 2 INJECTION, POWDER, LYOPHILIZED, FOR SOLUTION INTRAMUSCULAR; INTRAVENOUS at 00:31

## 2023-05-11 RX ADMIN — ASPIRIN 81 MG: 81 TABLET, COATED ORAL at 21:26

## 2023-05-11 RX ADMIN — Medication 10 ML: at 09:06

## 2023-05-11 RX ADMIN — METOPROLOL TARTRATE 50 MG: 50 TABLET, FILM COATED ORAL at 21:26

## 2023-05-11 RX ADMIN — PREDNISONE 2.5 MG: 5 TABLET ORAL at 11:26

## 2023-05-11 RX ADMIN — CEFTRIAXONE 2 G: 2 INJECTION, POWDER, FOR SOLUTION INTRAMUSCULAR; INTRAVENOUS at 09:42

## 2023-05-11 RX ADMIN — BUMETANIDE 1 MG: 0.25 INJECTION INTRAMUSCULAR; INTRAVENOUS at 11:24

## 2023-05-11 NOTE — PLAN OF CARE
Goal Outcome Evaluation:      VSS at this time. Patient is disoriented to time and situation. PTT was greater than 100. Heparin drip was on hold for an hour and then restarted and decreased by three. Patient removed from bipap per request. Now tolerating 3.5 NC. Patient resting in bed with family at bedside. Bed alarm set. Call light in reach. Safety maintained. Will continue to monitor.

## 2023-05-11 NOTE — PROGRESS NOTES
[facility] HOSPITALIST PROGRESS NOTE     Patient Identification:  Name:  Anh Saldana  Age:  88 y.o.  Sex:  female  :  1934  MRN:  [id number]  Visit Number:  [id number]  ROOM: [address]     Primary Care Provider:  Meme Medellin MD    Length of stay in inpatient status:  1    Subjective     Chief Compliant:    Chief Complaint   Patient presents with   • Edema       History of Presenting Illness:    Patient seen and examined this morning with her brother present at bedside. Nursing staff paged me to notify me she appeared to be in atrial fibrillation on telemetry with heart rate 150s, but shortly after I arrived she converted spontaneously back to normal sinus rhythm. She reported feeling some shortness of breath but said it was better than yesterday. Denied chest pain or other complaints.     Objective     Current Hospital Meds:aspirin, 81 mg, Oral, Daily  atorvastatin, 20 mg, Oral, Nightly  budesonide-formoterol, 2 puff, Inhalation, BID - RT  cefTRIAXone, 2 g, Intravenous, Q24H  metoprolol tartrate, 50 mg, Oral, BID  pantoprazole, 40 mg, Oral, QAM AC  predniSONE, 2.5 mg, Oral, Daily With Breakfast  sodium chloride, 10 mL, Intravenous, Q12H    heparin, 12 Units/kg/hr, Last Rate: 9 Units/kg/hr (23 0957)        Current Antimicrobial Therapy:  Anti-Infectives (From admission, onward)    Ordered     Dose/Rate Route Frequency Start Stop    23 0900  cefTRIAXone (ROCEPHIN) 2 g in sodium chloride 0.9 % 100 mL IVPB-VTB        Ordering Provider: Jamie Vicente MD    2 g  200 mL/hr over 30 Minutes Intravenous Every 24 Hours 23 1000 23 0959    05/10/23 1152  aztreonam (AZACTAM) 2 g in sodium chloride 0.9 % 100 mL IVPB-VTB        Ordering Provider: Blas Cooper PA    2 g  200 mL/hr over 30 Minutes Intravenous Once 05/10/23 1154 05/10/23 1326    05/10/23 1152  vancomycin 750 mg/250 mL 0.9% NS IVPB ([facility])        Ordering Provider: Blas Cooper PA    20 mg/kg × 40.8  kg  over 60 Minutes Intravenous Once 05/10/23 1154 05/10/23 1446        Current Diuretic Therapy:  Diuretics (From admission, onward)    Ordered     Dose/Rate Route Frequency Start Stop    05/11/23 0912  bumetanide (BUMEX) injection 1 mg        Ordering Provider: Andrea Nobles MD    1 mg Intravenous Once 05/11/23 1100 05/11/23 1124    05/10/23 1249  furosemide (LASIX) injection 80 mg        Ordering Provider: Blas Cooper PA    80 mg Intravenous Once 05/10/23 1251 05/10/23 1259        ----------------------------------------------------------------------------------------------------------------------  Vital Signs:  Temp:  [97.6 °F (36.4 °C)-100 °F (37.8 °C)] 100 °F (37.8 °C)  Heart Rate:  [] 71  Resp:  [18-30] 18  BP: (115-149)/() 132/88  SpO2:  [93 %-100 %] 93 %  on  Flow (L/min):  [2-3.5] 3;   Device (Oxygen Therapy): nasal cannula  Body mass index is 17.57 kg/m².    Wt Readings from Last 3 Encounters:   05/11/23 40.8 kg (89 lb 15.2 oz)   07/26/21 42.6 kg (94 lb)   10/15/20 45.8 kg (101 lb)     Intake & Output (last 3 days)       05/09 0701  05/10 0700 05/10 0701 05/11 0700 05/11 0701 05/12 0700    P.O.   200    I.V. (mL/kg)  98.3 (2.4) 187.3 (4.6)    IV Piggyback  100     Total Intake(mL/kg)  198.3 (4.9) 387.3 (9.5)    Urine (mL/kg/hr)  100 100 (0.2)    Total Output  100 100    Net  +98.3 +287.3           Urine Unmeasured Occurrence   1 x        Diet: Cardiac Diets; Healthy Heart (2-3 Na+); Texture: Regular Texture (IDDSI 7); Fluid Consistency: Thin (IDDSI 0)  ----------------------------------------------------------------------------------------------------------------------  Physical exam:   Constitutional:  Well-developed. Cachectic and frail appearing.  No acute distress.      HENT:  Head:  Normocephalic and atraumatic.   Cardiovascular:  Normal rate, regular rhythm   Pulmonary/Chest:  No respiratory distress, mild crackles in lateral lung fields but improved from  yesterday  Musculoskeletal:  No deformity.    Neurological: Awake, alert, no focal deficit on gross examination. No slurred speech or facial droop.   Skin:  Skin is warm and dry.  Peripheral vascular:  No cyanosis, no edema.  Psychiatric: Appropriate mood and affect  Edited by: Daisha Rm DO at 5/11/2023 1954  ----------------------------------------------------------------------------------------------------------------------  Results from last 7 days   Lab Units 05/11/23  0019 05/10/23  1243 05/10/23  1134   LACTATE mmol/L  --  1.9  --    WBC 10*3/mm3 26.38*  --  20.55*   HEMOGLOBIN g/dL 12.0  --  12.7   HEMATOCRIT % 38.4  --  40.4   MCV fL 105.8*  --  106.9*   MCHC g/dL 31.3*  --  31.4*   PLATELETS 10*3/mm3 189  --  247   INR   --   --  1.30*     Results from last 7 days   Lab Units 05/10/23  2059   PH, ARTERIAL pH units 7.472*   PO2 ART mm Hg 73.0*   PCO2, ARTERIAL mm Hg 38.4   HCO3 ART mmol/L 28.1*     Results from last 7 days   Lab Units 05/11/23  0019 05/10/23  1134   SODIUM mmol/L 144 144   POTASSIUM mmol/L 4.1 4.5   MAGNESIUM mg/dL 2.0 2.0   CHLORIDE mmol/L 106 103   CO2 mmol/L 25.2 22.5   BUN mg/dL 40* 35*   CREATININE mg/dL 1.76* 1.55*   CALCIUM mg/dL 9.0 9.5   GLUCOSE mg/dL 123* 133*   ALBUMIN g/dL  --  3.4*   BILIRUBIN mg/dL  --  1.0   ALK PHOS U/L  --  165*   AST (SGOT) U/L  --  32   ALT (SGPT) U/L  --  10   Estimated Creatinine Clearance: 14.2 mL/min (A) (by C-G formula based on SCr of 1.76 mg/dL (H)).  No results found for: AMMONIA  Results from last 7 days   Lab Units 05/11/23  0935 05/10/23  1408 05/10/23  1134   HSTROP T ng/L 89* 75* 68*     Results from last 7 days   Lab Units 05/10/23  1134   PROBNP pg/mL 56,301.0*     Results from last 7 days   Lab Units 05/11/23  1720   CHOLESTEROL mg/dL 145   TRIGLYCERIDES mg/dL 126   HDL CHOL mg/dL 56   LDL CHOL mg/dL 67     No results found for: HGBA1C, POCGLU  Lab Results   Component Value Date    TSH 3.950 05/10/2023     No results found for:  PREGTESTUR, PREGSERUM, HCG, HCGQUANT  Pain Management Panel          View : No data to display.                    Brief Urine Lab Results  (Last result in the past 365 days)      Color   Clarity   Blood   Leuk Est   Nitrite   Protein   CREAT   Urine HCG        05/10/23 1702 Yellow   Clear   Moderate (2+)   Small (1+)   Positive   100 mg/dL (2+)               Blood Culture   Date Value Ref Range Status   05/10/2023 Abnormal Stain (C)  Preliminary   05/10/2023 Abnormal Stain (C)  Preliminary     Urine Culture   Date Value Ref Range Status   05/10/2023 Culture in progress  Preliminary     No results found for: WOUNDCX  No results found for: STOOLCX  No results found for: RESPCX  No results found for: AFBCX  Results from last 7 days   Lab Units 05/10/23  1243   LACTATE mmol/L 1.9       I have personally looked at the labs and they are summarized above.  ----------------------------------------------------------------------------------------------------------------------  Detailed radiology reports for the last 24 hours:  Imaging Results (Last 24 Hours)     Procedure Component Value Units Date/Time    US Renal Bilateral [[id number]] Resulted: 05/11/23 1817     Updated: 05/11/23 1818        Assessment & Plan      #Sepsis secondary to pneumonia, UTI, and E coli bacteremia  #Left basilar pneumonia  #Acute UTI  #Acute on chronic HFpEF  #Acute on chronic respiratory failure with hypoxia, likely multifactorial due to above  #TANA on CKD stage II  - Patient met sepsis criteria with tachycardia, tachypnea, leukocytosis with urinary and respiratory sources of infection. Blood cultures became positive overnight for gram negative bacilli, E coli by BCID pcr.  - Continue empiric antibiotics with high dose ceftriaxone. ID following, appreciate assistance.  - Received 80mg IV lasix in the ED but only had 100cc urine output noted. Creatinine was elevated to 1.55 at admission and worsened to 1.76 this morning. Nephrology consulted for  recommendations, appreciate assistance. Baseline creatinine 0.8. Avoid nephrotoxins as able.  - Bumex ordered today by Nephrology  - Respiratory failure has improved. Required BiPAP at admission but now tolerating nasal cannula.  - Repeat cbc, bmp, blood cultures in am  - Follow heart failure pathway  - Monitor I/Os, daily weights    #paroxysmal atrial fibrillation  - Patient's family reports only a brief previous episode sometime a few years ago. Patient has been intermittently tachycardic to 140s-150s with atrial fibrillation seen on EKG, but spontaneously converted back to normal sinus rhythm.   - Continue metoprolol and eliquis per Cardiology   - Monitor on telemetry    #Malnutrition  - Request malnutrition severity assessment and recommendations     Chronic medical conditions:  #Hard of hearing - supportive care  #Hypertension - spironolactone, metoprolol as above, monitor per protocol  #Osteoarthritis - supportive care  #Osteoporosis - supportive care, fall precautions  #Rheumatoid arthritis - home prednisone 2.5mg po daily      Edited by: Daisha Rm DO at 5/11/2023 1954    VTE Prophylaxis:   Mechanical Order History:     None      Pharmalogical Order History:      Ordered     Dose Route Frequency Stop    05/10/23 1449  heparin (porcine) 5000 UNIT/ML injection 2,400 Units         60 Units/kg IV Once 05/10/23 1541    05/10/23 1449  heparin 83220 units/250 mL (100 units/mL) in 0.9% NaCl infusion  4.89 mL/hr         12 Units/kg/hr IV Titrated --    05/10/23 1449  heparin (porcine) 5000 UNIT/ML injection 2,400 Units         60 Units/kg IV As Needed --    05/10/23 1449  heparin (porcine) 5000 UNIT/ML injection 1,200 Units         30 Units/kg IV As Needed --                Dispo:  likely home at discharge pending clinical improvement     Daisha Rm DO  [facility]  05/11/23  19:54 EDT

## 2023-05-11 NOTE — CONSULTS
Nephrology Consult Note    Referring Provider: Dr. Rm  Reason for Consultation: TANA    Subjective       History of present illness:  Anh Saldana is a 88 y.o. female who presented to [facility] emergency department with chief complaint of shortness of breath, weakness and tiredness.  Patient is known to have heart failure with preserved ejection fraction, essential hypertension rheumatoid arthritis and chronic respiratory failure home oxygen dependent.  Patient also has history of CKD her creatinine was 1.4 in April 2023 this time she has been admitted with creatinine of 1.8  Patient's brother at bedside he provided some of the history.  No history of NSAIDs or recent rash or upper respiratory tract infections.      History  Past Medical History:   Diagnosis Date   • Breast cancer 2011   • CHF (congestive heart failure)    • Chronic kidney disease     acute kidney failure   • [place] (hard of hearing)    • Hypertension    • Osteoarthritis    • Osteoporosis    • Rheumatoid arthritis    • Right-sided Bell's palsy    • Shortness of breath     sleeps with oxygen at night   ,   Past Surgical History:   Procedure Laterality Date   • BREAST LUMPECTOMY Left     benign   • CATARACT EXTRACTION W/ INTRAOCULAR LENS  IMPLANT, BILATERAL Bilateral    • SHOULDER SURGERY Left     metal plate in left shoulder   ,   Family History   Problem Relation Age of Onset   • Breast cancer Maternal Aunt    • Hypertension Mother    • Hypertension Father    • Heart disease Sister         MI in her 80's   ,   Social History     Tobacco Use   • Smoking status: Never   • Smokeless tobacco: Never   Vaping Use   • Vaping Use: Never used   Substance Use Topics   • Alcohol use: No   • Drug use: No   ,   Medications Prior to Admission   Medication Sig Dispense Refill Last Dose   • apixaban (ELIQUIS) 2.5 MG tablet tablet Take 1 tablet by mouth Every 12 (Twelve) Hours. 60 tablet 0 5/9/2023   • Fluticasone Furoate-Vilanterol 200-25 MCG/INH aerosol  powder  Inhale 1 puff Daily.   5/9/2023   • furosemide (LASIX) 40 MG tablet Take 1 tablet by mouth Daily.   5/9/2023   • metoprolol tartrate (LOPRESSOR) 50 MG tablet Take 1 tablet by mouth 2 (Two) Times a Day.   5/9/2023   • pantoprazole (PROTONIX) 40 MG EC tablet Take 1 tablet by mouth Daily.   5/9/2023   • predniSONE (DELTASONE) 5 MG tablet Take 2.5 mg by mouth Daily.   5/9/2023   • spironolactone (ALDACTONE) 50 MG tablet Take 1 tablet by mouth Daily.   5/9/2023   , Scheduled Meds:  cefTRIAXone, 2 g, Intravenous, Q24H  sodium chloride, 10 mL, Intravenous, Q12H    , Continuous Infusions:  heparin, 12 Units/kg/hr, Last Rate: 9 Units/kg/hr (05/11/23 0642)    , PRN Meds:  •  heparin (porcine)  •  heparin (porcine)  •  nitroglycerin  •  [COMPLETED] Insert Peripheral IV **AND** sodium chloride  •  sodium chloride  •  sodium chloride and Allergies:  Penicillins, Doxycycline, Hydroxychloroquine, and Latex    Review of Systems  More than 10 point review of systems was done. Pertinent items are noted in HPI, all other systems reviewed and negative    Objective     Vital Signs  Temp:  [97.6 °F (36.4 °C)-99.3 °F (37.4 °C)] 99.3 °F (37.4 °C)  Heart Rate:  [] 68  Resp:  [18-34] 21  BP: (109-174)/() 125/76    Intake/Output                       05/10/23 0701 - 05/11/23 0700 05/11/23 0701 - 05/12/23 0700     1579-9110 7270-9781 Total 1030-1797 0728-1255 Total                 Intake    P.O.  --  -- --  200  -- 200    I.V.  --  98.3 98.3  --  -- --    IV Piggyback  100  -- 100  --  -- --    Total Intake 100 98.3 198.3 200 -- 200       Output    Urine  --  100 100  --  -- --    Total Output -- 100 100 -- -- --           Physical Examination:  General Appearance: not in acute distress  No JVD  Lungs: Bilateral decreased intensity of breath sounds in lower lung zones  Heart: Regular rhythm & normal rate, normal S1, S2, no murmur, no gallop, no rub   Abdomen: Normal bowel sounds, no masses and soft non-tender  Extremities:  No edema  Neurologic: Orientated to person, place, time, grossly no focal deficitis    Laboratory Data :      WBC WBC   Date Value Ref Range Status   05/11/2023 26.38 (H) 3.40 - 10.80 10*3/mm3 Final   05/10/2023 20.55 (H) 3.40 - 10.80 10*3/mm3 Final      HGB Hemoglobin   Date Value Ref Range Status   05/11/2023 12.0 12.0 - 15.9 g/dL Final   05/10/2023 12.7 12.0 - 15.9 g/dL Final      HCT Hematocrit   Date Value Ref Range Status   05/11/2023 38.4 34.0 - 46.6 % Final   05/10/2023 40.4 34.0 - 46.6 % Final      Platlets No results found for: LABPLAT   MCV MCV   Date Value Ref Range Status   05/11/2023 105.8 (H) 79.0 - 97.0 fL Final   05/10/2023 106.9 (H) 79.0 - 97.0 fL Final          Sodium Sodium   Date Value Ref Range Status   05/11/2023 144 136 - 145 mmol/L Final   05/10/2023 144 136 - 145 mmol/L Final      Potassium Potassium   Date Value Ref Range Status   05/11/2023 4.1 3.5 - 5.2 mmol/L Final     Comment:     Slight hemolysis detected by analyzer. Results may be affected.   05/10/2023 4.5 3.5 - 5.2 mmol/L Final     Comment:     Specimen hemolyzed.  Results may be affected.1+ Hemolysis          Chloride Chloride   Date Value Ref Range Status   05/11/2023 106 98 - 107 mmol/L Final   05/10/2023 103 98 - 107 mmol/L Final      CO2 CO2   Date Value Ref Range Status   05/11/2023 25.2 22.0 - 29.0 mmol/L Final   05/10/2023 22.5 22.0 - 29.0 mmol/L Final      BUN BUN   Date Value Ref Range Status   05/11/2023 40 (H) 8 - 23 mg/dL Final   05/10/2023 35 (H) 8 - 23 mg/dL Final      Creatinine Creatinine   Date Value Ref Range Status   05/11/2023 1.76 (H) 0.57 - 1.00 mg/dL Final   05/10/2023 1.55 (H) 0.57 - 1.00 mg/dL Final      Calcium Calcium   Date Value Ref Range Status   05/11/2023 9.0 8.6 - 10.5 mg/dL Final   05/10/2023 9.5 8.6 - 10.5 mg/dL Final      PO4 No results found for: CAPO4   Albumin Albumin   Date Value Ref Range Status   05/10/2023 3.4 (L) 3.5 - 5.2 g/dL Final      Magnesium Magnesium   Date Value Ref Range  Status   05/11/2023 2.0 1.6 - 2.4 mg/dL Final   05/10/2023 2.0 1.6 - 2.4 mg/dL Final      Uric Acid No results found for: URICACID     Radiology results :     Imaging Results (Last 72 Hours)     Procedure Component Value Units Date/Time    XR Chest 1 View [[id number]] Collected: 05/10/23 1631     Updated: 05/10/23 1634    Narrative:      XR CHEST 1 VW-     CLINICAL INDICATION: increased sob; I21.4-Non-ST elevation (NSTEMI)  myocardial infarction; I50.9-Heart failure, unspecified; A41.9-Sepsis,  unspecified organism; N17.9-Acute kidney failure, unspecified;  R09.02-Hypoxemia        COMPARISON: 05/10/2023      TECHNIQUE: Single frontal view of the chest.     FINDINGS:      LUNGS: Bibasilar airspace disease      HEART AND MEDIASTINUM: Heart and mediastinal contours are unremarkable        SKELETON: Bony and soft tissue structures are unremarkable.             Impression:      Bibasilar airspace disease similar to the prior study     This report was finalized on 5/10/2023 4:32 PM by Dr. Salvador Bill MD.       XR Chest 1 View [[id number]] Collected: 05/10/23 1212     Updated: 05/10/23 1248    Narrative:      XR CHEST 1 VW-     CLINICAL INDICATION: edema        COMPARISON: 01/07/2019      TECHNIQUE: Single frontal view of the chest.     FINDINGS:      LUNGS: Small bibasilar effusions and left basilar airspace disease      HEART AND MEDIASTINUM: Heart and mediastinal contours are unremarkable        SKELETON: Bony and soft tissue structures are unremarkable.             Impression:      Small right effusion and left basilar airspace disease     This report was finalized on 5/10/2023 12:13 PM by Dr. Salvador Bill MD.       US Venous Doppler Lower Extremity Bilateral (duplex) [[id number]] Collected: 05/10/23 1223     Updated: 05/10/23 1248    Narrative:      US VENOUS DOPPLER LOWER EXTREMITY BILATERAL (DUPLEX)-     CLINICAL INDICATION: swelling, pain        COMPARISON: None available      TECHNIQUE: Color Doppler imaging was  used with compression and  augmentation to evaluate the lower extremity deep venous system.     FINDINGS:   There is patent spontaneous flow from the common femoral vein through  the posterior tibial veins.  There was no internal clot or area of noncompressibility.  Normal augmentation was elicited where applicable.       Impression:      No DVT in the lower extremities on today's exam.      This report was finalized on 5/10/2023 12:23 PM by Dr. Salvador Bill MD.               Medications:      cefTRIAXone, 2 g, Intravenous, Q24H  sodium chloride, 10 mL, Intravenous, Q12H      heparin, 12 Units/kg/hr, Last Rate: 9 Units/kg/hr (05/11/23 0642)        Assessment & Plan       Acute on chronic heart failure with preserved ejection fraction (HFpEF)    Acute respiratory failure with hypoxemia    -TANA  nonoliguric  - Heart failure with preserved ejection fraction, mildly decompensated  - Essential hypertension  - Rheumatoid arthritis    TANA on CKD is most likely due to hemodynamic changes, cardiorenal syndrome in setting of diastolic dysfunction.  Baseline creatinine appears to be 0.8 was admitted with 1.5.  UA suggestive of urinary tract infection  - Give a dose of Bumex today as well due to her significant fluid overload  - Strict intake and output  - Please avoid any nephrotoxic agents, hypotension and adjust medications according to estimated GFR      Thanks Dr Rm for the consult. Nephrology will follow the patient.   I discussed the patient's findings and my recommendations with SHELYB Nobles MD  05/11/23  09:06 EDT

## 2023-05-11 NOTE — CONSULTS
Cardiology  CONSULT NOTE    Consults    Patient Identification:  Name:  Anh Saldana+  Age:  88 y.o.  Sex:  female  :  1934  MRN:  [id number]  Visit Number:  [id number]  Primary care provider:  Meme Medellin MD    Subjective       Reason for the consult:  Elevated troponin D levels      Chief complaints:  Shortness of breath and leg edema      History of presenting illness:    Anh Saldana is a 88 y.o. female has been admitted with history of increasing shortness of breath bilateral leg edema.  Patient has hearing impairment and somewhat difficult to get a reliable history.    Her proBNP level was more than 51,000.  Chest x-ray showed evidence of pneumonia.  CT of the chest has not been done.  She has TANA on CKD.  She was given Bumex 2 mg IV x1.  She is being treated for pneumonia.    I have been consulted as her troponin T levels are elevated.  The levels are 68-75- 88.  Delta was 7.  ECG showed no evidence of ischemia.  She denied history of chest pain.    No history of known CAD.  She has history of persistent 10 atrial fibrillation.  However her A-fib is paroxysmal during hospital stay.  History of heart failure.  Echocardiogram done  showed LVEF-70%, moderate MR, mild AAS and mild AR, moderate to severe TR and severe pulmonary hypertension.  She is anticoagulated on Eliquis.    She has history of hypertension chronic kidney disease and rheumatoid arthritis.  --------------------------------------------------------------------------------------------------------------------  Review of Systems:  Constitutional-fatigue and tiredness, ENT-hearing impairment, cardiovascular-as above, respiratory-shortness of breath genitourinary-kidney failure, musculoskeletal-arthritis.  Complete review done.    ---------------------------------------------------------------------------------------------------------------------   Past History:  Family History   Problem Relation Age of Onset   • Breast cancer  Maternal Aunt    • Hypertension Mother    • Hypertension Father    • Heart disease Sister         MI in her 80's     Past Medical History:   Diagnosis Date   • Breast cancer 2011   • CHF (congestive heart failure)    • Chronic kidney disease     acute kidney failure   • [place] (hard of hearing)    • Hypertension    • Osteoarthritis    • Osteoporosis    • Rheumatoid arthritis    • Right-sided Bell's palsy    • Shortness of breath     sleeps with oxygen at night     Past Surgical History:   Procedure Laterality Date   • BREAST LUMPECTOMY Left     benign   • CATARACT EXTRACTION W/ INTRAOCULAR LENS  IMPLANT, BILATERAL Bilateral    • SHOULDER SURGERY Left     metal plate in left shoulder     Social History     Socioeconomic History   • Marital status:    Tobacco Use   • Smoking status: Never   • Smokeless tobacco: Never   Vaping Use   • Vaping Use: Never used   Substance and Sexual Activity   • Alcohol use: No   • Drug use: No   • Sexual activity: Defer     ---------------------------------------------------------------------------------------------------------------------   Allergies:  Penicillins, Doxycycline, Hydroxychloroquine, and Latex  ---------------------------------------------------------------------------------------------------------------------     Hospital Meds:  budesonide-formoterol, 2 puff, Inhalation, BID - RT  cefTRIAXone, 2 g, Intravenous, Q24H  metoprolol tartrate, 50 mg, Oral, BID  pantoprazole, 40 mg, Oral, QAM AC  predniSONE, 2.5 mg, Oral, Daily With Breakfast  sodium chloride, 10 mL, Intravenous, Q12H      heparin, 12 Units/kg/hr, Last Rate: 9 Units/kg/hr (05/11/23 0957)      ---------------------------------------------------------------------------------------------------------------------     Objective     Vital Signs:  Temp:  [97.6 °F (36.4 °C)-99.3 °F (37.4 °C)] 98.3 °F (36.8 °C)  Heart Rate:  [] 71  Resp:  [19-34] 22  BP: (109-149)/() 120/87      05/10/23  1108  05/11/23 0400   Weight: 40.8 kg (90 lb) 40.8 kg (89 lb 15.2 oz)     Body mass index is 17.57 kg/m².  ---------------------------------------------------------------------------------------------------------------------   Physical exam:       General Appearance:  HEENT:   Neck:     Alert, cooperative, not in distress.  Bilateral hearing impairment  No JVD    Lungs:   Clear to auscultation,respirations regular, No added sounds     Heart:   Tachycardia.  Irregular rhythm.  Systolic murmur heard in the apex.    Chest Wall:  No abnormalities observed    Abdomen   Soft, nontender, no masses, no organomegaly and bowel sounds are heard.     Extremities:  Bilateral 1+ pedal edema    Pulses: Pulses palpable and equal bilaterally    Neurologic:  Deferred        Telemetry:  A-fib.    ---------------------------------------------------------------------------------------------------------------------   ECG:   ECG/EMG Results (last 24 hours)     Procedure Component Value Units Date/Time    ECG 12 Lead Rhythm Change [[id number]] Collected: 05/11/23 0429     Updated: 05/11/23 0431     QT Interval 308 ms      QTC Interval 504 ms     Narrative:      Test Reason : Rhythm Change  Blood Pressure :   */*   mmHG  Vent. Rate : 161 BPM     Atrial Rate : 144 BPM     P-R Int :   * ms          QRS Dur :  98 ms      QT Int : 308 ms       P-R-T Axes :   * 126 -40 degrees     QTc Int : 504 ms    ** Critical Test Result: High HR  Atrial fibrillation with rapid ventricular response  Right axis deviation  Incomplete right bundle branch block  Possible Right ventricular hypertrophy  Nonspecific ST and T wave abnormality  Abnormal ECG  When compared with ECG of 10-MAY-2023 16:01, (Unconfirmed)  Atrial fibrillation has replaced Sinus rhythm  Vent. rate has increased BY  57 BPM  ST more depressed in Lateral leads  Nonspecific T wave abnormality, improved in Anterior leads    Referred By:            Confirmed By:     ECG 12 Lead Rhythm Change [[id number]]  Collected: 05/11/23 0516     Updated: 05/11/23 0517     QT Interval 402 ms      QTC Interval 430 ms     Narrative:      Test Reason : Rhythm Change  Blood Pressure :   */*   mmHG  Vent. Rate :  69 BPM     Atrial Rate :  69 BPM     P-R Int : 152 ms          QRS Dur :  94 ms      QT Int : 402 ms       P-R-T Axes :  63  22 -28 degrees     QTc Int : 430 ms    Normal sinus rhythm  Indeterminate axis  ST & T wave abnormality, consider anterior ischemia  Abnormal ECG  When compared with ECG of 11-MAY-2023 04:29, (Unconfirmed)  Sinus rhythm has replaced Atrial fibrillation  Vent. rate has decreased BY  92 BPM  ST no longer depressed in Lateral leads  T wave inversion more evident in Inferior leads  T wave inversion now evident in Anterior leads    Referred By:            Confirmed By:     SCANNED - TELEMETRY   [[id number]] Resulted: 05/10/23     Updated: 05/11/23 0651    SCANNED - TELEMETRY   [[id number]] Resulted: 05/10/23     Updated: 05/11/23 0651    SCANNED - TELEMETRY   [[id number]] Resulted: 05/10/23     Updated: 05/11/23 0651    ECG 12 Lead Rhythm Change [[id number]] Collected: 05/11/23 0939     Updated: 05/11/23 0941     QT Interval 318 ms      QTC Interval 480 ms     Narrative:      Test Reason : Rhythm Change  Blood Pressure :   */*   mmHG  Vent. Rate : 137 BPM     Atrial Rate : 163 BPM     P-R Int :   * ms          QRS Dur :  94 ms      QT Int : 318 ms       P-R-T Axes :   *  58 -26 degrees     QTc Int : 480 ms    Atrial fibrillation with rapid ventricular response  Indeterminate axis  ST & T wave abnormality, consider anterior ischemia  Abnormal ECG  When compared with ECG of 11-MAY-2023 05:16, (Unconfirmed)  Atrial fibrillation has replaced Sinus rhythm  Vent. rate has increased BY  68 BPM    Referred By:            Confirmed By:     SCANNED - TELEMETRY   [[id number]] Resulted: 05/10/23     Updated: 05/11/23 1025    ECG 12 Lead Chest Pain [[id number]] Collected: 05/10/23 1601     Updated: 05/11/23 1104     QT  Interval 304 ms      QTC Interval 399 ms     Narrative:      Test Reason : Chest Pain  Blood Pressure :   */*   mmHG  Vent. Rate : 104 BPM     Atrial Rate : 104 BPM     P-R Int : 112 ms          QRS Dur :  88 ms      QT Int : 304 ms       P-R-T Axes :  42  90 -16 degrees     QTc Int : 399 ms    Sinus tachycardia  Possible Right ventricular hypertrophy  Nonspecific ST and T wave abnormality  Abnormal ECG  When compared with ECG of 10-MAY-2023 11:47,  premature atrial complexes are no longer present  Criteria for Septal infarct are no longer present  QT has shortened  Confirmed by Valeriy Rose ([id number]) on 5/11/2023 11:04:21 AM    Referred By: TANO           Confirmed By: Valeriy Rose     Resulted: 05/10/23     Updated: 05/11/23 1128     Resulted: 05/10/23     Updated: 05/11/23 1128     Resulted: 05/10/23     Updated: 05/11/23 1329          --------------------------------------------------------------------------------------------------------------------   Results from last 7 days   Lab Units 05/11/23  0019 05/10/23  1243 05/10/23  1134   LACTATE mmol/L  --  1.9  --    WBC 10*3/mm3 26.38*  --  20.55*   HEMOGLOBIN g/dL 12.0  --  12.7   HEMATOCRIT % 38.4  --  40.4   MCV fL 105.8*  --  106.9*   MCHC g/dL 31.3*  --  31.4*   PLATELETS 10*3/mm3 189  --  247   INR   --   --  1.30*     Results from last 7 days   Lab Units 05/10/23  2059   PH, ARTERIAL pH units 7.472*   PO2 ART mm Hg 73.0*   PCO2, ARTERIAL mm Hg 38.4   HCO3 ART mmol/L 28.1*     Results from last 7 days   Lab Units 05/11/23  0019 05/10/23  1134   SODIUM mmol/L 144 144   POTASSIUM mmol/L 4.1 4.5   MAGNESIUM mg/dL 2.0 2.0   CHLORIDE mmol/L 106 103   CO2 mmol/L 25.2 22.5   BUN mg/dL 40* 35*   CREATININE mg/dL 1.76* 1.55*   CALCIUM mg/dL 9.0 9.5   GLUCOSE mg/dL 123* 133*   ALBUMIN g/dL  --  3.4*   BILIRUBIN mg/dL  --  1.0   ALK PHOS U/L  --  165*   AST (SGOT) U/L  --  32   ALT (SGPT) U/L  --  10   Estimated Creatinine Clearance: 14.2 mL/min (A) (by C-G formula  based on SCr of 1.76 mg/dL (H)).  No results found for: AMMONIA  Results from last 7 days   Lab Units 05/11/23  0935 05/10/23  1408 05/10/23  1134   HSTROP T ng/L 89* 75* 68*     Results from last 7 days   Lab Units 05/10/23  1134   PROBNP pg/mL 56,301.0*     No results found for: HGBA1C  Lab Results   Component Value Date    TSH 3.950 05/10/2023     No results found for: PREGTESTUR, PREGSERUM, HCG, HCGQUANT  Pain Management Panel          View : No data to display.                    Blood Culture   Date Value Ref Range Status   05/10/2023 Abnormal Stain (C)  Preliminary   05/10/2023 Abnormal Stain (C)  Preliminary     Urine Culture   Date Value Ref Range Status   05/10/2023 Culture in progress  Preliminary     No results found for: WOUNDCX  No results found for: STOOLCX      ---------------------------------------------------------------------------------------------------------------------   Radiology:    No results found for this or any previous visit.      Results for orders placed during the hospital encounter of 01/07/19    XR Chest PA & Lateral    Narrative  EXAMINATION: XR CHEST PA AND LATERAL-    CLINICAL INDICATION:     Rule out pneumonia or heart failure.;  R06.09-Other forms of dyspnea    TECHNIQUE:  XR CHEST PA AND LATERAL-    COMPARISON: 11/10/2018    FINDINGS:    Chronic lung changes are stable. Mild CHF again noted.  Cardiomegaly stable.  No pneumothorax.  No effusions.  Stable appearance of the bony structures.    Impression  CHF/edema. Chronic lung changes appear stable.    This report was finalized on 1/7/2019 1:09 PM by Dr. Odell Godoy MD.      Results for orders placed during the hospital encounter of 05/10/23    XR Chest 1 View    Narrative  XR CHEST 1 VW-    CLINICAL INDICATION: increased sob; I21.4-Non-ST elevation (NSTEMI)  myocardial infarction; I50.9-Heart failure, unspecified; A41.9-Sepsis,  unspecified organism; N17.9-Acute kidney failure,  unspecified;  R09.02-Hypoxemia      COMPARISON: 05/10/2023    TECHNIQUE: Single frontal view of the chest.    FINDINGS:    LUNGS: Bibasilar airspace disease    HEART AND MEDIASTINUM: Heart and mediastinal contours are unremarkable      SKELETON: Bony and soft tissue structures are unremarkable.    Impression  Bibasilar airspace disease similar to the prior study    This report was finalized on 5/10/2023 4:32 PM by Dr. Salvador Bill MD.      No results found for this or any previous visit.      I have personally reviewed the radiology images and read the final radiology report.        Assessment      1.  Elevated troponin T levels with upward trend and significant delta.       NSTEMI type I versus type II-due to hypoxia.  Also contributed by       renal failure.  No chest pain or ischemic ECG changes.  2.  Paroxysmal atrial fibrillation.  VR is controlled.  3.  Acute on chronic heart failure.  proBNP level > 50 K.  Recent EF not        not known.  Stable now.  4.  Hypertension  5.  Moderate to severe MR-2021  6.  Severe pulmonary hypertension.  Group 2 and group 3  7.  Pneumonia with acute on chronic hypoxic respiratory failure.    Recommendations     1.  Because of absence of angina and presence of comorbid conditions such as advanced age and renal failure, decided to treat NSTEMI with medical therapy with no interventions.  Started aspirin and atorvastatin.  Continue metoprolol.    2.  Will treat A-fib with a strategy of rate control with metoprolol and stroke prevention with Eliquis.  No rhythm control is considered due to her age.  If appropriate, may resume Eliquis and stop IV heparin.    3.  HF clinically appears stable.  Received Bumex 1 mg IV x1.  Will monitor the response and renal function.  proBNP level is> 50 K.  Echocardiogram is ordered to assess LVEF, severity of MR and pulmonary hypertension.    Thank you Dr. Rm for the opportunity to participate in the care of your patient. Please do not hesitate  to call with any questions or concerns.     Rosa Maria Hunter MD, [facility],  05/11/23  16:02 EDT

## 2023-05-11 NOTE — CASE MANAGEMENT/SOCIAL WORK
Discharge Planning Assessment   [place]     Patient Name: Anh Saldana  MRN: [id number]  Today's Date: 5/11/2023    Admit Date: 5/10/2023    Plan: Pt admitted on 5/10/23. SS received nursing consult for discharge planning. SS spoke with pt and sister in Law Ora at beside. Pt lives at home and pt brother Octavio stays with her. Pt states she hopes to return home at discharge Pt does not utilize HH services at this time. Pt sister in law states pt has been undecided about HH in the past. Pt PCP Meme Medellin. Pt has wheelchair, BSC, (r) walker, cane, oxgyen at 3 liters, concentrator, via unknown provider. Pt does not have POA/living will. Pt brother Octavio to provide transportation at d/c. SS to follow and assist as needed with discharge planning.      Discharge Needs Assessment     Row Name 05/11/23 1126       Living Environment    People in Home sibling(s)    Name(s) of People in Home Ephraim Cunningham    Current Living Arrangements home    Potentially Unsafe Housing Conditions none    Primary Care Provided by other (see comments)  Brother and sister in law    Provides Primary Care For no one    Family Caregiver if Needed sibling(s)    Family Caregiver Names Brother - Octavio    Quality of Family Relationships helpful;involved;supportive    Able to Return to Prior Arrangements yes       Food Insecurity    Within the past 12 months, you worried that your food would run out before you got the money to buy more. Never true    Within the past 12 months, the food you bought just didn't last and you didn't have money to get more. Never true       Transition Planning    Patient/Family Anticipates Transition to home with family    Transportation Anticipated family or friend will provide       Discharge Needs Assessment    Equipment Currently Used at Home oxygen;wheelchair;commode;walker, rolling;cane, straight    Concerns to be Addressed discharge planning               Discharge Plan     Row Name 05/11/23 1124       Plan    Plan Pt  admitted on 5/10/23. SS received nursing consult for discharge planning. SS spoke with pt and sister in Law Ora at beside. Pt lives at home and pt brother Octavio stays with her. Pt states she hopes to return home at discharge Pt does not utilize HH services at this time. Pt sister in law states pt has been undecided about HH in the past. Pt PCP Meme Medellin. Pt has wheelchair, BSC, (r) walker, cane, oxgyen at 3 liters, concentrator, via unknown provider. Pt does not have POA/living will. Pt brother Octavio to provide transportation at d/c. SS to follow and assist as needed with discharge planning.              Expected Discharge Date and Time     Expected Discharge Date Expected Discharge Time    May 14, 2023          Demographic Summary     Row Name 05/11/23 1125       General Information    Admission Type inpatient    Arrived From home    Referral Source nursing    Reason for Consult discharge planning    Preferred Language English       Contact Information    Permission Granted to Share Info With                 MARIA C Everett

## 2023-05-11 NOTE — NURSING NOTE
Wound consult for denuded area to the coccyx. Area presents as a stage 2 pressure ulcer with a partial thickness loss of the dermis and a shallow pink ulcer with rolled edges. Ina wound skin in discolored from previous healed episodes. New order for thera-honey and cover with a silicone border dressing daily and PRN

## 2023-05-11 NOTE — PLAN OF CARE
Goal Outcome Evaluation:  Plan of Care Reviewed With: patient, daughter, sibling        Progress: no change  Outcome Evaluation: patient remains on heparin drip at therapeutic rate. did have one brief episode of afib rvr this morning but spontaneously converted back to NSR on her own. wound care completed per order.

## 2023-05-11 NOTE — THERAPY EVALUATION
Acute Care - Physical Therapy Initial Evaluation   [place]     Patient Name: Anh Sladana  : 1934  MRN: [id number]  Today's Date: 2023   Onset of Illness/Injury or Date of Surgery: 05/10/23  Visit Dx:     ICD-10-CM ICD-9-CM   1. Non-STEMI (non-ST elevated myocardial infarction)  I21.4 410.70   2. Congestive heart failure, unspecified HF chronicity, unspecified heart failure type  I50.9 428.0   3. Sepsis, due to unspecified organism, unspecified whether acute organ dysfunction present  A41.9 038.9     995.91   4. TANA (acute kidney injury)  N17.9 584.9   5. Hypoxemia  R09.02 799.02     Patient Active Problem List   Diagnosis   • Osteoporosis, unspecified   • Hypertensive urgency   • Essential hypertension   • Dyspnea on exertion   • CHF (congestive heart failure)   • Persistent atrial fibrillation   • Hearing loss   • Acute on chronic heart failure with preserved ejection fraction (HFpEF)   • Acute respiratory failure with hypoxemia     Past Medical History:   Diagnosis Date   • Breast cancer    • CHF (congestive heart failure)    • Chronic kidney disease     acute kidney failure   • [place] (hard of hearing)    • Hypertension    • Osteoarthritis    • Osteoporosis    • Rheumatoid arthritis    • Right-sided Bell's palsy    • Shortness of breath     sleeps with oxygen at night     Past Surgical History:   Procedure Laterality Date   • BREAST LUMPECTOMY Left     benign   • CATARACT EXTRACTION W/ INTRAOCULAR LENS  IMPLANT, BILATERAL Bilateral    • SHOULDER SURGERY Left     metal plate in left shoulder     PT Assessment (last 12 hours)     PT Evaluation and Treatment     Row Name 23 1356          Physical Therapy Time and Intention    Subjective Information complains of;weakness;fatigue  -CT     Document Type evaluation  -CT     Mode of Treatment physical therapy  -CT     Patient Effort fair  -CT     Comment Pt is [place] and family assists with PLOF and history. Family reports pt has not been ambulatory  "for a couple of weeks and is primarily \"on the couch\" which is where she sleeps. Pt uses a BSC next to where she sleeps. Pt is max A bed mobility at time of eval.  -CT     Row Name 05/11/23 1350          General Information    Patient Profile Reviewed yes  -CT     Onset of Illness/Injury or Date of Surgery 05/10/23  -CT     Referring Physician Rm  -CT     Patient Observations alert;cooperative;agree to therapy  -CT     Prior Level of Function mod assist:;transfer  -CT     Equipment Currently Used at Home wheelchair;commode, bedside;walker, rolling;cane, straight  -CT     Existing Precautions/Restrictions fall;oxygen therapy device and L/min  -CT     Limitations/Impairments safety/cognitive;hearing  pt very hard of hearing  -CT     Equipment Issued to Patient gait belt  -CT     Risks Reviewed patient:;family:  -CT     Benefits Reviewed patient:;family:  -CT     Row Name 05/11/23 1356          Living Environment    Current Living Arrangements home  -CT     People in Home sibling(s)  -CT     Row Name 05/11/23 1356          Cognition    Affect/Mental Status (Cognition) WFL  -CT     Orientation Status (Cognition) oriented to;person  -CT     Follows Commands (Cognition) verbal cues/prompting required;repetition of directions required  -CT     Row Name 05/11/23 1356          Range of Motion Comprehensive    Comment, General Range of Motion BLE grossly WFL  -CT     Row Name 05/11/23 1356          Strength Comprehensive (MMT)    Comment, General Manual Muscle Testing (MMT) Assessment unable to formally assess  -CT     Row Name 05/11/23 1357          Bed Mobility    Bed Mobility bed mobility (all) activities  -CT     All Activities, [place] (Bed Mobility) maximum assist (25% patient effort);dependent (less than 25% patient effort);2 person assist  -CT     Bed Mobility, Safety Issues decreased use of arms for pushing/pulling;decreased use of legs for bridging/pushing  -CT     Assistive Device (Bed Mobility) bed rails  " "-CT     Row Name 05/11/23 1356          Transfers    Comment, (Transfers) unable to assess  -CT     Row Name 05/11/23 1356          Gait/Stairs (Locomotion)    Comment, (Gait/Stairs) unable to assess  -CT     Row Name 05/11/23 1356          Balance    Balance Assessment sitting static balance  -CT     Static Sitting Balance dependent  -CT     Position, Sitting Balance sitting edge of bed  -CT     Row Name             Wound 05/10/23 1727 coccyx    Wound - Properties Group Placement Date: 05/10/23  -SC Placement Time: 1727  -SC Present on Hospital Admission: Y  -SC Location: coccyx  -SC    Retired Wound - Properties Group Placement Date: 05/10/23  -SC Placement Time: 1727  -SC Present on Hospital Admission: Y  -SC Location: coccyx  -SC    Retired Wound - Properties Group Date first assessed: 05/10/23  -SC Time first assessed: 1727  -SC Present on Hospital Admission: Y  -SC Location: coccyx  -SC    Row Name 05/11/23 1356          Plan of Care Review    Plan of Care Reviewed With patient  -CT     Row Name 05/11/23 1356          Positioning and Restraints    Pre-Treatment Position in bed  -CT     Post Treatment Position bed  -CT     In Bed supine;call light within reach;encouraged to call for assist;exit alarm on;side rails up x3;with family/caregiver  -CT     Row Name 05/11/23 1356          Therapy Assessment/Plan (PT)    Patient/Family Therapy Goals Statement (PT) Pt goals are to \"go home\"  -CT     Functional Level at Time of Evaluation (PT) Max/Dep x 2 bed mobility  -CT     PT Diagnosis (PT) impaired functional mobility  -CT     Rehab Potential (PT) fair, will monitor progress closely  -CT     Criteria for Skilled Interventions Met (PT) yes;skilled treatment is necessary  -CT     Therapy Frequency (PT) 2 times/wk  2-5 times/wk  -CT     Predicted Duration of Therapy Intervention (PT) lenght of stay  -CT     Row Name 05/11/23 1356          Therapy Plan Review/Discharge Plan (PT)    Therapy Plan Review (PT) " evaluation/treatment results reviewed;care plan/treatment goals reviewed;risks/benefits reviewed;participants voiced agreement with care plan;current/potential barriers reviewed;participants included;patient  -CT     Row Name 05/11/23 1356          Physical Therapy Goals    Bed Mobility Goal Selection (PT) bed mobility, PT goal 1  -CT     Transfer Goal Selection (PT) transfer, PT goal 1  -CT     Row Name 05/11/23 1356          Bed Mobility Goal 1 (PT)    Activity/Assistive Device (Bed Mobility Goal 1, PT) bed mobility activities, all  -CT     [place] Level/Cues Needed (Bed Mobility Goal 1, PT) minimum assist (75% or more patient effort)  -CT     Time Frame (Bed Mobility Goal 1, PT) by discharge  -CT     Row Name 05/11/23 1356          Transfer Goal 1 (PT)    Activity/Assistive Device (Transfer Goal 1, PT) sit-to-stand/stand-to-sit;bed-to-chair/chair-to-bed  -CT     [place] Level/Cues Needed (Transfer Goal 1, PT) maximum assist (25-49% patient effort)  -CT     Time Frame (Transfer Goal 1, PT) by discharge  -CT           User Key  (r) = Recorded By, (t) = Taken By, (c) = Cosigned By    Initials Name Provider Type    CT Marleny Calix PT Physical Therapist    Lydia Cordero, RN Registered Nurse                  PT Recommendation and Plan  Planned Therapy Interventions (PT): balance training, bed mobility training, gait training, home exercise program, motor coordination training, manual therapy techniques, neuromuscular re-education, patient/family education, postural re-education, strengthening, transfer training  Therapy Frequency (PT): 2 times/wk (2-5 times/wk)  Plan of Care Reviewed With: patient       Time Calculation:    PT Charges     Row Name 05/11/23 1413             Time Calculation    PT Received On 05/11/23  -CT      PT Goal Re-Cert Due Date 05/25/23  -CT            User Key  (r) = Recorded By, (t) = Taken By, (c) = Cosigned By    Initials Name Provider Type    CT Marleny Calix PT  Physical Therapist              Therapy Charges for Today     Code Description Service Date Service Provider Modifiers Qty    14886045746 HC PT EVAL MOD COMPLEXITY 4 5/11/2023 Marleny Calix, PT GP 1               Marleny Calix, PT  5/11/2023

## 2023-05-11 NOTE — CONSULTS
INFECTIOUS DISEASE CONSULTATION REPORT        Patient Identification:  Name:  Anh Saldana  Age:  88 y.o.  Sex:  female  :  1934  MRN:  [id number]   Visit Number:  [id number]  Primary Care Physician:  Meme Medellin MD        Referring Provider: Dr. Rm    Reason for consult: E. coli bacteremia, acute pyelonephritis       LOS: 1 day        Subjective       Subjective     History of present illness:      Thank you Dr. Rm for allowing us to participate in the care of your patient.  As you well know, Ms. Anh Saldana is a 88 y.o. female with past medical history significant for congestive heart failure, CKD, hard of hearing, hypertension, osteoarthritis, osteoporosis, rheumatoid arthritis, chronic respiratory failure requiring supplemental oxygen of 2 L per nasal cannula at night, who presented to [facility] Emergency Department on 5/10/2023 for increased shortness of breath, decreased urine output.  WBC elevated 26.38.  Lactic acid 1.9 on admission.  Urinalysis from 5/10/2023 positive with culture currently in progress.  Blood cultures from 5/10/2023 2 out of 2 sets positive for E. coli.  COVID-19 and influenza PCR negative.  Chest x-ray from 5/10/2023 reports bibasilar airspace disease.  Bilateral lower extremity venous duplex reports no evidence of DVT.    Today patient is resting comfortably in bed on slightly increased oxygen requirement of 3.5 L per nasal cannula.   at bedside.  Decreased urinary output overnight.  Low-grade fever of 99.3.  Denies diarrhea.  Patient very hard of hearing.  Patient developed A-fib with RVR overnight, received Lopressor and now is currently in normal sinus rhythm.  Lungs clear to auscultation bilaterally.  Abdomen soft, nontender.      Infectious Disease consultation was requested for antimicrobial management.      ---------------------------------------------------------------------------------------------------------------------      Review Of Systems:    Constitutional: no fever, chills and night sweats. No appetite change or unexpected weight change. No fatigue.  Eyes: no eye drainage, itching or redness.  HEENT: no mouth sores, dysphagia or nose bleed.  Respiratory: Positive shortness of breath, no cough or production of sputum.  Cardiovascular: no chest pain, no palpitations, no orthopnea.  Gastrointestinal: no nausea, vomiting or diarrhea. No abdominal pain, hematemesis or rectal bleeding.  Genitourinary: no dysuria or polyuria.  Hematologic/lymphatic: no lymph node abnormalities, no easy bruising or easy bleeding.  Musculoskeletal: no muscle or joint pain.  Skin: No rash and no itching.  Neurological: no loss of consciousness, no seizure, no headache.  Behavioral/Psych: no depression or suicidal ideation.  Endocrine: no hot flashes.  Immunologic: negative.    ---------------------------------------------------------------------------------------------------------------------     Past Medical History    Past Medical History:   Diagnosis Date    Breast cancer 2011    CHF (congestive heart failure)     Chronic kidney disease     acute kidney failure    [place] (hard of hearing)     Hypertension     Osteoarthritis     Osteoporosis     Rheumatoid arthritis     Right-sided Bell's palsy     Shortness of breath     sleeps with oxygen at night       Past Surgical History    Past Surgical History:   Procedure Laterality Date    BREAST LUMPECTOMY Left     benign    CATARACT EXTRACTION W/ INTRAOCULAR LENS  IMPLANT, BILATERAL Bilateral     SHOULDER SURGERY Left     metal plate in left shoulder       Family History    Family History   Problem Relation Age of Onset    Breast cancer Maternal Aunt     Hypertension Mother     Hypertension Father     Heart disease Sister         MI in her 80's       Social History    Social History     Tobacco Use    Smoking status: Never    Smokeless tobacco: Never   Vaping Use    Vaping Use: Never used   Substance Use  Topics    Alcohol use: No    Drug use: No       Allergies    Penicillins, Doxycycline, Hydroxychloroquine, and Latex  ---------------------------------------------------------------------------------------------------------------------     Home Medications:    Prior to Admission Medications       Prescriptions Last Dose Informant Patient Reported? Taking?    apixaban (ELIQUIS) 2.5 MG tablet tablet 5/9/2023  No Yes    Take 1 tablet by mouth Every 12 (Twelve) Hours.    Fluticasone Furoate-Vilanterol 200-25 MCG/INH aerosol powder  5/9/2023 Self Yes Yes    Inhale 1 puff Daily.    furosemide (LASIX) 40 MG tablet 5/9/2023 Pharmacy Yes Yes    Take 1 tablet by mouth Daily.    metoprolol tartrate (LOPRESSOR) 50 MG tablet 5/9/2023 Self Yes Yes    Take 1 tablet by mouth 2 (Two) Times a Day.    pantoprazole (PROTONIX) 40 MG EC tablet 5/9/2023 Self Yes Yes    Take 1 tablet by mouth Daily.    predniSONE (DELTASONE) 5 MG tablet 5/9/2023  Yes Yes    Take 2.5 mg by mouth Daily.    spironolactone (ALDACTONE) 50 MG tablet 5/9/2023 Pharmacy Yes Yes    Take 1 tablet by mouth Daily.          ---------------------------------------------------------------------------------------------------------------------    Objective       Objective     Hospital Scheduled Meds:  budesonide-formoterol, 2 puff, Inhalation, BID - RT  bumetanide, 1 mg, Intravenous, Once  cefTRIAXone, 2 g, Intravenous, Q24H  metoprolol tartrate, 50 mg, Oral, BID  pantoprazole, 40 mg, Oral, QAM AC  predniSONE, 2.5 mg, Oral, Daily With Breakfast  sodium chloride, 10 mL, Intravenous, Q12H      heparin, 12 Units/kg/hr, Last Rate: 9 Units/kg/hr (05/11/23 0957)      ---------------------------------------------------------------------------------------------------------------------   Vital Signs:  Temp:  [97.6 °F (36.4 °C)-99.3 °F (37.4 °C)] 99.3 °F (37.4 °C)  Heart Rate:  [] 92  Resp:  [18-34] 28  BP: (109-174)/() 133/91  Mean Arterial Pressure (Non-Invasive) for  the past 24 hrs (Last 3 readings):   Noninvasive MAP (mmHg)   05/11/23 1030 115   05/11/23 1000 116   05/11/23 0942 114     SpO2 Percentage    05/11/23 0630 05/11/23 1000 05/11/23 1046   SpO2: 97% 96% 94%     SpO2:  [90 %-100 %] 94 %  on  Flow (L/min):  [2-4] 2;   Device (Oxygen Therapy): nasal cannula    Body mass index is 17.57 kg/m².  Wt Readings from Last 3 Encounters:   05/11/23 40.8 kg (89 lb 15.2 oz)   07/26/21 42.6 kg (94 lb)   10/15/20 45.8 kg (101 lb)     ---------------------------------------------------------------------------------------------------------------------     Physical Exam:    Constitutional: Elderly, chronically ill-appearing female currently on 3.5 L per nasal cannula with no apparent distress.   at bedside.  HENT:  Head: Normocephalic and atraumatic.  Hard of hearing Mouth:  Moist mucous membranes.    Eyes:  Conjunctivae and EOM are normal.  No scleral icterus.  Neck:  Neck supple.  No JVD present.    Cardiovascular:  Normal rate, regular rhythm and normal heart sounds with no murmur. No edema.  Pulmonary/Chest:  No respiratory distress, no wheezes, no crackles, with normal breath sounds and good air movement.  Abdominal:  Soft.  Bowel sounds are normal.  No distension and no tenderness.   Musculoskeletal:  No edema, no tenderness, and no deformity.  No swelling or redness of joints.  Neurological:  Alert and oriented to person, place, and time.  No facial droop.  No slurred speech.   Skin:  Skin is warm and dry.  No rash noted.  No pallor.   Psychiatric:  Normal mood and affect.  Behavior is normal.    ---------------------------------------------------------------------------------------------------------------------    Results from last 7 days   Lab Units 05/11/23  0935 05/10/23  1408 05/10/23  1134   HSTROP T ng/L 89* 75* 68*     Results from last 7 days   Lab Units 05/10/23  1134   PROBNP pg/mL 56,301.0*       Results from last 7 days   Lab Units 05/10/23  2059   PH, ARTERIAL  pH units 7.472*   PO2 ART mm Hg 73.0*   PCO2, ARTERIAL mm Hg 38.4   HCO3 ART mmol/L 28.1*     Results from last 7 days   Lab Units 05/11/23  0019 05/10/23  1243 05/10/23  1134   LACTATE mmol/L  --  1.9  --    WBC 10*3/mm3 26.38*  --  20.55*   HEMOGLOBIN g/dL 12.0  --  12.7   HEMATOCRIT % 38.4  --  40.4   MCV fL 105.8*  --  106.9*   MCHC g/dL 31.3*  --  31.4*   PLATELETS 10*3/mm3 189  --  247   INR   --   --  1.30*     Results from last 7 days   Lab Units 05/11/23  0019 05/10/23  1134   SODIUM mmol/L 144 144   POTASSIUM mmol/L 4.1 4.5   MAGNESIUM mg/dL 2.0 2.0   CHLORIDE mmol/L 106 103   CO2 mmol/L 25.2 22.5   BUN mg/dL 40* 35*   CREATININE mg/dL 1.76* 1.55*   CALCIUM mg/dL 9.0 9.5   GLUCOSE mg/dL 123* 133*   ALBUMIN g/dL  --  3.4*   BILIRUBIN mg/dL  --  1.0   ALK PHOS U/L  --  165*   AST (SGOT) U/L  --  32   ALT (SGPT) U/L  --  10   Estimated Creatinine Clearance: 14.2 mL/min (A) (by C-G formula based on SCr of 1.76 mg/dL (H)).  No results found for: AMMONIA    No results found for: HGBA1C, POCGLU  No results found for: HGBA1C  Lab Results   Component Value Date    TSH 3.950 05/10/2023       Blood Culture   Date Value Ref Range Status   05/10/2023 Abnormal Stain (C)  Preliminary   05/10/2023 Abnormal Stain (C)  Preliminary     No results found for: URINECX  No results found for: WOUNDCX  No results found for: STOOLCX  No results found for: RESPCX  Pain Management Panel            View : No data to display.                    I have personally reviewed the above laboratory results.   ---------------------------------------------------------------------------------------------------------------------  Imaging Results (Last 7 Days)       Procedure Component Value Units Date/Time    XR Chest 1 View [[id number]] Collected: 05/10/23 1631     Updated: 05/10/23 1634    Narrative:      XR CHEST 1 VW-     CLINICAL INDICATION: increased sob; I21.4-Non-ST elevation (NSTEMI)  myocardial infarction; I50.9-Heart failure,  unspecified; A41.9-Sepsis,  unspecified organism; N17.9-Acute kidney failure, unspecified;  R09.02-Hypoxemia        COMPARISON: 05/10/2023      TECHNIQUE: Single frontal view of the chest.     FINDINGS:      LUNGS: Bibasilar airspace disease      HEART AND MEDIASTINUM: Heart and mediastinal contours are unremarkable        SKELETON: Bony and soft tissue structures are unremarkable.             Impression:      Bibasilar airspace disease similar to the prior study     This report was finalized on 5/10/2023 4:32 PM by Dr. Salvador Bill MD.       XR Chest 1 View [[id number]] Collected: 05/10/23 1212     Updated: 05/10/23 1248    Narrative:      XR CHEST 1 VW-     CLINICAL INDICATION: edema        COMPARISON: 01/07/2019      TECHNIQUE: Single frontal view of the chest.     FINDINGS:      LUNGS: Small bibasilar effusions and left basilar airspace disease      HEART AND MEDIASTINUM: Heart and mediastinal contours are unremarkable        SKELETON: Bony and soft tissue structures are unremarkable.             Impression:      Small right effusion and left basilar airspace disease     This report was finalized on 5/10/2023 12:13 PM by Dr. Salvador Bill MD.       US Venous Doppler Lower Extremity Bilateral (duplex) [[id number]] Collected: 05/10/23 1223     Updated: 05/10/23 1248    Narrative:      US VENOUS DOPPLER LOWER EXTREMITY BILATERAL (DUPLEX)-     CLINICAL INDICATION: swelling, pain        COMPARISON: None available      TECHNIQUE: Color Doppler imaging was used with compression and  augmentation to evaluate the lower extremity deep venous system.     FINDINGS:   There is patent spontaneous flow from the common femoral vein through  the posterior tibial veins.  There was no internal clot or area of noncompressibility.  Normal augmentation was elicited where applicable.       Impression:      No DVT in the lower extremities on today's exam.      This report was finalized on 5/10/2023 12:23 PM by Dr. Salvador Bill MD.              I have personally reviewed the above radiology results.   ---------------------------------------------------------------------------------------------------------------------      Pertinent Infectious Disease Results          Assessment & Plan      Assessment      Acute pyelonephritis  E. coli bacteremia  Aspiration pneumonia        Plan      I saw the patient this morning with ANA Hendrickson and got report from primary RN and here are my findings:    The patient initially presented with increased shortness of breath, decreased urine output, elevated WBC count of 26.38, and a lactic acid level of 1.9 on admission. Urinalysis from 5/10/2023 was positive, and a culture is currently in progress. Blood cultures from 5/10/2023 were also positive for E. coli, while COVID-19 and influenza PCR tests were negative. The chest x-ray from 5/10/2023 revealed bibasilar airspace disease, and bilateral lower extremity venous duplex showed no evidence of DVT.    Today, the patient is resting comfortably in bed with a slightly increased oxygen requirement of 3.5 L per nasal cannula. The  is at the bedside. There was a decreased urinary output overnight, and the patient has a low-grade fever of 99.3. The patient denies diarrhea but is hard of hearing. Overnight, the patient developed A-fib with RVR, received Lopressor, and is now in normal sinus rhythm. The lungs sound clear on auscultation bilaterally, and the abdomen is soft and nontender.    Based on the positive blood cultures, Azactam was changed to ceftriaxone 2 g IV every 24 hours, and vancomycin was discontinued for now based on culture results. We will continue to closely monitor the patient's condition and adjust the antibiotic therapy as needed.        ANTIMICROBIAL THERAPY    cefTRIAXone (ROCEPHIN) 2gm IVPB in 100 mL NS (VTB)       Again, thank you Dr. Rm for allowing us to participate in the care of your patient and please feel free to call for  any questions you may have.        Code Status:     Code Status and Medical Interventions:   Ordered at: 05/10/23 1720     Medical Intervention Limits:    NO intubation (DNI)     Level Of Support Discussed With:    Next of Kin (If No Surrogate)     Code Status (Patient has no pulse and is not breathing):    No CPR (Do Not Attempt to Resuscitate)     Medical Interventions (Patient has pulse or is breathing):    Limited Support     Release to patient:    Routine Release         ANA Hendrickson  05/11/23  10:56 EDT     Electronically signed by ANA Hendrickson, 05/11/23, 12:08 PM EDT.    Electronically signed by Jamie Vicente MD, 05/11/23, 12:09 PM EDT.

## 2023-05-12 ENCOUNTER — APPOINTMENT (OUTPATIENT)
Dept: CARDIOLOGY | Facility: HOSPITAL | Age: 88
DRG: 871 | End: 2023-05-12
Payer: MEDICARE

## 2023-05-12 LAB
ANION GAP SERPL CALCULATED.3IONS-SCNC: 16.8 MMOL/L (ref 5–15)
APTT PPP: 43.1 SECONDS (ref 26.5–34.5)
APTT PPP: 46.4 SECONDS (ref 26.5–34.5)
BUN SERPL-MCNC: 50 MG/DL (ref 8–23)
BUN/CREAT SERPL: 27 (ref 7–25)
CALCIUM SPEC-SCNC: 8.7 MG/DL (ref 8.6–10.5)
CHLORIDE SERPL-SCNC: 102 MMOL/L (ref 98–107)
CO2 SERPL-SCNC: 19.2 MMOL/L (ref 22–29)
CREAT SERPL-MCNC: 1.85 MG/DL (ref 0.57–1)
CRP SERPL-MCNC: 26.2 MG/DL (ref 0–0.5)
DEPRECATED RDW RBC AUTO: 67.2 FL (ref 37–54)
EGFRCR SERPLBLD CKD-EPI 2021: 26 ML/MIN/1.73
ERYTHROCYTE [DISTWIDTH] IN BLOOD BY AUTOMATED COUNT: 17.1 % (ref 12.3–15.4)
GLUCOSE SERPL-MCNC: 121 MG/DL (ref 65–99)
HCT VFR BLD AUTO: 39 % (ref 34–46.6)
HGB BLD-MCNC: 11.9 G/DL (ref 12–15.9)
MCH RBC QN AUTO: 32.8 PG (ref 26.6–33)
MCHC RBC AUTO-ENTMCNC: 30.5 G/DL (ref 31.5–35.7)
MCV RBC AUTO: 107.4 FL (ref 79–97)
PLATELET # BLD AUTO: 183 10*3/MM3 (ref 140–450)
PMV BLD AUTO: 10.4 FL (ref 6–12)
POTASSIUM SERPL-SCNC: 4.4 MMOL/L (ref 3.5–5.2)
RBC # BLD AUTO: 3.63 10*6/MM3 (ref 3.77–5.28)
SODIUM SERPL-SCNC: 138 MMOL/L (ref 136–145)
WBC NRBC COR # BLD: 16.7 10*3/MM3 (ref 3.4–10.8)

## 2023-05-12 PROCEDURE — 94664 DEMO&/EVAL PT USE INHALER: CPT

## 2023-05-12 PROCEDURE — 86140 C-REACTIVE PROTEIN: CPT | Performed by: STUDENT IN AN ORGANIZED HEALTH CARE EDUCATION/TRAINING PROGRAM

## 2023-05-12 PROCEDURE — 25010000002 CEFTRIAXONE PER 250 MG: Performed by: INTERNAL MEDICINE

## 2023-05-12 PROCEDURE — 99232 SBSQ HOSP IP/OBS MODERATE 35: CPT | Performed by: STUDENT IN AN ORGANIZED HEALTH CARE EDUCATION/TRAINING PROGRAM

## 2023-05-12 PROCEDURE — 85730 THROMBOPLASTIN TIME PARTIAL: CPT | Performed by: HOSPITALIST

## 2023-05-12 PROCEDURE — 99232 SBSQ HOSP IP/OBS MODERATE 35: CPT | Performed by: NURSE PRACTITIONER

## 2023-05-12 PROCEDURE — 94799 UNLISTED PULMONARY SVC/PX: CPT

## 2023-05-12 PROCEDURE — 94761 N-INVAS EAR/PLS OXIMETRY MLT: CPT

## 2023-05-12 PROCEDURE — 85027 COMPLETE CBC AUTOMATED: CPT | Performed by: STUDENT IN AN ORGANIZED HEALTH CARE EDUCATION/TRAINING PROGRAM

## 2023-05-12 PROCEDURE — 93306 TTE W/DOPPLER COMPLETE: CPT | Performed by: SPECIALIST

## 2023-05-12 PROCEDURE — 99232 SBSQ HOSP IP/OBS MODERATE 35: CPT | Performed by: SPECIALIST

## 2023-05-12 PROCEDURE — 85730 THROMBOPLASTIN TIME PARTIAL: CPT | Performed by: STUDENT IN AN ORGANIZED HEALTH CARE EDUCATION/TRAINING PROGRAM

## 2023-05-12 PROCEDURE — 80048 BASIC METABOLIC PNL TOTAL CA: CPT | Performed by: STUDENT IN AN ORGANIZED HEALTH CARE EDUCATION/TRAINING PROGRAM

## 2023-05-12 PROCEDURE — 93306 TTE W/DOPPLER COMPLETE: CPT

## 2023-05-12 PROCEDURE — 63710000001 PREDNISONE PER 5 MG: Performed by: STUDENT IN AN ORGANIZED HEALTH CARE EDUCATION/TRAINING PROGRAM

## 2023-05-12 PROCEDURE — 87040 BLOOD CULTURE FOR BACTERIA: CPT | Performed by: NURSE PRACTITIONER

## 2023-05-12 PROCEDURE — 25010000002 HEPARIN (PORCINE) PER 1000 UNITS: Performed by: STUDENT IN AN ORGANIZED HEALTH CARE EDUCATION/TRAINING PROGRAM

## 2023-05-12 RX ADMIN — PANTOPRAZOLE SODIUM 40 MG: 40 TABLET, DELAYED RELEASE ORAL at 08:10

## 2023-05-12 RX ADMIN — ASPIRIN 81 MG: 81 TABLET, COATED ORAL at 08:09

## 2023-05-12 RX ADMIN — APIXABAN 2.5 MG: 2.5 TABLET, FILM COATED ORAL at 18:03

## 2023-05-12 RX ADMIN — Medication 10 ML: at 20:56

## 2023-05-12 RX ADMIN — HEPARIN SODIUM 2400 UNITS: 5000 INJECTION INTRAVENOUS; SUBCUTANEOUS at 09:50

## 2023-05-12 RX ADMIN — ATORVASTATIN CALCIUM 20 MG: 20 TABLET, FILM COATED ORAL at 20:55

## 2023-05-12 RX ADMIN — CEFTRIAXONE 2 G: 2 INJECTION, POWDER, FOR SOLUTION INTRAMUSCULAR; INTRAVENOUS at 09:52

## 2023-05-12 RX ADMIN — Medication 10 ML: at 08:09

## 2023-05-12 RX ADMIN — PREDNISONE 2.5 MG: 5 TABLET ORAL at 08:10

## 2023-05-12 RX ADMIN — BUDESONIDE AND FORMOTEROL FUMARATE DIHYDRATE 2 PUFF: 160; 4.5 AEROSOL RESPIRATORY (INHALATION) at 18:31

## 2023-05-12 RX ADMIN — BUDESONIDE AND FORMOTEROL FUMARATE DIHYDRATE 2 PUFF: 160; 4.5 AEROSOL RESPIRATORY (INHALATION) at 06:18

## 2023-05-12 RX ADMIN — METOPROLOL TARTRATE 50 MG: 50 TABLET, FILM COATED ORAL at 08:10

## 2023-05-12 NOTE — NURSING NOTE
Daily Care Plan Summary: Heart Failure    Diuretic in use (IV or PO):   neither        Daily weight (up or down):    up      Output > Intake (yes/no): no      O2 Requirements (current, any change?): 2L      Symptoms noted with Activity (Respiratory Tolerance, functional state):        Anticipated Discharge Plans:    Home with family

## 2023-05-12 NOTE — PROGRESS NOTES
[facility]  PROGRESS NOTE      Patient information:  Name: Anh Saldana  Age/Sex: 88 y.o. female  :  1934        PCP: Meme Medellin MD  Attending: Daisharenate Rm   MRN:  [id number]  Visit Number:  [id number]    LOS:  LOS: 2 days     PROBLEM LIST:Principal Problem:    Acute on chronic heart failure with preserved ejection fraction (HFpEF)  Active Problems:    Acute respiratory failure with hypoxemia      Reason for Cardiology follow-up: NSTEMI and HFpEF exacerbation       Subjective   ADMISSION INFORMATION:  Chief Complaint   Patient presents with   • Edema       HPI:  Anh Saldana is a 88 y.o. female has been admitted to [facility] with history of increasing shortness of breath and bilateral leg edema.  Patient is hearing impairment and somewhat difficult to get a reliable history.  Her proBNP level was 56,301.0.  CXR showed evidence of pneumonia. She has TANA on CKD.  She was given Bumex 2 mg IV x1.  She is being treated for pneumonia.  Cardiology has been consulted as her troponin T levels are elevated.  The levels are 68-75- 88.  Delta was 7.  ECG showed no evidence of ischemia.  She denied history of chest pain and has no known history of known CAD.      Her past medica history includes HTN, CKD, RA,  persistent atrial fibrillation (however,her A-fib is paroxysmal during this hospital stay), HFpEF, (Echocardiogram done  showed LVEF-70%), moderate MR, mild AAS and mild AR, moderate to severe TR and severe pulmonary hypertension.  She is anticoagulated on Eliquis.     Interval History:   Patient is in room  and was examined by Dr. Crooks.  Patient's creatinine is 1.85 which has slightly bumped up from admission at 1.55.  Potassium 4.4, CO2 is 19.2, and BUN of 50.  HGB is 11.9 and PLT CT is 183.  Patient is lying in bed resting quietly.  No acute distress noted at this time.  Patient denies any chest pain or palpitations.   "Patient reports her breathing has improved.  Patient states, \"I am just trying to rest\".  Patient's brother and sister-in-law are at bedside.    Vital Signs  Temp:  [98.3 °F (36.8 °C)-100 °F (37.8 °C)] 98.3 °F (36.8 °C)  Heart Rate:  [] 72  Resp:  [12-30] 20  BP: (115-157)/() 131/95  Vital Signs (last 72 hrs)       05/09 0700  05/10 0659 05/10 0700  05/11 0659 05/11 0700  05/12 0659 05/12 0700  05/12 0735   Most Recent      Temp (°F)   97.6 -  98.8    98.3 -  100       98.3 (36.8) 05/12 0400    Heart Rate   67 -  172    60 -  146      72     72 05/12 0730    Resp   18 -  34    12 -  30       20 05/12 0612    BP   109/71 -  174/109    115/86 -  157/89      131/95     131/95 05/12 0730    SpO2 (%)   90 -  100    91 -  100      99     99 05/12 0730        Body mass index is 17.65 kg/m².    Intake/Output Summary (Last 24 hours) at 5/12/2023 0735  Last data filed at 5/12/2023 0617  Gross per 24 hour   Intake 432.44 ml   Output 300 ml   Net 132.44 ml     Objective  Objective     Physical Exam:     General Appearance:    Alert, cooperative, in no acute distress.  [place], thin and frail.     Head:    Normocephalic, without obvious abnormality, atraumatic.   Eyes:                          Conjunctivae and sclerae normal, no icterus, no pallor, corneas clear.   Neck:   No adenopathy, supple, trachea midline, no thyromegaly, no carotid bruit, no JVD. Contracture noted to her neck.   Lungs:     Clear to auscultation, respirations regular, even and             unlabored.    Heart:    Regular rhythm and normal rate, normal S1 and S2, systolic murmur heard in the apex, no gallop, no rub, no click   Chest Wall:    No abnormalities observed.   Abdomen:     Normal bowel sounds, no masses, no organomegaly, soft nontender, nondistended, no guarding, no rebound tenderness.   Extremities:   Moves all extremities well, 1+ edema, no cyanosis, no            Redness.  Contractures noted to bilateral hands.   Pulses:   Pulses " palpable and equal bilaterally.   Skin:   No bleeding, bruising or rash.   Neurologic:   Alert and Oriented x 3, Speech Clear & comprehensive.       Results review   Results Review:     Results from last 7 days   Lab Units 05/12/23  0019 05/11/23  0019 05/10/23  1134   WBC 10*3/mm3 16.70* 26.38* 20.55*   HEMOGLOBIN g/dL 11.9* 12.0 12.7   PLATELETS 10*3/mm3 183 189 247     Results from last 7 days   Lab Units 05/12/23  0019 05/11/23  0019 05/10/23  1134   SODIUM mmol/L 138 144 144   POTASSIUM mmol/L 4.4 4.1 4.5   CHLORIDE mmol/L 102 106 103   CO2 mmol/L 19.2* 25.2 22.5   BUN mg/dL 50* 40* 35*   CREATININE mg/dL 1.85* 1.76* 1.55*   CALCIUM mg/dL 8.7 9.0 9.5   GLUCOSE mg/dL 121* 123* 133*   ALT (SGPT) U/L  --   --  10   AST (SGOT) U/L  --   --  32     Results from last 7 days   Lab Units 05/11/23  0935 05/10/23  1408 05/10/23  1134   HSTROP T ng/L 89* 75* 68*     Lab Results   Component Value Date    PROBNP 56,301.0 (H) 05/10/2023     No results found.     Lab Results   Component Value Date    INR 1.30 (H) 05/10/2023    INR 0.98 11/10/2018     Lab Results   Component Value Date    MG 2.0 05/11/2023    MG 2.0 05/10/2023     Lab Results   Component Value Date    TSH 3.950 05/10/2023    TRIG 126 05/11/2023    HDL 56 05/11/2023    LDL 67 05/11/2023      Pain Management Panel          View : No data to display.                    Microbiology Results (last 10 days)     Procedure Component Value - Date/Time    Urine Culture - Urine, Urine, Catheter [[id number]] Collected: 05/10/23 1702    Lab Status: Final result Specimen: Urine, Catheter Updated: 05/11/23 2059     Urine Culture 50,000 CFU/mL Mixed Sondra Isolated    Narrative:      Specimen contains mixed organisms of questionable pathogenicity suggestive of contamination. If symptoms persist, suggest recollection.  Colonization of the urinary tract without infection is common. Treatment is discouraged unless the patient is symptomatic, pregnant, or undergoing an invasive  urologic procedure.    Blood Culture - Blood, Arm, Left [[id number]]  (Abnormal) Collected: 05/10/23 1248    Lab Status: Preliminary result Specimen: Blood from Arm, Left Updated: 05/12/23 0701     Blood Culture Escherichia coli     Isolated from Aerobic and Anaerobic Bottles     Gram Stain Anaerobic Bottle Gram negative bacilli      Aerobic Bottle Gram negative bacilli    Narrative:      No BCID performed, refer to previous blood culture specimen collected on 5-10-23 at 12:43    Blood Culture - Blood, Arm, Right [[id number]]  (Abnormal) Collected: 05/10/23 1243    Lab Status: Preliminary result Specimen: Blood from Arm, Right Updated: 05/12/23 0701     Blood Culture Escherichia coli     Isolated from Aerobic and Anaerobic Bottles     Gram Stain Aerobic Bottle Gram negative bacilli      Anaerobic Bottle Gram negative bacilli    Blood Culture ID, PCR - Blood, Arm, Right [[id number]]  (Abnormal) Collected: 05/10/23 1243    Lab Status: Final result Specimen: Blood from Arm, Right Updated: 05/11/23 0405     BCID, PCR Escherichia coli. Identification by BCID2 PCR.    Narrative:      No resistance genes detected.    COVID-19 and FLU A/B PCR - Swab, Nasopharynx [[id number]]  (Normal) Collected: 05/10/23 1135    Lab Status: Final result Specimen: Swab from Nasopharynx Updated: 05/10/23 1204     COVID19 Not Detected     Influenza A PCR Not Detected     Influenza B PCR Not Detected    Narrative:      Fact sheet for providers: https://www.fda.gov/media/759187/download    Fact sheet for patients: https://www.fda.gov/media/319204/download    Test performed by PCR.         Imaging Results (Last 48 Hours)     Procedure Component Value Units Date/Time    US Renal Bilateral [[id number]] Collected: 05/12/23 0711     Updated: 05/12/23 0711    Narrative:      EXAMINATION: US RENAL BILATERAL-      CLINICAL INDICATION: marjorie; I21.4-Non-ST elevation (NSTEMI) myocardial  infarction; I50.9-Heart failure, unspecified; A41.9-Sepsis,  unspecified  organism; N17.9-Acute kidney failure, unspecified; R09.02-Hypoxemia        COMPARISON: None available     PROCEDURE: Sonographic imaging of the kidneys     FINDINGS:  Imaging of the right kidney demonstrates the right kidney measuring 4.93  x 2.77 x 3.56 cm. No solid mass or hydronephrosis.     Unable to visualize the left kidney.       Impression:      1. Right kidney appears to be atrophic.  2. Unable to visualize the left kidney.          XR Chest 1 View [[id number]] Collected: 05/10/23 1631     Updated: 05/10/23 1634    Narrative:      XR CHEST 1 VW-     CLINICAL INDICATION: increased sob; I21.4-Non-ST elevation (NSTEMI)  myocardial infarction; I50.9-Heart failure, unspecified; A41.9-Sepsis,  unspecified organism; N17.9-Acute kidney failure, unspecified;  R09.02-Hypoxemia        COMPARISON: 05/10/2023      TECHNIQUE: Single frontal view of the chest.     FINDINGS:      LUNGS: Bibasilar airspace disease      HEART AND MEDIASTINUM: Heart and mediastinal contours are unremarkable        SKELETON: Bony and soft tissue structures are unremarkable.             Impression:      Bibasilar airspace disease similar to the prior study     This report was finalized on 5/10/2023 4:32 PM by Dr. Salvador Bill MD.       XR Chest 1 View [[id number]] Collected: 05/10/23 1212     Updated: 05/10/23 1248    Narrative:      XR CHEST 1 VW-     CLINICAL INDICATION: edema        COMPARISON: 01/07/2019      TECHNIQUE: Single frontal view of the chest.     FINDINGS:      LUNGS: Small bibasilar effusions and left basilar airspace disease      HEART AND MEDIASTINUM: Heart and mediastinal contours are unremarkable        SKELETON: Bony and soft tissue structures are unremarkable.             Impression:      Small right effusion and left basilar airspace disease     This report was finalized on 5/10/2023 12:13 PM by Dr. Salvador Bill MD.       US Venous Doppler Lower Extremity Bilateral (duplex) [[id number]] Collected: 05/10/23  1223     Updated: 05/10/23 1248    Narrative:      US VENOUS DOPPLER LOWER EXTREMITY BILATERAL (DUPLEX)-     CLINICAL INDICATION: swelling, pain        COMPARISON: None available      TECHNIQUE: Color Doppler imaging was used with compression and  augmentation to evaluate the lower extremity deep venous system.     FINDINGS:   There is patent spontaneous flow from the common femoral vein through  the posterior tibial veins.  There was no internal clot or area of noncompressibility.  Normal augmentation was elicited where applicable.       Impression:      No DVT in the lower extremities on today's exam.      This report was finalized on 5/10/2023 12:23 PM by Dr. Salvador Bill MD.             ECHO:  Results for orders placed during the hospital encounter of 07/30/21    Adult Transthoracic Echo Complete W/ Cont if Necessary Per Protocol    Interpretation Summary  · Normal left ventricular cavity size and wall thickness noted. All left ventricular wall segments contract normally  · Left ventricular ejection fraction appears to be 66 - 70%.  · Left ventricular diastolic function is consistent with (grade II w/high LAP) pseudonormalization.  · There is moderate thickening and calcification of the non-coronary cusp(s) of the aortic valve.  · Mild aortic valve regurgitation is present. Mild aortic valve stenosis is present.  · There are myxomatous changes of the mitral valve apparatus present. Moderate mitral valve regurgitation is present. No significant mitral valve stenosis is present.  · Moderate to severe tricuspid valve regurgitation is present. Estimated right ventricular systolic pressure from tricuspid regurgitation is markedly elevated (100 mmHg).  · Severe pulmonary hypertension is present.  · There is no evidence of pericardial effusion.      STRESS TEST:  No results found for this or any previous visit.    HEART CATH:  No results found for this or any previous  visit.      [id number]                      TELEMETRY: SR 70's        I reviewed the patient's new clinical results.    Medication Review:   Current list of medications may not reflect those currently placed in orders that are not signed or are being held.   aspirin, 81 mg, Oral, Daily  atorvastatin, 20 mg, Oral, Nightly  budesonide-formoterol, 2 puff, Inhalation, BID - RT  cefTRIAXone, 2 g, Intravenous, Q24H  metoprolol tartrate, 50 mg, Oral, BID  pantoprazole, 40 mg, Oral, QAM AC  predniSONE, 2.5 mg, Oral, Daily With Breakfast  sodium chloride, 10 mL, Intravenous, Q12H      heparin, 12 Units/kg/hr, Last Rate: 9 Units/kg/hr (23 0909)      Assessment    Assessment:  1.  Elevated high-sensitivity troponin with significant delta, patient is chest pain-free this is consistent with type I versus type II NSTEMI due to hypoxia and renal failure  2.  Persistent atrial fibrillation Micha Vascor of 5 with intermittent RVR  3.  Acute on chronic unknown recent EF  4.  Essential hypertension  5.  Moderate to severe much regurgitation on echocardiogram   6.  Severe pulm hypertension group 2 and group 3  7.  Pneumonia with acute on chronic hypoxic respiratory failure  8.  TANA on CKD              Plan   Recommendations:  1.  We will get an echocardiac to assess ejection fraction and further tailoring the medications  2.  Continue with gentle diuresis with the help of nephrology  3.  Agree with conservative management considering her advanced age and poor general status regarding her NSTEMI this was discussed with the family and all in agreement  4.  Continue with rate control for A-fib and continue with Eliquis                I have discussed the patients findings and my recommendations with patient.    ANA Rosas   [facility] 07:35 EDT 2023    Electronically signed by ANA Kevin, 23, 11:56 AM EDT.  Electronically signed by Milton Crooks MD, 23,  12:22 PM EDT.                      Please note that portions of this note were completed with a voice recognition program.    Please note that portions of this note were copied and has been reviewed and is accurate as of 5/12/2023 .

## 2023-05-12 NOTE — PLAN OF CARE
Goal Outcome Evaluation:  Plan of Care Reviewed With: patient        Progress: improving  Outcome Evaluation: patient reports improvment in symptoms today. heparin drip stopped and transitioned to eliquis.

## 2023-05-12 NOTE — PROGRESS NOTES
Malnutrition Severity Assessment      Patient meets criteria for : Severe Malnutrition  Malnutrition Type (last 8 hours)     Malnutrition Severity Assessment     Row Name 05/12/23 1628       Malnutrition Severity Assessment    Malnutrition Type Chronic Disease - Related Malnutrition    Row Name 05/12/23 1628       Insufficient Energy Intake     Insufficient Energy Intake Findings Severe    Insufficient Energy Intake  <50% of est. energy requirement for >or equal to 1 month    Row Name 05/12/23 1628       Muscle Loss    Loss of Muscle Mass Findings Severe    Nondenominational Region Severe - deep hollowing/scooping, lack of muscle to touch, facial bones well defined    Clavicle Bone Region Severe - protruding prominent bone    Acromion Bone Region Severe - squared shoulders, bones, and acromion process protrusion prominent    Scapular Bone Region Severe - prominent bones, depressions easily visible between ribs, scapula, spine, shoulders    Dorsal Hand Region Severe - prominent depression    Patellar Region Severe - prominent bone, square looking, very little muscle definition    Posterior Calf Region Severe - thin with very little definition/firmness    Row Name 05/12/23 1628       Fat Loss    Subcutaneous Fat Loss Findings Severe    Orbital Region  Severe - pronounced hollowness/depression, dark circles, loose saggy skin    Upper Arm Region Severe - mostly skin, very little space between folds, fingers touch    Thoracic & Lumbar Region Severe - ribs visible with prominent depressions, iliac crest very prominent    Row Name 05/12/23 1628       Criteria Met (Must meet criteria for severity in at least 2 of these categories: M Wasting, Fat Loss, Fluid, Secondary Signs, Wt. Status, Intake)    Patient meets criteria for  Severe Malnutrition

## 2023-05-12 NOTE — PLAN OF CARE
Goal Outcome Evaluation:  Plan of Care Reviewed With: patient        Progress: no change  Outcome Evaluation: Pt alert but disoriented to time this shift. Heparin gtt currently infusing at 9 with ptt @ 0600. VSS. Pt currently resting in bed with eyes closed verbalizing no complaints at this time. Call light within reach. Family at bedside. Safety precautions in place. VSS.

## 2023-05-12 NOTE — PROGRESS NOTES
Nephrology Progress Note      Subjective     Patient spent bedside, patient feels her shortness of breath is same compared to yesterday.    Objective       Vital signs :     Temp:  [98.3 °F (36.8 °C)-100 °F (37.8 °C)] 98.7 °F (37.1 °C)  Heart Rate:  [] 72  Resp:  [12-30] 20  BP: (115-157)/() 131/95    Intake/Output                             05/10/23 0701 - 05/11/23 0700 05/11/23 0701 - 05/12/23 0700 05/12/23 0701 - 05/13/23 0700     [id number] 4627-2693 Total [id number] 9553-9725 Total 9400-7289 1566-7442 Total                    Intake    P.O.  --  -- --  200  -- 200  120  -- 120    I.V.  --  98.3 98.3  187.3  45.2 232.4  --  -- --    IV Piggyback  100  -- 100  --  -- --  --  -- --    Total Intake 100 98.3 198.3 387.3 45.2 432.4 120 -- 120       Output    Urine  --  100 100  100  200 300  --  -- --    Total Output -- 100 100 100 200 300 -- -- --           Physical Exam:    General Appearance : Not in acute distress  Lungs : Bilateral decreased intensity of breath sounds  Heart :  regular rhythm & normal rate, normal S1, S2 and no murmur, no rub  Abdomen : soft, non distended  Extremities : No edema  Neurologic :   orientated to person, place, time and situation, Grossly no focal deficits        Laboratory Data :     Albumin Albumin   Date Value Ref Range Status   05/10/2023 3.4 (L) 3.5 - 5.2 g/dL Final      Magnesium Magnesium   Date Value Ref Range Status   05/11/2023 2.0 1.6 - 2.4 mg/dL Final   05/10/2023 2.0 1.6 - 2.4 mg/dL Final          PTH               No results found for: PTH    CBC and coagulation:  Results from last 7 days   Lab Units 05/12/23  0019 05/11/23  0019 05/10/23  1243 05/10/23  1134   LACTATE mmol/L  --   --  1.9  --    CRP mg/dL 26.20*  --   --   --    WBC 10*3/mm3 16.70* 26.38*  --  20.55*   HEMOGLOBIN g/dL 11.9* 12.0  --  12.7   HEMATOCRIT % 39.0 38.4  --  40.4   MCV fL 107.4* 105.8*  --  106.9*   MCHC g/dL 30.5* 31.3*  --  31.4*   PLATELETS 10*3/mm3 183 189  --  247   INR   --    --   --  1.30*     Acid/base balance:  Results from last 7 days   Lab Units 05/10/23  2059 05/10/23  1608 05/10/23  1134   PH, ARTERIAL pH units 7.472* 7.459* 7.474*   PO2 ART mm Hg 73.0* 54.5* 55.9*   PCO2, ARTERIAL mm Hg 38.4 32.6* 36.3   HCO3 ART mmol/L 28.1* 23.1 26.7*     Renal and electrolytes:    Results from last 7 days   Lab Units 05/12/23  0019 05/11/23  0019 05/10/23  1134   SODIUM mmol/L 138 144 144   POTASSIUM mmol/L 4.4 4.1 4.5   MAGNESIUM mg/dL  --  2.0 2.0   CHLORIDE mmol/L 102 106 103   CO2 mmol/L 19.2* 25.2 22.5   BUN mg/dL 50* 40* 35*   CREATININE mg/dL 1.85* 1.76* 1.55*   CALCIUM mg/dL 8.7 9.0 9.5     Estimated Creatinine Clearance: 13.6 mL/min (A) (by C-G formula based on SCr of 1.85 mg/dL (H)).  @GFRCG:3@   Liver and pancreatic function:  Results from last 7 days   Lab Units 05/10/23  1134   ALBUMIN g/dL 3.4*   BILIRUBIN mg/dL 1.0   ALK PHOS U/L 165*   AST (SGOT) U/L 32   ALT (SGPT) U/L 10         Cardiac:  Results from last 7 days   Lab Units 05/10/23  1134   PROBNP pg/mL 56,301.0*     Liver and pancreatic function:  Results from last 7 days   Lab Units 05/10/23  1134   ALBUMIN g/dL 3.4*   BILIRUBIN mg/dL 1.0   ALK PHOS U/L 165*   AST (SGOT) U/L 32   ALT (SGPT) U/L 10       Medications :     aspirin, 81 mg, Oral, Daily  atorvastatin, 20 mg, Oral, Nightly  budesonide-formoterol, 2 puff, Inhalation, BID - RT  cefTRIAXone, 2 g, Intravenous, Q24H  metoprolol tartrate, 50 mg, Oral, BID  pantoprazole, 40 mg, Oral, QAM AC  predniSONE, 2.5 mg, Oral, Daily With Breakfast  sodium chloride, 10 mL, Intravenous, Q12H      heparin, 12 Units/kg/hr, Last Rate: 9 Units/kg/hr (05/11/23 2358)          Assessment & Plan        -TANA  nonoliguric  - Heart failure with preserved ejection fraction, mildly decompensated  - Essential hypertension  - Rheumatoid arthritis     Creatinine is slightly worse, fluid overload is better clinically.  Will hold diuretics today and reassess tomorrow.  Continue on fluid  restriction 1 L/day and salt restriction 2 g/day  Educated and counseled the patient and her 's    TANA on CKD is most likely due to hemodynamic changes, cardiorenal syndrome in setting of diastolic dysfunction.  Baseline creatinine appears to be 0.8 was admitted with 1.5.  UA suggestive of urinary tract infection  - Strict intake and output  - Please avoid any nephrotoxic agents, hypotension and adjust medications according to estimated GFR      Andrea Nobles MD  05/12/23  08:48 EDT

## 2023-05-12 NOTE — PROGRESS NOTES
PROGRESS NOTE         Patient Identification:  Name:  Anh Saldana  Age:  88 y.o.  Sex:  female  :  1934  MRN:  [id number]  Visit Number:  [id number]  Primary Care Provider:  Meme Medellin MD         LOS: 2 days       ----------------------------------------------------------------------------------------------------------------------  Subjective       Chief Complaints:    Edema        Interval History:      Patient resting comfortably in bed this morning.  Family reports patient overall seems much better today.  With increased appetite and overall feeling better today.  Afebrile, no reported diarrhea.  Lungs clear to auscultation bilaterally.  WBC improved at 16.70.  CRP 26.20.    Review of Systems:    Constitutional: no fever, chills and night sweats.  Generalized fatigue.  Eyes: no eye drainage, itching or redness.  HEENT: no mouth sores, dysphagia or nose bleed.  Respiratory: no for shortness of breath, cough or production of sputum.  Cardiovascular: no chest pain, no palpitations, no orthopnea.  Gastrointestinal: no nausea, vomiting or diarrhea. No abdominal pain, hematemesis or rectal bleeding.  Genitourinary: no dysuria or polyuria.  Hematologic/lymphatic: no lymph node abnormalities, no easy bruising or easy bleeding.  Musculoskeletal: no muscle or joint pain.  Skin: No rash and no itching.  Neurological: no loss of consciousness, no seizure, no headache.  Behavioral/Psych: no depression or suicidal ideation.  Endocrine: no hot flashes.  Immunologic: negative.    ----------------------------------------------------------------------------------------------------------------------      Objective       [facility] Meds:  aspirin, 81 mg, Oral, Daily  atorvastatin, 20 mg, Oral, Nightly  budesonide-formoterol, 2 puff, Inhalation, BID - RT  cefTRIAXone, 2 g, Intravenous, Q24H  metoprolol tartrate, 50 mg, Oral, BID  pantoprazole, 40 mg, Oral, QAM AC  predniSONE, 2.5 mg, Oral, Daily  With Breakfast  sodium chloride, 10 mL, Intravenous, Q12H      heparin, 12 Units/kg/hr, Last Rate: 13 Units/kg/hr (05/12/23 0950)      ----------------------------------------------------------------------------------------------------------------------    Vital Signs:  Temp:  [98 °F (36.7 °C)-100 °F (37.8 °C)] 98 °F (36.7 °C)  Heart Rate:  [60-78] 61  Resp:  [12-25] 17  BP: (115-157)/(72-99) 135/78  Mean Arterial Pressure (Non-Invasive) for the past 24 hrs (Last 3 readings):   Noninvasive MAP (mmHg)   05/12/23 1000 104   05/12/23 0930 110   05/12/23 0900 87     SpO2 Percentage    05/12/23 0900 05/12/23 0930 05/12/23 1000   SpO2: 100% 100% 100%     SpO2:  [91 %-100 %] 100 %  on  Flow (L/min):  [3] 3;   Device (Oxygen Therapy): nasal cannula    Body mass index is 17.65 kg/m².  Wt Readings from Last 3 Encounters:   05/12/23 41 kg (90 lb 6.2 oz)   07/26/21 42.6 kg (94 lb)   10/15/20 45.8 kg (101 lb)        Intake/Output Summary (Last 24 hours) at 5/12/2023 1457  Last data filed at 5/12/2023 1300  Gross per 24 hour   Intake 592.44 ml   Output 300 ml   Net 292.44 ml     Diet: Cardiac Diets; Healthy Heart (2-3 Na+); Texture: Regular Texture (IDDSI 7); Fluid Consistency: Thin (IDDSI 0)  ----------------------------------------------------------------------------------------------------------------------      Physical Exam:    Constitutional: Elderly, chronically ill-appearing.  Currently on 3 L per nasal cannula.  Family at bedside.      HENT:  Head: Normocephalic and atraumatic.  Mouth:  Moist mucous membranes.    Eyes:  Conjunctivae and EOM are normal.  No scleral icterus.  Neck:  Neck supple.  No JVD present.    Cardiovascular:  Normal rate, regular rhythm and normal heart sounds with no murmur. No edema.  Pulmonary/Chest:  No respiratory distress, no wheezes, no crackles, with normal breath sounds and good air movement.  Abdominal:  Soft.  Bowel sounds are normal.  No distension and no tenderness.   Musculoskeletal:   No edema, no tenderness, and no deformity.  No swelling or redness of joints.  Neurological:  Alert and oriented to person, place, and time.  No facial droop.  No slurred speech.   Skin:  Skin is warm and dry.  No rash noted.  No pallor.   Psychiatric:  Normal mood and affect.  Behavior is normal.        ----------------------------------------------------------------------------------------------------------------------  Results from last 7 days   Lab Units 05/11/23  0935 05/10/23  1408 05/10/23  1134   HSTROP T ng/L 89* 75* 68*     Results from last 7 days   Lab Units 05/10/23  1134   PROBNP pg/mL 56,301.0*     Results from last 7 days   Lab Units 05/11/23  1720   CHOLESTEROL mg/dL 145   TRIGLYCERIDES mg/dL 126   HDL CHOL mg/dL 56   LDL CHOL mg/dL 67     Results from last 7 days   Lab Units 05/10/23  2059   PH, ARTERIAL pH units 7.472*   PO2 ART mm Hg 73.0*   PCO2, ARTERIAL mm Hg 38.4   HCO3 ART mmol/L 28.1*     Results from last 7 days   Lab Units 05/12/23  0019 05/11/23  0019 05/10/23  1243 05/10/23  1134   CRP mg/dL 26.20*  --   --   --    LACTATE mmol/L  --   --  1.9  --    WBC 10*3/mm3 16.70* 26.38*  --  20.55*   HEMOGLOBIN g/dL 11.9* 12.0  --  12.7   HEMATOCRIT % 39.0 38.4  --  40.4   MCV fL 107.4* 105.8*  --  106.9*   MCHC g/dL 30.5* 31.3*  --  31.4*   PLATELETS 10*3/mm3 183 189  --  247   INR   --   --   --  1.30*     Results from last 7 days   Lab Units 05/12/23  0019 05/11/23  0019 05/10/23  1134   SODIUM mmol/L 138 144 144   POTASSIUM mmol/L 4.4 4.1 4.5   MAGNESIUM mg/dL  --  2.0 2.0   CHLORIDE mmol/L 102 106 103   CO2 mmol/L 19.2* 25.2 22.5   BUN mg/dL 50* 40* 35*   CREATININE mg/dL 1.85* 1.76* 1.55*   CALCIUM mg/dL 8.7 9.0 9.5   GLUCOSE mg/dL 121* 123* 133*   ALBUMIN g/dL  --   --  3.4*   BILIRUBIN mg/dL  --   --  1.0   ALK PHOS U/L  --   --  165*   AST (SGOT) U/L  --   --  32   ALT (SGPT) U/L  --   --  10   Estimated Creatinine Clearance: 13.6 mL/min (A) (by C-G formula based on SCr of 1.85 mg/dL  (H)).  No results found for: AMMONIA    No results found for: HGBA1C, POCGLU  No results found for: HGBA1C  Lab Results   Component Value Date    TSH 3.950 05/10/2023       Blood Culture   Date Value Ref Range Status   05/10/2023 Escherichia coli (C)  Preliminary   05/10/2023 Escherichia coli (C)  Preliminary     Urine Culture   Date Value Ref Range Status   05/10/2023 50,000 CFU/mL Mixed Sondra Isolated  Final     No results found for: WOUNDCX  No results found for: STOOLCX  No results found for: RESPCX  Pain Management Panel          View : No data to display.                        ----------------------------------------------------------------------------------------------------------------------  Imaging Results (Last 24 Hours)     Procedure Component Value Units Date/Time    US Renal Bilateral [[id number]] Collected: 05/12/23 0711     Updated: 05/12/23 0736    Narrative:      EXAMINATION: US RENAL BILATERAL-      CLINICAL INDICATION: marjorie; I21.4-Non-ST elevation (NSTEMI) myocardial  infarction; I50.9-Heart failure, unspecified; A41.9-Sepsis, unspecified  organism; N17.9-Acute kidney failure, unspecified; R09.02-Hypoxemia        COMPARISON: None available     PROCEDURE: Sonographic imaging of the kidneys     FINDINGS:  Imaging of the right kidney demonstrates the right kidney measuring 4.93  x 2.77 x 3.56 cm. No solid mass or hydronephrosis.     Unable to visualize the left kidney.       Impression:      1. Right kidney appears to be atrophic.  2. Unable to visualize the left kidney.     This report was finalized on 5/12/2023 7:34 AM by Dr. Salvador Bill MD.             ----------------------------------------------------------------------------------------------------------------------    Pertinent Infectious Disease Results                Assessment/Plan       Assessment        Acute pyelonephritis  E. coli bacteremia  Aspiration pneumonia           Plan      Patient resting comfortably in bed this morning.   Family reports patient overall seems much better today.  With increased appetite and overall feeling better today.  Afebrile, no reported diarrhea.  Lungs clear to auscultation bilaterally.  WBC improved at 16.70.  CRP 26.20.  Urine culture from 5/10/2023 finalized as 50,000 colonies of mixed nina.    For now recommend to continue ceftriaxone 2 g IV every 24 hours for treatment of E. coli bacteremia secondary to UTI and pneumonia.  We will continue to follow closely and adjust antibiotic therapy as needed.      ANTIMICROBIAL THERAPY    cefTRIAXone (ROCEPHIN) 2gm IVPB in 100 mL NS (VTB)     Code Status:   Code Status and Medical Interventions:   Ordered at: 05/10/23 1720     Medical Intervention Limits:    NO intubation (DNI)     Level Of Support Discussed With:    Next of Kin (If No Surrogate)     Code Status (Patient has no pulse and is not breathing):    No CPR (Do Not Attempt to Resuscitate)     Medical Interventions (Patient has pulse or is breathing):    Limited Support     Release to patient:    Routine Release       ANA Hendrickson  05/12/23  14:57 EDT

## 2023-05-12 NOTE — CASE MANAGEMENT/SOCIAL WORK
Discharge Planning Assessment  [facility]     Patient Name: Anh Saldana  MRN: [id number]  Today's Date: 5/12/2023    Admit Date: 5/10/2023    Plan: Pt admitted on 5/10/23.Pt lives at home and pt brother Octavio stays with pt. Pt states she hopes to return home at discharge. Pt does not utilize HH services at this time. Pt PCP Meme Medellin. Pt has wheelchair, BSC, (r) walker, cane, oxgyen at 3 liters, concentrator, via unknown provider. Pt brother Octavio to provide transportation at d/c. SS to follow and assist as needed with discharge planning.      Discharge Plan     Row Name 05/12/23 1422       Plan    Plan Pt admitted on 5/10/23.Pt lives at home and pt brother Octavio stays with pt. Pt states she hopes to return home at discharge. Pt does not utilize HH services at this time. Pt PCP Meme Medellin. Pt has wheelchair, BSC, (r) walker, cane, oxgyen at 3 liters, concentrator, via unknown provider. Pt brother Octavio to provide transportation at d/c. SS to follow and assist as needed with discharge planning.           Expected Discharge Date and Time     Expected Discharge Date Expected Discharge Time    May 14, 2023           MARIA C Everett

## 2023-05-13 PROBLEM — E43 SEVERE MALNUTRITION: Status: ACTIVE | Noted: 2023-01-01

## 2023-05-13 LAB
ANION GAP SERPL CALCULATED.3IONS-SCNC: 10.7 MMOL/L (ref 5–15)
BACTERIA SPEC AEROBE CULT: ABNORMAL
BACTERIA SPEC AEROBE CULT: ABNORMAL
BUN SERPL-MCNC: 55 MG/DL (ref 8–23)
BUN/CREAT SERPL: 27.9 (ref 7–25)
CALCIUM SPEC-SCNC: 9 MG/DL (ref 8.6–10.5)
CHLORIDE SERPL-SCNC: 100 MMOL/L (ref 98–107)
CO2 SERPL-SCNC: 25.3 MMOL/L (ref 22–29)
CREAT SERPL-MCNC: 1.97 MG/DL (ref 0.57–1)
DEPRECATED RDW RBC AUTO: 69.4 FL (ref 37–54)
EGFRCR SERPLBLD CKD-EPI 2021: 24.1 ML/MIN/1.73
ERYTHROCYTE [DISTWIDTH] IN BLOOD BY AUTOMATED COUNT: 17.1 % (ref 12.3–15.4)
GLUCOSE SERPL-MCNC: 115 MG/DL (ref 65–99)
GRAM STN SPEC: ABNORMAL
HCT VFR BLD AUTO: 38.5 % (ref 34–46.6)
HGB BLD-MCNC: 11.6 G/DL (ref 12–15.9)
ISOLATED FROM: ABNORMAL
ISOLATED FROM: ABNORMAL
MCH RBC QN AUTO: 33.4 PG (ref 26.6–33)
MCHC RBC AUTO-ENTMCNC: 30.1 G/DL (ref 31.5–35.7)
MCV RBC AUTO: 111 FL (ref 79–97)
PLATELET # BLD AUTO: 201 10*3/MM3 (ref 140–450)
PMV BLD AUTO: 11 FL (ref 6–12)
POTASSIUM SERPL-SCNC: 4.9 MMOL/L (ref 3.5–5.2)
QT INTERVAL: 388 MS
QTC INTERVAL: 433 MS
RBC # BLD AUTO: 3.47 10*6/MM3 (ref 3.77–5.28)
SODIUM SERPL-SCNC: 136 MMOL/L (ref 136–145)
WBC NRBC COR # BLD: 9.38 10*3/MM3 (ref 3.4–10.8)

## 2023-05-13 PROCEDURE — 80048 BASIC METABOLIC PNL TOTAL CA: CPT | Performed by: STUDENT IN AN ORGANIZED HEALTH CARE EDUCATION/TRAINING PROGRAM

## 2023-05-13 PROCEDURE — 25010000002 CEFTRIAXONE PER 250 MG: Performed by: INTERNAL MEDICINE

## 2023-05-13 PROCEDURE — 94761 N-INVAS EAR/PLS OXIMETRY MLT: CPT

## 2023-05-13 PROCEDURE — 99232 SBSQ HOSP IP/OBS MODERATE 35: CPT | Performed by: INTERNAL MEDICINE

## 2023-05-13 PROCEDURE — 94799 UNLISTED PULMONARY SVC/PX: CPT

## 2023-05-13 PROCEDURE — 93010 ELECTROCARDIOGRAM REPORT: CPT | Performed by: INTERNAL MEDICINE

## 2023-05-13 PROCEDURE — 99232 SBSQ HOSP IP/OBS MODERATE 35: CPT | Performed by: NURSE PRACTITIONER

## 2023-05-13 PROCEDURE — 85027 COMPLETE CBC AUTOMATED: CPT | Performed by: STUDENT IN AN ORGANIZED HEALTH CARE EDUCATION/TRAINING PROGRAM

## 2023-05-13 PROCEDURE — 99232 SBSQ HOSP IP/OBS MODERATE 35: CPT | Performed by: STUDENT IN AN ORGANIZED HEALTH CARE EDUCATION/TRAINING PROGRAM

## 2023-05-13 PROCEDURE — 63710000001 PREDNISONE PER 5 MG: Performed by: STUDENT IN AN ORGANIZED HEALTH CARE EDUCATION/TRAINING PROGRAM

## 2023-05-13 PROCEDURE — 93005 ELECTROCARDIOGRAM TRACING: CPT | Performed by: HOSPITALIST

## 2023-05-13 RX ORDER — AMOXICILLIN 250 MG
1 CAPSULE ORAL 2 TIMES DAILY
Status: DISCONTINUED | OUTPATIENT
Start: 2023-05-13 | End: 2023-05-19 | Stop reason: HOSPADM

## 2023-05-13 RX ORDER — POLYETHYLENE GLYCOL 3350 17 G/17G
17 POWDER, FOR SOLUTION ORAL DAILY
Status: DISCONTINUED | OUTPATIENT
Start: 2023-05-13 | End: 2023-05-19 | Stop reason: HOSPADM

## 2023-05-13 RX ADMIN — DOCUSATE SODIUM 50 MG AND SENNOSIDES 8.6 MG 1 TABLET: 8.6; 5 TABLET, FILM COATED ORAL at 13:08

## 2023-05-13 RX ADMIN — METOPROLOL TARTRATE 50 MG: 50 TABLET, FILM COATED ORAL at 21:06

## 2023-05-13 RX ADMIN — PREDNISONE 2.5 MG: 5 TABLET ORAL at 09:15

## 2023-05-13 RX ADMIN — BUDESONIDE AND FORMOTEROL FUMARATE DIHYDRATE 2 PUFF: 160; 4.5 AEROSOL RESPIRATORY (INHALATION) at 19:11

## 2023-05-13 RX ADMIN — APIXABAN 2.5 MG: 2.5 TABLET, FILM COATED ORAL at 09:15

## 2023-05-13 RX ADMIN — BUDESONIDE AND FORMOTEROL FUMARATE DIHYDRATE 2 PUFF: 160; 4.5 AEROSOL RESPIRATORY (INHALATION) at 06:27

## 2023-05-13 RX ADMIN — APIXABAN 2.5 MG: 2.5 TABLET, FILM COATED ORAL at 21:06

## 2023-05-13 RX ADMIN — Medication 10 ML: at 09:15

## 2023-05-13 RX ADMIN — ATORVASTATIN CALCIUM 20 MG: 20 TABLET, FILM COATED ORAL at 21:06

## 2023-05-13 RX ADMIN — CEFTRIAXONE 2 G: 2 INJECTION, POWDER, FOR SOLUTION INTRAMUSCULAR; INTRAVENOUS at 09:12

## 2023-05-13 RX ADMIN — POLYETHYLENE GLYCOL 3350 17 G: 17 POWDER, FOR SOLUTION ORAL at 13:09

## 2023-05-13 RX ADMIN — ASPIRIN 81 MG: 81 TABLET, COATED ORAL at 09:15

## 2023-05-13 RX ADMIN — METOPROLOL TARTRATE 50 MG: 50 TABLET, FILM COATED ORAL at 09:15

## 2023-05-13 RX ADMIN — PANTOPRAZOLE SODIUM 40 MG: 40 TABLET, DELAYED RELEASE ORAL at 06:32

## 2023-05-13 NOTE — PLAN OF CARE
Goal Outcome Evaluation:  Plan of Care Reviewed With: patient        Progress: no change  Outcome Evaluation: Pt alert but confused this shift. 3LNC. VSS. Pt currently resting in bed verbalizing no complaints at this time. Call light within reach. Family at bedside. Safety precautions in place.

## 2023-05-13 NOTE — PROGRESS NOTES
LOS: 3 days     Name: Anh Saldana  Age/Sex: 88 y.o. female  :  1934        PCP: Meme Medellin MD  REF: No ref. provider found    Principal Problem:    Acute on chronic heart failure with preserved ejection fraction (HFpEF)  Active Problems:    Acute respiratory failure with hypoxemia    Severe Malnutrition (HCC)      Reason for follow-up: NSTEMI and HFpEF    Subjective     Subjective     Anh Saldana is a 88 y.o. female has been admitted to [facility] ([facility] [place]) with history of increasing shortness of breath and bilateral leg edema.  Patient is hearing impairment and somewhat difficult to get a reliable history.    Interval History: Family at bedside.  Kidney function slightly worse today at 1.97.  Echocardiogram pending.  Denies any chest pain or shortness of breath.  Troponin trending up to 89.  Blood pressure stable    Vital Signs  Temp:  [96.8 °F (36 °C)-98.2 °F (36.8 °C)] 96.9 °F (36.1 °C)  Heart Rate:  [58-72] 69  Resp:  [12-] 25  BP: (103-146)/(41-98) 130/86  Vital Signs (last 72 hrs)       05/10 0700   0659  0700   0659  0700   0659  07 0936   Most Recent      Temp (°F) 97.6 -  98.8    98.3 -  100    96.8 -  98.7      96.9     96.9 (36.1)  0800    Heart Rate 67 -  172    60 -  146    58 -  72      69     69  0700    Resp 18 -  34    12 -  30    12 -  24      25     25  0700    /71 -  174/109    115/86 -  157/89    103/58 -  146/92      130/86     130/86  0700    SpO2 (%) 90 -  100    91 -  100    95 -  100      94     94  07        Documented weights    05/10/23 1108 23 0400 23 0400   Weight: 40.8 kg (90 lb) 40.8 kg (89 lb 15.2 oz) 41 kg (90 lb 6.2 oz)      Body mass index is 17.65 kg/m².    Intake/Output Summary (Last 24 hours) at 2023 0936  Last data filed at 2023 0800  Gross per 24 hour   Intake 635.28 ml   Output --   Net 635.28 ml     Objective    Objective       Physical  Exam:     General Appearance:    Alert, cooperative, in no acute distress   Head:    Normocephalic, without obvious abnormality, atraumatic   Eyes:            Conjunctivae and sclerae normal, no   icterus, no pallor, corneas clear.   Neck:   No adenopathy, supple, trachea midline, no thyromegaly, no   carotid bruit, no JVD   Lungs:     Clear to auscultation,respirations regular, even and                  unlabored    Heart:    Regular rhythm and normal rate, normal S1 and S2, +            murmur, no gallop, no rub, no click   Chest Wall:    No abnormalities observed   Abdomen:     Normal bowel sounds, no masses, no organomegaly, soft        nontender, nondistended, no guarding, no rebound                tenderness   Extremities:   Moves all extremities well, no edema, no cyanosis, no             redness   Pulses:   Pulses palpable and equal bilaterally   Skin:   No bleeding, bruising or rash       Neurologic:   Alert and oriented      Results review       Results Review:   Results from last 7 days   Lab Units 05/13/23  0207 05/12/23  0019 05/11/23  0019 05/10/23  1134   WBC 10*3/mm3 9.38 16.70* 26.38* 20.55*   HEMOGLOBIN g/dL 11.6* 11.9* 12.0 12.7   PLATELETS 10*3/mm3 201 183 189 247     Results from last 7 days   Lab Units 05/13/23  0207 05/12/23  0019 05/11/23  0019 05/10/23  1134   SODIUM mmol/L 136 138 144 144   POTASSIUM mmol/L 4.9 4.4 4.1 4.5   CHLORIDE mmol/L 100 102 106 103   CO2 mmol/L 25.3 19.2* 25.2 22.5   BUN mg/dL 55* 50* 40* 35*   CREATININE mg/dL 1.97* 1.85* 1.76* 1.55*   CALCIUM mg/dL 9.0 8.7 9.0 9.5   GLUCOSE mg/dL 115* 121* 123* 133*   ALT (SGPT) U/L  --   --   --  10   AST (SGOT) U/L  --   --   --  32     Results from last 7 days   Lab Units 05/11/23  0935 05/10/23  1408 05/10/23  1134   HSTROP T ng/L 89* 75* 68*     Lab Results   Component Value Date    INR 1.30 (H) 05/10/2023    INR 0.98 11/10/2018     Lab Results   Component Value Date    MG 2.0 05/11/2023    MG 2.0 05/10/2023     Lab Results    Component Value Date    TSH 3.950 05/10/2023    TRIG 126 05/11/2023    HDL 56 05/11/2023    LDL 67 05/11/2023      Imaging Results (Last 48 Hours)     Procedure Component Value Units Date/Time    US Renal Bilateral [[id number]] Collected: 05/12/23 0711     Updated: 05/12/23 0736    Narrative:      EXAMINATION: US RENAL BILATERAL-      CLINICAL INDICATION: marjorie; I21.4-Non-ST elevation (NSTEMI) myocardial  infarction; I50.9-Heart failure, unspecified; A41.9-Sepsis, unspecified  organism; N17.9-Acute kidney failure, unspecified; R09.02-Hypoxemia        COMPARISON: None available     PROCEDURE: Sonographic imaging of the kidneys     FINDINGS:  Imaging of the right kidney demonstrates the right kidney measuring 4.93  x 2.77 x 3.56 cm. No solid mass or hydronephrosis.     Unable to visualize the left kidney.       Impression:      1. Right kidney appears to be atrophic.  2. Unable to visualize the left kidney.     This report was finalized on 5/12/2023 7:34 AM by Dr. Salvador Bill MD.           No results found for: BNP           Echo   Results for orders placed during the hospital encounter of 07/30/21    Adult Transthoracic Echo Complete W/ Cont if Necessary Per Protocol    Interpretation Summary  · Normal left ventricular cavity size and wall thickness noted. All left ventricular wall segments contract normally  · Left ventricular ejection fraction appears to be 66 - 70%.  · Left ventricular diastolic function is consistent with (grade II w/high LAP) pseudonormalization.  · There is moderate thickening and calcification of the non-coronary cusp(s) of the aortic valve.  · Mild aortic valve regurgitation is present. Mild aortic valve stenosis is present.  · There are myxomatous changes of the mitral valve apparatus present. Moderate mitral valve regurgitation is present. No significant mitral valve stenosis is present.  · Moderate to severe tricuspid valve regurgitation is present. Estimated right ventricular systolic  pressure from tricuspid regurgitation is markedly elevated (100 mmHg).  · Severe pulmonary hypertension is present.  · There is no evidence of pericardial effusion.       I reviewed the patient's new clinical results.    Telemetry: Normal sinus rhythm 60-70     Medication Review:   apixaban, 2.5 mg, Oral, Q12H  aspirin, 81 mg, Oral, Daily  atorvastatin, 20 mg, Oral, Nightly  budesonide-formoterol, 2 puff, Inhalation, BID - RT  cefTRIAXone, 2 g, Intravenous, Q24H  metoprolol tartrate, 50 mg, Oral, BID  pantoprazole, 40 mg, Oral, QAM AC  predniSONE, 2.5 mg, Oral, Daily With Breakfast  sodium chloride, 10 mL, Intravenous, Q12H             Assessment      Assessment:  1. Heart failure with reduced ejection fraction  2. Chronic kidney disease  3. Elevated high-sensitivity troponins possible type I MI versus type II  4. Sepsis  5. Atrial fibrillation  6. Pulmonary hypertension        Plan     Recommendations:  1. Cardiac.  Patient with mildly elevated troponin on admission.  Patient denies any angina symptoms.  Possible type II MI.  We will continue aspirin and lipid-lowering medication.  Patient is on beta-blockers which will be currently.  2. Pulm hypertension.  Patient echocardiogram from this admission reviewed.  Patient has normal LV systolic function, moderate severe pulm hypertension noted.  (Final report not in the computer as yet)  3. #2 A-fib.  Patient is rate controlled.  Eliquis to continue, metoprolol to continue  4. ID.  Patient with positive blood culture.  On ceftriaxone which will be continued    I discussed the patient's findings and my recommendations with patient and family    Electronically signed by ANA Parmar, 05/13/23, 9:36 AM EDT.     Please note that portions of this note were completed with a voice recognition program.  .Electronically signed by Mary Kay Ernandez MD, 05/13/23, 3:37 PM EDT.

## 2023-05-13 NOTE — PLAN OF CARE
Goal Outcome Evaluation:  Plan of Care Reviewed With: patient           Outcome Evaluation: Pt is a transfer from PCU, report received from Abby RYAN. Alert to self but confused to time, place, and situation. VSS, no complaints or s/s of acute distress noted. Pt is calm and cooperative w/ treatment, family at bedside and attentive to pt. IV access and telemetry monitoring patent and maintained, will continue to follow plan of care.       Daily Care Plan Summary: Heart Failure    Diuretic in use (IV or PO):   None in use        Daily weight (up or down):    No change in weight recorded      Output > Intake (yes/no):Yes      O2 Requirements (current, any change?):  2 liters/min via nasal cannula      Symptoms noted with Activity (Respiratory Tolerance, functional state):     Tolerates bed exercises.      Anticipated Discharge Plans:    Home with family.

## 2023-05-13 NOTE — PROGRESS NOTES
PROGRESS NOTE         Patient Identification:  Name:  Anh Saldana  Age:  88 y.o.  Sex:  female  :  1934  MRN:  [id number]  Visit Number:  [id number]  Primary Care Provider:  Meme Medellin MD         LOS: 3 days       ----------------------------------------------------------------------------------------------------------------------  Subjective       Chief Complaints:    Edema        Interval History:      Patient resting comfortably in bed this morning.  Complains of generalized arthritic pain that is chronic in nature.  Niece at bedside.  Overall doing significantly better today.  Continues on 3 L per nasal cannula with no apparent distress.  Afebrile, denies diarrhea.  Lungs clear to auscultation bilaterally.  WBC normal at 9.38.  Blood cultures from 2023 remain in process.    Review of Systems:    Constitutional: no fever, chills and night sweats.  Generalized fatigue.  Eyes: no eye drainage, itching or redness.  HEENT: no mouth sores, dysphagia or nose bleed.  Respiratory: no for shortness of breath, cough or production of sputum.  Cardiovascular: no chest pain, no palpitations, no orthopnea.  Gastrointestinal: no nausea, vomiting or diarrhea. No abdominal pain, hematemesis or rectal bleeding.  Genitourinary: no dysuria or polyuria.  Hematologic/lymphatic: no lymph node abnormalities, no easy bruising or easy bleeding.  Musculoskeletal: no muscle or joint pain.  Skin: No rash and no itching.  Neurological: no loss of consciousness, no seizure, no headache.  Behavioral/Psych: no depression or suicidal ideation.  Endocrine: no hot flashes.  Immunologic: negative.    ----------------------------------------------------------------------------------------------------------------------      Objective       Current [facility] Meds:  apixaban, 2.5 mg, Oral, Q12H  aspirin, 81 mg, Oral, Daily  atorvastatin, 20 mg, Oral, Nightly  budesonide-formoterol, 2 puff, Inhalation, BID -  RT  cefTRIAXone, 2 g, Intravenous, Q24H  metoprolol tartrate, 50 mg, Oral, BID  pantoprazole, 40 mg, Oral, QAM AC  polyethylene glycol, 17 g, Oral, Daily  predniSONE, 2.5 mg, Oral, Daily With Breakfast  senna-docusate sodium, 1 tablet, Oral, BID  sodium chloride, 10 mL, Intravenous, Q12H         ----------------------------------------------------------------------------------------------------------------------    Vital Signs:  Temp:  [96.8 °F (36 °C)-98.2 °F (36.8 °C)] 98 °F (36.7 °C)  Heart Rate:  [58-72] 69  Resp:  [12-25] 25  BP: (103-146)/(41-98) 130/86  Mean Arterial Pressure (Non-Invasive) for the past 24 hrs (Last 3 readings):   Noninvasive MAP (mmHg)   05/13/23 0700 105   05/13/23 0619 109   05/13/23 0500 73     SpO2 Percentage    05/13/23 0619 05/13/23 0627 05/13/23 0700   SpO2: 95% 99% 94%     SpO2:  [94 %-100 %] 94 %  on  Flow (L/min):  [3] 3;   Device (Oxygen Therapy): nasal cannula    Body mass index is 17.65 kg/m².  Wt Readings from Last 3 Encounters:   05/12/23 41 kg (90 lb 6.2 oz)   07/26/21 42.6 kg (94 lb)   10/15/20 45.8 kg (101 lb)        Intake/Output Summary (Last 24 hours) at 5/13/2023 1242  Last data filed at 5/13/2023 0800  Gross per 24 hour   Intake 635.28 ml   Output --   Net 635.28 ml     Diet: Regular/House Diet; Texture: Soft to Chew (NDD 3); Soft to Chew: Chopped Meat; Fluid Consistency: Thin (IDDSI 0)  ----------------------------------------------------------------------------------------------------------------------      Physical Exam:    Constitutional: Elderly, chronically ill-appearing.  Currently on 3 L per nasal cannula.  Niece at bedside  HENT:  Head: Normocephalic and atraumatic.  Mouth:  Moist mucous membranes.    Eyes:  Conjunctivae and EOM are normal.  No scleral icterus.  Neck:  Neck supple.  No JVD present.    Cardiovascular:  Normal rate, regular rhythm and normal heart sounds with no murmur. No edema.  Pulmonary/Chest:  No respiratory distress, no wheezes, no  crackles, with normal breath sounds and good air movement.  Abdominal:  Soft.  Bowel sounds are normal.  No distension and no tenderness.   Musculoskeletal:  No edema, no tenderness, and no deformity.  No swelling or redness of joints.  Severe kyphosis.  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  Neurological:  Alert and oriented to person, place, and time.  No facial droop.  No slurred speech.   Skin:  Skin is warm and dry.  No rash noted.  No pallor.   Psychiatric:  Normal mood and affect.  Behavior is normal.        ----------------------------------------------------------------------------------------------------------------------  Results from last 7 days   Lab Units 05/11/23  0935 05/10/23  1408 05/10/23  1134   HSTROP T ng/L 89* 75* 68*     Results from last 7 days   Lab Units 05/10/23  1134   PROBNP pg/mL 56,301.0*     Results from last 7 days   Lab Units 05/11/23  1720   CHOLESTEROL mg/dL 145   TRIGLYCERIDES mg/dL 126   HDL CHOL mg/dL 56   LDL CHOL mg/dL 67     Results from last 7 days   Lab Units 05/10/23  2059   PH, ARTERIAL pH units 7.472*   PO2 ART mm Hg 73.0*   PCO2, ARTERIAL mm Hg 38.4   HCO3 ART mmol/L 28.1*     Results from last 7 days   Lab Units 05/13/23  0207 05/12/23  0019 05/11/23  0019 05/10/23  1243 05/10/23  1134   CRP mg/dL  --  26.20*  --   --   --    LACTATE mmol/L  --   --   --  1.9  --    WBC 10*3/mm3 9.38 16.70* 26.38*  --  20.55*   HEMOGLOBIN g/dL 11.6* 11.9* 12.0  --  12.7   HEMATOCRIT % 38.5 39.0 38.4  --  40.4   MCV fL 111.0* 107.4* 105.8*  --  106.9*   MCHC g/dL 30.1* 30.5* 31.3*  --  31.4*   PLATELETS 10*3/mm3 201 183 189  --  247   INR   --   --   --   --  1.30*     Results from last 7 days   Lab Units 05/13/23  0207 05/12/23  0019 05/11/23  0019 05/10/23  1134   SODIUM mmol/L 136 138 144 144   POTASSIUM mmol/L 4.9 4.4 4.1 4.5   MAGNESIUM mg/dL  --   --  2.0 2.0   CHLORIDE mmol/L 100 102 106 103   CO2 mmol/L 25.3 19.2* 25.2 22.5   BUN mg/dL 55* 50* 40* 35*   CREATININE mg/dL  1.97* 1.85* 1.76* 1.55*   CALCIUM mg/dL 9.0 8.7 9.0 9.5   GLUCOSE mg/dL 115* 121* 123* 133*   ALBUMIN g/dL  --   --   --  3.4*   BILIRUBIN mg/dL  --   --   --  1.0   ALK PHOS U/L  --   --   --  165*   AST (SGOT) U/L  --   --   --  32   ALT (SGPT) U/L  --   --   --  10   Estimated Creatinine Clearance: 12.8 mL/min (A) (by C-G formula based on SCr of 1.97 mg/dL (H)).  No results found for: AMMONIA    No results found for: HGBA1C, POCGLU  No results found for: HGBA1C  Lab Results   Component Value Date    TSH 3.950 05/10/2023       Blood Culture   Date Value Ref Range Status   05/10/2023 Escherichia coli (C)  Preliminary   05/10/2023 Escherichia coli (C)  Preliminary     Urine Culture   Date Value Ref Range Status   05/10/2023 50,000 CFU/mL Mixed Nina Isolated  Final     No results found for: WOUNDCX  No results found for: STOOLCX  No results found for: RESPCX  Pain Management Panel          View : No data to display.                        ----------------------------------------------------------------------------------------------------------------------  Imaging Results (Last 24 Hours)     ** No results found for the last 24 hours. **          ----------------------------------------------------------------------------------------------------------------------    Pertinent Infectious Disease Results                Assessment/Plan       Assessment        Acute pyelonephritis  E. coli bacteremia  Aspiration pneumonia           Plan      Patient resting comfortably in bed this morning.  Complains of generalized arthritic pain that is chronic in nature.  Niece at bedside.  Overall doing significantly better today.  Continues on 3 L per nasal cannula with no apparent distress.  Afebrile, denies diarrhea.  Lungs clear to auscultation bilaterally.  WBC normal at 9.38.  Blood cultures from 5/12/2023 remain in process.    Urine culture from 5/10/2023 finalized as 50,000 colonies of mixed nina.    For now recommend to  continue ceftriaxone 2 g IV every 24 hours for treatment of E. coli bacteremia secondary to UTI and pneumonia.  Upon discharge patient can be further de-escalated to oral cefdinir per pharmacy dosing to continue through 5/21/2023 for treatment of bacteremia, UTI and pneumonia. We will continue to follow closely and adjust antibiotic therapy as needed.      ANTIMICROBIAL THERAPY    cefTRIAXone (ROCEPHIN) 2gm IVPB in 100 mL NS (VTB)     Code Status:   Code Status and Medical Interventions:   Ordered at: 05/10/23 1720     Medical Intervention Limits:    NO intubation (DNI)     Level Of Support Discussed With:    Next of Kin (If No Surrogate)     Code Status (Patient has no pulse and is not breathing):    No CPR (Do Not Attempt to Resuscitate)     Medical Interventions (Patient has pulse or is breathing):    Limited Support     Release to patient:    Routine Release       ANA Hendrickson  05/13/23  12:42 EDT

## 2023-05-13 NOTE — CONSULTS
Duplicate consult.  Infectious disease already following please see updated consult note.    ANA Hendrickson  Infectious disease

## 2023-05-13 NOTE — PROGRESS NOTES
Nephrology Progress Note    Interval History:     Patient Complaints: Very hard of hearing.  Niece at bedside.  She helps.  Says around is feeling better.  Has ate better.  Denies shortness of breath.  Likes her chocolate boost.  Very weak now.  Was ambulatory with assist prior to hospitalization        Vital Signs  Temp:  [96.8 °F (36 °C)-98.2 °F (36.8 °C)] 97.9 °F (36.6 °C)  Heart Rate:  [58-74] 70  Resp:  [12-28] 18  BP: (103-146)/(41-98) 125/79    Physical Exam:    General:           Elderly cachectic female wearing nasal oxygen      HEENT:  No cyanosis               Neck:  JVD       Lungs:    Occasional basal crackles   Heart:   Irregularly irregular,  no rub       Abdomen:   Normal bowel sounds, soft non-tender, non-distended, no guarding       Extremities:  Edema       Skin: No petechiae       Neurologic: Cranial nerves grossly intact, moves all extremities.        Results Review:    I reviewed the patient's new clinical results.    Lab Results (last 24 hours)     Procedure Component Value Units Date/Time    Blood Culture - Blood, Arm, Left [[id number]]  (Abnormal) Collected: 05/10/23 1248    Specimen: Blood from Arm, Left Updated: 05/13/23 0621     Blood Culture Escherichia coli     Isolated from Aerobic and Anaerobic Bottles     Gram Stain Anaerobic Bottle Gram negative bacilli      Aerobic Bottle Gram negative bacilli    Narrative:      No BCID performed, refer to previous blood culture specimen collected on 5-10-23 at 12:43    Blood Culture - Blood, Arm, Right [[id number]]  (Abnormal)  (Susceptibility) Collected: 05/10/23 1243    Specimen: Blood from Arm, Right Updated: 05/13/23 0621     Blood Culture Escherichia coli     Isolated from Aerobic and Anaerobic Bottles     Gram Stain Aerobic Bottle Gram negative bacilli      Anaerobic Bottle Gram negative bacilli    Susceptibility      Escherichia coli      GRACE      Ampicillin Susceptible       Ampicillin + Sulbactam Susceptible      Cefepime Susceptible      Ceftazidime Susceptible      Ceftriaxone Susceptible      Gentamicin Susceptible      Levofloxacin Susceptible      Piperacillin + Tazobactam Susceptible      Trimethoprim + Sulfamethoxazole Susceptible                       Susceptibility Comments     Escherichia coli    Cefazolin sensitivity will not be reported for Enterobacteriaceae in non-urine isolates. If cefazolin is preferred, please call the microbiology lab to request an E-test.  With the exception of urinary-sourced infections, aminoglycosides should not be used as monotherapy.             Basic Metabolic Panel [[id number]]  (Abnormal) Collected: 05/13/23 0207    Specimen: Blood Updated: 05/13/23 0245     Glucose 115 mg/dL      BUN 55 mg/dL      Creatinine 1.97 mg/dL      Sodium 136 mmol/L      Potassium 4.9 mmol/L      Comment: Specimen hemolyzed.  Results may be affected.        Chloride 100 mmol/L      CO2 25.3 mmol/L      Calcium 9.0 mg/dL      BUN/Creatinine Ratio 27.9     Anion Gap 10.7 mmol/L      eGFR 24.1 mL/min/1.73     Narrative:      GFR Normal >60  Chronic Kidney Disease <60  Kidney Failure <15    The GFR formula is only valid for adults with stable renal function between ages 18 and 70.    CBC (No Diff) [[id number]]  (Abnormal) Collected: 05/13/23 0207    Specimen: Blood Updated: 05/13/23 0242     WBC 9.38 10*3/mm3      RBC 3.47 10*6/mm3      Hemoglobin 11.6 g/dL      Hematocrit 38.5 %      .0 fL      MCH 33.4 pg      MCHC 30.1 g/dL      RDW 17.1 %      RDW-SD 69.4 fl      MPV 11.0 fL      Platelets 201 10*3/mm3     Blood Culture - Blood, Hand, Right [[id number]] Collected: 05/12/23 1610    Specimen: Blood from Hand, Right Updated: 05/12/23 1818    Blood Culture - Blood, Hand, Left [[id number]] Collected: 05/12/23 1611    Specimen: Blood from Hand, Left Updated: 05/12/23 1818          Imaging Results (Last 24 Hours)     ** No results found for the last 24 hours. **           Assessment and Plan:    1.  Acute renal failure with acute tubular necrosis  2.  E. coli bacteremia   3.  Hard of hearing  4.  Paroxysmal atrial fibrillation  5.  Heart failure with preserved ejection fraction  6.  Bibasilar pneumonia    At this point the treatment is supportive.  She is eating better.  She is symptomatically better.  Hopefully with the resolution of sepsis she will improved.      Sonographic imaging of the kidneys was completely inadequate but at this point it will have no bearing on her management.  If the creatinine keeps rising we will go ahead and do a CT abdomen to make sure there is no hydronephrosis.    We will set parameters on the use of metoprolol.     She is not a candidate for hemodialysis owing to frailty and age.  Niece and brother understand.  Discussed for about 15 minutes.  Her  had been on dialysis 7 to 8 years ago and the family know what dialysis is all about    Aaron Sarmiento MD  05/13/23  15:18 EDT

## 2023-05-13 NOTE — PROGRESS NOTES
[facility] HOSPITALIST PROGRESS NOTE     Patient Identification:  Name:  Anh Saldana  Age:  88 y.o.  Sex:  female  :  1934  MRN:  [id number]  Visit Number:  [id number]  ROOM: [address]     Primary Care Provider:  Meme Medellin MD    Length of stay in inpatient status:  2    Subjective     Chief Compliant:    Chief Complaint   Patient presents with   • Edema       History of Presenting Illness:    Patient seen and examined this morning with her brother and sister-in-law present. She was sleeping soundly but they reported she had told them her shortness of breath was improved this morning. No acute events noted overnight.     Objective     Current Hospital Meds:apixaban, 2.5 mg, Oral, Q12H  aspirin, 81 mg, Oral, Daily  atorvastatin, 20 mg, Oral, Nightly  budesonide-formoterol, 2 puff, Inhalation, BID - RT  cefTRIAXone, 2 g, Intravenous, Q24H  metoprolol tartrate, 50 mg, Oral, BID  pantoprazole, 40 mg, Oral, QAM AC  predniSONE, 2.5 mg, Oral, Daily With Breakfast  sodium chloride, 10 mL, Intravenous, Q12H         Current Antimicrobial Therapy:  Anti-Infectives (From admission, onward)    Ordered     Dose/Rate Route Frequency Start Stop    23 0900  cefTRIAXone (ROCEPHIN) 2 g in sodium chloride 0.9 % 100 mL IVPB-VTB        Ordering Provider: Jamie Vicente MD    2 g  200 mL/hr over 30 Minutes Intravenous Every 24 Hours 23 1000 23 0959    05/10/23 1152  aztreonam (AZACTAM) 2 g in sodium chloride 0.9 % 100 mL IVPB-VTB        Ordering Provider: Blas Cooper PA    2 g  200 mL/hr over 30 Minutes Intravenous Once 05/10/23 1154 05/10/23 1326    05/10/23 1152  vancomycin 750 mg/250 mL 0.9% NS IVPB ([facility])        Ordering Provider: Blas Cooper PA    20 mg/kg × 40.8 kg  over 60 Minutes Intravenous Once 05/10/23 1154 05/10/23 1446        Current Diuretic Therapy:  Diuretics (From admission, onward)    Ordered     Dose/Rate Route Frequency Start Stop    23 0912   bumetanide (BUMEX) injection 1 mg        Ordering Provider: Andrea Nobles MD    1 mg Intravenous Once 05/11/23 1100 05/11/23 1124    05/10/23 1249  furosemide (LASIX) injection 80 mg        Ordering Provider: Blas Cooper PA    80 mg Intravenous Once 05/10/23 1251 05/10/23 1259        ----------------------------------------------------------------------------------------------------------------------  Vital Signs:  Temp:  [96.8 °F (36 °C)-99.1 °F (37.3 °C)] 96.8 °F (36 °C)  Heart Rate:  [58-78] 63  Resp:  [12-24] 24  BP: (122-157)/(72-99) 140/97  SpO2:  [91 %-100 %] 100 %  on  Flow (L/min):  [3] 3;   Device (Oxygen Therapy): nasal cannula  Body mass index is 17.65 kg/m².    Wt Readings from Last 3 Encounters:   05/12/23 41 kg (90 lb 6.2 oz)   07/26/21 42.6 kg (94 lb)   10/15/20 45.8 kg (101 lb)     Intake & Output (last 3 days)       05/10 0701 05/11 0700 05/11 0701  05/12 0700 05/12 0701  05/13 0700    P.O.  200 360    I.V. (mL/kg) 98.3 (2.4) 232.4 (5.7) 155.3 (3.8)    IV Piggyback 100      Total Intake(mL/kg) 198.3 (4.9) 432.4 (10.5) 515.3 (12.6)    Urine (mL/kg/hr) 100 300 (0.3)     Total Output 100 300     Net +98.3 +132.4 +515.3           Urine Unmeasured Occurrence  1 x 1 x        Diet: Regular/House Diet; Texture: Soft to Chew (NDD 3); Soft to Chew: Chopped Meat; Fluid Consistency: Thin (IDDSI 0)  ----------------------------------------------------------------------------------------------------------------------  Physical exam:   Constitutional:  Well-developed. Cachectic and frail appearing.  No acute distress.      HENT:  Head:  Normocephalic and atraumatic.   Cardiovascular:  Normal rate, regular rhythm   Pulmonary/Chest:  No respiratory distress, crackles in bilateral posterior lung fields   Musculoskeletal:  No deformity.    Neurological: Sleeping comfortably  Skin:  Skin is warm and dry.  Peripheral vascular:  No cyanosis, no edema.  Edited by: Daisha Rm DO at 5/12/2023  2002  ----------------------------------------------------------------------------------------------------------------------  Results from last 7 days   Lab Units 05/12/23  0019 05/11/23  0019 05/10/23  1243 05/10/23  1134   CRP mg/dL 26.20*  --   --   --    LACTATE mmol/L  --   --  1.9  --    WBC 10*3/mm3 16.70* 26.38*  --  20.55*   HEMOGLOBIN g/dL 11.9* 12.0  --  12.7   HEMATOCRIT % 39.0 38.4  --  40.4   MCV fL 107.4* 105.8*  --  106.9*   MCHC g/dL 30.5* 31.3*  --  31.4*   PLATELETS 10*3/mm3 183 189  --  247   INR   --   --   --  1.30*     Results from last 7 days   Lab Units 05/10/23  2059   PH, ARTERIAL pH units 7.472*   PO2 ART mm Hg 73.0*   PCO2, ARTERIAL mm Hg 38.4   HCO3 ART mmol/L 28.1*     Results from last 7 days   Lab Units 05/12/23  0019 05/11/23  0019 05/10/23  1134   SODIUM mmol/L 138 144 144   POTASSIUM mmol/L 4.4 4.1 4.5   MAGNESIUM mg/dL  --  2.0 2.0   CHLORIDE mmol/L 102 106 103   CO2 mmol/L 19.2* 25.2 22.5   BUN mg/dL 50* 40* 35*   CREATININE mg/dL 1.85* 1.76* 1.55*   CALCIUM mg/dL 8.7 9.0 9.5   GLUCOSE mg/dL 121* 123* 133*   ALBUMIN g/dL  --   --  3.4*   BILIRUBIN mg/dL  --   --  1.0   ALK PHOS U/L  --   --  165*   AST (SGOT) U/L  --   --  32   ALT (SGPT) U/L  --   --  10   Estimated Creatinine Clearance: 13.6 mL/min (A) (by C-G formula based on SCr of 1.85 mg/dL (H)).  No results found for: AMMONIA  Results from last 7 days   Lab Units 05/11/23  0935 05/10/23  1408 05/10/23  1134   HSTROP T ng/L 89* 75* 68*     Results from last 7 days   Lab Units 05/10/23  1134   PROBNP pg/mL 56,301.0*     Results from last 7 days   Lab Units 05/11/23  1720   CHOLESTEROL mg/dL 145   TRIGLYCERIDES mg/dL 126   HDL CHOL mg/dL 56   LDL CHOL mg/dL 67     No results found for: HGBA1C, POCGLU  Lab Results   Component Value Date    TSH 3.950 05/10/2023     No results found for: PREGTESTUR, PREGSERUM, HCG, HCGQUANT  Pain Management Panel          View : No data to display.                    Brief Urine Lab Results   (Last result in the past 365 days)      Color   Clarity   Blood   Leuk Est   Nitrite   Protein   CREAT   Urine HCG        05/10/23 1702 Yellow   Clear   Moderate (2+)   Small (1+)   Positive   100 mg/dL (2+)               Blood Culture   Date Value Ref Range Status   05/10/2023 Escherichia coli (C)  Preliminary   05/10/2023 Escherichia coli (C)  Preliminary     Urine Culture   Date Value Ref Range Status   05/10/2023 50,000 CFU/mL Mixed Sondra Isolated  Final     No results found for: WOUNDCX  No results found for: STOOLCX  No results found for: RESPCX  No results found for: AFBCX  Results from last 7 days   Lab Units 05/12/23  0019 05/10/23  1243   LACTATE mmol/L  --  1.9   CRP mg/dL 26.20*  --        I have personally looked at the labs and they are summarized above.  ----------------------------------------------------------------------------------------------------------------------  Detailed radiology reports for the last 24 hours:  Imaging Results (Last 24 Hours)     Procedure Component Value Units Date/Time    US Renal Bilateral [[id number]] Collected: 05/12/23 0711     Updated: 05/12/23 0736    Narrative:      EXAMINATION: US RENAL BILATERAL-      CLINICAL INDICATION: marjorie; I21.4-Non-ST elevation (NSTEMI) myocardial  infarction; I50.9-Heart failure, unspecified; A41.9-Sepsis, unspecified  organism; N17.9-Acute kidney failure, unspecified; R09.02-Hypoxemia        COMPARISON: None available     PROCEDURE: Sonographic imaging of the kidneys     FINDINGS:  Imaging of the right kidney demonstrates the right kidney measuring 4.93  x 2.77 x 3.56 cm. No solid mass or hydronephrosis.     Unable to visualize the left kidney.       Impression:      1. Right kidney appears to be atrophic.  2. Unable to visualize the left kidney.     This report was finalized on 5/12/2023 7:34 AM by Dr. Salvador Bill MD.           Assessment & Plan      #Sepsis secondary to pneumonia, UTI, and E coli bacteremia  #Left basilar  pneumonia  #Acute UTI  #Acute on chronic HFpEF  #Acute on chronic respiratory failure with hypoxia, likely multifactorial due to above  #TANA on CKD stage II  - Patient met sepsis criteria with tachycardia, tachypnea, leukocytosis with urinary and respiratory sources of infection. Blood cultures became positive quickly for gram negative bacilli, E coli by BCID pcr.  - Continue empiric antibiotics with high dose ceftriaxone. ID following, appreciate assistance.  - Received 80mg IV lasix in the ED but only had 100cc urine output noted. Creatinine was elevated to 1.55 at admission and worsened to 1.76 this morning. Nephrology consulted for recommendations, appreciate assistance. Baseline creatinine 0.8. Avoid nephrotoxins as able.  - Respiratory failure has improved. Required BiPAP at admission but now tolerating nasal cannula. Sepsis has resolved and leukocytosis is improving. Repeat blood cultures drawn today.  - Repeat cbc, bmp, blood cultures in am  - Follow heart failure pathway  - Monitor I/Os, daily weights    #paroxysmal atrial fibrillation  - Patient's family reports only a brief previous episode sometime a few years ago. Patient has been intermittently tachycardic to 140s-150s with atrial fibrillation seen on EKG, but spontaneously converted back to normal sinus rhythm.   - Continue metoprolol and eliquis per Cardiology   - Monitor on telemetry    #Malnutrition  - Registered Dietician evaluated patient and she meets criteria for severe malnutrition.  - Dietary supplements with boost and magic cups ordered, appreciate assistance.    Chronic medical conditions:  #Hard of hearing - supportive care  #Hypertension - spironolactone, metoprolol as above, monitor per protocol  #Osteoarthritis - supportive care  #Osteoporosis - supportive care, fall precautions  #Rheumatoid arthritis - home prednisone 2.5mg po daily      Edited by: Daisha Rm DO at 5/12/2023 2002    VTE Prophylaxis:   Mechanical Order History:      None      Pharmalogical Order History:      Ordered     Dose Route Frequency Stop    05/12/23 1520  apixaban (ELIQUIS) tablet 2.5 mg         2.5 mg PO Every 12 Hours Scheduled --    05/10/23 1449  heparin (porcine) 5000 UNIT/ML injection 2,400 Units         60 Units/kg IV Once 05/10/23 1541    05/10/23 1449  heparin 83884 units/250 mL (100 units/mL) in 0.9% NaCl infusion  4.89 mL/hr,   Status:  Discontinued         12 Units/kg/hr IV Titrated 05/12/23 1520    05/10/23 1449  heparin (porcine) 5000 UNIT/ML injection 2,400 Units  Status:  Discontinued         60 Units/kg IV As Needed 05/12/23 1520    05/10/23 1449  heparin (porcine) 5000 UNIT/ML injection 1,200 Units  Status:  Discontinued         30 Units/kg IV As Needed 05/12/23 1520                Dispo:  likely home at discharge pending clinical improvement     Daisha Rm DO  [facility] Hospitalist  05/12/23  20:02 EDT

## 2023-05-14 LAB
ALBUMIN SERPL-MCNC: 3.1 G/DL (ref 3.5–5.2)
ALBUMIN/GLOB SERPL: 0.9 G/DL
ALP SERPL-CCNC: 119 U/L (ref 39–117)
ALT SERPL W P-5'-P-CCNC: 21 U/L (ref 1–33)
ANION GAP SERPL CALCULATED.3IONS-SCNC: 8.5 MMOL/L (ref 5–15)
AST SERPL-CCNC: 26 U/L (ref 1–32)
BASOPHILS # BLD AUTO: 0.02 10*3/MM3 (ref 0–0.2)
BASOPHILS NFR BLD AUTO: 0.2 % (ref 0–1.5)
BILIRUB SERPL-MCNC: 0.2 MG/DL (ref 0–1.2)
BUN SERPL-MCNC: 46 MG/DL (ref 8–23)
BUN/CREAT SERPL: 29.9 (ref 7–25)
CALCIUM SPEC-SCNC: 9.1 MG/DL (ref 8.6–10.5)
CHLORIDE SERPL-SCNC: 99 MMOL/L (ref 98–107)
CO2 SERPL-SCNC: 29.5 MMOL/L (ref 22–29)
CREAT SERPL-MCNC: 1.54 MG/DL (ref 0.57–1)
DEPRECATED RDW RBC AUTO: 62.9 FL (ref 37–54)
EGFRCR SERPLBLD CKD-EPI 2021: 32.3 ML/MIN/1.73
EOSINOPHIL # BLD AUTO: 0.09 10*3/MM3 (ref 0–0.4)
EOSINOPHIL NFR BLD AUTO: 1 % (ref 0.3–6.2)
ERYTHROCYTE [DISTWIDTH] IN BLOOD BY AUTOMATED COUNT: 16.4 % (ref 12.3–15.4)
GLOBULIN UR ELPH-MCNC: 3.3 GM/DL
GLUCOSE SERPL-MCNC: 96 MG/DL (ref 65–99)
HCT VFR BLD AUTO: 37.3 % (ref 34–46.6)
HGB BLD-MCNC: 11.6 G/DL (ref 12–15.9)
IMM GRANULOCYTES # BLD AUTO: 0.14 10*3/MM3 (ref 0–0.05)
IMM GRANULOCYTES NFR BLD AUTO: 1.6 % (ref 0–0.5)
LYMPHOCYTES # BLD AUTO: 1.8 10*3/MM3 (ref 0.7–3.1)
LYMPHOCYTES NFR BLD AUTO: 20 % (ref 19.6–45.3)
MCH RBC QN AUTO: 32.5 PG (ref 26.6–33)
MCHC RBC AUTO-ENTMCNC: 31.1 G/DL (ref 31.5–35.7)
MCV RBC AUTO: 104.5 FL (ref 79–97)
MONOCYTES # BLD AUTO: 0.92 10*3/MM3 (ref 0.1–0.9)
MONOCYTES NFR BLD AUTO: 10.2 % (ref 5–12)
NEUTROPHILS NFR BLD AUTO: 6.04 10*3/MM3 (ref 1.7–7)
NEUTROPHILS NFR BLD AUTO: 67 % (ref 42.7–76)
NRBC BLD AUTO-RTO: 0 /100 WBC (ref 0–0.2)
PLATELET # BLD AUTO: 233 10*3/MM3 (ref 140–450)
PMV BLD AUTO: 10.3 FL (ref 6–12)
POTASSIUM SERPL-SCNC: 4.3 MMOL/L (ref 3.5–5.2)
PROT SERPL-MCNC: 6.4 G/DL (ref 6–8.5)
RBC # BLD AUTO: 3.57 10*6/MM3 (ref 3.77–5.28)
SODIUM SERPL-SCNC: 137 MMOL/L (ref 136–145)
WBC NRBC COR # BLD: 9.01 10*3/MM3 (ref 3.4–10.8)

## 2023-05-14 PROCEDURE — 85025 COMPLETE CBC W/AUTO DIFF WBC: CPT | Performed by: STUDENT IN AN ORGANIZED HEALTH CARE EDUCATION/TRAINING PROGRAM

## 2023-05-14 PROCEDURE — 94799 UNLISTED PULMONARY SVC/PX: CPT

## 2023-05-14 PROCEDURE — 94761 N-INVAS EAR/PLS OXIMETRY MLT: CPT

## 2023-05-14 PROCEDURE — 99232 SBSQ HOSP IP/OBS MODERATE 35: CPT | Performed by: STUDENT IN AN ORGANIZED HEALTH CARE EDUCATION/TRAINING PROGRAM

## 2023-05-14 PROCEDURE — 80053 COMPREHEN METABOLIC PANEL: CPT | Performed by: STUDENT IN AN ORGANIZED HEALTH CARE EDUCATION/TRAINING PROGRAM

## 2023-05-14 PROCEDURE — 63710000001 PREDNISONE PER 5 MG: Performed by: STUDENT IN AN ORGANIZED HEALTH CARE EDUCATION/TRAINING PROGRAM

## 2023-05-14 PROCEDURE — 25010000002 CEFTRIAXONE PER 250 MG: Performed by: STUDENT IN AN ORGANIZED HEALTH CARE EDUCATION/TRAINING PROGRAM

## 2023-05-14 PROCEDURE — 94660 CPAP INITIATION&MGMT: CPT

## 2023-05-14 PROCEDURE — 99232 SBSQ HOSP IP/OBS MODERATE 35: CPT | Performed by: INTERNAL MEDICINE

## 2023-05-14 RX ORDER — ISOSORBIDE MONONITRATE 30 MG/1
30 TABLET, EXTENDED RELEASE ORAL
Status: DISCONTINUED | OUTPATIENT
Start: 2023-05-14 | End: 2023-05-19 | Stop reason: HOSPADM

## 2023-05-14 RX ORDER — FUROSEMIDE 40 MG/1
40 TABLET ORAL DAILY
Status: DISCONTINUED | OUTPATIENT
Start: 2023-05-14 | End: 2023-05-15

## 2023-05-14 RX ORDER — HYDRALAZINE HYDROCHLORIDE 10 MG/1
10 TABLET, FILM COATED ORAL EVERY 8 HOURS SCHEDULED
Status: DISCONTINUED | OUTPATIENT
Start: 2023-05-14 | End: 2023-05-19 | Stop reason: HOSPADM

## 2023-05-14 RX ADMIN — ISOSORBIDE MONONITRATE 30 MG: 30 TABLET, EXTENDED RELEASE ORAL at 14:13

## 2023-05-14 RX ADMIN — APIXABAN 2.5 MG: 2.5 TABLET, FILM COATED ORAL at 09:31

## 2023-05-14 RX ADMIN — FUROSEMIDE 40 MG: 40 TABLET ORAL at 17:19

## 2023-05-14 RX ADMIN — APIXABAN 2.5 MG: 2.5 TABLET, FILM COATED ORAL at 20:40

## 2023-05-14 RX ADMIN — HYDRALAZINE HYDROCHLORIDE 10 MG: 10 TABLET ORAL at 21:01

## 2023-05-14 RX ADMIN — METOPROLOL TARTRATE 50 MG: 50 TABLET, FILM COATED ORAL at 09:32

## 2023-05-14 RX ADMIN — DOCUSATE SODIUM 50 MG AND SENNOSIDES 8.6 MG 1 TABLET: 8.6; 5 TABLET, FILM COATED ORAL at 09:31

## 2023-05-14 RX ADMIN — BUDESONIDE AND FORMOTEROL FUMARATE DIHYDRATE 2 PUFF: 160; 4.5 AEROSOL RESPIRATORY (INHALATION) at 06:20

## 2023-05-14 RX ADMIN — METOPROLOL TARTRATE 50 MG: 50 TABLET, FILM COATED ORAL at 20:40

## 2023-05-14 RX ADMIN — CEFTRIAXONE 2 G: 2 INJECTION, POWDER, FOR SOLUTION INTRAMUSCULAR; INTRAVENOUS at 10:07

## 2023-05-14 RX ADMIN — ATORVASTATIN CALCIUM 20 MG: 20 TABLET, FILM COATED ORAL at 20:40

## 2023-05-14 RX ADMIN — POLYETHYLENE GLYCOL 3350 17 G: 17 POWDER, FOR SOLUTION ORAL at 09:32

## 2023-05-14 RX ADMIN — Medication 10 ML: at 09:33

## 2023-05-14 RX ADMIN — PANTOPRAZOLE SODIUM 40 MG: 40 TABLET, DELAYED RELEASE ORAL at 06:35

## 2023-05-14 RX ADMIN — HYDRALAZINE HYDROCHLORIDE 10 MG: 10 TABLET ORAL at 14:13

## 2023-05-14 RX ADMIN — PREDNISONE 2.5 MG: 5 TABLET ORAL at 09:32

## 2023-05-14 RX ADMIN — ASPIRIN 81 MG: 81 TABLET, COATED ORAL at 09:31

## 2023-05-14 RX ADMIN — BUDESONIDE AND FORMOTEROL FUMARATE DIHYDRATE 2 PUFF: 160; 4.5 AEROSOL RESPIRATORY (INHALATION) at 18:48

## 2023-05-14 NOTE — PROGRESS NOTES
[facility] HOSPITALIST PROGRESS NOTE     Patient Identification:  Name:  Anh Saldana  Age:  88 y.o.  Sex:  female  :  1934  MRN:  [id number]  Visit Number:  [id number]  ROOM: [address] [place] [address]     Primary Care Provider:  Meme Medellin MD    Length of stay in inpatient status:  4    Subjective     Chief Compliant:    Chief Complaint   Patient presents with   • Edema       History of Presenting Illness:    Patient seen and examined this morning with her niece present at bedside. Patient says she is feeling better today than she had been and denies any current shortness of breath or pain anywhere. No acute events noted overnight.    Objective     Current Hospital Meds:apixaban, 2.5 mg, Oral, Q12H  aspirin, 81 mg, Oral, Daily  atorvastatin, 20 mg, Oral, Nightly  budesonide-formoterol, 2 puff, Inhalation, BID - RT  cefTRIAXone, 2 g, Intravenous, Q24H  furosemide, 40 mg, Oral, Daily  hydrALAZINE, 10 mg, Oral, Q8H  isosorbide mononitrate, 30 mg, Oral, Q24H  metoprolol tartrate, 50 mg, Oral, BID  pantoprazole, 40 mg, Oral, QAM AC  polyethylene glycol, 17 g, Oral, Daily  predniSONE, 2.5 mg, Oral, Daily With Breakfast  senna-docusate sodium, 1 tablet, Oral, BID  sodium chloride, 10 mL, Intravenous, Q12H         Current Antimicrobial Therapy:  Anti-Infectives (From admission, onward)    Ordered     Dose/Rate Route Frequency Start Stop    23 0900  cefTRIAXone (ROCEPHIN) 2 g in sodium chloride 0.9 % 100 mL IVPB-VTB        Ordering Provider: Daisha Rm DO    2 g  200 mL/hr over 30 Minutes Intravenous Every 24 Hours 23 1000 23 0959    05/10/23 1152  aztreonam (AZACTAM) 2 g in sodium chloride 0.9 % 100 mL IVPB-VTB        Ordering Provider: Blas Cooper PA    2 g  200 mL/hr over 30 Minutes Intravenous Once 05/10/23 1154 05/10/23 1326    05/10/23 1152  vancomycin 750 mg/250 mL 0.9% NS IVPB ([facility])        Ordering Provider: Blas Cooper PA    20 mg/kg × 40.8 kg  over 60 Minutes  Intravenous Once 05/10/23 1154 05/10/23 1446        Current Diuretic Therapy:  Diuretics (From admission, onward)    Ordered     Dose/Rate Route Frequency Start Stop    05/14/23 1504  furosemide (LASIX) tablet 40 mg        Ordering Provider: Aaron Sarmiento MD    40 mg Oral Daily 05/14/23 1700      05/11/23 0912  bumetanide (BUMEX) injection 1 mg        Ordering Provider: Andrea Nobles MD    1 mg Intravenous Once 05/11/23 1100 05/11/23 1124    05/10/23 1249  furosemide (LASIX) injection 80 mg        Ordering Provider: Blas Cooper PA    80 mg Intravenous Once 05/10/23 1251 05/10/23 1259        ----------------------------------------------------------------------------------------------------------------------  Vital Signs:  Temp:  [97.3 °F (36.3 °C)-98.3 °F (36.8 °C)] 98.3 °F (36.8 °C)  Heart Rate:  [60-80] 67  Resp:  [16-28] 18  BP: (127-147)/(70-85) 147/85  SpO2:  [94 %-99 %] 99 %  on  Flow (L/min):  [2-3] 3;   Device (Oxygen Therapy): nasal cannula  Body mass index is 17.81 kg/m².    Wt Readings from Last 3 Encounters:   05/14/23 41.4 kg (91 lb 3.2 oz)   07/26/21 42.6 kg (94 lb)   10/15/20 45.8 kg (101 lb)     Intake & Output (last 3 days)       05/11 0701 05/12 0700 05/12 0701 05/13 0700 05/13 0701 05/14 0700 05/14 0701  05/15 0700    P.O. 200 360 360 600    I.V. (mL/kg) 232.4 (5.7) 155.3 (3.8)      IV Piggyback        Total Intake(mL/kg) 432.4 (10.5) 515.3 (12.6) 360 (8.7) 600 (14.5)    Urine (mL/kg/hr) 300 (0.3)  100 (0.1) 400 (0.9)    Stool   0 0    Total Output 300  100 400    Net +132.4 +515.3 +260 +200            Urine Unmeasured Occurrence 1 x 2 x 1 x     Stool Unmeasured Occurrence   6 x 3 x        Diet: Regular/House Diet; Texture: Soft to Chew (NDD 3); Soft to Chew: Chopped Meat; Fluid Consistency: Thin (IDDSI 0)  ----------------------------------------------------------------------------------------------------------------------  Physical exam:   Constitutional:  Well-developed. Cachectic  and frail appearing.  No acute distress. In good spirits.  HENT:  Head:  Normocephalic and atraumatic.   Cardiovascular:  Normal rate, regular rhythm   Pulmonary/Chest:  No respiratory distress, mild crackles in bilateral posterior lung fields   Musculoskeletal:  No deformity.    Neurological: Awake, alert, no focal deficit on gross examination. No slurred speech or facial droop.   Skin:  Skin is warm and dry.  Peripheral vascular:  No cyanosis, 2+ pitting edema of the ankles  Psych: appropriate mood and affect, cooperative  Edited by: Daisha Rm DO at 5/14/2023 1815  ----------------------------------------------------------------------------------------------------------------------  Results from last 7 days   Lab Units 05/14/23  0648 05/13/23  0207 05/12/23  0019 05/11/23  0019 05/10/23  1243 05/10/23  1134   CRP mg/dL  --   --  26.20*  --   --   --    LACTATE mmol/L  --   --   --   --  1.9  --    WBC 10*3/mm3 9.01 9.38 16.70*   < >  --  20.55*   HEMOGLOBIN g/dL 11.6* 11.6* 11.9*   < >  --  12.7   HEMATOCRIT % 37.3 38.5 39.0   < >  --  40.4   MCV fL 104.5* 111.0* 107.4*   < >  --  106.9*   MCHC g/dL 31.1* 30.1* 30.5*   < >  --  31.4*   PLATELETS 10*3/mm3 233 201 183   < >  --  247   INR   --   --   --   --   --  1.30*    < > = values in this interval not displayed.     Results from last 7 days   Lab Units 05/10/23  2059   PH, ARTERIAL pH units 7.472*   PO2 ART mm Hg 73.0*   PCO2, ARTERIAL mm Hg 38.4   HCO3 ART mmol/L 28.1*     Results from last 7 days   Lab Units 05/14/23  0648 05/13/23  0207 05/12/23  0019 05/11/23  0019 05/10/23  1134   SODIUM mmol/L 137 136 138 144 144   POTASSIUM mmol/L 4.3 4.9 4.4 4.1 4.5   MAGNESIUM mg/dL  --   --   --  2.0 2.0   CHLORIDE mmol/L 99 100 102 106 103   CO2 mmol/L 29.5* 25.3 19.2* 25.2 22.5   BUN mg/dL 46* 55* 50* 40* 35*   CREATININE mg/dL 1.54* 1.97* 1.85* 1.76* 1.55*   CALCIUM mg/dL 9.1 9.0 8.7 9.0 9.5   GLUCOSE mg/dL 96 115* 121* 123* 133*   ALBUMIN g/dL 3.1*  --   --    --  3.4*   BILIRUBIN mg/dL 0.2  --   --   --  1.0   ALK PHOS U/L 119*  --   --   --  165*   AST (SGOT) U/L 26  --   --   --  32   ALT (SGPT) U/L 21  --   --   --  10   Estimated Creatinine Clearance: 16.5 mL/min (A) (by C-G formula based on SCr of 1.54 mg/dL (H)).  No results found for: AMMONIA  Results from last 7 days   Lab Units 05/11/23  0935 05/10/23  1408 05/10/23  1134   HSTROP T ng/L 89* 75* 68*     Results from last 7 days   Lab Units 05/10/23  1134   PROBNP pg/mL 56,301.0*     Results from last 7 days   Lab Units 05/11/23  1720   CHOLESTEROL mg/dL 145   TRIGLYCERIDES mg/dL 126   HDL CHOL mg/dL 56   LDL CHOL mg/dL 67     No results found for: HGBA1C, POCGLU  Lab Results   Component Value Date    TSH 3.950 05/10/2023     No results found for: PREGTESTUR, PREGSERUM, HCG, HCGQUANT  Pain Management Panel          View : No data to display.                    Brief Urine Lab Results  (Last result in the past 365 days)      Color   Clarity   Blood   Leuk Est   Nitrite   Protein   CREAT   Urine HCG        05/10/23 1702 Yellow   Clear   Moderate (2+)   Small (1+)   Positive   100 mg/dL (2+)               Blood Culture   Date Value Ref Range Status   05/12/2023 No growth at 24 hours  Preliminary   05/12/2023 No growth at 24 hours  Preliminary   05/10/2023 Escherichia coli (C)  Final   05/10/2023 Escherichia coli (C)  Final     Urine Culture   Date Value Ref Range Status   05/10/2023 50,000 CFU/mL Mixed Sondra Isolated  Final     No results found for: WOUNDCX  No results found for: STOOLCX  No results found for: RESPCX  No results found for: AFBCX  Results from last 7 days   Lab Units 05/12/23  0019 05/10/23  1243   LACTATE mmol/L  --  1.9   CRP mg/dL 26.20*  --        I have personally looked at the labs and they are summarized above.  ----------------------------------------------------------------------------------------------------------------------  Detailed radiology reports for the last 24 hours:  Imaging  Results (Last 24 Hours)     ** No results found for the last 24 hours. **        Assessment & Plan      #Sepsis secondary to pneumonia, UTI, and E coli bacteremia  #Left basilar pneumonia  #Acute UTI  #Acute on chronic HFpEF  #Acute on chronic respiratory failure with hypoxia, likely multifactorial due to above  #TANA on CKD stage II  - Patient met sepsis criteria with tachycardia, tachypnea, leukocytosis with urinary and respiratory sources of infection. Blood cultures became positive quickly for gram negative bacilli, E coli by BCID pcr.  - Continue empiric antibiotics with high dose ceftriaxone. ID following, appreciate assistance.  - Received 80mg IV lasix in the ED but only had 100cc urine output noted. Creatinine was elevated to 1.55 at admission, then worsened to 1.97. Creatinine improved back to 1.5 today. Nephrology consulted for recommendations, appreciate assistance. Baseline creatinine 0.8. Avoid nephrotoxins as able.   - Respiratory failure has improved. Required BiPAP at admission but now tolerating nasal cannula. Sepsis has resolved, leukocytosis resolved. Repeat blood cultures still show no growth at 24 hours, not yet updated today.  - Repeat cbc, bmp in a.m.  - Follow heart failure pathway  - Monitor I/Os, daily weights    #paroxysmal atrial fibrillation  #Pulmonary hypertension   - Patient's family reports only a brief previous episode sometime a few years ago. Patient was intermittently tachycardic to 140s-150s earlier in admission with atrial fibrillation seen on EKG, but spontaneously converted back to normal sinus rhythm. She remains in sinus rhythm with normal rate today.  - Continue metoprolol and eliquis per Cardiology   - Monitor on telemetry  - Pulmonary hypertension discussed with Cardiology today, they plan to start nitrates and hydralazine    #Malnutrition  - Registered Dietician evaluated patient and she meets criteria for severe malnutrition.  - Dietary supplements with boost and magic  cups ordered, appreciate assistance.    Chronic medical conditions:  #Hard of hearing - supportive care  #Hypertension - spironolactone, metoprolol as above, monitor per protocol  #Osteoarthritis - supportive care  #Osteoporosis - supportive care, fall precautions  #Rheumatoid arthritis - home prednisone 2.5mg po daily    Copied portions of the note have been reviewed and are correct as of 05/14/23.  Edited by: Daisha Rm DO at 5/14/2023 7004    VTE Prophylaxis:   Mechanical Order History:     None      Pharmalogical Order History:      Ordered     Dose Route Frequency Stop    05/12/23 1520  apixaban (ELIQUIS) tablet 2.5 mg         2.5 mg PO Every 12 Hours Scheduled --    05/10/23 1449  heparin (porcine) 5000 UNIT/ML injection 2,400 Units         60 Units/kg IV Once 05/10/23 1541    05/10/23 1449  heparin 60715 units/250 mL (100 units/mL) in 0.9% NaCl infusion  4.89 mL/hr,   Status:  Discontinued         12 Units/kg/hr IV Titrated 05/12/23 1520    05/10/23 1449  heparin (porcine) 5000 UNIT/ML injection 2,400 Units  Status:  Discontinued         60 Units/kg IV As Needed 05/12/23 1520    05/10/23 1449  heparin (porcine) 5000 UNIT/ML injection 1,200 Units  Status:  Discontinued         30 Units/kg IV As Needed 05/12/23 1520                Dispo:  likely home at discharge pending medical stability    Daisha Rm DO  [facility]  05/14/23  18:16 EDT

## 2023-05-14 NOTE — PLAN OF CARE
Goal Outcome Evaluation:             Daily Care Plan Summary: Heart Failure    Diuretic in use (IV or PO):  none        Daily weight (up or down):    gain: up 1 lbs .. did not get weighed on 5/13      Output > Intake (yes/no):No      O2 Requirements (current, any change?): 2 liters/min via nasal cannula      Symptoms noted with Activity (Respiratory Tolerance, functional state):    SOA when turning in bed       Anticipated Discharge Plans:    home when medically stable             Pt appears to have rested well this shift. No s/s of acute distress noted at this time. No complaints verbalized at this time. Plan of care ongoing.

## 2023-05-14 NOTE — PROGRESS NOTES
Nephrology Progress Note    Interval History:     Patient Complaints: Eating better.  No shortness of breath.  No abdominal pain.  Acknowledges to some swelling.  Does take furosemide 40 mg daily at home        Vital Signs  Temp:  [97.3 °F (36.3 °C)-98.3 °F (36.8 °C)] 97.3 °F (36.3 °C)  Heart Rate:  [60-80] 63  Resp:  [16-28] 16  BP: (127-138)/(70-76) 127/70    Physical Exam:    General:           No distress      HEENT:  No pallor               Neck:  JVD is noted       Lungs:    Occasional basal crackles   Heart:   Irregular,  no rub       Abdomen:   Normal bowel sounds, soft non-tender, non-distended, no guarding       Extremities:  Edema++                        Results Review:    I reviewed the patient's new clinical results.    Lab Results (last 24 hours)     Procedure Component Value Units Date/Time    CBC & Differential [[id number]]  (Abnormal) Collected: 05/14/23 0648    Specimen: Blood Updated: 05/14/23 0751    Narrative:      The following orders were created for panel order CBC & Differential.  Procedure                               Abnormality         Status                     ---------                               -----------         ------                     CBC Auto Differential[[id number]]        Abnormal            Final result                 Please view results for these tests on the individual orders.    CBC Auto Differential [[id number]]  (Abnormal) Collected: 05/14/23 0648    Specimen: Blood Updated: 05/14/23 0751     WBC 9.01 10*3/mm3      RBC 3.57 10*6/mm3      Hemoglobin 11.6 g/dL      Hematocrit 37.3 %      .5 fL      MCH 32.5 pg      MCHC 31.1 g/dL      RDW 16.4 %      RDW-SD 62.9 fl      MPV 10.3 fL      Platelets 233 10*3/mm3      Neutrophil % 67.0 %      Lymphocyte % 20.0 %      Monocyte % 10.2 %      Eosinophil % 1.0 %      Basophil % 0.2 %      Immature Grans % 1.6 %      Neutrophils, Absolute 6.04 10*3/mm3       Lymphocytes, Absolute 1.80 10*3/mm3      Monocytes, Absolute 0.92 10*3/mm3      Eosinophils, Absolute 0.09 10*3/mm3      Basophils, Absolute 0.02 10*3/mm3      Immature Grans, Absolute 0.14 10*3/mm3      nRBC 0.0 /100 WBC     Comprehensive Metabolic Panel [[id number]]  (Abnormal) Collected: 05/14/23 0648    Specimen: Blood Updated: 05/14/23 0739     Glucose 96 mg/dL      BUN 46 mg/dL      Creatinine 1.54 mg/dL      Sodium 137 mmol/L      Potassium 4.3 mmol/L      Chloride 99 mmol/L      CO2 29.5 mmol/L      Calcium 9.1 mg/dL      Total Protein 6.4 g/dL      Albumin 3.1 g/dL      ALT (SGPT) 21 U/L      AST (SGOT) 26 U/L      Alkaline Phosphatase 119 U/L      Total Bilirubin 0.2 mg/dL      Globulin 3.3 gm/dL      A/G Ratio 0.9 g/dL      BUN/Creatinine Ratio 29.9     Anion Gap 8.5 mmol/L      eGFR 32.3 mL/min/1.73     Narrative:      GFR Normal >60  Chronic Kidney Disease <60  Kidney Failure <15    The GFR formula is only valid for adults with stable renal function between ages 18 and 70.    Blood Culture - Blood, Hand, Left [[id number]]  (Normal) Collected: 05/12/23 1611    Specimen: Blood from Hand, Left Updated: 05/13/23 1832     Blood Culture No growth at 24 hours    Blood Culture - Blood, Hand, Right [[id number]]  (Normal) Collected: 05/12/23 1610    Specimen: Blood from Hand, Right Updated: 05/13/23 1832     Blood Culture No growth at 24 hours          Imaging Results (Last 24 Hours)     ** No results found for the last 24 hours. **          Assessment and Plan:    1.  Acute renal failure with acute tubular necrosis  2.  E. coli bacteremia   3.  Hard of hearing  4.  Paroxysmal atrial fibrillation  5.  Heart failure with preserved ejection fraction  6.  Bibasilar pneumonia     Encourage patient to eat.    Discussed the case with Dr. Daisha Rm.    We will go ahead and give her Lasix by mouth today as she is edematous and she does have a history of heart failure and has been tolerating the same at home.  We will  set parameters on the use of Arlet Sarmiento MD  05/14/23  15:02 EDT

## 2023-05-14 NOTE — PROGRESS NOTES
LOS: 4 days     Name: Anh Saldana  Age/Sex: 88 y.o. female  :  1934        PCP: Meme Medellin MD  REF: No ref. provider found    Principal Problem:    Acute on chronic heart failure with preserved ejection fraction (HFpEF)  Active Problems:    Acute respiratory failure with hypoxemia    Severe Malnutrition (HCC)      Reason for follow-up: NSTEMI and HFpEF    Subjective     Subjective     Anh Saldana is a 88 y.o. female has been admitted to [facility] ([facility]) with history of increasing shortness of breath and bilateral leg edema.  Patient is hearing impairment and somewhat difficult to get a reliable history.    Interval History: Family bedside.  Kidney function currently improved today at 1.54.  Patient states she is feeling overall okay today.  Echocardiogram showed normal LV function with severe pulmonary hypertension.  Does have some mild shortness of breath.  Denies any chest pain.    Vital Signs  Temp:  [97.3 °F (36.3 °C)-98.3 °F (36.8 °C)] 97.3 °F (36.3 °C)  Heart Rate:  [60-80] 63  Resp:  [16-28] 16  BP: (125-138)/(70-81) 127/70  Vital Signs (last 72 hrs)        0700   0659  0700   0659  0700   0659  0700   1206   Most Recent      Temp (°F) 98.3 -  100    96.8 -  98.7    96.9 -  98.3      97.3     97.3 (36.3)  1100    Heart Rate 60 -  146    58 -  72    60 -  80      63     63  1100    Resp 12 -  30    12 -  24    16 -  28      16     16  1100    /86 -  157/89    103/58 -  146/92    120/66 -  145/88      127/70     127/70  1100    SpO2 (%) 91 -  100    95 -  100    94 -  100      94     94  1100        Documented weights    05/10/23 1108 23 0400 23 0400 23 0500   Weight: 40.8 kg (90 lb) 40.8 kg (89 lb 15.2 oz) 41 kg (90 lb 6.2 oz) 41.4 kg (91 lb 3.2 oz)      Body mass index is 17.81 kg/m².    Intake/Output Summary (Last 24 hours) at 2023 1206  Last data filed at 2023 1100  Gross per  24 hour   Intake 360 ml   Output 100 ml   Net 260 ml     Objective    Objective       Physical Exam:     General Appearance:    Alert, cooperative, in no acute distress   Head:    Normocephalic, without obvious abnormality, atraumatic   Eyes:            Conjunctivae and sclerae normal, no   icterus, no pallor, corneas clear.   Neck:   No adenopathy, supple, trachea midline, no thyromegaly, no   carotid bruit, no JVD   Lungs:     Clear to auscultation,respirations regular, even and                  unlabored    Heart:    Regular rhythm and normal rate, normal S1 and S2, no            murmur, no gallop, no rub, no click   Chest Wall:    No abnormalities observed   Abdomen:     Normal bowel sounds, no masses, no organomegaly, soft        nontender, nondistended, no guarding, no rebound                tenderness   Extremities:   Moves all extremities well, no edema, no cyanosis, no             redness   Pulses:   Pulses palpable and equal bilaterally   Skin:   No bleeding, bruising or rash       Neurologic:   Alert and oriented      Results review       Results Review:   Results from last 7 days   Lab Units 05/14/23  0648 05/13/23  0207 05/12/23  0019 05/11/23  0019 05/10/23  1134   WBC 10*3/mm3 9.01 9.38 16.70* 26.38* 20.55*   HEMOGLOBIN g/dL 11.6* 11.6* 11.9* 12.0 12.7   PLATELETS 10*3/mm3 233 201 183 189 247     Results from last 7 days   Lab Units 05/14/23  0648 05/13/23  0207 05/12/23  0019 05/11/23  0019 05/10/23  1134   SODIUM mmol/L 137 136 138 144 144   POTASSIUM mmol/L 4.3 4.9 4.4 4.1 4.5   CHLORIDE mmol/L 99 100 102 106 103   CO2 mmol/L 29.5* 25.3 19.2* 25.2 22.5   BUN mg/dL 46* 55* 50* 40* 35*   CREATININE mg/dL 1.54* 1.97* 1.85* 1.76* 1.55*   CALCIUM mg/dL 9.1 9.0 8.7 9.0 9.5   GLUCOSE mg/dL 96 115* 121* 123* 133*   ALT (SGPT) U/L 21  --   --   --  10   AST (SGOT) U/L 26  --   --   --  32     Results from last 7 days   Lab Units 05/11/23  0935 05/10/23  1408 05/10/23  1134   HSTROP T ng/L 89* 75* 68*      Lab Results   Component Value Date    INR 1.30 (H) 05/10/2023    INR 0.98 11/10/2018     Lab Results   Component Value Date    MG 2.0 05/11/2023    MG 2.0 05/10/2023     Lab Results   Component Value Date    TSH 3.950 05/10/2023    TRIG 126 05/11/2023    HDL 56 05/11/2023    LDL 67 05/11/2023      Imaging Results (Last 48 Hours)     ** No results found for the last 48 hours. **        No results found for: BNP           Echo   Results for orders placed during the hospital encounter of 07/30/21    Adult Transthoracic Echo Complete W/ Cont if Necessary Per Protocol    Interpretation Summary  · Normal left ventricular cavity size and wall thickness noted. All left ventricular wall segments contract normally  · Left ventricular ejection fraction appears to be 66 - 70%.  · Left ventricular diastolic function is consistent with (grade II w/high LAP) pseudonormalization.  · There is moderate thickening and calcification of the non-coronary cusp(s) of the aortic valve.  · Mild aortic valve regurgitation is present. Mild aortic valve stenosis is present.  · There are myxomatous changes of the mitral valve apparatus present. Moderate mitral valve regurgitation is present. No significant mitral valve stenosis is present.  · Moderate to severe tricuspid valve regurgitation is present. Estimated right ventricular systolic pressure from tricuspid regurgitation is markedly elevated (100 mmHg).  · Severe pulmonary hypertension is present.  · There is no evidence of pericardial effusion.       I reviewed the patient's new clinical results.    Telemetry: Normal sinus rhythm 80s     Medication Review:   apixaban, 2.5 mg, Oral, Q12H  aspirin, 81 mg, Oral, Daily  atorvastatin, 20 mg, Oral, Nightly  budesonide-formoterol, 2 puff, Inhalation, BID - RT  cefTRIAXone, 2 g, Intravenous, Q24H  metoprolol tartrate, 50 mg, Oral, BID  pantoprazole, 40 mg, Oral, QAM AC  polyethylene glycol, 17 g, Oral, Daily  predniSONE, 2.5 mg, Oral, Daily  With Breakfast  senna-docusate sodium, 1 tablet, Oral, BID  sodium chloride, 10 mL, Intravenous, Q12H             Assessment      Assessment:  1. Renal insufficiency  2. Severe pulm hypertension  3. Elevated high-sensitivity troponin possible type II MI  4. Diastolic dysfunction  5. Atrial fibrillation  6. Sepsis        Plan     Recommendations:  1. Cardiac.  Patient with normal LV systolic function with moderate to severe pulm hypertension.  Will advised to start nitrates and hydralazine for afterload reduction.  Patient has renal sufficiency and will not be able to take ACE or ARB.  2. Paroxysmal atrial fibrillation.  Anticoagulation with Eliquis and metoprolol for rate control to continue    I discussed the patient's findings and my recommendations with patient and family    Electronically signed by ANA Parmar, 05/14/23, 12:06 PM EDT.     Please note that portions of this note were completed with a voice recognition program.  .Electronically signed by Mary Kay Ernandez MD, 05/14/23, 4:50 PM EDT.

## 2023-05-14 NOTE — PROGRESS NOTES
[facility] HOSPITALIST PROGRESS NOTE     Patient Identification:  Name:  Anh Saldana  Age:  88 y.o.  Sex:  female  :  1934  MRN:  [id number]  Visit Number:  [id number]  ROOM: [address] [place] [address]     Primary Care Provider:  Meme Medellin MD    Length of stay in inpatient status:  3    Subjective     Chief Compliant:    Chief Complaint   Patient presents with   • Edema       History of Presenting Illness:    Patient seen and examined this morning with multiple family members present. She says she is feeling much better today and reports only mild shortness of breath. She denies any chest pain, abdominal pain, or other complaints. Per family she has been eating well today.    Objective     Current Hospital Meds:apixaban, 2.5 mg, Oral, Q12H  aspirin, 81 mg, Oral, Daily  atorvastatin, 20 mg, Oral, Nightly  budesonide-formoterol, 2 puff, Inhalation, BID - RT  cefTRIAXone, 2 g, Intravenous, Q24H  metoprolol tartrate, 50 mg, Oral, BID  pantoprazole, 40 mg, Oral, QAM AC  polyethylene glycol, 17 g, Oral, Daily  predniSONE, 2.5 mg, Oral, Daily With Breakfast  senna-docusate sodium, 1 tablet, Oral, BID  sodium chloride, 10 mL, Intravenous, Q12H         Current Antimicrobial Therapy:  Anti-Infectives (From admission, onward)    Ordered     Dose/Rate Route Frequency Start Stop    23 0900  cefTRIAXone (ROCEPHIN) 2 g in sodium chloride 0.9 % 100 mL IVPB-VTB        Ordering Provider: Daisha Rm DO    2 g  200 mL/hr over 30 Minutes Intravenous Every 24 Hours 23 1000 23 0959    05/10/23 1152  aztreonam (AZACTAM) 2 g in sodium chloride 0.9 % 100 mL IVPB-VTB        Ordering Provider: Blas Cooper PA    2 g  200 mL/hr over 30 Minutes Intravenous Once 05/10/23 1154 05/10/23 1326    05/10/23 1152  vancomycin 750 mg/250 mL 0.9% NS IVPB ([facility])        Ordering Provider: Blas Cooper PA    20 mg/kg × 40.8 kg  over 60 Minutes Intravenous Once 05/10/23 1154 05/10/23 1446        Current Diuretic  Therapy:  Diuretics (From admission, onward)    Ordered     Dose/Rate Route Frequency Start Stop    05/11/23 0912  bumetanide (BUMEX) injection 1 mg        Ordering Provider: Andrea Nobles MD    1 mg Intravenous Once 05/11/23 1100 05/11/23 1124    05/10/23 1249  furosemide (LASIX) injection 80 mg        Ordering Provider: Blas Cooper PA    80 mg Intravenous Once 05/10/23 1251 05/10/23 1259        ----------------------------------------------------------------------------------------------------------------------  Vital Signs:  Temp:  [96.9 °F (36.1 °C)-98 °F (36.7 °C)] 98 °F (36.7 °C)  Heart Rate:  [58-80] 74  Resp:  [12-28] 17  BP: (103-145)/(41-98) 138/76  SpO2:  [94 %-100 %] 97 %  on  Flow (L/min):  [2-3] 2;   Device (Oxygen Therapy): nasal cannula  Body mass index is 17.65 kg/m².    Wt Readings from Last 3 Encounters:   05/12/23 41 kg (90 lb 6.2 oz)   07/26/21 42.6 kg (94 lb)   10/15/20 45.8 kg (101 lb)     Intake & Output (last 3 days)       05/11 0701 05/12 0700 05/12 0701  05/13 0700 05/13 0701 05/14 0700    P.O. 200 360 360    I.V. (mL/kg) 232.4 (5.7) 155.3 (3.8)     IV Piggyback       Total Intake(mL/kg) 432.4 (10.5) 515.3 (12.6) 360 (8.8)    Urine (mL/kg/hr) 300 (0.3)  100 (0.2)    Stool   0    Total Output 300  100    Net +132.4 +515.3 +260           Urine Unmeasured Occurrence 1 x 2 x 1 x    Stool Unmeasured Occurrence   3 x        Diet: Regular/House Diet; Texture: Soft to Chew (NDD 3); Soft to Chew: Chopped Meat; Fluid Consistency: Thin (IDDSI 0)  ----------------------------------------------------------------------------------------------------------------------  Physical exam:   Constitutional:  Well-developed. Cachectic and frail appearing.  No acute distress. Appears much improved today.     HENT:  Head:  Normocephalic and atraumatic.   Cardiovascular:  Normal rate, regular rhythm   Pulmonary/Chest:  No respiratory distress, crackles still heard in bilateral posterior lung fields    Musculoskeletal:  No deformity.    Neurological: Awake, alert, no focal deficit on gross examination. No slurred speech or facial droop.   Skin:  Skin is warm and dry.  Peripheral vascular:  No cyanosis, no edema.  Psych: appropriate mood and affect, cooperative  Edited by: Daisha Rm DO at 5/13/2023 2025  ----------------------------------------------------------------------------------------------------------------------  Results from last 7 days   Lab Units 05/13/23 0207 05/12/23 0019 05/11/23  0019 05/10/23  1243 05/10/23  1134   CRP mg/dL  --  26.20*  --   --   --    LACTATE mmol/L  --   --   --  1.9  --    WBC 10*3/mm3 9.38 16.70* 26.38*  --  20.55*   HEMOGLOBIN g/dL 11.6* 11.9* 12.0  --  12.7   HEMATOCRIT % 38.5 39.0 38.4  --  40.4   MCV fL 111.0* 107.4* 105.8*  --  106.9*   MCHC g/dL 30.1* 30.5* 31.3*  --  31.4*   PLATELETS 10*3/mm3 201 183 189  --  247   INR   --   --   --   --  1.30*     Results from last 7 days   Lab Units 05/10/23  2059   PH, ARTERIAL pH units 7.472*   PO2 ART mm Hg 73.0*   PCO2, ARTERIAL mm Hg 38.4   HCO3 ART mmol/L 28.1*     Results from last 7 days   Lab Units 05/13/23 0207 05/12/23 0019 05/11/23  0019 05/10/23  1134   SODIUM mmol/L 136 138 144 144   POTASSIUM mmol/L 4.9 4.4 4.1 4.5   MAGNESIUM mg/dL  --   --  2.0 2.0   CHLORIDE mmol/L 100 102 106 103   CO2 mmol/L 25.3 19.2* 25.2 22.5   BUN mg/dL 55* 50* 40* 35*   CREATININE mg/dL 1.97* 1.85* 1.76* 1.55*   CALCIUM mg/dL 9.0 8.7 9.0 9.5   GLUCOSE mg/dL 115* 121* 123* 133*   ALBUMIN g/dL  --   --   --  3.4*   BILIRUBIN mg/dL  --   --   --  1.0   ALK PHOS U/L  --   --   --  165*   AST (SGOT) U/L  --   --   --  32   ALT (SGPT) U/L  --   --   --  10   Estimated Creatinine Clearance: 12.8 mL/min (A) (by C-G formula based on SCr of 1.97 mg/dL (H)).  No results found for: AMMONIA  Results from last 7 days   Lab Units 05/11/23  0935 05/10/23  1408 05/10/23  1134   HSTROP T ng/L 89* 75* 68*     Results from last 7 days   Lab Units  05/10/23  1134   PROBNP pg/mL 56,301.0*     Results from last 7 days   Lab Units 05/11/23  1720   CHOLESTEROL mg/dL 145   TRIGLYCERIDES mg/dL 126   HDL CHOL mg/dL 56   LDL CHOL mg/dL 67     No results found for: HGBA1C, POCGLU  Lab Results   Component Value Date    TSH 3.950 05/10/2023     No results found for: PREGTESTUR, PREGSERUM, HCG, HCGQUANT  Pain Management Panel          View : No data to display.                    Brief Urine Lab Results  (Last result in the past 365 days)      Color   Clarity   Blood   Leuk Est   Nitrite   Protein   CREAT   Urine HCG        05/10/23 1702 Yellow   Clear   Moderate (2+)   Small (1+)   Positive   100 mg/dL (2+)               Blood Culture   Date Value Ref Range Status   05/12/2023 No growth at 24 hours  Preliminary   05/12/2023 No growth at 24 hours  Preliminary   05/10/2023 Escherichia coli (C)  Final   05/10/2023 Escherichia coli (C)  Final     Urine Culture   Date Value Ref Range Status   05/10/2023 50,000 CFU/mL Mixed Sondra Isolated  Final     No results found for: WOUNDCX  No results found for: STOOLCX  No results found for: RESPCX  No results found for: AFBCX  Results from last 7 days   Lab Units 05/12/23  0019 05/10/23  1243   LACTATE mmol/L  --  1.9   CRP mg/dL 26.20*  --        I have personally looked at the labs and they are summarized above.  ----------------------------------------------------------------------------------------------------------------------  Detailed radiology reports for the last 24 hours:  Imaging Results (Last 24 Hours)     ** No results found for the last 24 hours. **        Assessment & Plan      #Sepsis secondary to pneumonia, UTI, and E coli bacteremia  #Left basilar pneumonia  #Acute UTI  #Acute on chronic HFpEF  #Acute on chronic respiratory failure with hypoxia, likely multifactorial due to above  #TANA on CKD stage II  - Patient met sepsis criteria with tachycardia, tachypnea, leukocytosis with urinary and respiratory sources of  infection. Blood cultures became positive quickly for gram negative bacilli, E coli by BCID pcr.  - Continue empiric antibiotics with high dose ceftriaxone. ID following, appreciate assistance.  - Received 80mg IV lasix in the ED but only had 100cc urine output noted. Creatinine was elevated to 1.55 at admission and has worsened to 1.97 this morning. Nephrology consulted for recommendations, appreciate assistance. Baseline creatinine 0.8. Avoid nephrotoxins as able. Plan for CT to evaluate for hydronephrosis if creatinine continues to rise per Nephrology. Hopefully renal function will start to improve now that sepsis has resolved.  - Respiratory failure has improved. Required BiPAP at admission but now tolerating nasal cannula. Sepsis has resolved and leukocytosis is improving. Repeat blood cultures show no growth at 24 hours.  - Repeat cbc, bmp, blood cultures in am  - Follow heart failure pathway  - Monitor I/Os, daily weights    #paroxysmal atrial fibrillation  - Patient's family reports only a brief previous episode sometime a few years ago. Patient was intermittently tachycardic to 140s-150s earlier in admission with atrial fibrillation seen on EKG, but spontaneously converted back to normal sinus rhythm. She remains in sinus rhythm with normal rate today.  - Continue metoprolol and eliquis per Cardiology   - Monitor on telemetry    #Malnutrition  - Registered Dietician evaluated patient and she meets criteria for severe malnutrition.  - Dietary supplements with boost and magic cups ordered, appreciate assistance.    Chronic medical conditions:  #Hard of hearing - supportive care  #Hypertension - spironolactone, metoprolol as above, monitor per protocol  #Osteoarthritis - supportive care  #Osteoporosis - supportive care, fall precautions  #Rheumatoid arthritis - home prednisone 2.5mg po daily    Copied portions of the note have been reviewed and are correct as of 05/13/23.   Edited by: Daisha Rm DO at  5/13/2023 2025    VTE Prophylaxis:   Mechanical Order History:     None      Pharmalogical Order History:      Ordered     Dose Route Frequency Stop    05/12/23 1520  apixaban (ELIQUIS) tablet 2.5 mg         2.5 mg PO Every 12 Hours Scheduled --    05/10/23 1449  heparin (porcine) 5000 UNIT/ML injection 2,400 Units         60 Units/kg IV Once 05/10/23 1541    05/10/23 1449  heparin 13066 units/250 mL (100 units/mL) in 0.9% NaCl infusion  4.89 mL/hr,   Status:  Discontinued         12 Units/kg/hr IV Titrated 05/12/23 1520    05/10/23 1449  heparin (porcine) 5000 UNIT/ML injection 2,400 Units  Status:  Discontinued         60 Units/kg IV As Needed 05/12/23 1520    05/10/23 1449  heparin (porcine) 5000 UNIT/ML injection 1,200 Units  Status:  Discontinued         30 Units/kg IV As Needed 05/12/23 1520                Dispo:  likely home at discharge pending clinical improvement     Daisha Rm DO  [facility]  05/13/23  20:25 EDT

## 2023-05-15 LAB
ANION GAP SERPL CALCULATED.3IONS-SCNC: 10.9 MMOL/L (ref 5–15)
BUN SERPL-MCNC: 42 MG/DL (ref 8–23)
BUN/CREAT SERPL: 33.3 (ref 7–25)
CALCIUM SPEC-SCNC: 8.8 MG/DL (ref 8.6–10.5)
CHLORIDE SERPL-SCNC: 98 MMOL/L (ref 98–107)
CO2 SERPL-SCNC: 22.1 MMOL/L (ref 22–29)
CREAT SERPL-MCNC: 1.26 MG/DL (ref 0.57–1)
EGFRCR SERPLBLD CKD-EPI 2021: 41.1 ML/MIN/1.73
GLUCOSE SERPL-MCNC: 84 MG/DL (ref 65–99)
MAGNESIUM SERPL-MCNC: 2.5 MG/DL (ref 1.6–2.4)
POTASSIUM SERPL-SCNC: 4.4 MMOL/L (ref 3.5–5.2)
SODIUM SERPL-SCNC: 131 MMOL/L (ref 136–145)

## 2023-05-15 PROCEDURE — 94799 UNLISTED PULMONARY SVC/PX: CPT

## 2023-05-15 PROCEDURE — 94660 CPAP INITIATION&MGMT: CPT

## 2023-05-15 PROCEDURE — 99232 SBSQ HOSP IP/OBS MODERATE 35: CPT | Performed by: INTERNAL MEDICINE

## 2023-05-15 PROCEDURE — 99232 SBSQ HOSP IP/OBS MODERATE 35: CPT | Performed by: STUDENT IN AN ORGANIZED HEALTH CARE EDUCATION/TRAINING PROGRAM

## 2023-05-15 PROCEDURE — 63710000001 PREDNISONE PER 5 MG: Performed by: STUDENT IN AN ORGANIZED HEALTH CARE EDUCATION/TRAINING PROGRAM

## 2023-05-15 PROCEDURE — 94761 N-INVAS EAR/PLS OXIMETRY MLT: CPT

## 2023-05-15 PROCEDURE — 83735 ASSAY OF MAGNESIUM: CPT | Performed by: STUDENT IN AN ORGANIZED HEALTH CARE EDUCATION/TRAINING PROGRAM

## 2023-05-15 PROCEDURE — 99232 SBSQ HOSP IP/OBS MODERATE 35: CPT | Performed by: NURSE PRACTITIONER

## 2023-05-15 PROCEDURE — 80048 BASIC METABOLIC PNL TOTAL CA: CPT | Performed by: STUDENT IN AN ORGANIZED HEALTH CARE EDUCATION/TRAINING PROGRAM

## 2023-05-15 PROCEDURE — 25010000002 CEFTRIAXONE PER 250 MG: Performed by: STUDENT IN AN ORGANIZED HEALTH CARE EDUCATION/TRAINING PROGRAM

## 2023-05-15 RX ORDER — BUMETANIDE 1 MG/1
1 TABLET ORAL DAILY
Status: DISCONTINUED | OUTPATIENT
Start: 2023-05-15 | End: 2023-05-17

## 2023-05-15 RX ORDER — LOPERAMIDE HYDROCHLORIDE 2 MG/1
2 CAPSULE ORAL ONCE
Status: COMPLETED | OUTPATIENT
Start: 2023-05-15 | End: 2023-05-15

## 2023-05-15 RX ADMIN — APIXABAN 2.5 MG: 2.5 TABLET, FILM COATED ORAL at 20:14

## 2023-05-15 RX ADMIN — APIXABAN 2.5 MG: 2.5 TABLET, FILM COATED ORAL at 08:20

## 2023-05-15 RX ADMIN — ASPIRIN 81 MG: 81 TABLET, COATED ORAL at 08:21

## 2023-05-15 RX ADMIN — BUDESONIDE AND FORMOTEROL FUMARATE DIHYDRATE 2 PUFF: 160; 4.5 AEROSOL RESPIRATORY (INHALATION) at 07:12

## 2023-05-15 RX ADMIN — METOPROLOL TARTRATE 50 MG: 50 TABLET, FILM COATED ORAL at 08:21

## 2023-05-15 RX ADMIN — METOPROLOL TARTRATE 50 MG: 50 TABLET, FILM COATED ORAL at 20:14

## 2023-05-15 RX ADMIN — BUDESONIDE AND FORMOTEROL FUMARATE DIHYDRATE 2 PUFF: 160; 4.5 AEROSOL RESPIRATORY (INHALATION) at 18:15

## 2023-05-15 RX ADMIN — HYDRALAZINE HYDROCHLORIDE 10 MG: 10 TABLET ORAL at 20:15

## 2023-05-15 RX ADMIN — CEFTRIAXONE 2 G: 2 INJECTION, POWDER, FOR SOLUTION INTRAMUSCULAR; INTRAVENOUS at 09:34

## 2023-05-15 RX ADMIN — ISOSORBIDE MONONITRATE 30 MG: 30 TABLET, EXTENDED RELEASE ORAL at 08:21

## 2023-05-15 RX ADMIN — DOCUSATE SODIUM 50 MG AND SENNOSIDES 8.6 MG 1 TABLET: 8.6; 5 TABLET, FILM COATED ORAL at 20:14

## 2023-05-15 RX ADMIN — Medication 10 ML: at 08:21

## 2023-05-15 RX ADMIN — PREDNISONE 2.5 MG: 5 TABLET ORAL at 08:21

## 2023-05-15 RX ADMIN — BUMETANIDE 1 MG: 1 TABLET ORAL at 10:44

## 2023-05-15 RX ADMIN — FUROSEMIDE 40 MG: 40 TABLET ORAL at 08:20

## 2023-05-15 RX ADMIN — HYDRALAZINE HYDROCHLORIDE 10 MG: 10 TABLET ORAL at 14:10

## 2023-05-15 RX ADMIN — LOPERAMIDE HYDROCHLORIDE 2 MG: 2 CAPSULE ORAL at 12:09

## 2023-05-15 RX ADMIN — ATORVASTATIN CALCIUM 20 MG: 20 TABLET, FILM COATED ORAL at 20:14

## 2023-05-15 RX ADMIN — HYDRALAZINE HYDROCHLORIDE 10 MG: 10 TABLET ORAL at 05:24

## 2023-05-15 RX ADMIN — PANTOPRAZOLE SODIUM 40 MG: 40 TABLET, DELAYED RELEASE ORAL at 08:21

## 2023-05-15 NOTE — PROGRESS NOTES
PROGRESS NOTE         Patient Identification:  Name:  Anh Saldana  Age:  88 y.o.  Sex:  female  :  1934  MRN:  [id number]  Visit Number:  [id number]  Primary Care Provider:  Meme Medellin MD         LOS: 5 days       ----------------------------------------------------------------------------------------------------------------------  Subjective       Chief Complaints:    Edema        Interval History:      Patient resting comfortably in bed this morning.  Continues on 2 L per nasal cannula with no apparent distress.  Niece at bedside.  Overall feels significantly better.  Lungs clear to auscultation bilaterally.  Abdomen soft, nontender.    Review of Systems:    Constitutional: no fever, chills and night sweats.  Generalized fatigue.  Eyes: no eye drainage, itching or redness.  HEENT: no mouth sores, dysphagia or nose bleed.  Respiratory: no for shortness of breath, cough or production of sputum.  Cardiovascular: no chest pain, no palpitations, no orthopnea.  Gastrointestinal: no nausea, vomiting or diarrhea. No abdominal pain, hematemesis or rectal bleeding.  Genitourinary: no dysuria or polyuria.  Hematologic/lymphatic: no lymph node abnormalities, no easy bruising or easy bleeding.  Musculoskeletal: no muscle or joint pain.  Skin: No rash and no itching.  Neurological: no loss of consciousness, no seizure, no headache.  Behavioral/Psych: no depression or suicidal ideation.  Endocrine: no hot flashes.  Immunologic: negative.    ----------------------------------------------------------------------------------------------------------------------      Objective       Current [facility] Meds:  apixaban, 2.5 mg, Oral, Q12H  aspirin, 81 mg, Oral, Daily  atorvastatin, 20 mg, Oral, Nightly  budesonide-formoterol, 2 puff, Inhalation, BID - RT  bumetanide, 1 mg, Oral, Daily  cefTRIAXone, 2 g, Intravenous, Q24H  hydrALAZINE, 10 mg, Oral, Q8H  isosorbide mononitrate, 30 mg, Oral, Q24H  loperamide, 2  mg, Oral, Once  metoprolol tartrate, 50 mg, Oral, BID  pantoprazole, 40 mg, Oral, QAM AC  polyethylene glycol, 17 g, Oral, Daily  predniSONE, 2.5 mg, Oral, Daily With Breakfast  senna-docusate sodium, 1 tablet, Oral, BID  sodium chloride, 10 mL, Intravenous, Q12H         ----------------------------------------------------------------------------------------------------------------------    Vital Signs:  Temp:  [97.6 °F (36.4 °C)-98.3 °F (36.8 °C)] 97.7 °F (36.5 °C)  Heart Rate:  [62-70] 66  Resp:  [16-20] 18  BP: (127-157)/(70-85) 136/82  Mean Arterial Pressure (Non-Invasive) for the past 24 hrs (Last 3 readings):   Noninvasive MAP (mmHg)   05/15/23 1030 100   05/15/23 0656 105   05/15/23 0307 83     SpO2 Percentage    05/15/23 0656 05/15/23 0712 05/15/23 1030   SpO2: 97% 99% 96%     SpO2:  [90 %-99 %] 96 %  on  Flow (L/min):  [2] 2;   Device (Oxygen Therapy): nasal cannula    Body mass index is 17.81 kg/m².  Wt Readings from Last 3 Encounters:   05/15/23 41.4 kg (91 lb 3.2 oz)   07/26/21 42.6 kg (94 lb)   10/15/20 45.8 kg (101 lb)        Intake/Output Summary (Last 24 hours) at 5/15/2023 1141  Last data filed at 5/15/2023 1100  Gross per 24 hour   Intake 480 ml   Output 800 ml   Net -320 ml     Diet: Regular/House Diet; Texture: Soft to Chew (NDD 3); Soft to Chew: Chopped Meat; Fluid Consistency: Thin (IDDSI 0)  ----------------------------------------------------------------------------------------------------------------------      Physical Exam:    Constitutional: Elderly, chronically ill-appearing.  Currently on 3 L per nasal cannula.  Niece at bedside.  HENT:  Head: Normocephalic and atraumatic.  Hard of hearing. Mouth:  Moist mucous membranes.    Eyes:  Conjunctivae and EOM are normal.  No scleral icterus.  Neck:  Neck supple.  No JVD present.    Cardiovascular:  Normal rate, regular rhythm and normal heart sounds with no murmur. No edema.  Pulmonary/Chest:  No respiratory distress, no wheezes, no crackles,  with normal breath sounds and good air movement.  Abdominal:  Soft.  Bowel sounds are normal.  No distension and no tenderness.   Musculoskeletal:  No edema, no tenderness, and no deformity.  No swelling or redness of joints.  Severe kyphosis.  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  Neurological:  Alert and oriented to person, place, and time.  No facial droop.  No slurred speech.   Skin:  Skin is warm and dry.  No rash noted.  No pallor.   Psychiatric:  Normal mood and affect.  Behavior is normal.        ----------------------------------------------------------------------------------------------------------------------  Results from last 7 days   Lab Units 05/11/23  0935 05/10/23  1408 05/10/23  1134   HSTROP T ng/L 89* 75* 68*     Results from last 7 days   Lab Units 05/10/23  1134   PROBNP pg/mL 56,301.0*     Results from last 7 days   Lab Units 05/11/23  1720   CHOLESTEROL mg/dL 145   TRIGLYCERIDES mg/dL 126   HDL CHOL mg/dL 56   LDL CHOL mg/dL 67     Results from last 7 days   Lab Units 05/10/23  2059   PH, ARTERIAL pH units 7.472*   PO2 ART mm Hg 73.0*   PCO2, ARTERIAL mm Hg 38.4   HCO3 ART mmol/L 28.1*     Results from last 7 days   Lab Units 05/14/23  0648 05/13/23  0207 05/12/23  0019 05/11/23  0019 05/10/23  1243 05/10/23  1134   CRP mg/dL  --   --  26.20*  --   --   --    LACTATE mmol/L  --   --   --   --  1.9  --    WBC 10*3/mm3 9.01 9.38 16.70*   < >  --  20.55*   HEMOGLOBIN g/dL 11.6* 11.6* 11.9*   < >  --  12.7   HEMATOCRIT % 37.3 38.5 39.0   < >  --  40.4   MCV fL 104.5* 111.0* 107.4*   < >  --  106.9*   MCHC g/dL 31.1* 30.1* 30.5*   < >  --  31.4*   PLATELETS 10*3/mm3 233 201 183   < >  --  247   INR   --   --   --   --   --  1.30*    < > = values in this interval not displayed.     Results from last 7 days   Lab Units 05/15/23  0505 05/14/23  0648 05/13/23  0207 05/12/23  0019 05/11/23  0019 05/10/23  1134   SODIUM mmol/L 131* 137 136   < > 144 144   POTASSIUM mmol/L 4.4 4.3 4.9   < > 4.1 4.5    MAGNESIUM mg/dL  --   --   --   --  2.0 2.0   CHLORIDE mmol/L 98 99 100   < > 106 103   CO2 mmol/L 22.1 29.5* 25.3   < > 25.2 22.5   BUN mg/dL 42* 46* 55*   < > 40* 35*   CREATININE mg/dL 1.26* 1.54* 1.97*   < > 1.76* 1.55*   CALCIUM mg/dL 8.8 9.1 9.0   < > 9.0 9.5   GLUCOSE mg/dL 84 96 115*   < > 123* 133*   ALBUMIN g/dL  --  3.1*  --   --   --  3.4*   BILIRUBIN mg/dL  --  0.2  --   --   --  1.0   ALK PHOS U/L  --  119*  --   --   --  165*   AST (SGOT) U/L  --  26  --   --   --  32   ALT (SGPT) U/L  --  21  --   --   --  10    < > = values in this interval not displayed.   Estimated Creatinine Clearance: 20.2 mL/min (A) (by C-G formula based on SCr of 1.26 mg/dL (H)).  No results found for: AMMONIA    No results found for: HGBA1C, POCGLU  No results found for: HGBA1C  Lab Results   Component Value Date    TSH 3.950 05/10/2023       Blood Culture   Date Value Ref Range Status   05/10/2023 Escherichia coli (C)  Preliminary   05/10/2023 Escherichia coli (C)  Preliminary     Urine Culture   Date Value Ref Range Status   05/10/2023 50,000 CFU/mL Mixed Sondra Isolated  Final     No results found for: WOUNDCX  No results found for: STOOLCX  No results found for: RESPCX  Pain Management Panel          View : No data to display.                        ----------------------------------------------------------------------------------------------------------------------  Imaging Results (Last 24 Hours)     ** No results found for the last 24 hours. **          ----------------------------------------------------------------------------------------------------------------------    Pertinent Infectious Disease Results                Assessment/Plan       Assessment        Acute pyelonephritis  E. coli bacteremia  Aspiration pneumonia           Plan      Patient resting comfortably in bed this morning.  Continues on 2 L per nasal cannula with no apparent distress.  Niece at bedside.  Overall feels significantly better.  Lungs  clear to auscultation bilaterally.  Abdomen soft, nontender.  Blood cultures from 5/12/2023 show no growth thus far.    Urine culture from 5/10/2023 finalized as 50,000 colonies of mixed nina.    For now recommend to continue ceftriaxone 2 g IV every 24 hours for treatment of E. coli bacteremia secondary to UTI and pneumonia.  Upon discharge patient can be further de-escalated to oral cefdinir per pharmacy dosing to continue through 5/21/2023 for treatment of bacteremia, UTI and pneumonia.  Patient overall stable from ID standpoint.  Okay to discharge from ID standpoint.      ANTIMICROBIAL THERAPY    cefTRIAXone (ROCEPHIN) 2gm IVPB in 100 mL NS (VTB)     Code Status:   Code Status and Medical Interventions:   Ordered at: 05/10/23 1720     Medical Intervention Limits:    NO intubation (DNI)     Level Of Support Discussed With:    Next of Kin (If No Surrogate)     Code Status (Patient has no pulse and is not breathing):    No CPR (Do Not Attempt to Resuscitate)     Medical Interventions (Patient has pulse or is breathing):    Limited Support     Release to patient:    Routine Release       Caryn Figueroa, APRN  05/15/23  11:41 EDT

## 2023-05-15 NOTE — PROGRESS NOTES
[facility] HOSPITALIST PROGRESS NOTE     Patient Identification:  Name:  Anh Saldana  Age:  88 y.o.  Sex:  female  :  1934  MRN:  [id number]  Visit Number:  [id number]  ROOM: [address] [place] [address]     Primary Care Provider:  Meme Medellin MD    Length of stay in inpatient status:  5    Subjective     Chief Compliant:    Chief Complaint   Patient presents with   • Edema       History of Presenting Illness: Patient seen and evaluated in follow-up for sepsis secondary to community-acquired pneumonia with E. coli bacteremia from acute UTI with acute on chronic HFpEF with resultant acute on chronic hypoxemic respiratory failure.  Patient at time of exam resting comfortably in bed on 2 L nasal cannula and denying any acute complaints questioning when she will be able to return home with family    Objective     Current Hospital Meds:  apixaban, 2.5 mg, Oral, Q12H  aspirin, 81 mg, Oral, Daily  atorvastatin, 20 mg, Oral, Nightly  budesonide-formoterol, 2 puff, Inhalation, BID - RT  bumetanide, 1 mg, Oral, Daily  cefTRIAXone, 2 g, Intravenous, Q24H  hydrALAZINE, 10 mg, Oral, Q8H  isosorbide mononitrate, 30 mg, Oral, Q24H  metoprolol tartrate, 50 mg, Oral, BID  pantoprazole, 40 mg, Oral, QAM AC  polyethylene glycol, 17 g, Oral, Daily  predniSONE, 2.5 mg, Oral, Daily With Breakfast  senna-docusate sodium, 1 tablet, Oral, BID  sodium chloride, 10 mL, Intravenous, Q12H         ----------------------------------------------------------------------------------------------------------------------  Vital Signs:  Temp:  [97.6 °F (36.4 °C)-98 °F (36.7 °C)] 97.6 °F (36.4 °C)  Heart Rate:  [62-74] 74  Resp:  [16-20] 20  BP: (127-157)/(70-84) 138/84  SpO2:  [90 %-99 %] 96 %  on  Flow (L/min):  [1-2] 1;   Device (Oxygen Therapy): nasal cannula  Body mass index is 17.81 kg/m².      Intake/Output Summary (Last 24 hours) at 5/15/2023 1831  Last data filed at 5/15/2023 1800  Gross per 24 hour   Intake 360 ml   Output 700 ml    Net -340 ml      ----------------------------------------------------------------------------------------------------------------------  Physical exam:  Constitutional: Elderly chronically ill-appearing frail cachectic female resting in bed in no distress   HENT:  Head:  Normocephalic and atraumatic.  Mouth:  Moist mucous membranes.    Eyes:  Conjunctivae and EOM are normal. No scleral icterus.    Cardiovascular:  Normal rate, regular rhythm and normal heart sounds with no murmur.  Pulmonary/Chest:  No respiratory distress, no wheezes, no crackles, with normal breath sounds and good air movement.  Abdominal:  Soft.  Bowel sounds are normal.  No distension and no tenderness.   Musculoskeletal:  No tenderness, and no deformity.  No red or swollen joints anywhere.  Functional ROM intact.   Neurological:  Alert and oriented to person, place, and time.  No cranial nerve deficit.  No tongue deviation.  No facial droop.  No slurred speech. Intact Sensation throughout  Skin:  Skin is warm and dry. No rash or lesion noted. No pallor.   Peripheral vascular:  Pulses in all 4 extremities with no clubbing, no cyanosis, no edema.  Psychiatric: Appropriate mood and affect, pleasant.   ----------------------------------------------------------------------------------------------------------------------     BUN/CREAT/GLUC/ALT/AST/ARASH/LIP    42/1.26/84/--/--/--/-- (05/15 0505)  GUSTAVO - Na/K/Cl/CO2: 131*/4.4/98/22.1 (05/15 7245)        Urine Culture   Date Value Ref Range Status   05/10/2023 50,000 CFU/mL Mixed Sondra Isolated  Final     Blood Culture   Date Value Ref Range Status   05/12/2023 No growth at 3 days  Preliminary   05/12/2023 No growth at 3 days  Preliminary   05/10/2023 Escherichia coli (C)  Final   05/10/2023 Escherichia coli (C)  Final       I have personally looked at the labs and they are summarized  above.  ----------------------------------------------------------------------------------------------------------------------  Detailed radiology reports for the last 24 hours:  No radiology results for the last day  Assessment & Plan      Sepsis  Left basilar CAP  E. coli bacteremia 2/2 acute UTI    -Patient presenting with tachypnea, tachycardia, leukocytosis and with blood cultures growing E. coli BC ID PCR.  Repeat blood cultures with no growth to date.    -Infectious disease consulted and recommends continuation with growth Rocephin and stepdown to oral cephalosporin at discharge through 5/21/2023.    Acute on chronic HFpEF  Acute on chronic hypoxemic respiratory failure    -Per review documentation patient presenting with decompensated heart failure with peripheral edema however on exam now with no longer any continued peripheral edema and therefore no need for continued diuresis    -Continue metoprolol, patient currently on heart failure pathway    TANA on CKD stage II    -Likely secondary to patient's sepsis from UTI with resultant bacteremia as well as community-acquired pneumonia    -Continue supportive care, TANA improving    Paroxysmal atrial fibrillation  Pulmonary hypertension    -Continue metoprolol and Eliquis    -Initiated on nitrate and hydralazine for pulmonary hypertension by cardiology    Chronic severe malnutrition    -Seen and evaluated by nutrition and dietary supplements added    Hypertension    -Continue Aldactone, metoprolol    Osteoarthritis  Rheumatoid arthritis    -Continue home prednisone 2.5 daily    Hard of hearing  Osteoporosis  Advanced age    -Complicates all aspects of care    Copied text in portions of the note has been reviewed and is accurate as of 05/15/23    VTE Prophylaxis:   Mechanical Order History:     None      Pharmalogical Order History:      Ordered     Dose Route Frequency Stop    05/12/23 1520  apixaban (ELIQUIS) tablet 2.5 mg         2.5 mg PO Every 12 Hours  Scheduled --    05/10/23 1449  heparin (porcine) 5000 UNIT/ML injection 2,400 Units         60 Units/kg IV Once 05/10/23 1541    05/10/23 1449  heparin 69223 units/250 mL (100 units/mL) in 0.9% NaCl infusion  4.89 mL/hr,   Status:  Discontinued         12 Units/kg/hr IV Titrated 05/12/23 1520    05/10/23 1449  heparin (porcine) 5000 UNIT/ML injection 2,400 Units  Status:  Discontinued         60 Units/kg IV As Needed 05/12/23 1520    05/10/23 1449  heparin (porcine) 5000 UNIT/ML injection 1,200 Units  Status:  Discontinued         30 Units/kg IV As Needed 05/12/23 1520                Disposition possible discharge home in the next 24 hours.    Didier Contreras DO  [facility]  05/15/23  18:31 EDT

## 2023-05-15 NOTE — PROGRESS NOTES
Nephrology Progress Note      Subjective     Patient feels much better no shortness of breath    Objective       Vital signs :     Temp:  [97.3 °F (36.3 °C)-98.3 °F (36.8 °C)] 98 °F (36.7 °C)  Heart Rate:  [62-70] 65  Resp:  [16-20] 20  BP: (127-157)/(70-85) 146/84    Intake/Output                       05/13/23 0701 - 05/14/23 0700 05/14/23 0701 - 05/15/23 0700     8970-7696 1413-3453 Total 9532-8587 8909-7909 Total                 Intake    P.O.  360  -- 360  600  -- 600    Total Intake 360 -- 360 600 -- 600       Output    Urine  100  -- 100  400  400 800    Total Output 100 -- 100 400 400 800           Physical Exam:    General Appearance : Not in acute distress  Lungs : Bilateral decreased intensity of breath sounds  Heart :  regular rhythm & normal rate, normal S1, S2 and no murmur, no rub  Abdomen : soft, non distended  Extremities : No edema  Neurologic :   orientated to person, place, time and situation, Grossly no focal deficits        Laboratory Data :     Albumin Albumin   Date Value Ref Range Status   05/14/2023 3.1 (L) 3.5 - 5.2 g/dL Final      Magnesium No results found for: MG       PTH               No results found for: PTH    CBC and coagulation:  Results from last 7 days   Lab Units 05/14/23  0648 05/13/23  0207 05/12/23  0019 05/11/23  0019 05/10/23  1243 05/10/23  1134   LACTATE mmol/L  --   --   --   --  1.9  --    CRP mg/dL  --   --  26.20*  --   --   --    WBC 10*3/mm3 9.01 9.38 16.70*   < >  --  20.55*   HEMOGLOBIN g/dL 11.6* 11.6* 11.9*   < >  --  12.7   HEMATOCRIT % 37.3 38.5 39.0   < >  --  40.4   MCV fL 104.5* 111.0* 107.4*   < >  --  106.9*   MCHC g/dL 31.1* 30.1* 30.5*   < >  --  31.4*   PLATELETS 10*3/mm3 233 201 183   < >  --  247   INR   --   --   --   --   --  1.30*    < > = values in this interval not displayed.     Acid/base balance:  Results from last 7 days   Lab Units 05/10/23  2059 05/10/23  1608 05/10/23  1134   PH, ARTERIAL pH units 7.472* 7.459* 7.474*   PO2 ART mm Hg  73.0* 54.5* 55.9*   PCO2, ARTERIAL mm Hg 38.4 32.6* 36.3   HCO3 ART mmol/L 28.1* 23.1 26.7*     Renal and electrolytes:    Results from last 7 days   Lab Units 05/15/23  0505 05/14/23  0648 05/13/23  0207 05/12/23  0019 05/11/23  0019 05/10/23  1134   SODIUM mmol/L 131* 137 136 138 144 144   POTASSIUM mmol/L 4.4 4.3 4.9 4.4 4.1 4.5   MAGNESIUM mg/dL  --   --   --   --  2.0 2.0   CHLORIDE mmol/L 98 99 100 102 106 103   CO2 mmol/L 22.1 29.5* 25.3 19.2* 25.2 22.5   BUN mg/dL 42* 46* 55* 50* 40* 35*   CREATININE mg/dL 1.26* 1.54* 1.97* 1.85* 1.76* 1.55*   CALCIUM mg/dL 8.8 9.1 9.0 8.7 9.0 9.5     Estimated Creatinine Clearance: 20.2 mL/min (A) (by C-G formula based on SCr of 1.26 mg/dL (H)).  @GFRCG:3@   Liver and pancreatic function:  Results from last 7 days   Lab Units 05/14/23  0648 05/10/23  1134   ALBUMIN g/dL 3.1* 3.4*   BILIRUBIN mg/dL 0.2 1.0   ALK PHOS U/L 119* 165*   AST (SGOT) U/L 26 32   ALT (SGPT) U/L 21 10         Cardiac:  Results from last 7 days   Lab Units 05/10/23  1134   PROBNP pg/mL 56,301.0*     Liver and pancreatic function:  Results from last 7 days   Lab Units 05/14/23  0648 05/10/23  1134   ALBUMIN g/dL 3.1* 3.4*   BILIRUBIN mg/dL 0.2 1.0   ALK PHOS U/L 119* 165*   AST (SGOT) U/L 26 32   ALT (SGPT) U/L 21 10       Medications :     apixaban, 2.5 mg, Oral, Q12H  aspirin, 81 mg, Oral, Daily  atorvastatin, 20 mg, Oral, Nightly  budesonide-formoterol, 2 puff, Inhalation, BID - RT  cefTRIAXone, 2 g, Intravenous, Q24H  furosemide, 40 mg, Oral, Daily  hydrALAZINE, 10 mg, Oral, Q8H  isosorbide mononitrate, 30 mg, Oral, Q24H  metoprolol tartrate, 50 mg, Oral, BID  pantoprazole, 40 mg, Oral, QAM AC  polyethylene glycol, 17 g, Oral, Daily  predniSONE, 2.5 mg, Oral, Daily With Breakfast  senna-docusate sodium, 1 tablet, Oral, BID  sodium chloride, 10 mL, Intravenous, Q12H             Assessment & Plan        -TANA  nonoliguric  - Heart failure with preserved ejection fraction, mildly decompensated  -  Essential hypertension  - Rheumatoid arthritis     Renal functions have significantly improved with creatinine is 1.2 today from 1.5.  I will switch Lasix to Bumex 1 mg daily and to be continued on discharge as well.  Educated and counseled the patient and family.  I will sign off please call if any questions.  Follow-up in clinic in 2 months    TANA on CKD is most likely due to hemodynamic changes, cardiorenal syndrome in setting of diastolic dysfunction.  Baseline creatinine appears to be 0.8 was admitted with 1.5.  UA suggestive of urinary tract infection  - Strict intake and output  - Please avoid any nephrotoxic agents, hypotension and adjust medications according to estimated GFR      Andrea Nobles MD  05/15/23  08:34 EDT

## 2023-05-15 NOTE — PLAN OF CARE
Goal Outcome Evaluation:               Daily Care Plan Summary: Heart Failure    Diuretic in use (IV or PO): po        Daily weight (up or down):    same      Output > Intake (yes/no):no      O2 Requirements (current, any change?): decrease    Symptoms noted with Activity (Respiratory Tolerance, functional state):         Anticipated Discharge Plans:    home with family when stable

## 2023-05-15 NOTE — PLAN OF CARE
Goal Outcome Evaluation:                     Pt appears to have rested well this shift. No s/s of acute distress noted at this time. Wound care completed. Family remains at bedside, attentive to pt. Plan of care ongoing.

## 2023-05-15 NOTE — CASE MANAGEMENT/SOCIAL WORK
Discharge Planning Assessment   [place]     Patient Name: Anh Saldana  MRN: [id number]  Today's Date: 5/15/2023    Admit Date: 5/10/2023       Discharge Plan     Row Name 05/15/23 1719       Plan    Plan Pt was admitted on 05/10/23. Pt transferred from [facility] to [address] [place] [address] over the weekend. Pt lives with her brother Octavio and plans to return home at discharge. Pt does not utilize home health services. Pt has a wheelchair, bedside commode, rolling walker, straight cane, home oxygen at 3 liters and POC via unknown provider. Pt is hard of hearing. SS attempted to call Pt's brother, Octavio but was not successful. SS to follow.                RYAN DickersonW

## 2023-05-15 NOTE — PLAN OF CARE
Goal Outcome Evaluation:   Patient resting in bed with family at bedside. O2 reduced to 1L NC around 2pm per MD and turned off at 6pm. Gave one time order of immodium. No c/o pain and  in no distress. VS stable. Will continue to monitor and follow plan of care.

## 2023-05-15 NOTE — PROGRESS NOTES
"    [facility]  PROGRESS NOTE      Patient information:  Name: Anh Saldana  Age/Sex: 88 y.o. female  :  1934        PCP: Meme Medellin MD  Attending: Daisha Rm DO  MRN:  [id number]  Visit Number:  [id number]    LOS:  LOS: 5 days     PROBLEM LIST:Principal Problem:    Acute on chronic heart failure with preserved ejection fraction (HFpEF)  Active Problems:    Acute respiratory failure with hypoxemia    Severe Malnutrition (HCC)      Reason for Cardiology follow-up: NSTEMI and HFpEF    Subjective   ADMISSION INFORMATION:  Chief Complaint   Patient presents with   • Edema       HPI:  Anh Saldana is a 88 y.o. female has been admitted to [facility] ([facility]) with history of increasing shortness of breath and bilateral leg edema.  Patient is hearing impairment and somewhat difficult to get a reliable history.  Her past medical history includes HTN, CKD, RA,  persistent atrial fibrillation on Eliquis (however,her A-fib is paroxysmal during this hospital stay), HFpEF, (Echocardiogram done  showed LVEF-70%), moderate MR, mild AAS and mild AR, moderate to severe TR and severe pulmonary hypertension.      Interval History:   Patient is in room 325 and was examined by Dr. Jenkins.  Patient's creatinine continues to improve to 1.26.  Her I&O flowsheet reveals a -200 mL diuresing from overnight.  Patient is lying in bed resting quietly.  No acute distress noted at this time.  Patient currently denies any chest pain, or palpitations.  Patient reports her breathing is \"some better\".  Patient's brother is at bedside.     Vital Signs  Temp:  [97.3 °F (36.3 °C)-98.3 °F (36.8 °C)] 98 °F (36.7 °C)  Heart Rate:  [62-70] 65  Resp:  [16-20] 20  BP: (127-157)/(70-85) 146/84  Vital Signs (last 72 hrs)        0700   0659  0700   0659  0700  05/15 0659 05/15 0700  05/15 0721   Most Recent      Temp (°F) 96.8 -  98.7    96.9 -  98.3    97.3 -  " 98.3       98 (36.7) 05/15 0656    Heart Rate 58 -  72    60 -  80    62 -  70      65     65 05/15 0712    Resp 12 -  24    16 -  28    16 -  20      20     20 05/15 0712    /58 -  146/92    120/66 -  145/88    127/70 -  157/73       146/84 05/15 0656    SpO2 (%) 95 -  100    94 -  100    90 -  99      99     99 05/15 0712        Body mass index is 17.81 kg/m².    Intake/Output Summary (Last 24 hours) at 5/15/2023 0721  Last data filed at 5/15/2023 0500  Gross per 24 hour   Intake 600 ml   Output 800 ml   Net -200 ml     Objective  Objective     I have seen and examined Ms. Saldana today.  Physical Exam:      General Appearance:    Alert, cooperative, in no acute distress. [place], thin and frail.     Head:    Normocephalic, without obvious abnormality, atraumatic.   Eyes:                          Conjunctivae and sclerae normal, no icterus, no pallor, corneas clear.   Neck:   No adenopathy, supple, trachea midline, no thyromegaly, no carotid bruit, no JVD. Contracture noted to her neck.   Lungs:     Clear to auscultation, respirations regular, even and             unlabored.    Heart:    Regular rhythm and normal rate, normal S1 and S2, systolic murmur heard at the LLSB grade 3/6 , no gallop, no rub, no click   Chest Wall:    No abnormalities observed.   Abdomen:     Normal bowel sounds, no masses, no organomegaly, soft nontender, nondistended, no guarding, no rebound tenderness. Pur wick is in use at this time connected to wall suction.    Extremities:   Moves all extremities well, no edema, no cyanosis, no            Redness.Contractures noted to bilateral hands.     Pulses:   Pulses palpable and equal bilaterally.   Skin:   No bleeding, bruising or rash.   Neurologic:   Alert and Oriented x person and place, Speech Clear & comprehensive.       Results review   Results Review:     Results from last 7 days   Lab Units 05/14/23  0648 05/13/23  0207 05/12/23  0019 05/11/23  0019 05/10/23  1134   WBC 10*3/mm3 9.01  9.38 16.70* 26.38* 20.55*   HEMOGLOBIN g/dL 11.6* 11.6* 11.9* 12.0 12.7   PLATELETS 10*3/mm3 233 201 183 189 247     Results from last 7 days   Lab Units 05/15/23  0505 05/14/23  0648 05/13/23  0207 05/12/23  0019 05/11/23  0019 05/10/23  1134   SODIUM mmol/L 131* 137 136 138 144 144   POTASSIUM mmol/L 4.4 4.3 4.9 4.4 4.1 4.5   CHLORIDE mmol/L 98 99 100 102 106 103   CO2 mmol/L 22.1 29.5* 25.3 19.2* 25.2 22.5   BUN mg/dL 42* 46* 55* 50* 40* 35*   CREATININE mg/dL 1.26* 1.54* 1.97* 1.85* 1.76* 1.55*   CALCIUM mg/dL 8.8 9.1 9.0 8.7 9.0 9.5   GLUCOSE mg/dL 84 96 115* 121* 123* 133*   ALT (SGPT) U/L  --  21  --   --   --  10   AST (SGOT) U/L  --  26  --   --   --  32     Results from last 7 days   Lab Units 05/11/23  0935 05/10/23  1408 05/10/23  1134   HSTROP T ng/L 89* 75* 68*     Lab Results   Component Value Date    PROBNP 56,301.0 (H) 05/10/2023     No results found.     Lab Results   Component Value Date    INR 1.30 (H) 05/10/2023    INR 0.98 11/10/2018     Lab Results   Component Value Date    MG 2.0 05/11/2023    MG 2.0 05/10/2023     Lab Results   Component Value Date    TSH 3.950 05/10/2023    TRIG 126 05/11/2023    HDL 56 05/11/2023    LDL 67 05/11/2023      Pain Management Panel          View : No data to display.                    Microbiology Results (last 10 days)     Procedure Component Value - Date/Time    Blood Culture - Blood, Hand, Left [[id number]]  (Normal) Collected: 05/12/23 1611    Lab Status: Preliminary result Specimen: Blood from Hand, Left Updated: 05/14/23 1831     Blood Culture No growth at 2 days    Blood Culture - Blood, Hand, Right [[id number]]  (Normal) Collected: 05/12/23 1610    Lab Status: Preliminary result Specimen: Blood from Hand, Right Updated: 05/14/23 1831     Blood Culture No growth at 2 days    Urine Culture - Urine, Urine, Catheter [[id number]] Collected: 05/10/23 1702    Lab Status: Final result Specimen: Urine, Catheter Updated: 05/11/23 2059     Urine Culture 50,000  CFU/mL Mixed Sondra Isolated    Narrative:      Specimen contains mixed organisms of questionable pathogenicity suggestive of contamination. If symptoms persist, suggest recollection.  Colonization of the urinary tract without infection is common. Treatment is discouraged unless the patient is symptomatic, pregnant, or undergoing an invasive urologic procedure.    Blood Culture - Blood, Arm, Left [[id number]]  (Abnormal) Collected: 05/10/23 1248    Lab Status: Final result Specimen: Blood from Arm, Left Updated: 05/13/23 0621     Blood Culture Escherichia coli     Isolated from Aerobic and Anaerobic Bottles     Gram Stain Anaerobic Bottle Gram negative bacilli      Aerobic Bottle Gram negative bacilli    Narrative:      No BCID performed, refer to previous blood culture specimen collected on 5-10-23 at 12:43    Blood Culture - Blood, Arm, Right [[id number]]  (Abnormal)  (Susceptibility) Collected: 05/10/23 1243    Lab Status: Final result Specimen: Blood from Arm, Right Updated: 05/13/23 0621     Blood Culture Escherichia coli     Isolated from Aerobic and Anaerobic Bottles     Gram Stain Aerobic Bottle Gram negative bacilli      Anaerobic Bottle Gram negative bacilli    Susceptibility      Escherichia coli      GRACE      Ampicillin Susceptible      Ampicillin + Sulbactam Susceptible      Cefepime Susceptible      Ceftazidime Susceptible      Ceftriaxone Susceptible      Gentamicin Susceptible      Levofloxacin Susceptible      Piperacillin + Tazobactam Susceptible      Trimethoprim + Sulfamethoxazole Susceptible                       Susceptibility Comments     Escherichia coli    Cefazolin sensitivity will not be reported for Enterobacteriaceae in non-urine isolates. If cefazolin is preferred, please call the microbiology lab to request an E-test.  With the exception of urinary-sourced infections, aminoglycosides should not be used as monotherapy.             Blood Culture ID, PCR - Blood, Arm, Right [[id number]]   (Abnormal) Collected: 05/10/23 1243    Lab Status: Final result Specimen: Blood from Arm, Right Updated: 05/11/23 0405     BCID, PCR Escherichia coli. Identification by BCID2 PCR.    Narrative:      No resistance genes detected.    COVID-19 and FLU A/B PCR - Swab, Nasopharynx [[id number]]  (Normal) Collected: 05/10/23 1135    Lab Status: Final result Specimen: Swab from Nasopharynx Updated: 05/10/23 1204     COVID19 Not Detected     Influenza A PCR Not Detected     Influenza B PCR Not Detected    Narrative:      Fact sheet for providers: https://www.fda.gov/media/714352/download    Fact sheet for patients: https://www.fda.gov/media/786772/download    Test performed by PCR.         Imaging Results (Last 48 Hours)     ** No results found for the last 48 hours. **          ECHO:  Results for orders placed during the hospital encounter of 07/30/21    Adult Transthoracic Echo Complete W/ Cont if Necessary Per Protocol    Interpretation Summary  · Normal left ventricular cavity size and wall thickness noted. All left ventricular wall segments contract normally  · Left ventricular ejection fraction appears to be 66 - 70%.  · Left ventricular diastolic function is consistent with (grade II w/high LAP) pseudonormalization.  · There is moderate thickening and calcification of the non-coronary cusp(s) of the aortic valve.  · Mild aortic valve regurgitation is present. Mild aortic valve stenosis is present.  · There are myxomatous changes of the mitral valve apparatus present. Moderate mitral valve regurgitation is present. No significant mitral valve stenosis is present.  · Moderate to severe tricuspid valve regurgitation is present. Estimated right ventricular systolic pressure from tricuspid regurgitation is markedly elevated (100 mmHg).  · Severe pulmonary hypertension is present.  · There is no evidence of pericardial effusion.      STRESS TEST:   No results found for this or any previous visit.     HEART CATH:  No results  found for this or any previous visit.      TELEMETRY:  SR 60's with frequent PAC's             I reviewed the patient's new clinical results.    Medication Review:   Current list of medications may not reflect those currently placed in orders that are not signed or are being held.   apixaban, 2.5 mg, Oral, Q12H  aspirin, 81 mg, Oral, Daily  atorvastatin, 20 mg, Oral, Nightly  budesonide-formoterol, 2 puff, Inhalation, BID - RT  cefTRIAXone, 2 g, Intravenous, Q24H  furosemide, 40 mg, Oral, Daily  hydrALAZINE, 10 mg, Oral, Q8H  isosorbide mononitrate, 30 mg, Oral, Q24H  metoprolol tartrate, 50 mg, Oral, BID  pantoprazole, 40 mg, Oral, QAM AC  polyethylene glycol, 17 g, Oral, Daily  predniSONE, 2.5 mg, Oral, Daily With Breakfast  senna-docusate sodium, 1 tablet, Oral, BID  sodium chloride, 10 mL, Intravenous, Q12H         Assessment      1. Acute HFpEF, improving.  2. Acute kidney injury on chronic kidney disease, improving  3. Elevated high-sensitivity troponin, possibly type II MI from heart failure and acute kidney injury  4. Paroxysmal atrial fibrillation, maintaining sinus rhythm.  5. Moderate mitral regurgitation  6. Severe tricuspid regurgitation with severe pulmonary hypertension possibly from left heart failure.  7. E. coli bacteremia, being managed by the infectious disease.    Plan     1. For her heart failure, continue with p.o. diuretics as needed and tolerated  2. For atrial fibrillation, continue with Eliquis for stroke prevention.  3. With regards to her mitral and tricuspid regurgitation, given her age, will continue to manage her conservatively.      I have discussed the patients findings and my recommendations with patient.    Electronically signed by ANA Kevin, 05/15/23, 4:19 PM EDT.    Electronically signed by Alex Jenkins MD, 05/15/23, 6:58 PM EDT.            Please note that portions of this note were completed with a voice recognition program.    Please note that portions of  this note were copied and has been reviewed and is accurate as of 5/15/2023 .

## 2023-05-16 LAB
ANION GAP SERPL CALCULATED.3IONS-SCNC: 12 MMOL/L (ref 5–15)
BH CV ECHO MEAS - ACS: 1 CM
BH CV ECHO MEAS - AO MAX PG: 6.4 MMHG
BH CV ECHO MEAS - AO MEAN PG: 3 MMHG
BH CV ECHO MEAS - AO ROOT DIAM: 2.9 CM
BH CV ECHO MEAS - AO V2 MAX: 126.2 CM/SEC
BH CV ECHO MEAS - AO V2 VTI: 24.1 CM
BH CV ECHO MEAS - AVA(I,D): 2.34 CM2
BH CV ECHO MEAS - EDV(CUBED): 66.4 ML
BH CV ECHO MEAS - EDV(MOD-SP2): 29.7 ML
BH CV ECHO MEAS - EDV(MOD-SP4): 15.8 ML
BH CV ECHO MEAS - EF(MOD-BP): 76.7 %
BH CV ECHO MEAS - EF(MOD-SP2): 79.9 %
BH CV ECHO MEAS - EF(MOD-SP4): 73.5 %
BH CV ECHO MEAS - ESV(CUBED): 23.1 ML
BH CV ECHO MEAS - ESV(MOD-SP2): 6 ML
BH CV ECHO MEAS - ESV(MOD-SP4): 4.2 ML
BH CV ECHO MEAS - FS: 29.6 %
BH CV ECHO MEAS - IVS/LVPW: 0.9 CM
BH CV ECHO MEAS - IVSD: 0.9 CM
BH CV ECHO MEAS - LA DIMENSION: 4.5 CM
BH CV ECHO MEAS - LAT PEAK E' VEL: 7.9 CM/SEC
BH CV ECHO MEAS - LV DIASTOLIC VOL/BSA (35-75): 11.9 CM2
BH CV ECHO MEAS - LV MASS(C)D: 120.6 GRAMS
BH CV ECHO MEAS - LV MAX PG: 2.3 MMHG
BH CV ECHO MEAS - LV MEAN PG: 1 MMHG
BH CV ECHO MEAS - LV SYSTOLIC VOL/BSA (12-30): 3.1 CM2
BH CV ECHO MEAS - LV V1 MAX: 75.8 CM/SEC
BH CV ECHO MEAS - LV V1 VTI: 17.9 CM
BH CV ECHO MEAS - LVIDD: 4.1 CM
BH CV ECHO MEAS - LVIDS: 2.9 CM
BH CV ECHO MEAS - LVOT AREA: 3.1 CM2
BH CV ECHO MEAS - LVOT DIAM: 2 CM
BH CV ECHO MEAS - LVPWD: 1 CM
BH CV ECHO MEAS - MED PEAK E' VEL: 3.5 CM/SEC
BH CV ECHO MEAS - PA ACC TIME: 0.06 SEC
BH CV ECHO MEAS - PA PR(ACCEL): 53.8 MMHG
BH CV ECHO MEAS - PI END-D VEL: 190.5 CM/SEC
BH CV ECHO MEAS - RAP SYSTOLE: 10 MMHG
BH CV ECHO MEAS - RVSP: 94.3 MMHG
BH CV ECHO MEAS - SI(MOD-SP2): 17.8 ML/M2
BH CV ECHO MEAS - SI(MOD-SP4): 8.7 ML/M2
BH CV ECHO MEAS - SV(LVOT): 56.2 ML
BH CV ECHO MEAS - SV(MOD-SP2): 23.7 ML
BH CV ECHO MEAS - SV(MOD-SP4): 11.6 ML
BH CV ECHO MEAS - TR MAX PG: 84.3 MMHG
BH CV ECHO MEAS - TR MAX VEL: 459 CM/SEC
BH CV XLRA - RV BASE: 4 CM
BH CV XLRA - RV LENGTH: 5.3 CM
BH CV XLRA - RV MID: 4 CM
BUN SERPL-MCNC: 40 MG/DL (ref 8–23)
BUN/CREAT SERPL: 33.3 (ref 7–25)
CALCIUM SPEC-SCNC: 8.9 MG/DL (ref 8.6–10.5)
CHLORIDE SERPL-SCNC: 95 MMOL/L (ref 98–107)
CO2 SERPL-SCNC: 25 MMOL/L (ref 22–29)
CREAT SERPL-MCNC: 1.2 MG/DL (ref 0.57–1)
EGFRCR SERPLBLD CKD-EPI 2021: 43.6 ML/MIN/1.73
GEN 5 2HR TROPONIN T REFLEX: 58 NG/L
GLUCOSE SERPL-MCNC: 112 MG/DL (ref 65–99)
LEFT ATRIUM VOLUME INDEX: 27.1 ML/M2
MAGNESIUM SERPL-MCNC: 2 MG/DL (ref 1.6–2.4)
MAXIMAL PREDICTED HEART RATE: 132 BPM
NT-PROBNP SERPL-MCNC: ABNORMAL PG/ML (ref 0–1800)
POTASSIUM SERPL-SCNC: 4.1 MMOL/L (ref 3.5–5.2)
QT INTERVAL: 422 MS
QT INTERVAL: 426 MS
QTC INTERVAL: 442 MS
QTC INTERVAL: 462 MS
SODIUM SERPL-SCNC: 132 MMOL/L (ref 136–145)
STRESS TARGET HR: 112 BPM
TROPONIN T DELTA: -1 NG/L
TROPONIN T SERPL HS-MCNC: 59 NG/L

## 2023-05-16 PROCEDURE — 83880 ASSAY OF NATRIURETIC PEPTIDE: CPT | Performed by: NURSE PRACTITIONER

## 2023-05-16 PROCEDURE — 93005 ELECTROCARDIOGRAM TRACING: CPT

## 2023-05-16 PROCEDURE — 63710000001 PREDNISONE PER 5 MG: Performed by: STUDENT IN AN ORGANIZED HEALTH CARE EDUCATION/TRAINING PROGRAM

## 2023-05-16 PROCEDURE — 97110 THERAPEUTIC EXERCISES: CPT

## 2023-05-16 PROCEDURE — 99232 SBSQ HOSP IP/OBS MODERATE 35: CPT | Performed by: INTERNAL MEDICINE

## 2023-05-16 PROCEDURE — 97530 THERAPEUTIC ACTIVITIES: CPT

## 2023-05-16 PROCEDURE — 94761 N-INVAS EAR/PLS OXIMETRY MLT: CPT

## 2023-05-16 PROCEDURE — 84484 ASSAY OF TROPONIN QUANT: CPT

## 2023-05-16 PROCEDURE — 83735 ASSAY OF MAGNESIUM: CPT

## 2023-05-16 PROCEDURE — 99232 SBSQ HOSP IP/OBS MODERATE 35: CPT | Performed by: STUDENT IN AN ORGANIZED HEALTH CARE EDUCATION/TRAINING PROGRAM

## 2023-05-16 PROCEDURE — 94799 UNLISTED PULMONARY SVC/PX: CPT

## 2023-05-16 PROCEDURE — 25010000002 FUROSEMIDE PER 20 MG: Performed by: NURSE PRACTITIONER

## 2023-05-16 PROCEDURE — 93010 ELECTROCARDIOGRAM REPORT: CPT | Performed by: INTERNAL MEDICINE

## 2023-05-16 PROCEDURE — 99232 SBSQ HOSP IP/OBS MODERATE 35: CPT | Performed by: NURSE PRACTITIONER

## 2023-05-16 PROCEDURE — 97166 OT EVAL MOD COMPLEX 45 MIN: CPT

## 2023-05-16 PROCEDURE — 25010000002 CEFTRIAXONE PER 250 MG: Performed by: STUDENT IN AN ORGANIZED HEALTH CARE EDUCATION/TRAINING PROGRAM

## 2023-05-16 PROCEDURE — 80048 BASIC METABOLIC PNL TOTAL CA: CPT | Performed by: STUDENT IN AN ORGANIZED HEALTH CARE EDUCATION/TRAINING PROGRAM

## 2023-05-16 RX ORDER — FUROSEMIDE 10 MG/ML
40 INJECTION INTRAMUSCULAR; INTRAVENOUS ONCE
Status: COMPLETED | OUTPATIENT
Start: 2023-05-16 | End: 2023-05-16

## 2023-05-16 RX ADMIN — ISOSORBIDE MONONITRATE 30 MG: 30 TABLET, EXTENDED RELEASE ORAL at 09:25

## 2023-05-16 RX ADMIN — METOPROLOL TARTRATE 50 MG: 50 TABLET, FILM COATED ORAL at 20:28

## 2023-05-16 RX ADMIN — HYDRALAZINE HYDROCHLORIDE 10 MG: 10 TABLET ORAL at 21:06

## 2023-05-16 RX ADMIN — PANTOPRAZOLE SODIUM 40 MG: 40 TABLET, DELAYED RELEASE ORAL at 09:25

## 2023-05-16 RX ADMIN — PREDNISONE 2.5 MG: 5 TABLET ORAL at 09:26

## 2023-05-16 RX ADMIN — DOCUSATE SODIUM 50 MG AND SENNOSIDES 8.6 MG 1 TABLET: 8.6; 5 TABLET, FILM COATED ORAL at 20:28

## 2023-05-16 RX ADMIN — ATORVASTATIN CALCIUM 20 MG: 20 TABLET, FILM COATED ORAL at 20:28

## 2023-05-16 RX ADMIN — FUROSEMIDE 40 MG: 10 INJECTION, SOLUTION INTRAMUSCULAR; INTRAVENOUS at 12:24

## 2023-05-16 RX ADMIN — CEFTRIAXONE 2 G: 2 INJECTION, POWDER, FOR SOLUTION INTRAMUSCULAR; INTRAVENOUS at 09:45

## 2023-05-16 RX ADMIN — HYDRALAZINE HYDROCHLORIDE 10 MG: 10 TABLET ORAL at 05:05

## 2023-05-16 RX ADMIN — Medication 10 ML: at 09:26

## 2023-05-16 RX ADMIN — ASPIRIN 81 MG: 81 TABLET, COATED ORAL at 09:25

## 2023-05-16 RX ADMIN — DOCUSATE SODIUM 50 MG AND SENNOSIDES 8.6 MG 1 TABLET: 8.6; 5 TABLET, FILM COATED ORAL at 09:25

## 2023-05-16 RX ADMIN — APIXABAN 2.5 MG: 2.5 TABLET, FILM COATED ORAL at 09:25

## 2023-05-16 RX ADMIN — APIXABAN 2.5 MG: 2.5 TABLET, FILM COATED ORAL at 20:28

## 2023-05-16 RX ADMIN — METOPROLOL TARTRATE 50 MG: 50 TABLET, FILM COATED ORAL at 09:25

## 2023-05-16 RX ADMIN — BUDESONIDE AND FORMOTEROL FUMARATE DIHYDRATE 2 PUFF: 160; 4.5 AEROSOL RESPIRATORY (INHALATION) at 07:01

## 2023-05-16 RX ADMIN — BUDESONIDE AND FORMOTEROL FUMARATE DIHYDRATE 2 PUFF: 160; 4.5 AEROSOL RESPIRATORY (INHALATION) at 19:02

## 2023-05-16 RX ADMIN — BUMETANIDE 1 MG: 1 TABLET ORAL at 09:25

## 2023-05-16 NOTE — DISCHARGE PLACEMENT REQUEST
"Kathryn Saldana (88 y.o. Female)     Date of Birth   1934    Social Security Number       Address   [address] [facility] [address]    Home Phone   [phone]    MRN   [id number]       Shinto   Other    Marital Status                               Admission Date   5/10/23    Admission Type   Emergency    Admitting Provider   Daisha Rm DO    Attending Provider   Didier Contreras DO    Department, Room/Bed   [address], [address]/       Discharge Date       Discharge Disposition       Discharge Destination                               Attending Provider: Didier Contreras DO    Allergies: Penicillins, Doxycycline, Hydroxychloroquine, Latex    Isolation: None   Infection: None   Code Status: No CPR    Ht: 152.4 cm (60\")   Wt: 41.3 kg (91 lb)    Admission Cmt: None   Principal Problem: Acute on chronic heart failure with preserved ejection fraction (HFpEF) [I50.33]                 Active Insurance as of 5/10/2023     Primary Coverage     Payor Plan Insurance Group Employer/Plan Group    [facility] MEDICARE REPLAC[facility] MEDICARE REPLACEMENT [id number]     Payor Plan Address Payor Plan Phone Number Payor Plan Fax Number Effective Dates    [address] [address]   6/1/2016 - None Entered    [facility] [id number]       Subscriber Name Subscriber Birth Date Member ID       KATHRYN SALDANA 1934 [id number]                 Emergency Contacts      (Rel.) Home Phone Work Phone Mobile Phone    Octavio Mosqueda (Brother) [phone] -- --               History & Physical      Royer Patel PA-C at 05/10/23 1728     Attestation signed by Daisha Rm DO at 05/10/23 2031 (Updated)    I have evaluated the patient independently, I have reviewed H&P, all labs and images from today and evaluated the patient at bedside.  I have discussed with Royer Patel PA-C and agree.  I saw patient in ED room 117 this afternoon and at that time she was on " supplemental oxygen via nasal cannula at 4L and saturating in the mid to high 80s. She became progressively more short of breath over the afternoon per the ED provider, so a repeat ABG and CXR were obtained. ABG showed similar hypoxemia to previous but evidence of hyperventilation with decreased CO2 and respiratory alkalosis. She was taking very shallow, rapid breaths and I noted crackles on auscultation of patient's lungs. She was somewhat confused and also very hard of hearing. She was not oriented to place and also could not tell me her name. She reported feeling short of breath and hurting all over. Most of the history was obtained by discussion with her brother at bedside (next of kin as she has no children). I confirmed code status with him and he stated she would not want CPR, shocks, or to be placed on a ventilator. Since patient has an acute kidney injury and has been oliguric (only 150cc of urine output after 80mg IV lasix) I requested a nephrology consult as well as the Cardiology consult that was requested by the ED provider. We appreciate all assistance.                      [facility] Medicine Services  History & Physical    Patient Identification:  Name:  Anh Saldana  Age:  88 y.o.  Sex:  female  :  1934  MRN:  [id number]   Visit Number:  [id number]  Admit Date: 5/10/2023   Primary Care Physician:  Meme Medellin MD    Subjective     Chief complaint: Edema    History of presenting illness:      Anh Saldana is a 88 y.o. female with past medical history significant for HFpEF, CKD stage II, hard of hearing, HTN, osteoarthritis, osteoporosis, rheumatoid arthritis, chronic respiratory failure on O2 at night.    Upon arrival to the ED, vital signs were temp 98, heart rate 112, respirations 18, /109, SPO2 90% on nasal cannula.  Supplemental O2 has been titrated up, currently requiring BiPAP at 35% FiO2 saturating at 97%.  Initial ABG in the ED with pH 4.74, PO2 55.9,  bicarb 26.7.  Repeat ABG with pH 7.459, PO2 54.5.  Initial HS troponin elevated at 68 with repeat at 75, significant delta of 7.  proBNP elevated at 56,301.  Chemistry with anion gap elevated at 18.5.  Creatinine consistent with TANA at 1.55 from baseline 0.8.  CBC with WBC count elevated at 20.55, left shift noted 85.4.  UA is nitrite positive with 1+ leukocytes, 2+ protein, 13-20 RBCs, 13-20 WBCs and 2+ bacteria.  Culture pending.  Blood cultures pending.  CXR notes small right effusion and left basilar airspace disease.  Venous Doppler lower extremities was without DVT.  EKG with sinus rhythm with PACs, incomplete RBBB.    On my exam patient is resting in no acute distress on BiPAP.  Very lethargic and unable to participate in exam.  Some history provided by family at the bedside.  They report patient has been increasingly short of breath for the past 6 weeks, now using her as needed oxygen continuously.  Previously had only required at bedtime.  Also with increased lower extremity edema during the same timeframe.  They deny that she had had any constitutional symptoms including fever chills, cough.  They do note that last p.m. patient complained of decreased urine output and cloudy urine.  They report patient is on diuretics at home though has not missed any doses.    Known Emergency Department medications received prior to my evaluation included full dose aspirin, aztreonam, IV Lasix 80 mg, heparin bolus, vancomycin, now on heparin drip.   [facility] at the time of my evaluation [facility].     ---------------------------------------------------------------------------------------------------------------------   Review of Systems   Unable to perform ROS: Acuity of condition        ---------------------------------------------------------------------------------------------------------------------   Past Medical History:   Diagnosis Date   • Breast cancer 2011   • CHF (congestive heart failure)    • Chronic  kidney disease     acute kidney failure   • [place] (hard of hearing)    • Hypertension    • Osteoarthritis    • Osteoporosis    • Rheumatoid arthritis    • Right-sided Bell's palsy    • Shortness of breath     sleeps with oxygen at night     Past Surgical History:   Procedure Laterality Date   • BREAST LUMPECTOMY Left     benign   • CATARACT EXTRACTION W/ INTRAOCULAR LENS  IMPLANT, BILATERAL Bilateral    • SHOULDER SURGERY Left     metal plate in left shoulder     Family History   Problem Relation Age of Onset   • Breast cancer Maternal Aunt    • Hypertension Mother    • Hypertension Father    • Heart disease Sister         MI in her 80's     Social History     Socioeconomic History   • Marital status:    Tobacco Use   • Smoking status: Never   • Smokeless tobacco: Never   Substance and Sexual Activity   • Alcohol use: No   • Drug use: No   • Sexual activity: Defer     ---------------------------------------------------------------------------------------------------------------------   Allergies:  Penicillins, Doxycycline, Hydroxychloroquine, and Latex  ---------------------------------------------------------------------------------------------------------------------   Home medications:    Medications below are reported home medications pulling from within the system; at this time, these medications have not been reconciled unless otherwise specified and are in the verification process for further verifcation as current home medications.  (Not in a hospital admission)      Hospital Scheduled Meds:     heparin, 12 Units/kg/hr, Last Rate: 12 Units/kg/hr (05/10/23 3899)        Current listed hospital scheduled medications may not yet reflect those currently placed in orders that are signed and held awaiting patient's arrival to floor.   ---------------------------------------------------------------------------------------------------------------------     Objective     Vital Signs:  Temp:  [98 °F (36.7 °C)] 98  °F (36.7 °C)  Heart Rate:  [] 91  Resp:  [18-34] 34  BP: (133-174)/() 144/79      05/10/23  1108   Weight: 40.8 kg (90 lb)     Body mass index is 17.58 kg/m².  ---------------------------------------------------------------------------------------------------------------------       Physical Exam  Vitals and nursing note reviewed.   Constitutional:       General: She is not in acute distress.     Appearance: She is ill-appearing.   HENT:      Head: Normocephalic and atraumatic.   Cardiovascular:      Rate and Rhythm: Normal rate and regular rhythm.   Pulmonary:      Effort: No respiratory distress.      Breath sounds: No wheezing.   Musculoskeletal:      Right lower leg: Edema present.      Left lower leg: Edema present.   Skin:     General: Skin is warm and dry.               ---------------------------------------------------------------------------------------------------------------------  EKG:        I have personally looked at the EKG.  ---------------------------------------------------------------------------------------------------------------------   Results from last 7 days   Lab Units 05/10/23  1243 05/10/23  1134   LACTATE mmol/L 1.9  --    WBC 10*3/mm3  --  20.55*   HEMOGLOBIN g/dL  --  12.7   HEMATOCRIT %  --  40.4   MCV fL  --  106.9*   MCHC g/dL  --  31.4*   PLATELETS 10*3/mm3  --  247   INR   --  1.30*     Results from last 7 days   Lab Units 05/10/23  1608 05/10/23  1134   PH, ARTERIAL pH units 7.459* 7.474*   PO2 ART mm Hg 54.5* 55.9*   PCO2, ARTERIAL mm Hg 32.6* 36.3   HCO3 ART mmol/L 23.1 26.7*     Results from last 7 days   Lab Units 05/10/23  1134   SODIUM mmol/L 144   POTASSIUM mmol/L 4.5   MAGNESIUM mg/dL 2.0   CHLORIDE mmol/L 103   CO2 mmol/L 22.5   BUN mg/dL 35*   CREATININE mg/dL 1.55*   CALCIUM mg/dL 9.5   GLUCOSE mg/dL 133*   ALBUMIN g/dL 3.4*   BILIRUBIN mg/dL 1.0   ALK PHOS U/L 165*   AST (SGOT) U/L 32   ALT (SGPT) U/L 10   Estimated Creatinine Clearance: 16.2 mL/min (A)  (by C-G formula based on SCr of 1.55 mg/dL (H)).  No results found for: AMMONIA  Results from last 7 days   Lab Units 05/10/23  1408 05/10/23  1134   HSTROP T ng/L 75* 68*     Results from last 7 days   Lab Units 05/10/23  1134   PROBNP pg/mL 56,301.0*     No results found for: HGBA1C  Lab Results   Component Value Date    TSH 3.950 05/10/2023     No results found for: PREGTESTUR, PREGSERUM, HCG, HCGQUANT  Pain Management Panel          View : No data to display.                    No results found for: BLOODCX  No results found for: URINECX  No results found for: WOUNDCX  No results found for: STOOLCX      ---------------------------------------------------------------------------------------------------------------------  Imaging Results (Last 7 Days)     Procedure Component Value Units Date/Time    XR Chest 1 View [[id number]] Collected: 05/10/23 1631     Updated: 05/10/23 1634    Narrative:      XR CHEST 1 VW-     CLINICAL INDICATION: increased sob; I21.4-Non-ST elevation (NSTEMI)  myocardial infarction; I50.9-Heart failure, unspecified; A41.9-Sepsis,  unspecified organism; N17.9-Acute kidney failure, unspecified;  R09.02-Hypoxemia        COMPARISON: 05/10/2023      TECHNIQUE: Single frontal view of the chest.     FINDINGS:      LUNGS: Bibasilar airspace disease      HEART AND MEDIASTINUM: Heart and mediastinal contours are unremarkable        SKELETON: Bony and soft tissue structures are unremarkable.             Impression:      Bibasilar airspace disease similar to the prior study     This report was finalized on 5/10/2023 4:32 PM by Dr. Salvador Bill MD.       XR Chest 1 View [[id number]] Collected: 05/10/23 1212     Updated: 05/10/23 1248    Narrative:      XR CHEST 1 VW-     CLINICAL INDICATION: edema        COMPARISON: 01/07/2019      TECHNIQUE: Single frontal view of the chest.     FINDINGS:      LUNGS: Small bibasilar effusions and left basilar airspace disease      HEART AND MEDIASTINUM: Heart and  mediastinal contours are unremarkable        SKELETON: Bony and soft tissue structures are unremarkable.             Impression:      Small right effusion and left basilar airspace disease     This report was finalized on 5/10/2023 12:13 PM by Dr. Salvador Bill MD.       US Venous Doppler Lower Extremity Bilateral (duplex) [[id number]] Collected: 05/10/23 1223     Updated: 05/10/23 1248    Narrative:      US VENOUS DOPPLER LOWER EXTREMITY BILATERAL (DUPLEX)-     CLINICAL INDICATION: swelling, pain        COMPARISON: None available      TECHNIQUE: Color Doppler imaging was used with compression and  augmentation to evaluate the lower extremity deep venous system.     FINDINGS:   There is patent spontaneous flow from the common femoral vein through  the posterior tibial veins.  There was no internal clot or area of noncompressibility.  Normal augmentation was elicited where applicable.       Impression:      No DVT in the lower extremities on today's exam.      This report was finalized on 5/10/2023 12:23 PM by Dr. Salvador Bill MD.             Cultures:  No results found for: BLOODCX, URINECX, WOUNDCX, MRSACX, RESPCX, STOOLCX    Last echocardiogram:  Results for orders placed during the hospital encounter of 07/30/21    Adult Transthoracic Echo Complete W/ Cont if Necessary Per Protocol    Interpretation Summary  · Normal left ventricular cavity size and wall thickness noted. All left ventricular wall segments contract normally  · Left ventricular ejection fraction appears to be 66 - 70%.  · Left ventricular diastolic function is consistent with (grade II w/high LAP) pseudonormalization.  · There is moderate thickening and calcification of the non-coronary cusp(s) of the aortic valve.  · Mild aortic valve regurgitation is present. Mild aortic valve stenosis is present.  · There are myxomatous changes of the mitral valve apparatus present. Moderate mitral valve regurgitation is present. No significant mitral valve  stenosis is present.  · Moderate to severe tricuspid valve regurgitation is present. Estimated right ventricular systolic pressure from tricuspid regurgitation is markedly elevated (100 mmHg).  · Severe pulmonary hypertension is present.  · There is no evidence of pericardial effusion.          I have personally reviewed the above radiology images and read the final radiology report on 05/10/23  ---------------------------------------------------------------------------------------------------------------------  Assessment / Plan     Active Hospital Problems    Diagnosis  POA   • **Acute on chronic heart failure with preserved ejection fraction (HFpEF) [I50.33]  Yes   • Acute respiratory failure with hypoxemia [J96.01]  Yes       ASSESSMENT/PLAN:    Acute on chronic HFpEF, POA  Left basilar pneumonia, POA  Acute on chronic hypoxic respiratory failure, POA, likely multifactorial due to above  NSTEMI type I versus type II  Acute urinary tract infection, POA  Patient presents from PCP secondary to lower extremity edema and increased oxygen requirements over the past 6 weeks.  Previously only required O2 at bedtime though now requiring it continuously.  ABG in the ED consistent with hypoxic respiratory failure.  Patient currently requiring BiPAP at 35% FiO2.  CXR in the ED also noted small right pleural effusion as well as left basilar airspace disease.  Patient was covered in the ED with aztreonam, vancomycin  Is s/p 80 mg IV Lasix x1.  Initial HS troponin elevated at 68 with repeat at 75, significant delta of 7.  Patient was given heparin bolus and initiated on heparin drip in the ED.  EKG acute ST elevation.  Also with noted UA on arrival, leukocyte and nitrite positive.  Urine culture pending.  Per family patient with recent complaint of decreased urine output and cloudy urine.  Patient will be admitted to the PCU with continuous cardiac monitoring/pulse oximetry for further work-up and management.  Consult  inpatient cardiology, assistance appreciated.  Heart failure pathway initiated on admission  Continue heparin drip and monitor per protocol.  Repeat labs in the a.m.  Obtain a ReDs vest value  Monitor I's and O's closely, daily weights  Continue antimicrobial coverage for left basilar pneumonia.  Follow culture results.  Continue BiPAP and wean as able.    TANA on CKD stage II, POA  Baseline creatinine appears to be around 0.8.  On arrival was 1.55.  We will consult nephrology for further assistance, appreciated.  Avoid nephrotoxins as able  Monitor I's and O's    Chronic:  Hard of hearing  HTN  Osteoarthritis  Osteoporosis  Rheumatoid arthritis  Restart home meds as indicated per med rec  Monitor vital signs closely  Supportive care  ----------  -DVT prophylaxis: Currently on heparin drip  -Activity: As tolerated  -Expected length of stay:INPATIENT status due to the need for care which can only be reasonably provided in an hospital setting such as aggressive/expedited ancillary services and/or consultation services, the necessity for IV medications, close physician monitoring and/or the possible need for procedures.  In such, I feel patient’s risk for adverse outcomes and need for care warrant INPATIENT evaluation and predict the patient’s care encounter to likely last beyond 2 midnights.   -Disposition pending course    High risk secondary to acute on chronic HFpEF, pneumonia, acute on chronic hypoxic respiratory failure, TANA on CKD stage II    Code Status and Medical Interventions:   Ordered at: 05/10/23 1720     Medical Intervention Limits:    NO intubation (DNI)     Level Of Support Discussed With:    Next of Kin (If No Surrogate)     Code Status (Patient has no pulse and is not breathing):    No CPR (Do Not Attempt to Resuscitate)     Medical Interventions (Patient has pulse or is breathing):    Limited Support     Release to patient:    Routine Release       Royer Patel PA-C   05/10/23  17:28  EDT    Electronically signed by Daisha Rm DO at 05/10/23 2031       Vital Signs (last day)     Date/Time Temp Temp src Pulse Resp BP Patient Position SpO2    05/16/23 1030 98.3 (36.8) Oral 70 18 146/72 Lying 96    05/16/23 0701 -- -- 69 18 -- -- 95    05/16/23 0500 -- -- 72 -- 144/70 Lying --    05/16/23 0313 98.3 (36.8) Oral 66 18 153/88 Lying 92    05/15/23 2335 97.6 (36.4) Oral 67 18 142/73 Lying 92    05/15/23 2008 97.1 (36.2) Oral 76 18 142/80 Lying 90    05/15/23 1815 -- -- 81 18 -- -- 90    05/15/23 1427 97.6 (36.4) Oral 74 20 138/84 Lying 96    05/15/23 1030 97.7 (36.5) Oral 66 18 136/82 Lying 96    05/15/23 0712 -- -- 65 20 -- -- 99    05/15/23 0656 98 (36.7) Oral 66 20 146/84 Lying 97    05/15/23 0513 -- -- -- -- 148/80 Lying --    05/15/23 0307 98 (36.7) Oral 62 16 157/73 Lying 90          Lines, Drains & Airways     Active LDAs     Name Placement date Placement time Site Days    Peripheral IV 05/10/23 1139 Right Antecubital 05/10/23  1139  Antecubital  6    External Urinary Catheter 05/12/23  0032  --  4                  Current Facility-Administered Medications   Medication Dose Route Frequency Provider Last Rate Last Admin   • apixaban (ELIQUIS) tablet 2.5 mg  2.5 mg Oral Q12H Daisha Rm DO   2.5 mg at 05/16/23 0925   • aspirin EC tablet 81 mg  81 mg Oral Daily Daisha Rm DO   81 mg at 05/16/23 0925   • atorvastatin (LIPITOR) tablet 20 mg  20 mg Oral Nightly Daisha Rm DO   20 mg at 05/15/23 2014   • budesonide-formoterol (SYMBICORT) 160-4.5 MCG/ACT inhaler 2 puff  2 puff Inhalation BID - RT Daisha Rm, DO   2 puff at 05/16/23 0701   • bumetanide (BUMEX) tablet 1 mg  1 mg Oral Daily Andrea Nobles MD   1 mg at 05/16/23 0925   • cefTRIAXone (ROCEPHIN) 2 g in sodium chloride 0.9 % 100 mL IVPB-VTB  2 g Intravenous Q24H Daisha Rm  mL/hr at 05/16/23 0945 2 g at 05/16/23 0945   • hydrALAZINE (APRESOLINE) tablet 10 mg  10 mg Oral Q8H Maureen Kwong APRN   10 mg at  23 0505   • isosorbide mononitrate (IMDUR) 24 hr tablet 30 mg  30 mg Oral Q24H Maureen Kwong APRN   30 mg at 23 0925   • magic barrier cream 1 application  1 application Topical 4x Daily PRN Christy Ellsworth PA-C       • metoprolol tartrate (LOPRESSOR) tablet 50 mg  50 mg Oral BID Aaron Sarmiento MD   50 mg at 23 0925   • nitroglycerin (NITROSTAT) SL tablet 0.4 mg  0.4 mg Sublingual Q5 Min PRN Daisha Rm DO       • pantoprazole (PROTONIX) EC tablet 40 mg  40 mg Oral QAM AC Daisha Rm DO   40 mg at 23 09   • polyethylene glycol (MIRALAX) packet 17 g  17 g Oral Daily Daisha Rm DO   17 g at 23 0932   • predniSONE (DELTASONE) tablet 2.5 mg  2.5 mg Oral Daily With Breakfast Daisha Rm DO   2.5 mg at 23 09   • sennosides-docusate (PERICOLACE) 8.6-50 MG per tablet 1 tablet  1 tablet Oral BID Daisha Rm DO   1 tablet at 23 0925   • sodium chloride 0.9 % flush 10 mL  10 mL Intravenous PRN Daisha Rm DO       • sodium chloride 0.9 % flush 10 mL  10 mL Intravenous Q12H Daisha Rm DO   10 mL at 23 09   • sodium chloride 0.9 % flush 10 mL  10 mL Intravenous PRN Daisha Rm DO       • sodium chloride 0.9 % infusion 40 mL  40 mL Intravenous PRN Daisha Rm DO            Operative/Procedure Notes (most recent note)      Yaneth Wilkerson at 23 1629        Attempted x 3 to obtain reds vest results. Unattainable at this time.    Electronically signed by Yaneth Wilkerson at 23 1630          Physician Progress Notes (most recent note)      Caryn Figueroa APRN at 23 1119                     PROGRESS NOTE         Patient Identification:  Name:  Anh Saldana  Age:  88 y.o.  Sex:  female  :  1934  MRN:  [id number]  Visit Number:  [id number]  Primary Care Provider:  Meme Medellin MD         LOS: 6 days        ----------------------------------------------------------------------------------------------------------------------  Subjective       Chief Complaints:    Edema        Interval History:      Patient resting in bed this morning.  Brother at bedside.  Overall feels well today.  Currently on 0.5 L per nasal cannula with no apparent distress.  Afebrile, denies diarrhea.  Denies urinary symptoms.  Lungs clear to auscultation bilaterally.  Abdomen soft, nontender.    Review of Systems:    Constitutional: no fever, chills and night sweats.  Generalized fatigue.  Eyes: no eye drainage, itching or redness.  HEENT: no mouth sores, dysphagia or nose bleed.  Respiratory: no for shortness of breath, cough or production of sputum.  Cardiovascular: no chest pain, no palpitations, no orthopnea.  Gastrointestinal: no nausea, vomiting or diarrhea. No abdominal pain, hematemesis or rectal bleeding.  Genitourinary: no dysuria or polyuria.  Hematologic/lymphatic: no lymph node abnormalities, no easy bruising or easy bleeding.  Musculoskeletal: no muscle or joint pain.  Skin: No rash and no itching.  Neurological: no loss of consciousness, no seizure, no headache.  Behavioral/Psych: no depression or suicidal ideation.  Endocrine: no hot flashes.  Immunologic: negative.    ----------------------------------------------------------------------------------------------------------------------      Objective       Current Hospital Meds:  apixaban, 2.5 mg, Oral, Q12H  aspirin, 81 mg, Oral, Daily  atorvastatin, 20 mg, Oral, Nightly  budesonide-formoterol, 2 puff, Inhalation, BID - RT  bumetanide, 1 mg, Oral, Daily  cefTRIAXone, 2 g, Intravenous, Q24H  hydrALAZINE, 10 mg, Oral, Q8H  isosorbide mononitrate, 30 mg, Oral, Q24H  metoprolol tartrate, 50 mg, Oral, BID  pantoprazole, 40 mg, Oral, QAM AC  polyethylene glycol, 17 g, Oral, Daily  predniSONE, 2.5 mg, Oral, Daily With Breakfast  senna-docusate sodium, 1 tablet, Oral, BID  sodium  chloride, 10 mL, Intravenous, Q12H         ----------------------------------------------------------------------------------------------------------------------    Vital Signs:  Temp:  [97.1 °F (36.2 °C)-98.3 °F (36.8 °C)] 98.3 °F (36.8 °C)  Heart Rate:  [66-81] 70  Resp:  [18-20] 18  BP: (138-153)/(70-88) 146/72  Mean Arterial Pressure (Non-Invasive) for the past 24 hrs (Last 3 readings):   Noninvasive MAP (mmHg)   05/16/23 0313 120   05/15/23 2335 89   05/15/23 2008 116     SpO2 Percentage    05/16/23 0313 05/16/23 0701 05/16/23 1030   SpO2: 92% 95% 96%     SpO2:  [90 %-96 %] 96 %  on  Flow (L/min):  [0.5-1] 0.5;   Device (Oxygen Therapy): nasal cannula    Body mass index is 17.77 kg/m².  Wt Readings from Last 3 Encounters:   05/16/23 41.3 kg (91 lb)   07/26/21 42.6 kg (94 lb)   10/15/20 45.8 kg (101 lb)        Intake/Output Summary (Last 24 hours) at 5/16/2023 1119  Last data filed at 5/15/2023 1800  Gross per 24 hour   Intake 120 ml   Output 300 ml   Net -180 ml     Diet: Regular/House Diet; Texture: Soft to Chew (NDD 3); Soft to Chew: Chopped Meat; Fluid Consistency: Thin (IDDSI 0)  ----------------------------------------------------------------------------------------------------------------------      Physical Exam:    Constitutional: Elderly, chronically ill-appearing.  Currently on 0.5 L per nasal cannula.  Brother at bedside.  HENT:  Head: Normocephalic and atraumatic.  Hard of hearing. Mouth:  Moist mucous membranes.    Eyes:  Conjunctivae and EOM are normal.  No scleral icterus.  Neck:  Neck supple.  No JVD present.    Cardiovascular:  Normal rate, regular rhythm and normal heart sounds with no murmur. No edema.  Pulmonary/Chest:  No respiratory distress, no wheezes, no crackles, with normal breath sounds and good air movement.  Abdominal:  Soft.  Bowel sounds are normal.  No distension and no tenderness.   Musculoskeletal:  No edema, no tenderness, and no deformity.  No swelling or redness of joints.   Severe kyphosis.  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  Neurological:  Alert and oriented to person, place, and time.  No facial droop.  No slurred speech.   Skin:  Skin is warm and dry.  No rash noted.  No pallor.   Psychiatric:  Normal mood and affect.  Behavior is normal.        ----------------------------------------------------------------------------------------------------------------------  Results from last 7 days   Lab Units 05/16/23  0656 05/16/23  0431 05/11/23  0935   HSTROP T ng/L 58* 59* 89*     Results from last 7 days   Lab Units 05/16/23  0656 05/10/23  1134   PROBNP pg/mL 66,260.0* 56,301.0*     Results from last 7 days   Lab Units 05/11/23  1720   CHOLESTEROL mg/dL 145   TRIGLYCERIDES mg/dL 126   HDL CHOL mg/dL 56   LDL CHOL mg/dL 67     Results from last 7 days   Lab Units 05/10/23  2059   PH, ARTERIAL pH units 7.472*   PO2 ART mm Hg 73.0*   PCO2, ARTERIAL mm Hg 38.4   HCO3 ART mmol/L 28.1*     Results from last 7 days   Lab Units 05/14/23  0648 05/13/23  0207 05/12/23  0019 05/11/23  0019 05/10/23  1243 05/10/23  1134   CRP mg/dL  --   --  26.20*  --   --   --    LACTATE mmol/L  --   --   --   --  1.9  --    WBC 10*3/mm3 9.01 9.38 16.70*   < >  --  20.55*   HEMOGLOBIN g/dL 11.6* 11.6* 11.9*   < >  --  12.7   HEMATOCRIT % 37.3 38.5 39.0   < >  --  40.4   MCV fL 104.5* 111.0* 107.4*   < >  --  106.9*   MCHC g/dL 31.1* 30.1* 30.5*   < >  --  31.4*   PLATELETS 10*3/mm3 233 201 183   < >  --  247   INR   --   --   --   --   --  1.30*    < > = values in this interval not displayed.     Results from last 7 days   Lab Units 05/16/23  0431 05/16/23  0220 05/15/23  0505 05/14/23  0648 05/12/23  0019 05/11/23  0019 05/10/23  1134   SODIUM mmol/L  --  132* 131* 137   < > 144 144   POTASSIUM mmol/L  --  4.1 4.4 4.3   < > 4.1 4.5   MAGNESIUM mg/dL 2.0  --  2.5*  --   --  2.0 2.0   CHLORIDE mmol/L  --  95* 98 99   < > 106 103   CO2 mmol/L  --  25.0 22.1 29.5*   < > 25.2 22.5   BUN mg/dL  --  40* 42* 46*    < > 40* 35*   CREATININE mg/dL  --  1.20* 1.26* 1.54*   < > 1.76* 1.55*   CALCIUM mg/dL  --  8.9 8.8 9.1   < > 9.0 9.5   GLUCOSE mg/dL  --  112* 84 96   < > 123* 133*   ALBUMIN g/dL  --   --   --  3.1*  --   --  3.4*   BILIRUBIN mg/dL  --   --   --  0.2  --   --  1.0   ALK PHOS U/L  --   --   --  119*  --   --  165*   AST (SGOT) U/L  --   --   --  26  --   --  32   ALT (SGPT) U/L  --   --   --  21  --   --  10    < > = values in this interval not displayed.   Estimated Creatinine Clearance: 21.1 mL/min (A) (by C-G formula based on SCr of 1.2 mg/dL (H)).  No results found for: AMMONIA    No results found for: HGBA1C, POCGLU  No results found for: HGBA1C  Lab Results   Component Value Date    TSH 3.950 05/10/2023       Blood Culture   Date Value Ref Range Status   05/10/2023 Escherichia coli (C)  Preliminary   05/10/2023 Escherichia coli (C)  Preliminary     Urine Culture   Date Value Ref Range Status   05/10/2023 50,000 CFU/mL Mixed Sondra Isolated  Final     No results found for: WOUNDCX  No results found for: STOOLCX  No results found for: RESPCX  Pain Management Panel          View : No data to display.                        ----------------------------------------------------------------------------------------------------------------------  Imaging Results (Last 24 Hours)     ** No results found for the last 24 hours. **          ----------------------------------------------------------------------------------------------------------------------    Pertinent Infectious Disease Results                Assessment/Plan       Assessment        Acute pyelonephritis  E. coli bacteremia  Aspiration pneumonia           Plan      Patient resting in bed this morning.  Brother at bedside.  Overall feels well today.  Currently on 0.5 L per nasal cannula with no apparent distress.  Afebrile, denies diarrhea.  Denies urinary symptoms.  Lungs clear to auscultation bilaterally.  Abdomen soft, nontender.    Blood cultures from  2023 show no growth thus far.    Urine culture from 5/10/2023 finalized as 50,000 colonies of mixed nina.    For now recommend to continue ceftriaxone 2 g IV every 24 hours for treatment of E. coli bacteremia secondary to UTI and pneumonia.  Upon discharge patient can be further de-escalated to oral cefdinir per pharmacy dosing to continue through 2023 for treatment of bacteremia, UTI and pneumonia.  Patient overall stable from ID standpoint.  Okay to discharge from ID standpoint.      ANTIMICROBIAL THERAPY    cefTRIAXone (ROCEPHIN) 2gm IVPB in 100 mL NS (VTB)     Code Status:   Code Status and Medical Interventions:   Ordered at: 05/10/23 1720     Medical Intervention Limits:    NO intubation (DNI)     Level Of Support Discussed With:    Next of Kin (If No Surrogate)     Code Status (Patient has no pulse and is not breathing):    No CPR (Do Not Attempt to Resuscitate)     Medical Interventions (Patient has pulse or is breathing):    Limited Support     Release to patient:    Routine Release       ANA Hendrickson  23  11:19 EDT    Electronically signed by Caryn Figueroa APRN at 23 1120          Consult Notes (most recent note)      Caryn Figueroa APRN at 23 1245      Consult Orders    1. Inpatient Infectious Diseases Consult [[id number]] ordered by Daisha Rm DO at 23 0838             Duplicate consult.  Infectious disease already following please see updated consult note.    ANA Hendrickson  Infectious disease    Electronically signed by Caryn Figueroa APRN at 23 1245          Physical Therapy Notes (most recent note)      Marleny Calix, PT at 23 1416  Version 1 of 1         Acute Care - Physical Therapy Initial Evaluation  [facility]     Patient Name: Anh Saldana  : 1934  MRN: [id number]  Today's Date: 2023   Onset of Illness/Injury or Date of Surgery: 05/10/23  Visit Dx:     ICD-10-CM ICD-9-CM   1. Non-STEMI (non-ST elevated myocardial  "infarction)  I21.4 410.70   2. Congestive heart failure, unspecified HF chronicity, unspecified heart failure type  I50.9 428.0   3. Sepsis, due to unspecified organism, unspecified whether acute organ dysfunction present  A41.9 038.9     995.91   4. TANA (acute kidney injury)  N17.9 584.9   5. Hypoxemia  R09.02 799.02     Patient Active Problem List   Diagnosis   • Osteoporosis, unspecified   • Hypertensive urgency   • Essential hypertension   • Dyspnea on exertion   • CHF (congestive heart failure)   • Persistent atrial fibrillation   • Hearing loss   • Acute on chronic heart failure with preserved ejection fraction (HFpEF)   • Acute respiratory failure with hypoxemia     Past Medical History:   Diagnosis Date   • Breast cancer 2011   • CHF (congestive heart failure)    • Chronic kidney disease     acute kidney failure   • [place] (hard of hearing)    • Hypertension    • Osteoarthritis    • Osteoporosis    • Rheumatoid arthritis    • Right-sided Bell's palsy    • Shortness of breath     sleeps with oxygen at night     Past Surgical History:   Procedure Laterality Date   • BREAST LUMPECTOMY Left     benign   • CATARACT EXTRACTION W/ INTRAOCULAR LENS  IMPLANT, BILATERAL Bilateral    • SHOULDER SURGERY Left     metal plate in left shoulder     PT Assessment (last 12 hours)     PT Evaluation and Treatment     Row Name 05/11/23 0557          Physical Therapy Time and Intention    Subjective Information complains of;weakness;fatigue  -CT     Document Type evaluation  -CT     Mode of Treatment physical therapy  -CT     Patient Effort fair  -CT     Comment Pt is [place] and family assists with PLOF and history. Family reports pt has not been ambulatory for a couple of weeks and is primarily \"on the couch\" which is where she sleeps. Pt uses a BSC next to where she sleeps. Pt is max A bed mobility at time of eval.  -CT     Row Name 05/11/23 6323          General Information    Patient Profile Reviewed yes  -CT     Onset of " Illness/Injury or Date of Surgery 05/10/23  -CT     Referring Physician Jag  -CT     Patient Observations alert;cooperative;agree to therapy  -CT     Prior Level of Function mod assist:;transfer  -CT     Equipment Currently Used at Home wheelchair;commode, bedside;walker, rolling;cane, straight  -CT     Existing Precautions/Restrictions fall;oxygen therapy device and L/min  -CT     Limitations/Impairments safety/cognitive;hearing  pt very hard of hearing  -CT     Equipment Issued to Patient gait belt  -CT     Risks Reviewed patient:;family:  -CT     Benefits Reviewed patient:;family:  -CT     Row Name 05/11/23 [facility]          Living Environment    Current Living Arrangements home  -CT     People in Home sibling(s)  -CT     Row Name 05/11/23 135          Cognition    Affect/Mental Status (Cognition) WFL  -CT     Orientation Status (Cognition) oriented to;person  -CT     Follows Commands (Cognition) verbal cues/prompting required;repetition of directions required  -CT     Row Name 05/11/23 [employer] [facility]          Range of Motion Comprehensive    Comment, General Range of Motion BLE grossly WFL  -CT     Row Name 05/11/23 [facility]          Strength Comprehensive (MMT)    Comment, General Manual Muscle Testing (MMT) Assessment unable to formally assess  -CT     Row Name 05/11/23 [employer]          Bed Mobility    Bed Mobility bed mobility (all) activities  -CT     All Activities, [place] (Bed Mobility) maximum assist (25% patient effort);dependent (less than 25% patient effort);2 person assist  -CT     Bed Mobility, Safety Issues decreased use of arms for pushing/pulling;decreased use of legs for bridging/pushing  -CT     Assistive Device (Bed Mobility) bed rails  -CT     Row Name 05/11/23 1356          Transfers    Comment, (Transfers) unable to assess  -CT     Row Name 05/11/23 [facility]          Gait/Stairs (Locomotion)    Comment, (Gait/Stairs) unable to assess  -CT     Row Name 05/11/23 [employer]          Balance    Balance Assessment  "sitting static balance  -CT     Static Sitting Balance dependent  -CT     Position, Sitting Balance sitting edge of bed  -CT     Row Name             Wound 05/10/23 1727 coccyx    Wound - Properties Group Placement Date: 05/10/23  -SC Placement Time: 1727  -SC Present on Hospital Admission: Y  -SC Location: coccyx  -SC    Retired Wound - Properties Group Placement Date: 05/10/23  -SC Placement Time: 1727  -SC Present on Hospital Admission: Y  -SC Location: coccyx  -SC    Retired Wound - Properties Group Date first assessed: 05/10/23  -SC Time first assessed: 1727  -SC Present on Hospital Admission: Y  -SC Location: coccyx  -SC    Row Name 05/11/23 1356          Plan of Care Review    Plan of Care Reviewed With patient  -CT     Row Name 05/11/23 1356          Positioning and Restraints    Pre-Treatment Position in bed  -CT     Post Treatment Position bed  -CT     In Bed supine;call light within reach;encouraged to call for assist;exit alarm on;side rails up x3;with family/caregiver  -CT     Row Name 05/11/23 1351          Therapy Assessment/Plan (PT)    Patient/Family Therapy Goals Statement (PT) Pt goals are to \"go home\"  -CT     Functional Level at Time of Evaluation (PT) Max/Dep x 2 bed mobility  -CT     PT Diagnosis (PT) impaired functional mobility  -CT     Rehab Potential (PT) fair, will monitor progress closely  -CT     Criteria for Skilled Interventions Met (PT) yes;skilled treatment is necessary  -CT     Therapy Frequency (PT) 2 times/wk  2-5 times/wk  -CT     Predicted Duration of Therapy Intervention (PT) lenght of stay  -CT     Row Name 05/11/23 1352          Therapy Plan Review/Discharge Plan (PT)    Therapy Plan Review (PT) evaluation/treatment results reviewed;care plan/treatment goals reviewed;risks/benefits reviewed;participants voiced agreement with care plan;current/potential barriers reviewed;participants included;patient  -CT     Row Name 05/11/23 1358          Physical Therapy Goals    Bed " Mobility Goal Selection (PT) bed mobility, PT goal 1  -CT     Transfer Goal Selection (PT) transfer, PT goal 1  -CT     Row Name 05/11/23 1356          Bed Mobility Goal 1 (PT)    Activity/Assistive Device (Bed Mobility Goal 1, PT) bed mobility activities, all  -CT     [place] Level/Cues Needed (Bed Mobility Goal 1, PT) minimum assist (75% or more patient effort)  -CT     Time Frame (Bed Mobility Goal 1, PT) by discharge  -CT     Row Name 05/11/23 1356          Transfer Goal 1 (PT)    Activity/Assistive Device (Transfer Goal 1, PT) sit-to-stand/stand-to-sit;bed-to-chair/chair-to-bed  -CT     [place] Level/Cues Needed (Transfer Goal 1, PT) maximum assist (25-49% patient effort)  -CT     Time Frame (Transfer Goal 1, PT) by discharge  -CT           User Key  (r) = Recorded By, (t) = Taken By, (c) = Cosigned By    Initials Name Provider Type    CT Marleny Calix PT Physical Therapist    Lydia Cordero, RN Registered Nurse                  PT Recommendation and Plan  Planned Therapy Interventions (PT): balance training, bed mobility training, gait training, home exercise program, motor coordination training, manual therapy techniques, neuromuscular re-education, patient/family education, postural re-education, strengthening, transfer training  Therapy Frequency (PT): 2 times/wk (2-5 times/wk)  Plan of Care Reviewed With: patient       Time Calculation:    PT Charges     Row Name 05/11/23 1413             Time Calculation    PT Received On 05/11/23  -CT      PT Goal Re-Cert Due Date 05/25/23  -CT            User Key  (r) = Recorded By, (t) = Taken By, (c) = Cosigned By    Initials Name Provider Type    CT Marleny Calix PT Physical Therapist              Therapy Charges for Today     Code Description Service Date Service Provider Modifiers Qty    02021292009 HC PT EVAL MOD COMPLEXITY 4 5/11/2023 Marleny Calix, PT GP 1               Marleny Calix, PT  5/11/2023      Electronically signed by Yogi  Marleny, PT at 05/11/23 1417       Occupational Therapy Notes (most recent note)    No notes exist for this encounter.

## 2023-05-16 NOTE — PROGRESS NOTES
[facility] HOSPITALIST PROGRESS NOTE     Patient Identification:  Name:  Anh Saldana  Age:  88 y.o.  Sex:  female  :  1934  MRN:  [id number]  Visit Number:  [id number]  ROOM: [address] [place] [address]     Primary Care Provider:  Meme Medellin MD    Length of stay in inpatient status:  6    Subjective     Chief Compliant:    Chief Complaint   Patient presents with   • Edema       History of Presenting Illness: Patient seen and evaluated in follow-up for sepsis secondary to community-acquired pneumonia with E. coli bacteremia from acute UTI with acute on chronic HFpEF with resultant acute on chronic hypoxemic respiratory failure.  Patient at time of exam resting comfortably in bed on room air.  Long discussion at bedside with patient and family regarding this recent hospitalization as well as her generalized weakness and need for 24/7 care at home and recommendation for physical therapy best suited for short-term SNF and patient and family agreeable    Objective     Current Hospital Meds:  apixaban, 2.5 mg, Oral, Q12H  aspirin, 81 mg, Oral, Daily  atorvastatin, 20 mg, Oral, Nightly  budesonide-formoterol, 2 puff, Inhalation, BID - RT  bumetanide, 1 mg, Oral, Daily  cefTRIAXone, 2 g, Intravenous, Q24H  hydrALAZINE, 10 mg, Oral, Q8H  isosorbide mononitrate, 30 mg, Oral, Q24H  metoprolol tartrate, 50 mg, Oral, BID  pantoprazole, 40 mg, Oral, QAM AC  polyethylene glycol, 17 g, Oral, Daily  predniSONE, 2.5 mg, Oral, Daily With Breakfast  senna-docusate sodium, 1 tablet, Oral, BID  sodium chloride, 10 mL, Intravenous, Q12H         ----------------------------------------------------------------------------------------------------------------------  Vital Signs:  Temp:  [97.1 °F (36.2 °C)-98.3 °F (36.8 °C)] 97.8 °F (36.6 °C)  Heart Rate:  [66-81] 68  Resp:  [18] 18  BP: ()/(54-88) 92/54  SpO2:  [90 %-98 %] 98 %  on  Flow (L/min):  [0.5-1] 0.5;   Device (Oxygen Therapy): room air  Body mass index is 17.77  kg/m².      Intake/Output Summary (Last 24 hours) at 5/16/2023 1659  Last data filed at 5/16/2023 1500  Gross per 24 hour   Intake 120 ml   Output 300 ml   Net -180 ml      ----------------------------------------------------------------------------------------------------------------------  Physical exam:  Constitutional: Elderly chronically ill-appearing frail cachectic female resting in bed in no distress   HENT:  Head:  Normocephalic and atraumatic.  Mouth:  Moist mucous membranes.    Eyes:  Conjunctivae and EOM are normal. No scleral icterus.    Cardiovascular:  Normal rate, regular rhythm and normal heart sounds with no murmur.  Pulmonary/Chest:  No respiratory distress, no wheezes, no crackles, with normal breath sounds and good air movement.  Abdominal:  Soft.  Bowel sounds are normal.  No distension and no tenderness.   Musculoskeletal:  No tenderness, and no deformity.  No red or swollen joints anywhere.  Functional ROM intact.   Neurological:  Alert and oriented to person, place, and time.  No cranial nerve deficit.  No tongue deviation.  No facial droop.  No slurred speech. Intact Sensation throughout  Skin:  Skin is warm and dry. No rash or lesion noted. No pallor.   Peripheral vascular:  Pulses in all 4 extremities with no clubbing, no cyanosis, no edema.  Psychiatric: Appropriate mood and affect, pleasant.   ----------------------------------------------------------------------------------------------------------------------     BUN/CREAT/GLUC/ALT/AST/ARASH/LIP    40/1.20/112/--/--/--/-- (05/16 0220)  LYTES - Na/K/Cl/CO2: 132*/4.1/95*/25.0 (05/16 0220)        Urine Culture   Date Value Ref Range Status   05/10/2023 50,000 CFU/mL Mixed Sondra Isolated  Final     Blood Culture   Date Value Ref Range Status   05/12/2023 No growth at 3 days  Preliminary   05/12/2023 No growth at 3 days  Preliminary   05/10/2023 Escherichia coli (C)  Final   05/10/2023 Escherichia coli (C)  Final       I have personally  looked at the labs and they are summarized above.  ----------------------------------------------------------------------------------------------------------------------  Detailed radiology reports for the last 24 hours:  No radiology results for the last day  Assessment & Plan      Sepsis  Left basilar CAP  E. coli bacteremia 2/2 acute UTI    -Patient presenting with tachypnea, tachycardia, leukocytosis and with blood cultures growing E. coli BC ID PCR.  Repeat blood cultures with no growth to date.    -Infectious disease consulted and recommends continuation with growth Rocephin and stepdown to oral cephalosporin at discharge through 5/21/2023.    Acute on chronic HFpEF  Acute on chronic hypoxemic respiratory failure    -Per review documentation patient presenting with decompensated heart failure with peripheral edema however on exam now with no longer any continued peripheral edema and therefore no need for continued diuresis    -Continue metoprolol, patient currently on heart failure pathway    -Continue Bumex but monitor renal function closely and consider change to every other day if rising creatinine    TANA on CKD stage II    -Likely secondary to patient's sepsis from UTI with resultant bacteremia as well as community-acquired pneumonia    -Continue supportive care, TANA improving    Paroxysmal atrial fibrillation  Pulmonary hypertension    -Continue metoprolol and Eliquis    -Initiated on nitrate and hydralazine for pulmonary hypertension by cardiology    Chronic severe malnutrition    -Seen and evaluated by nutrition and dietary supplements added    Hypertension    -Continue Aldactone, metoprolol    Osteoarthritis  Rheumatoid arthritis    -Continue home prednisone 2.5 daily    Hard of hearing  Osteoporosis  Advanced age    -Complicates all aspects of care    Acute illness myopathy    -Patient with significant debility and weakness after recent illness and this acute hospitalization consistent with acute  illness myopathy.  Consult PT and OT and discussed family and agreeable to SNF rehab.    Copied text in portions of the note has been reviewed and is accurate as of 05/16/23    VTE Prophylaxis:   Mechanical Order History:     None      Pharmalogical Order History:      Ordered     Dose Route Frequency Stop    05/12/23 1520  apixaban (ELIQUIS) tablet 2.5 mg         2.5 mg PO Every 12 Hours Scheduled --    05/10/23 1449  heparin (porcine) 5000 UNIT/ML injection 2,400 Units         60 Units/kg IV Once 05/10/23 1541    05/10/23 1449  heparin 16492 units/250 mL (100 units/mL) in 0.9% NaCl infusion  4.89 mL/hr,   Status:  Discontinued         12 Units/kg/hr IV Titrated 05/12/23 1520    05/10/23 1449  heparin (porcine) 5000 UNIT/ML injection 2,400 Units  Status:  Discontinued         60 Units/kg IV As Needed 05/12/23 1520    05/10/23 1449  heparin (porcine) 5000 UNIT/ML injection 1,200 Units  Status:  Discontinued         30 Units/kg IV As Needed 05/12/23 1520                Disposition skilled nursing facility    Didier Contreras DO  [facility]  05/16/23  16:59 EDT

## 2023-05-16 NOTE — PROGRESS NOTES
PROGRESS NOTE         Patient Identification:  Name:  Anh Saldana  Age:  88 y.o.  Sex:  female  :  1934  MRN:  [id number]  Visit Number:  [id number]  Primary Care Provider:  Meme Medellin MD         LOS: 6 days       ----------------------------------------------------------------------------------------------------------------------  Subjective       Chief Complaints:    Edema        Interval History:      Patient resting in bed this morning.  Brother at bedside.  Overall feels well today.  Currently on 0.5 L per nasal cannula with no apparent distress.  Afebrile, denies diarrhea.  Denies urinary symptoms.  Lungs clear to auscultation bilaterally.  Abdomen soft, nontender.    Review of Systems:    Constitutional: no fever, chills and night sweats.  Generalized fatigue.  Eyes: no eye drainage, itching or redness.  HEENT: no mouth sores, dysphagia or nose bleed.  Respiratory: no for shortness of breath, cough or production of sputum.  Cardiovascular: no chest pain, no palpitations, no orthopnea.  Gastrointestinal: no nausea, vomiting or diarrhea. No abdominal pain, hematemesis or rectal bleeding.  Genitourinary: no dysuria or polyuria.  Hematologic/lymphatic: no lymph node abnormalities, no easy bruising or easy bleeding.  Musculoskeletal: no muscle or joint pain.  Skin: No rash and no itching.  Neurological: no loss of consciousness, no seizure, no headache.  Behavioral/Psych: no depression or suicidal ideation.  Endocrine: no hot flashes.  Immunologic: negative.    ----------------------------------------------------------------------------------------------------------------------      Objective       Current [facility] Meds:  apixaban, 2.5 mg, Oral, Q12H  aspirin, 81 mg, Oral, Daily  atorvastatin, 20 mg, Oral, Nightly  budesonide-formoterol, 2 puff, Inhalation, BID - RT  bumetanide, 1 mg, Oral, Daily  cefTRIAXone, 2 g, Intravenous, Q24H  hydrALAZINE, 10 mg, Oral, Q8H  isosorbide  mononitrate, 30 mg, Oral, Q24H  metoprolol tartrate, 50 mg, Oral, BID  pantoprazole, 40 mg, Oral, QAM AC  polyethylene glycol, 17 g, Oral, Daily  predniSONE, 2.5 mg, Oral, Daily With Breakfast  senna-docusate sodium, 1 tablet, Oral, BID  sodium chloride, 10 mL, Intravenous, Q12H         ----------------------------------------------------------------------------------------------------------------------    Vital Signs:  Temp:  [97.1 °F (36.2 °C)-98.3 °F (36.8 °C)] 98.3 °F (36.8 °C)  Heart Rate:  [66-81] 70  Resp:  [18-20] 18  BP: (138-153)/(70-88) 146/72  Mean Arterial Pressure (Non-Invasive) for the past 24 hrs (Last 3 readings):   Noninvasive MAP (mmHg)   05/16/23 0313 120   05/15/23 2335 89   05/15/23 2008 116     SpO2 Percentage    05/16/23 0313 05/16/23 0701 05/16/23 1030   SpO2: 92% 95% 96%     SpO2:  [90 %-96 %] 96 %  on  Flow (L/min):  [0.5-1] 0.5;   Device (Oxygen Therapy): nasal cannula    Body mass index is 17.77 kg/m².  Wt Readings from Last 3 Encounters:   05/16/23 41.3 kg (91 lb)   07/26/21 42.6 kg (94 lb)   10/15/20 45.8 kg (101 lb)        Intake/Output Summary (Last 24 hours) at 5/16/2023 1119  Last data filed at 5/15/2023 1800  Gross per 24 hour   Intake 120 ml   Output 300 ml   Net -180 ml     Diet: Regular/House Diet; Texture: Soft to Chew (NDD 3); Soft to Chew: Chopped Meat; Fluid Consistency: Thin (IDDSI 0)  ----------------------------------------------------------------------------------------------------------------------      Physical Exam:    Constitutional: Elderly, chronically ill-appearing.  Currently on 0.5 L per nasal cannula.  Brother at bedside.  HENT:  Head: Normocephalic and atraumatic.  Hard of hearing. Mouth:  Moist mucous membranes.    Eyes:  Conjunctivae and EOM are normal.  No scleral icterus.  Neck:  Neck supple.  No JVD present.    Cardiovascular:  Normal rate, regular rhythm and normal heart sounds with no murmur. No edema.  Pulmonary/Chest:  No respiratory distress, no  wheezes, no crackles, with normal breath sounds and good air movement.  Abdominal:  Soft.  Bowel sounds are normal.  No distension and no tenderness.   Musculoskeletal:  No edema, no tenderness, and no deformity.  No swelling or redness of joints.  Severe kyphosis.  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  Neurological:  Alert and oriented to person, place, and time.  No facial droop.  No slurred speech.   Skin:  Skin is warm and dry.  No rash noted.  No pallor.   Psychiatric:  Normal mood and affect.  Behavior is normal.        ----------------------------------------------------------------------------------------------------------------------  Results from last 7 days   Lab Units 05/16/23  0656 05/16/23  0431 05/11/23  0935   HSTROP T ng/L 58* 59* 89*     Results from last 7 days   Lab Units 05/16/23  0656 05/10/23  1134   PROBNP pg/mL 66,260.0* 56,301.0*     Results from last 7 days   Lab Units 05/11/23  1720   CHOLESTEROL mg/dL 145   TRIGLYCERIDES mg/dL 126   HDL CHOL mg/dL 56   LDL CHOL mg/dL 67     Results from last 7 days   Lab Units 05/10/23  2059   PH, ARTERIAL pH units 7.472*   PO2 ART mm Hg 73.0*   PCO2, ARTERIAL mm Hg 38.4   HCO3 ART mmol/L 28.1*     Results from last 7 days   Lab Units 05/14/23  0648 05/13/23  0207 05/12/23  0019 05/11/23  0019 05/10/23  1243 05/10/23  1134   CRP mg/dL  --   --  26.20*  --   --   --    LACTATE mmol/L  --   --   --   --  1.9  --    WBC 10*3/mm3 9.01 9.38 16.70*   < >  --  20.55*   HEMOGLOBIN g/dL 11.6* 11.6* 11.9*   < >  --  12.7   HEMATOCRIT % 37.3 38.5 39.0   < >  --  40.4   MCV fL 104.5* 111.0* 107.4*   < >  --  106.9*   MCHC g/dL 31.1* 30.1* 30.5*   < >  --  31.4*   PLATELETS 10*3/mm3 233 201 183   < >  --  247   INR   --   --   --   --   --  1.30*    < > = values in this interval not displayed.     Results from last 7 days   Lab Units 05/16/23  0431 05/16/23  0220 05/15/23  0505 05/14/23  0648 05/12/23  0019 05/11/23  0019 05/10/23  1134   SODIUM mmol/L  --  132*  131* 137   < > 144 144   POTASSIUM mmol/L  --  4.1 4.4 4.3   < > 4.1 4.5   MAGNESIUM mg/dL 2.0  --  2.5*  --   --  2.0 2.0   CHLORIDE mmol/L  --  95* 98 99   < > 106 103   CO2 mmol/L  --  25.0 22.1 29.5*   < > 25.2 22.5   BUN mg/dL  --  40* 42* 46*   < > 40* 35*   CREATININE mg/dL  --  1.20* 1.26* 1.54*   < > 1.76* 1.55*   CALCIUM mg/dL  --  8.9 8.8 9.1   < > 9.0 9.5   GLUCOSE mg/dL  --  112* 84 96   < > 123* 133*   ALBUMIN g/dL  --   --   --  3.1*  --   --  3.4*   BILIRUBIN mg/dL  --   --   --  0.2  --   --  1.0   ALK PHOS U/L  --   --   --  119*  --   --  165*   AST (SGOT) U/L  --   --   --  26  --   --  32   ALT (SGPT) U/L  --   --   --  21  --   --  10    < > = values in this interval not displayed.   Estimated Creatinine Clearance: 21.1 mL/min (A) (by C-G formula based on SCr of 1.2 mg/dL (H)).  No results found for: AMMONIA    No results found for: HGBA1C, POCGLU  No results found for: HGBA1C  Lab Results   Component Value Date    TSH 3.950 05/10/2023       Blood Culture   Date Value Ref Range Status   05/10/2023 Escherichia coli (C)  Preliminary   05/10/2023 Escherichia coli (C)  Preliminary     Urine Culture   Date Value Ref Range Status   05/10/2023 50,000 CFU/mL Mixed Sondra Isolated  Final     No results found for: WOUNDCX  No results found for: STOOLCX  No results found for: RESPCX  Pain Management Panel          View : No data to display.                        ----------------------------------------------------------------------------------------------------------------------  Imaging Results (Last 24 Hours)     ** No results found for the last 24 hours. **          ----------------------------------------------------------------------------------------------------------------------    Pertinent Infectious Disease Results                Assessment/Plan       Assessment        Acute pyelonephritis  E. coli bacteremia  Aspiration pneumonia           Plan      Patient resting in bed this morningEren Soares  at bedside.  Overall feels well today.  Currently on 0.5 L per nasal cannula with no apparent distress.  Afebrile, denies diarrhea.  Denies urinary symptoms.  Lungs clear to auscultation bilaterally.  Abdomen soft, nontender.    Blood cultures from 5/12/2023 show no growth thus far.    Urine culture from 5/10/2023 finalized as 50,000 colonies of mixed nina.    For now recommend to continue ceftriaxone 2 g IV every 24 hours for treatment of E. coli bacteremia secondary to UTI and pneumonia.  Upon discharge patient can be further de-escalated to oral cefdinir per pharmacy dosing to continue through 5/21/2023 for treatment of bacteremia, UTI and pneumonia.  Patient overall stable from ID standpoint.  Okay to discharge from ID standpoint.      ANTIMICROBIAL THERAPY    cefTRIAXone (ROCEPHIN) 2gm IVPB in 100 mL NS (VTB)     Code Status:   Code Status and Medical Interventions:   Ordered at: 05/10/23 1720     Medical Intervention Limits:    NO intubation (DNI)     Level Of Support Discussed With:    Next of Kin (If No Surrogate)     Code Status (Patient has no pulse and is not breathing):    No CPR (Do Not Attempt to Resuscitate)     Medical Interventions (Patient has pulse or is breathing):    Limited Support     Release to patient:    Routine Release       ANA Hendrickson  05/16/23  11:19 EDT

## 2023-05-16 NOTE — PLAN OF CARE
Goal Outcome Evaluation:  Plan of Care Reviewed With: patient   Tele: SR 60s, SBPs 95-140s, no c/o CP or acute distress ntd this shift. Will continue to follow plan of care.     Progress: improving

## 2023-05-16 NOTE — PROGRESS NOTES
[facility]  PROGRESS NOTE      Patient information:  Name: Anh Saldana  Age/Sex: 88 y.o. female  :  1934        PCP: Meme Medellin MD  Attending: Daisha Rm DO  MRN:  [id number]  Visit Number:  [id number]    LOS:  LOS: 6 days   CODE STATUS:    Code Status and Medical Interventions:   Ordered at: 05/10/23 1720     Medical Intervention Limits:    NO intubation (DNI)     Level Of Support Discussed With:    Next of Kin (If No Surrogate)     Code Status (Patient has no pulse and is not breathing):    No CPR (Do Not Attempt to Resuscitate)     Medical Interventions (Patient has pulse or is breathing):    Limited Support     Release to patient:    Routine Release       PROBLEM LIST:Principal Problem:    Acute on chronic heart failure with preserved ejection fraction (HFpEF)  Active Problems:    Acute respiratory failure with hypoxemia    Severe Malnutrition (HCC)      Reason for Cardiology follow-up: NSTEMI and HFpEF    Subjective   ADMISSION INFORMATION:  Chief Complaint   Patient presents with   • Edema       HPI:  Anh Saldana is a 88 y.o. female has been admitted to [facility] ([place] [facility]) with history of increasing shortness of breath and bilateral leg edema.  Patient is hearing impairment and somewhat difficult to get a reliable history.  Her past medical history includes HTN, CKD, RA,  persistent atrial fibrillation on Eliquis (however,her A-fib is paroxysmal during this hospital stay), HFpEF, (Echocardiogram done  showed LVEF-70%), moderate MR, mild AAS and mild AR, moderate to severe TR and severe pulmonary hypertension.      Interval History:   Patient is in room 325 and was examined by Dr. Jenkins.  Telemetry reveals sinus rhythm 60s with 11 beat run of aberrant beats 6:54 AM today.  Please see attached strips below.  Her creatinine continues to improve to 1.20. Patient is lying in bed resting quietly. No acute distress is noted at this  "time. She reports, \"I am making it and feeling a little better today than yesterday\" when asked how is her breathing.  She denies chest pain or palpitations. Her brother is at bedside. proBNP is 66,260.0 today.     Orders: Lasix 40 mg IV x 1 dose in addition to her routine Bumex 1 mg PO daily due to elevated proBNP.  ReDs vest reading pending. BMP in AM.     Vital Signs  Temp:  [97.1 °F (36.2 °C)-98.3 °F (36.8 °C)] 98.3 °F (36.8 °C)  Heart Rate:  [66-81] 70  Resp:  [18-20] 18  BP: (138-153)/(70-88) 146/72  Flow (L/min):  [0.5-1] 0.5  Vital Signs (last 72 hrs)       05/13 0700  05/14 0659 05/14 0700  05/15 0659 05/15 0700  05/16 0659 05/16 0700  05/16 0718   Most Recent      Temp (°F) 96.9 -  98.3    97.3 -  98.3    97.1 -  98.3       98.3 (36.8) 05/16 0313    Heart Rate 60 -  80    62 -  70    65 -  81      69     69 05/16 0701    Resp 16 -  28    16 -  20    18 -  20      18     18 05/16 0701    /66 -  145/88    127/70 -  157/73    136/82 -  153/88       144/70 05/16 0500    SpO2 (%) 94 -  100    90 -  99    90 -  99      95     95 05/16 0701        Body mass index is 17.77 kg/m².    Intake/Output Summary (Last 24 hours) at 5/16/2023 1123  Last data filed at 5/15/2023 1800  Gross per 24 hour   Intake 120 ml   Output 300 ml   Net -180 ml     Objective  Objective     Physical Exam:      General Appearance:    Alert, cooperative, in no acute distress. Thin, frail, and [place].    Head:    Normocephalic, without obvious abnormality, atraumatic.   Eyes:                          Conjunctivae and sclerae normal, no icterus, no pallor, corneas clear.   Neck:   No adenopathy, supple, trachea midline, no thyromegaly, no carotid bruit, no JVD. Contracture noted to her neck.   Lungs:     Crackles noted bibasilar to auscultation, respirations regular, even and unlabored.    Heart:    Regular rhythm and normal rate, normal S1 and S2,systolic murmur heard at the LLSB grade 3/6, no gallop, no rub, no click   Chest Wall:    No " abnormalities observed.   Abdomen:     Normal bowel sounds, no masses, no organomegaly, soft nontender, nondistended, no guarding, no rebound tenderness.   Extremities:   Moves all extremities well, no edema, no cyanosis, no            Redness. Contractures noted to bilateral hands.   Pulses:   Pulses palpable and equal bilaterally.   Skin:   No bleeding, bruising or rash.   Neurologic:   Alert and Oriented x 3, Speech Clear & comprehensive.       Results review   Results Review:     Results from last 7 days   Lab Units 05/14/23  0648 05/13/23  0207 05/12/23  0019 05/11/23  0019 05/10/23  1134   WBC 10*3/mm3 9.01 9.38 16.70* 26.38* 20.55*   HEMOGLOBIN g/dL 11.6* 11.6* 11.9* 12.0 12.7   PLATELETS 10*3/mm3 233 201 183 189 247     Results from last 7 days   Lab Units 05/16/23  0220 05/15/23  0505 05/14/23  0648 05/13/23  0207 05/12/23  0019 05/11/23  0019 05/10/23  1134   SODIUM mmol/L 132* 131* 137 136 138 144 144   POTASSIUM mmol/L 4.1 4.4 4.3 4.9 4.4 4.1 4.5   CHLORIDE mmol/L 95* 98 99 100 102 106 103   CO2 mmol/L 25.0 22.1 29.5* 25.3 19.2* 25.2 22.5   BUN mg/dL 40* 42* 46* 55* 50* 40* 35*   CREATININE mg/dL 1.20* 1.26* 1.54* 1.97* 1.85* 1.76* 1.55*   CALCIUM mg/dL 8.9 8.8 9.1 9.0 8.7 9.0 9.5   GLUCOSE mg/dL 112* 84 96 115* 121* 123* 133*   ALT (SGPT) U/L  --   --  21  --   --   --  10   AST (SGOT) U/L  --   --  26  --   --   --  32     Results from last 7 days   Lab Units 05/16/23  0656 05/16/23  0431 05/11/23  0935 05/10/23  1408 05/10/23  1134   HSTROP T ng/L 58* 59* 89* 75* 68*     Lab Results   Component Value Date    PROBNP 66,260.0 (H) 05/16/2023    PROBNP 56,301.0 (H) 05/10/2023     No results found.     Lab Results   Component Value Date    INR 1.30 (H) 05/10/2023    INR 0.98 11/10/2018     Lab Results   Component Value Date    MG 2.0 05/16/2023    MG 2.5 (H) 05/15/2023    MG 2.0 05/11/2023     Lab Results   Component Value Date    TSH 3.950 05/10/2023    TRIG 126 05/11/2023    HDL 56 05/11/2023    LDL 67  2023      Pain Management Panel          View : No data to display.                       Imaging Results (Last 48 Hours)     ** No results found for the last 48 hours. **          ECHO:  Results for orders placed during the hospital encounter of 21    Adult Transthoracic Echo Complete W/ Cont if Necessary Per Protocol    Interpretation Summary  · Normal left ventricular cavity size and wall thickness noted. All left ventricular wall segments contract normally  · Left ventricular ejection fraction appears to be 66 - 70%.  · Left ventricular diastolic function is consistent with (grade II w/high LAP) pseudonormalization.  · There is moderate thickening and calcification of the non-coronary cusp(s) of the aortic valve.  · Mild aortic valve regurgitation is present. Mild aortic valve stenosis is present.  · There are myxomatous changes of the mitral valve apparatus present. Moderate mitral valve regurgitation is present. No significant mitral valve stenosis is present.  · Moderate to severe tricuspid valve regurgitation is present. Estimated right ventricular systolic pressure from tricuspid regurgitation is markedly elevated (100 mmHg).  · Severe pulmonary hypertension is present.  · There is no evidence of pericardial effusion.      STRESS TEST:  No results found for this or any previous visit.     HEART CATH:  No results found for this or any previous visit.      [id number]              TELEMETRY: SR 60's                  I reviewed the patient's new clinical results.    Medication Review:   Current list of medications may not reflect those currently placed in orders that are not signed or are being held.     apixaban, 2.5 mg, Oral, Q12H  aspirin, 81 mg, Oral, Daily  atorvastatin, 20 mg, Oral, Nightly  budesonide-formoterol, 2 puff, Inhalation, BID - RT  bumetanide, 1 mg, Oral, Daily  cefTRIAXone, 2 g, Intravenous, Q24H  hydrALAZINE, 10 mg, Oral, Q8H  isosorbide mononitrate, 30 mg, Oral,  Q24H  metoprolol tartrate, 50 mg, Oral, BID  pantoprazole, 40 mg, Oral, QAM AC  polyethylene glycol, 17 g, Oral, Daily  predniSONE, 2.5 mg, Oral, Daily With Breakfast  senna-docusate sodium, 1 tablet, Oral, BID  sodium chloride, 10 mL, Intravenous, Q12H         •  magic barrier cream  •  nitroglycerin  •  [COMPLETED] Insert Peripheral IV **AND** sodium chloride  •  sodium chloride  •  sodium chloride    Assessment      1. Acute HFpEF, improving  2. Acute kidney injury on chronic kidney disease, improving  3. Elevated high since her troponin, possibly due to type II MI from heart failure and acute kidney injury, patient has been relatively asymptomatic and clinically stable.  She is not a candidate for further evaluation either with a nuclear stress test or chronic catheterization due to her severe kyphosis.  4. Paroxysmal atrial fibrillation, maintaining sinus rhythm.  Patient is on Eliquis for stroke prevention.  5. E. coli bacteremia, being managed by the infectious disease.    Plan     1. Will obtain a proBNP and if this is significant elevated, we will give her IV diuretics.  2. For her atrial fibrillation, continue with Eliquis.  3. No ischemic work-up planned due to her advanced age and kyphosis.    I have discussed the patients findings and my recommendations with patient and her brother at bedside.    Electronically signed by ANA Kevin, 05/16/23, 11:29 AM EDT.    Electronically signed by Alex Jenkins MD, 05/17/23, 1:58 PM EDT.            Please note that portions of this note were completed with a voice recognition program.    Please note that portions of this note were copied and has been reviewed and is accurate as of 5/16/2023 .

## 2023-05-16 NOTE — CASE MANAGEMENT/SOCIAL WORK
Discharge Planning Assessment   [place]     Patient Name: Anh Saldana  MRN: [id number]  Today's Date: 5/16/2023    Admit Date: 5/10/2023       Discharge Plan     Row Name 05/16/23 1233       Plan    Plan SS received Physician consult for short term SNF rehab placement. SS spoke with Pt and Pt's brother Octavio at bedside on this date. Pt and family are agreeable to SNF, prefer [facility]. SS contacted [facility] and made her aware referral is being faxed via Epic basket. SS to follow.    14:20pm: [facility] requested updated PT/OT notes before making decision about possible admit. SS notified therapy services and Dr. Contreras on this date. SS to follow.                 MARIA C Dickerson

## 2023-05-16 NOTE — THERAPY EVALUATION
Acute Care - Occupational Therapy Initial Evaluation  CHANTEL Avelar     Patient Name: Anh Saldana  : 1934  MRN: [id number]  Today's Date: 2023  Onset of Illness/Injury or Date of Surgery: 05/10/23     Referring Physician: Jag    Admit Date: 5/10/2023       ICD-10-CM ICD-9-CM   1. Non-STEMI (non-ST elevated myocardial infarction)  I21.4 410.70   2. Congestive heart failure, unspecified HF chronicity, unspecified heart failure type  I50.9 428.0   3. Sepsis, due to unspecified organism, unspecified whether acute organ dysfunction present  A41.9 038.9     995.91   4. TANA (acute kidney injury)  N17.9 584.9   5. Hypoxemia  R09.02 799.02     Patient Active Problem List   Diagnosis   • Osteoporosis, unspecified   • Hypertensive urgency   • Essential hypertension   • Dyspnea on exertion   • CHF (congestive heart failure)   • Persistent atrial fibrillation   • Hearing loss   • Acute on chronic heart failure with preserved ejection fraction (HFpEF)   • Acute respiratory failure with hypoxemia   • Severe Malnutrition (HCC)     Past Medical History:   Diagnosis Date   • Breast cancer    • CHF (congestive heart failure)    • Chronic kidney disease     acute kidney failure   • [place] (hard of hearing)    • Hypertension    • Osteoarthritis    • Osteoporosis    • Rheumatoid arthritis    • Right-sided Bell's palsy    • Shortness of breath     sleeps with oxygen at night     Past Surgical History:   Procedure Laterality Date   • BREAST LUMPECTOMY Left     benign   • CATARACT EXTRACTION W/ INTRAOCULAR LENS  IMPLANT, BILATERAL Bilateral    • SHOULDER SURGERY Left     metal plate in left shoulder         OT ASSESSMENT FLOWSHEET (last 12 hours)     OT Evaluation and Treatment     Row Name 23 1400                   OT Time and Intention    Document Type evaluation  -KR        Mode of Treatment occupational therapy  -KR        Patient Effort adequate  -KR           General Information    General Observations of Patient  pleasant/cooperative  -KR           Living Environment    Current Living Arrangements home  -KR        People in Home sibling(s)  -KR        Primary Care Provided by self  -KR           Cognition    Affect/Mental Status (Cognition) WFL  -KR        Orientation Status (Cognition) oriented x 3  -KR        Follows Commands (Cognition) WFL  -KR           Range of Motion Comprehensive    Comment, General Range of Motion R shoulder 2/5, R elbow 4/5, R hand noted ulnar drift deformity but fxl for self care assist/performance, LUE 4/5  -KR           Strength Comprehensive (MMT)    Comment, General Manual Muscle Testing (MMT) Assessment 2+/3-  -KR           Activities of Daily Living    BADL Assessment/Intervention bathing;upper body dressing;lower body dressing;grooming;feeding;toileting  -KR           Bathing Assessment/Intervention    [place] Level (Bathing) bathing skills;maximum assist (25% patient effort)  -KR           Upper Body Dressing Assessment/Training    [place] Level (Upper Body Dressing) upper body dressing skills;moderate assist (50% patient effort)  -KR           Lower Body Dressing Assessment/Training    [place] Level (Lower Body Dressing) lower body dressing skills;maximum assist (25% patient effort)  -KR           Grooming Assessment/Training    [place] Level (Grooming) grooming skills;minimum assist (75% patient effort)  -KR           Self-Feeding Assessment/Training    [place] Level (Feeding) feeding skills;minimum assist (75% patient effort)  -KR           Toileting Assessment/Training    [place] Level (Toileting) toileting skills;maximum assist (25% patient effort)  -KR           Wound 05/10/23 1727 coccyx    Wound - Properties Group Placement Date: 05/10/23  -SC Placement Time: 1727  -SC Present on Hospital Admission: Y  -SC Location: coccyx  -SC    Retired Wound - Properties Group Placement Date: 05/10/23  -SC Placement Time: 1727 -SC Present on Hospital Admission: Y   -SC Location: coccyx  -SC    Retired Wound - Properties Group Date first assessed: 05/10/23  -SC Time first assessed: 1727  -SC Present on Hospital Admission: Y  -SC Location: coccyx  -SC       Plan of Care Review    Plan of Care Reviewed With patient;family;sibling  -KR           Therapy Assessment/Plan (OT)    Planned Therapy Interventions (OT) activity tolerance training;adaptive equipment training;BADL retraining  -KR           Therapy Plan Review/Discharge Plan (OT)    Anticipated Discharge Disposition (OT) extended care facility;inpatient rehabilitation facility  -KR           OT Goals    Dressing Goal Selection (OT) dressing, OT goal 1  -KR        Activity Tolerance Goal Selection (OT) activity tolerance, OT goal 1  -KR           Dressing Goal 1 (OT)    Activity/Device (Dressing Goal 1, OT) dressing skills, all  -KR        [place]/Cues Needed (Dressing Goal 1, OT) minimum assist (75% or more patient effort)  -KR        Time Frame (Dressing Goal 1, OT) by discharge  -KR            Activity Tolerance Goal 1 (OT)    Activity Tolerance Goal 1 (OT) Increase to enhance self care performance  -KR        Activity Level (Endurance Goal 1, OT) 15 min activity  -KR        Time Frame (Activity Tolerance Goal 1, OT) by discharge  -KR           Patient Education Goal (OT)    Activity (Patient Education Goal, OT) AE/DME training to enhance self care safety/independence  -KR        [place]/Cues/Accuracy (Memory Goal 2, OT) verbalizes understanding  -KR        Time Frame (Patient Education Goal, OT) by discharge  -KR              User Key  (r) = Recorded By, (t) = Taken By, (c) = Cosigned By    Initials Name Effective Dates    Odell Batista OT 06/16/21 -     Lydia Cordero RN 01/24/23 -                        OT Recommendation and Plan  Planned Therapy Interventions (OT): activity tolerance training, adaptive equipment training, BADL retraining  Plan of Care Review  Plan of Care Reviewed With: patient,  family, sibling  Plan of Care Reviewed With: patient, family, sibling        Time Calculation:     Therapy Charges for Today     Code Description Service Date Service Provider Modifiers Qty    11100292822 HC OT EVAL MOD COMPLEXITY 4 5/16/2023 Odell Reno, OT GO 1               Odell Reno OT  5/16/2023

## 2023-05-16 NOTE — THERAPY TREATMENT NOTE
Acute Care - Physical Therapy Treatment Note  CHANTEL Avelar     Patient Name: Anh Saldana  : 1934  MRN: [id number]  Today's Date: 2023   Onset of Illness/Injury or Date of Surgery: 05/10/23  Visit Dx:     ICD-10-CM ICD-9-CM   1. Non-STEMI (non-ST elevated myocardial infarction)  I21.4 410.70   2. Congestive heart failure, unspecified HF chronicity, unspecified heart failure type  I50.9 428.0   3. Sepsis, due to unspecified organism, unspecified whether acute organ dysfunction present  A41.9 038.9     995.91   4. TANA (acute kidney injury)  N17.9 584.9   5. Hypoxemia  R09.02 799.02     Patient Active Problem List   Diagnosis   • Osteoporosis, unspecified   • Hypertensive urgency   • Essential hypertension   • Dyspnea on exertion   • CHF (congestive heart failure)   • Persistent atrial fibrillation   • Hearing loss   • Acute on chronic heart failure with preserved ejection fraction (HFpEF)   • Acute respiratory failure with hypoxemia   • Severe Malnutrition (HCC)     Past Medical History:   Diagnosis Date   • Breast cancer    • CHF (congestive heart failure)    • Chronic kidney disease     acute kidney failure   • [place] (hard of hearing)    • Hypertension    • Osteoarthritis    • Osteoporosis    • Rheumatoid arthritis    • Right-sided Bell's palsy    • Shortness of breath     sleeps with oxygen at night     Past Surgical History:   Procedure Laterality Date   • BREAST LUMPECTOMY Left     benign   • CATARACT EXTRACTION W/ INTRAOCULAR LENS  IMPLANT, BILATERAL Bilateral    • SHOULDER SURGERY Left     metal plate in left shoulder     PT Assessment (last 12 hours)     PT Evaluation and Treatment     Row Name 23 1522          Physical Therapy Time and Intention    Subjective Information complains of;pain  pt. has no complaints at rest, however, cries out in pain with mobilization and LE ROM.  -AG     Document Type therapy note (daily note)  -AG     Mode of Treatment physical therapy  -AG     Patient Effort  poor  -AG     Symptoms Noted During/After Treatment increased pain  -AG     Row Name 05/16/23 1522          General Information    Patient Profile Reviewed yes  -AG     Patient Observations poorly cooperative;lethargic  -AG     Patient/Family/Caregiver Comments/Observations pt's family states she is very fatigued from not sleeping well last night.  He also reports she has been up in chair some today.  -AG     General Observations of Patient pt. R sidelying; verbalizes some but never opens eyes on therapist's commands.  -AG     Prior Level of Function min assist:  ambulated short distances within home with walker.  -AG     Existing Precautions/Restrictions fall;oxygen therapy device and L/min  -AG     Limitations/Impairments safety/cognitive;hearing  -AG     Benefits Reviewed patient and family:;improve function;increase independence;increase strength;increase balance;increase knowledge;decrease risk of DVT  -AG     Barriers to Rehab cognitive status;previous functional deficit  -AG     Row Name 05/16/23 1522          Living Environment    Current Living Arrangements home  -AG     People in Home sibling(s)  -AG     Primary Care Provided by self  -     Row Name 05/16/23 1522          Pain    Additional Documentation Pain Scale: FACES Pre/Post-Treatment (Group)  -[facility] Name 05/16/23 1522          Pain Scale: FACES Pre/Post-Treatment    Pain: FACES Scale, Pretreatment 0-->no hurt  -AG     Posttreatment Pain Rating 4-->hurts little more  -AG     [facility] Name 05/16/23 1522          Cognition    Affect/Mental Status (Cognition) low arousal/lethargic  -AG     Follows Commands (Cognition) follows one-step commands;verbal cues/prompting required;repetition of directions required;0-24% accuracy  -AG     Personal Safety Interventions supervised activity  -     Row Name 05/16/23 1522          Range of Motion (ROM)    Range of Motion --  pt. in excessive neck flexion; PT provided gentle PROM into extension, attempted  rotation, however, pt. tolerated poorly.  -AG     Row Name 05/16/23 1522          Sensory    Hearing Status hearing impairment, bilaterally  -     Row Name 05/16/23 1522          Mobility    Extremity Weight-bearing Status --  no restrictions  -     Row Name 05/16/23 1522          Bed Mobility    Bed Mobility scooting/bridging;rolling left;rolling right;supine-sit;sit-supine  -AG     Rolling Left [place] (Bed Mobility) dependent (less than 25% patient effort)  -AG     Rolling Right [place] (Bed Mobility) dependent (less than 25% patient effort)  -AG     Scooting/Bridging [place] (Bed Mobility) maximum assist (25% patient effort);2 person assist  -AG     Supine-Sit [place] (Bed Mobility) dependent (less than 25% patient effort)  -AG     Sit-Supine [place] (Bed Mobility) dependent (less than 25% patient effort)  -AG     Bed Mobility, Safety Issues impaired trunk control for bed mobility;decreased use of legs for bridging/pushing;decreased use of arms for pushing/pulling;cognitive deficits limit understanding  -AG     Assistive Device (Bed Mobility) bed rails;draw sheet  -     Row Name 05/16/23 1522          Transfers    Comment, (Transfers) unsafe to assess  -     Row Name 05/16/23 1522          Safety Issues, Functional Mobility    Safety Issues Affecting Function (Mobility) ability to follow commands;awareness of need for assistance;safety precautions follow-through/compliance;safety precaution awareness  -AG     Impairments Affecting Function (Mobility) balance;cognition;endurance/activity tolerance;strength;range of motion (ROM);pain  -AG     Cognitive Impairments, Mobility Safety/Performance insight into deficits/self-awareness;awareness, need for assistance;safety precaution awareness;safety precaution follow-through  -     Row Name 05/16/23 1522          Balance    Balance Assessment sitting static balance;sitting dynamic balance;sit to stand dynamic balance;standing static  balance;standing dynamic balance  -AG     Static Sitting Balance verbal cues;non-verbal cues (demo/gesture);maximum assist  -AG     Position, Sitting Balance supported;sitting edge of bed  -AG     Comment, Balance excessive trunk, neck flexion; attempted extension as well as scapular retraction to which pt. resists and cries out in pain.  -     Row Name 05/16/23 1522          Motor Skills    Therapeutic Exercise hip;knee;ankle  -AG     Row Name 05/16/23 1522          Hip (Therapeutic Exercise)    Hip (Therapeutic Exercise) PROM (passive range of motion)  -     Hip PROM (Therapeutic Exercise) bilateral;flexion;extension;aBduction;aDduction;external rotation;internal rotation;supine  -AG     Row Name 05/16/23 1522          Knee (Therapeutic Exercise)    Knee (Therapeutic Exercise) PROM (passive range of motion)  -     Knee PROM (Therapeutic Exercise) bilateral;flexion;extension;supine  -     Row Name 05/16/23 1522          Ankle (Therapeutic Exercise)    Ankle (Therapeutic Exercise) PROM (passive range of motion)  -     Ankle PROM (Therapeutic Exercise) bilateral;dorsiflexion;supine  -     Row Name             Wound 05/10/23 1727 coccyx    Wound - Properties Group Placement Date: 05/10/23  -SC Placement Time: 1727  -SC Present on Hospital Admission: Y  -SC Location: coccyx  -SC    Retired Wound - Properties Group Placement Date: 05/10/23  -SC Placement Time: 1727  -SC Present on Hospital Admission: Y  -SC Location: coccyx  -SC    Retired Wound - Properties Group Date first assessed: 05/10/23  -SC Time first assessed: 1727  -SC Present on Hospital Admission: Y  -SC Location: coccyx  -SC    Row Name 05/16/23 1522          Coping    Observed Emotional State uncooperative;withdrawn  -     Verbalized Emotional State acceptance  -     Trust Relationship/Rapport care explained;choices provided;thoughts/feelings acknowledged  -     Family/Support Persons family  -     Involvement in Care at  bedside;attentive to patient  -AG     Family/Support System Care support provided;self-care encouraged  -AG     Row Name 05/16/23 1522          Plan of Care Review    Plan of Care Reviewed With patient;sibling  -AG     Progress no change  -AG     Outcome Evaluation PT treatment complete.  PT provided B LE PROM and positioning into B knee extension to which pt. voiced pain with all palpation and ROM of LE.  Encouraged hip and knee extension.  Max/ dependent for all bed mobility at this time.  Pt. lethargic, poor participant with PT.  -AG     Row Name 05/16/23 1522          Therapy Plan Review/Discharge Plan (PT)    Therapy Plan Review (PT) evaluation/treatment results reviewed;care plan/treatment goals reviewed;risks/benefits reviewed;current/potential barriers reviewed;participants voiced agreement with care plan;participants included;patient;sibling  -AG           User Key  (r) = Recorded By, (t) = Taken By, (c) = Cosigned By    Initials Name Provider Type    Yaneth Gerber, PT Physical Therapist    Lydia Cordero RN Registered Nurse                Physical Therapy Education     Title: PT OT SLP Therapies (In Progress)     Topic: Physical Therapy (In Progress)     Point: Mobility training (In Progress)     Learning Progress Summary           Patient Acceptance, E,D, NL by AG at 5/16/2023 1522   Family Acceptance, E,D, NR by AG at 5/16/2023 1522                   Point: Home exercise program (In Progress)     Learning Progress Summary           Patient Acceptance, E,D, NL by AG at 5/16/2023 1522   Family Acceptance, E,D, NR by  at 5/16/2023 1522                   Point: Body mechanics (In Progress)     Learning Progress Summary           Patient Acceptance, E,D, NL by AG at 5/16/2023 1522   Family Acceptance, E,D, NR by  at 5/16/2023 1522                   Point: Precautions (In Progress)     Learning Progress Summary           Patient Acceptance, E,D, NL by AG at 5/16/2023 1522   Family Acceptance,  E,D, NR by  at 5/16/2023 1522                               User Key     Initials Effective Dates Name Provider Type Discipline     06/16/21 -  Yaneth Le, PT Physical Therapist PT              PT Recommendation and Plan     Plan of Care Reviewed With: patient, sibling  Progress: no change  Outcome Evaluation: PT treatment complete.  PT provided B LE PROM and positioning into B knee extension to which pt. voiced pain with all palpation and ROM of LE.  Encouraged hip and knee extension.  Max/ dependent for all bed mobility at this time.  Pt. lethargic, poor participant with PT.       Time Calculation:    PT Charges     Row Name 05/16/23 1521             Time Calculation    PT Received On 05/16/23  -            User Key  (r) = Recorded By, (t) = Taken By, (c) = Cosigned By    Initials Name Provider Type     Yaneth Le, PT Physical Therapist              Therapy Charges for Today     Code Description Service Date Service Provider Modifiers Qty    52110040007  PT THER PROC EA 15 MIN 5/16/2023 Yaneth Le, PT GP 1    [id number]  PT THERAPEUTIC ACT EA 15 MIN 5/16/2023 Yaneth Le, PT GP 1          PT G-Codes  AM-PAC 6 Clicks Score (PT): 8    Yaneth Le PT  5/16/2023

## 2023-05-16 NOTE — PLAN OF CARE
Goal Outcome Evaluation:                     Pt currently resting in bed. No s/s of acute distress noted at this time. No complaints verbalized at this time. Pt had a 6 beat run of PSVT, RAUDEL Ellsworth aware (see Orders/ Results). Plan of care ongoing.

## 2023-05-17 PROBLEM — I50.33 ACUTE ON CHRONIC HEART FAILURE WITH PRESERVED EJECTION FRACTION (HFPEF): Status: ACTIVE | Noted: 2019-01-16

## 2023-05-17 LAB
ANION GAP SERPL CALCULATED.3IONS-SCNC: 11.2 MMOL/L (ref 5–15)
BACTERIA SPEC AEROBE CULT: NORMAL
BACTERIA SPEC AEROBE CULT: NORMAL
BUN SERPL-MCNC: 41 MG/DL (ref 8–23)
BUN/CREAT SERPL: 32.8 (ref 7–25)
CALCIUM SPEC-SCNC: 9 MG/DL (ref 8.6–10.5)
CHLORIDE SERPL-SCNC: 96 MMOL/L (ref 98–107)
CO2 SERPL-SCNC: 25.8 MMOL/L (ref 22–29)
CREAT SERPL-MCNC: 1.25 MG/DL (ref 0.57–1)
DEPRECATED RDW RBC AUTO: 61.2 FL (ref 37–54)
EGFRCR SERPLBLD CKD-EPI 2021: 41.5 ML/MIN/1.73
ERYTHROCYTE [DISTWIDTH] IN BLOOD BY AUTOMATED COUNT: 16.2 % (ref 12.3–15.4)
GLUCOSE SERPL-MCNC: 107 MG/DL (ref 65–99)
HCT VFR BLD AUTO: 36.6 % (ref 34–46.6)
HGB BLD-MCNC: 11.5 G/DL (ref 12–15.9)
MAGNESIUM SERPL-MCNC: 2.3 MG/DL (ref 1.6–2.4)
MCH RBC QN AUTO: 32.4 PG (ref 26.6–33)
MCHC RBC AUTO-ENTMCNC: 31.4 G/DL (ref 31.5–35.7)
MCV RBC AUTO: 103.1 FL (ref 79–97)
PLATELET # BLD AUTO: 300 10*3/MM3 (ref 140–450)
PMV BLD AUTO: 9.6 FL (ref 6–12)
POTASSIUM SERPL-SCNC: 4.3 MMOL/L (ref 3.5–5.2)
RBC # BLD AUTO: 3.55 10*6/MM3 (ref 3.77–5.28)
SODIUM SERPL-SCNC: 133 MMOL/L (ref 136–145)
WBC NRBC COR # BLD: 11.61 10*3/MM3 (ref 3.4–10.8)

## 2023-05-17 PROCEDURE — 80048 BASIC METABOLIC PNL TOTAL CA: CPT | Performed by: NURSE PRACTITIONER

## 2023-05-17 PROCEDURE — 94799 UNLISTED PULMONARY SVC/PX: CPT

## 2023-05-17 PROCEDURE — 94761 N-INVAS EAR/PLS OXIMETRY MLT: CPT

## 2023-05-17 PROCEDURE — 85027 COMPLETE CBC AUTOMATED: CPT | Performed by: STUDENT IN AN ORGANIZED HEALTH CARE EDUCATION/TRAINING PROGRAM

## 2023-05-17 PROCEDURE — 99232 SBSQ HOSP IP/OBS MODERATE 35: CPT | Performed by: INTERNAL MEDICINE

## 2023-05-17 PROCEDURE — 25010000002 CEFTRIAXONE PER 250 MG: Performed by: STUDENT IN AN ORGANIZED HEALTH CARE EDUCATION/TRAINING PROGRAM

## 2023-05-17 PROCEDURE — 63710000001 PREDNISONE PER 5 MG: Performed by: STUDENT IN AN ORGANIZED HEALTH CARE EDUCATION/TRAINING PROGRAM

## 2023-05-17 PROCEDURE — 97530 THERAPEUTIC ACTIVITIES: CPT

## 2023-05-17 PROCEDURE — 99232 SBSQ HOSP IP/OBS MODERATE 35: CPT | Performed by: STUDENT IN AN ORGANIZED HEALTH CARE EDUCATION/TRAINING PROGRAM

## 2023-05-17 PROCEDURE — 25010000002 FUROSEMIDE PER 20 MG: Performed by: NURSE PRACTITIONER

## 2023-05-17 PROCEDURE — 97110 THERAPEUTIC EXERCISES: CPT

## 2023-05-17 PROCEDURE — 83735 ASSAY OF MAGNESIUM: CPT

## 2023-05-17 RX ORDER — FUROSEMIDE 10 MG/ML
40 INJECTION INTRAMUSCULAR; INTRAVENOUS ONCE
Status: COMPLETED | OUTPATIENT
Start: 2023-05-17 | End: 2023-05-17

## 2023-05-17 RX ADMIN — ATORVASTATIN CALCIUM 20 MG: 20 TABLET, FILM COATED ORAL at 22:48

## 2023-05-17 RX ADMIN — HYDRALAZINE HYDROCHLORIDE 10 MG: 10 TABLET ORAL at 22:48

## 2023-05-17 RX ADMIN — ASPIRIN 81 MG: 81 TABLET, COATED ORAL at 08:52

## 2023-05-17 RX ADMIN — DOCUSATE SODIUM 50 MG AND SENNOSIDES 8.6 MG 1 TABLET: 8.6; 5 TABLET, FILM COATED ORAL at 08:52

## 2023-05-17 RX ADMIN — METOPROLOL TARTRATE 50 MG: 50 TABLET, FILM COATED ORAL at 08:51

## 2023-05-17 RX ADMIN — METOPROLOL TARTRATE 50 MG: 50 TABLET, FILM COATED ORAL at 22:48

## 2023-05-17 RX ADMIN — APIXABAN 2.5 MG: 2.5 TABLET, FILM COATED ORAL at 08:52

## 2023-05-17 RX ADMIN — FUROSEMIDE 40 MG: 10 INJECTION, SOLUTION INTRAMUSCULAR; INTRAVENOUS at 10:36

## 2023-05-17 RX ADMIN — Medication 10 ML: at 08:52

## 2023-05-17 RX ADMIN — PREDNISONE 2.5 MG: 5 TABLET ORAL at 08:51

## 2023-05-17 RX ADMIN — Medication 10 ML: at 22:49

## 2023-05-17 RX ADMIN — ISOSORBIDE MONONITRATE 30 MG: 30 TABLET, EXTENDED RELEASE ORAL at 08:52

## 2023-05-17 RX ADMIN — PANTOPRAZOLE SODIUM 40 MG: 40 TABLET, DELAYED RELEASE ORAL at 08:51

## 2023-05-17 RX ADMIN — HYDRALAZINE HYDROCHLORIDE 10 MG: 10 TABLET ORAL at 05:00

## 2023-05-17 RX ADMIN — APIXABAN 2.5 MG: 2.5 TABLET, FILM COATED ORAL at 22:48

## 2023-05-17 RX ADMIN — BUDESONIDE AND FORMOTEROL FUMARATE DIHYDRATE 2 PUFF: 160; 4.5 AEROSOL RESPIRATORY (INHALATION) at 07:12

## 2023-05-17 RX ADMIN — BUMETANIDE 1 MG: 1 TABLET ORAL at 08:51

## 2023-05-17 RX ADMIN — CEFTRIAXONE 2 G: 2 INJECTION, POWDER, FOR SOLUTION INTRAMUSCULAR; INTRAVENOUS at 09:00

## 2023-05-17 RX ADMIN — BUDESONIDE AND FORMOTEROL FUMARATE DIHYDRATE 2 PUFF: 160; 4.5 AEROSOL RESPIRATORY (INHALATION) at 18:48

## 2023-05-17 NOTE — CASE MANAGEMENT/SOCIAL WORK
Discharge Planning Assessment   [place]     Patient Name: Anh Saldana  MRN: [id number]  Today's Date: 5/17/2023    Admit Date: 5/10/2023       Discharge Plan     Row Name 05/17/23 0940       Plan    [facility]. SS to follow.    11:59am: [facility] states Pt has been accepted clinically, plans to submit to insurance for pre-authorization on this date. SS notified Dr. Contreras. SS to follow.                 RYAN DickersonW

## 2023-05-17 NOTE — THERAPY TREATMENT NOTE
Acute Care - Occupational Therapy Treatment Note  CHANTEL Avelar     Patient Name: Anh Saldana  : 1934  MRN: [id number]  Today's Date: 2023  Onset of Illness/Injury or Date of Surgery: 05/10/23     Referring Physician: Jag    Admit Date: 5/10/2023       ICD-10-CM ICD-9-CM   1. Non-STEMI (non-ST elevated myocardial infarction)  I21.4 410.70   2. Congestive heart failure, unspecified HF chronicity, unspecified heart failure type  I50.9 428.0   3. Sepsis, due to unspecified organism, unspecified whether acute organ dysfunction present  A41.9 038.9     995.91   4. TANA (acute kidney injury)  N17.9 584.9   5. Hypoxemia  R09.02 799.02     Patient Active Problem List   Diagnosis   • Osteoporosis, unspecified   • Hypertensive urgency   • Essential hypertension   • Dyspnea on exertion   • CHF (congestive heart failure)   • Persistent atrial fibrillation   • Hearing loss   • Acute on chronic heart failure with preserved ejection fraction (HFpEF)   • Acute respiratory failure with hypoxemia   • Severe Malnutrition (HCC)     Past Medical History:   Diagnosis Date   • Breast cancer    • CHF (congestive heart failure)    • Chronic kidney disease     acute kidney failure   • [place] (hard of hearing)    • Hypertension    • Osteoarthritis    • Osteoporosis    • Rheumatoid arthritis    • Right-sided Bell's palsy    • Shortness of breath     sleeps with oxygen at night     Past Surgical History:   Procedure Laterality Date   • BREAST LUMPECTOMY Left     benign   • CATARACT EXTRACTION W/ INTRAOCULAR LENS  IMPLANT, BILATERAL Bilateral    • SHOULDER SURGERY Left     metal plate in left shoulder         OT ASSESSMENT FLOWSHEET (last 12 hours)     OT Evaluation and Treatment     Row Name 23 1151                   OT Time and Intention    Subjective Information complains of;pain  -LA        Document Type therapy note (daily note)  -LA        Mode of Treatment occupational therapy  -LA        Patient Effort fair  -LA         Symptoms Noted During/After Treatment fatigue  -LA           General Information    Patient Profile Reviewed yes  -LA        General Observations of Patient Patient sitting up completing self-feeding upon 2 attempts. Patient very slow with self-feeding task. Patient agreeable to therapy after eating. Patient with decreased level of alertness later in morning and required maxA with sitting up EOB and with UE ROM.  -LA        Existing Precautions/Restrictions fall;oxygen therapy device and L/min  high risk for contracture of neck  -LA           Cognition    Affect/Mental Status (Cognition) low arousal/lethargic  -LA        Orientation Status (Cognition) oriented to;person;place  -LA        Follows Commands (Cognition) delayed response/completion;repetition of directions required;verbal cues/prompting required  -LA           Bathing Assessment/Intervention    [place] Level (Bathing) maximum assist (25% patient effort);dependent (less than 25% patient effort)  -LA           Upper Body Dressing Assessment/Training    [place] Level (Upper Body Dressing) maximum assist (25% patient effort);dependent (less than 25% patient effort)  -LA           Lower Body Dressing Assessment/Training    [place] Level (Lower Body Dressing) maximum assist (25% patient effort);dependent (less than 25% patient effort)  -LA           Grooming Assessment/Training    [place] Level (Grooming) maximum assist (25% patient effort);dependent (less than 25% patient effort)  -LA           Self-Feeding Assessment/Training    [place] Level (Feeding) minimum assist (75% patient effort)  -LA           Toileting Assessment/Training    [place] Level (Toileting) maximum assist (25% patient effort);dependent (less than 25% patient effort)  -LA           Bed Mobility    Supine-Sit [place] (Bed Mobility) dependent (less than 25% patient effort)  -LA        Sit-Supine [place] (Bed Mobility) dependent (less than 25% patient  effort)  -LA           Motor Skills    Motor Skills coordination;functional endurance  -LA        Therapeutic Exercise shoulder;elbow/forearm;wrist;hand  -LA        Additional Documentation --  PROM completed with bilateral UE; focus on neck positioning and decreasing tone to reduce risk of contracture. Patient left asleep in tilted position to promote neck extension. Family present and nurse notified of positioning.  -LA           Balance    Position, Sitting Balance sitting edge of bed  -LA        Additional Documentation --  dependent for balance at EOB  -LA           Wound 05/10/23 1727 coccyx    Wound - Properties Group Placement Date: 05/10/23  -SC Placement Time: 1727  -SC Present on Hospital Admission: Y  -SC Location: coccyx  -SC    Retired Wound - Properties Group Placement Date: 05/10/23  -SC Placement Time: 1727  -SC Present on Hospital Admission: Y  -SC Location: coccyx  -SC    Retired Wound - Properties Group Date first assessed: 05/10/23  -SC Time first assessed: 1727  -SC Present on Hospital Admission: Y  -SC Location: [facility]  -SC       Plan of Care Review    Plan of Care Reviewed With patient;sibling  -LA           Positioning and Restraints    Pre-Treatment Position in bed  -LA        Post Treatment Position bed  -LA        In Bed supine;call light within reach;encouraged to call for assist;with family/caregiver;notified [facility]  -LA              User Key  (r) = Recorded By, (t) = Taken By, (c) = Cosigned By    Initials Name Effective Dates    Lydia Cordero RN 01/24/23 -     Agnieszka Mitchell OT 02/14/22 -                        OT Recommendation and Plan     Plan of Care Review  Plan of Care Reviewed With: patient, sibling  Plan of Care Reviewed With: patient, sibling        Time Calculation:     Therapy Charges for Today     Code Description Service Date Service Provider Modifiers Qty    [id number]  OT THER PROC EA 15 MIN 5/17/2023 Agnieszka Mendosa OT GO 1    [id number]  OT THERAPEUTIC  ACT EA 15 MIN 5/17/2023 Agnieszka Mendosa, OT GO 1               Agnieszka Mendosa, OT  5/17/2023

## 2023-05-17 NOTE — THERAPY TREATMENT NOTE
Acute Care - Physical Therapy Treatment Note  [facility]     Patient Name: Anh Saldana  : 1934  MRN: [id number]  Today's Date: 2023   Onset of Illness/Injury or Date of Surgery: 05/10/23  Visit Dx:     ICD-10-CM ICD-9-CM   1. Non-STEMI (non-ST elevated myocardial infarction)  I21.4 410.70   2. Congestive heart failure, unspecified HF chronicity, unspecified heart failure type  I50.9 428.0   3. Sepsis, due to unspecified organism, unspecified whether acute organ dysfunction present  A41.9 038.9     995.91   4. TANA (acute kidney injury)  N17.9 584.9   5. Hypoxemia  R09.02 799.02     Patient Active Problem List   Diagnosis   • Osteoporosis, unspecified   • Hypertensive urgency   • Essential hypertension   • Dyspnea on exertion   • CHF (congestive heart failure)   • Persistent atrial fibrillation   • Hearing loss   • Acute on chronic heart failure with preserved ejection fraction (HFpEF)   • Acute respiratory failure with hypoxemia   • Severe Malnutrition (HCC)     Past Medical History:   Diagnosis Date   • Breast cancer    • CHF (congestive heart failure)    • Chronic kidney disease     acute kidney failure   • [place] (hard of hearing)    • Hypertension    • Osteoarthritis    • Osteoporosis    • Rheumatoid arthritis    • Right-sided Bell's palsy    • Shortness of breath     sleeps with oxygen at night     Past Surgical History:   Procedure Laterality Date   • BREAST LUMPECTOMY Left     benign   • CATARACT EXTRACTION W/ INTRAOCULAR LENS  IMPLANT, BILATERAL Bilateral    • SHOULDER SURGERY Left     metal plate in left shoulder     PT Assessment (last 12 hours)     PT Evaluation and Treatment     Row Name 23 1220          Physical Therapy Time and Intention    Subjective Information complains of;weakness;fatigue  -AG     Document Type therapy note (daily note)  -AG     Mode of Treatment physical therapy  -AG     Patient Effort adequate  -AG     Symptoms Noted During/After Treatment fatigue;increased  pain  -     Row Name 05/17/23 1150          General Information    Patient Profile Reviewed yes  -AG     Patient Observations agree to therapy;cooperative;alert  -AG     Patient/Family/Caregiver Comments/Observations pt. supine with neck and trunk excessively flexed in bed; brother is present.  Pt. is much more alert and able to actively participate with therapy today as compared to yesterday.  -AG     Existing Precautions/Restrictions fall;oxygen therapy device and L/min  -AG     Limitations/Impairments hearing  -AG     Risks Reviewed patient and family:;increased discomfort  -AG     Benefits Reviewed patient and family:;improve function;increase independence;increase strength;increase balance;increase knowledge;decrease risk of DVT  -     Row Name 05/17/23 1150          Pain    Additional Documentation --  pt. experiences discomfort with LE mobilization as well as with positioning and CROM  -     Row Name 05/17/23 1150          Cognition    Affect/Mental Status (Cognition) WFL  -AG     Follows Commands (Cognition) follows one-step commands;repetition of directions required;verbal cues/prompting required;physical/tactile prompts required  -     Row Name 05/17/23 1150          Range of Motion (ROM)    Range of Motion --  significantly impaired CROM, mostly limited extension; poor trunk ROM.  -     Row Name 05/17/23 1150          Sensory    Hearing Status hearing impairment, bilaterally  -     Row Name 05/17/23 1150          Mobility    Extremity Weight-bearing Status --  no restrictions  -     Row Name 05/17/23 1150          Bed Mobility    Scooting/Bridging [place] (Bed Mobility) verbal cues;nonverbal cues (demo/gesture);maximum assist (25% patient effort);dependent (less than 25% patient effort)  -AG     Supine-Sit [place] (Bed Mobility) verbal cues;nonverbal cues (demo/gesture);maximum assist (25% patient effort);dependent (less than 25% patient effort)  -AG     Sit-Supine [place]  (Bed Mobility) verbal cues;nonverbal cues (demo/gesture);maximum assist (25% patient effort);dependent (less than 25% patient effort)  -AG     Bed Mobility, Safety Issues decreased use of arms for pushing/pulling;decreased use of legs for bridging/pushing;impaired trunk control for bed mobility  -     Assistive Device (Bed Mobility) bed rails;draw sheet  -     Row Name 05/17/23 1150          Transfers    Comment, (Transfers) unsafe to assess  -AG     Row Name 05/17/23 1150          Safety Issues, Functional Mobility    Impairments Affecting Function (Mobility) balance;coordination;endurance/activity tolerance;motor control;range of motion (ROM);pain;strength  -AG     Row Name 05/17/23 1150          Balance    Balance Assessment sitting static balance;sitting dynamic balance;standing static balance;sit to stand dynamic balance;standing dynamic balance;system impairments  -     Static Sitting Balance verbal cues;non-verbal cues (demo/gesture);maximum assist;dependent  -AG     Position, Sitting Balance supported;sitting edge of bed  -AG     Comment, Balance pt. is unable to maintain static sitting and requires max/ total assistance d/t poor trunk and cervical mobility.  -     Row Name 05/17/23 1150          Motor Skills    Therapeutic Exercise stretching;other (see comments)  -     Additional Documentation --  performed passive neck extension, rotation.  -     Row Name             Wound 05/10/23 1727 coccyx    Wound - Properties Group Placement Date: 05/10/23  -SC Placement Time: 1727  -SC Present on Hospital Admission: Y  -SC Location: coccyx  -SC    Retired Wound - Properties Group Placement Date: 05/10/23  -SC Placement Time: 1727  -SC Present on Hospital Admission: Y  -SC Location: coccyx  -SC    Retired Wound - Properties Group Date first assessed: 05/10/23  -SC Time first assessed: 1727  -SC Present on Hospital Admission: Y  -SC Location: coccyx  -SC    Row Name 05/17/23 1150          Coping     Observed Emotional State calm;cooperative  -     Verbalized Emotional State acceptance  -AG     Trust Relationship/Rapport thoughts/feelings acknowledged;choices provided;care explained  -AG     Family/Support Persons sibling  -AG     Involvement in Care at bedside;attentive to patient  -AG     Family/Support System Care support provided;self-care encouraged  -AG     Row Name 05/17/23 1150          Plan of Care Review    Plan of Care Reviewed With patient;sibling  -AG     Outcome Evaluation PT treatment complete.  Functional mobility skills are significantly limited by excessive trunk, cervical flexion, affecting all aspects of functional mobility and self care.  At conclusion of treatment pt. was positioned in trendelenberg with HOB flat to encourage head in neutral position.  Educated pt's brother in positioning.  Notified RNOumou. Will follow.  -     Row Name 05/17/23 1150          Positioning and Restraints    Pre-Treatment Position in bed  -AG     Post Treatment Position bed  -AG     In Bed supine;call light within reach;encouraged to call for assist;notified nsg;with family/caregiver;side rails up x3  trendelenberg position  -     Row Name 05/17/23 1157          Therapy Plan Review/Discharge Plan (PT)    Therapy Plan Review (PT) evaluation/treatment results reviewed;care plan/treatment goals reviewed;risks/benefits reviewed;current/potential barriers reviewed;participants voiced agreement with care plan;participants included;patient;sibling  -AG           User Key  (r) = Recorded By, (t) = Taken By, (c) = Cosigned By    Initials Name Provider Type    Yaneth Gerber, PT Physical Therapist    Lydia Cordero, RN Registered Nurse                Physical Therapy Education     Title: PT OT SLP Therapies (Done)     Topic: Physical Therapy (Done)     Point: Mobility training (Done)     Learning Progress Summary           Patient Acceptance, D,E, VU,NR by  at 5/17/2023 1149    Acceptance, E,D, NL by  AG at 5/16/2023 1522   Family Acceptance, D,E, VU,NR by AG at 5/17/2023 1149    Acceptance, E,D, NR by AG at 5/16/2023 1522                   Point: Home exercise program (Done)     Learning Progress Summary           Patient Acceptance, D,E, VU,NR by AG at 5/17/2023 1149    Acceptance, E,D, NL by AG at 5/16/2023 1522   Family Acceptance, D,E, VU,NR by AG at 5/17/2023 1149    Acceptance, E,D, NR by AG at 5/16/2023 1522                   Point: Body mechanics (Done)     Learning Progress Summary           Patient Acceptance, D,E, VU,NR by AG at 5/17/2023 1149    Acceptance, E,D, NL by AG at 5/16/2023 1522   Family Acceptance, D,E, VU,NR by AG at 5/17/2023 1149    Acceptance, E,D, NR by AG at 5/16/2023 1522                   Point: Precautions (Done)     Learning Progress Summary           Patient Acceptance, D,E, VU,NR by AG at 5/17/2023 1149    Acceptance, E,D, NL by AG at 5/16/2023 1522   Family Acceptance, D,E, VU,NR by AG at 5/17/2023 1149    Acceptance, E,D, NR by AG at 5/16/2023 1522                               User Key     Initials Effective Dates Name Provider Type Discipline     06/16/21 -  Yaneth Le, PT Physical Therapist PT              PT Recommendation and Plan     Plan of Care Reviewed With: patient, sibling  Progress: no change  Outcome Evaluation: PT treatment complete.  Functional mobility skills are significantly limited by excessive trunk, cervical flexion, affecting all aspects of functional mobility and self care.  At conclusion of treatment pt. was positioned in trendelenberg with HOB flat to encourage head in neutral position.  Educated pt's brother in positioning.  Notified RNOumou. Will follow.       Time Calculation:    PT Charges     Row Name 05/17/23 1150             Time Calculation    PT Received On 05/17/23  -            User Key  (r) = Recorded By, (t) = Taken By, (c) = Cosigned By    Initials Name Provider Type     Yaneth Le, PT Physical Therapist               Therapy Charges for Today     Code Description Service Date Service Provider Modifiers Qty    69059914910 HC PT THER PROC EA 15 MIN 5/16/2023 Yaneth Le, PT GP 1    13422887822 HC PT THERAPEUTIC ACT EA 15 MIN 5/16/2023 Yaneth Le, PT GP 1    72985381491 HC PT THER PROC EA 15 MIN 5/17/2023 Yaneth Le, PT GP 1    33516496565 HC PT THERAPEUTIC ACT EA 15 MIN 5/17/2023 Yaneth Le, PT GP 1          PT G-Codes  AM-PAC 6 Clicks Score (PT): 8    Yaneth Le, PT  5/17/2023

## 2023-05-17 NOTE — PLAN OF CARE
Goal Outcome Evaluation:  Plan of Care Reviewed With: patient   Pt resting in bed, no c/o CP or acute distress ntd. Pt repositioned frequently and educated on the importance of weight shifting to prevent pressure injuries. Will continue to follow plan of care.     Progress: improving

## 2023-05-17 NOTE — PLAN OF CARE
Goal Outcome Evaluation:                     Pt appears to have rested well this shift. No s/s of acute distress noted at this time. Wound care completed. No complaints verbalized at this time. Pt had 7 beat run of PSVT, RAUDEL [place] aware (see Orders/ Results).  Plan of care ongoing.

## 2023-05-17 NOTE — DISCHARGE PLACEMENT REQUEST
"Kathryn Saldana (88 y.o. Female)     Date of Birth   1934    Social Security Number       Address   [address] [address]    Home Phone   [phone]    MRN   [id number]       Amish   Other    Marital Status                               Admission Date   5/10/23    Admission Type   Emergency    Admitting Provider   Daisha Rm DO    Attending Provider   Didier Contreras DO    Department, Room/Bed   [address], [facility]/       Discharge Date       Discharge Disposition       Discharge Destination                               Attending Provider: Didier Contreras DO    Allergies: Penicillins, Doxycycline, Hydroxychloroquine, Latex    Isolation: None   Infection: None   Code Status: No CPR    Ht: 152.4 cm (60\")   Wt: 41.9 kg (92 lb 6.4 oz)    Admission Cmt: None   Principal Problem: Acute on chronic heart failure with preserved ejection fraction (HFpEF) [I50.33]                 Active Insurance as of 5/10/2023     Primary Coverage     Payor Plan Insurance Group Employer/Plan Group    [facility] MEDICARE REPLACEMENT [facility] MEDICARE REPLACEMENT [id number]     Payor Plan Address Payor Plan Phone Number Payor Plan Fax Number Effective Dates    [address] [address]   2016 - None Entered    [facility] [id number]       Subscriber Name Subscriber Birth Date Member ID       KATHRYN SALDANA 1934 [id number]                 Emergency Contacts      (Rel.) Home Phone Work Phone Mobile Phone    Octavio Mosqueda (Brother) [phone] -- --               Physician Progress Notes (most recent note)      Didier Contreras DO at 23 [address] [place] [address] HOSPITALIST PROGRESS NOTE     Patient Identification:  Name:  Kathryn Saldana  Age:  88 y.o.  Sex:  female  :  1934  MRN:  [id number]  Visit Number:  [id number]  ROOM: [address] [place] [address]     Primary Care Provider:  Meme Medellin MD    Length of stay in inpatient status:  6    Subjective "     Chief Compliant:    Chief Complaint   Patient presents with   • Edema       History of Presenting Illness: Patient seen and evaluated in follow-up for sepsis secondary to community-acquired pneumonia with E. coli bacteremia from acute UTI with acute on chronic HFpEF with resultant acute on chronic hypoxemic respiratory failure.  Patient at time of exam resting comfortably in bed on room air.  Long discussion at bedside with patient and family regarding this recent hospitalization as well as her generalized weakness and need for 24/7 care at home and recommendation for physical therapy best suited for short-term SNF and patient and family agreeable    Objective     Current Hospital Meds:  apixaban, 2.5 mg, Oral, Q12H  aspirin, 81 mg, Oral, Daily  atorvastatin, 20 mg, Oral, Nightly  budesonide-formoterol, 2 puff, Inhalation, BID - RT  bumetanide, 1 mg, Oral, Daily  cefTRIAXone, 2 g, Intravenous, Q24H  hydrALAZINE, 10 mg, Oral, Q8H  isosorbide mononitrate, 30 mg, Oral, Q24H  metoprolol tartrate, 50 mg, Oral, BID  pantoprazole, 40 mg, Oral, QAM AC  polyethylene glycol, 17 g, Oral, Daily  predniSONE, 2.5 mg, Oral, Daily With Breakfast  senna-docusate sodium, 1 tablet, Oral, BID  sodium chloride, 10 mL, Intravenous, Q12H         ----------------------------------------------------------------------------------------------------------------------  Vital Signs:  Temp:  [97.1 °F (36.2 °C)-98.3 °F (36.8 °C)] 97.8 °F (36.6 °C)  Heart Rate:  [66-81] 68  Resp:  [18] 18  BP: ()/(54-88) 92/54  SpO2:  [90 %-98 %] 98 %  on  Flow (L/min):  [0.5-1] 0.5;   Device (Oxygen Therapy): room air  Body mass index is 17.77 kg/m².      Intake/Output Summary (Last 24 hours) at 5/16/2023 1659  Last data filed at 5/16/2023 1500  Gross per 24 hour   Intake 120 ml   Output 300 ml   Net -180 ml      ----------------------------------------------------------------------------------------------------------------------  Physical  exam:  Constitutional: Elderly chronically ill-appearing frail cachectic female resting in bed in no distress   HENT:  Head:  Normocephalic and atraumatic.  Mouth:  Moist mucous membranes.    Eyes:  Conjunctivae and EOM are normal. No scleral icterus.    Cardiovascular:  Normal rate, regular rhythm and normal heart sounds with no murmur.  Pulmonary/Chest:  No respiratory distress, no wheezes, no crackles, with normal breath sounds and good air movement.  Abdominal:  Soft.  Bowel sounds are normal.  No distension and no tenderness.   Musculoskeletal:  No tenderness, and no deformity.  No red or swollen joints anywhere.  Functional ROM intact.   Neurological:  Alert and oriented to person, place, and time.  No cranial nerve deficit.  No tongue deviation.  No facial droop.  No slurred speech. Intact Sensation throughout  Skin:  Skin is warm and dry. No rash or lesion noted. No pallor.   Peripheral vascular:  Pulses in all 4 extremities with no clubbing, no cyanosis, no edema.  Psychiatric: Appropriate mood and affect, pleasant.   ----------------------------------------------------------------------------------------------------------------------     BUN/CREAT/GLUC/ALT/AST/ARASH/LIP    40/1.20/112/--/--/--/-- (05/16 0220)  LYTES - Na/K/Cl/CO2: 132*/4.1/95*/25.0 (05/16 0220)        Urine Culture   Date Value Ref Range Status   05/10/2023 50,000 CFU/mL Mixed [place]ra Isolated  Final     Blood Culture   Date Value Ref Range Status   05/12/2023 No growth at 3 days  Preliminary   05/12/2023 No growth at 3 days  Preliminary   05/10/2023 Escherichia coli (C)  Final   05/10/2023 Escherichia coli (C)  Final       I have personally looked at the labs and they are summarized above.  ----------------------------------------------------------------------------------------------------------------------  Detailed radiology reports for the last 24 hours:  No radiology results for the last day  Assessment & Plan      Sepsis  Left basilar  CAP  E. coli bacteremia 2/2 acute UTI    -Patient presenting with tachypnea, tachycardia, leukocytosis and with blood cultures growing E. coli BC ID PCR.  Repeat blood cultures with no growth to date.    -Infectious disease consulted and recommends continuation with growth Rocephin and stepdown to oral cephalosporin at discharge through 5/21/2023.    Acute on chronic HFpEF  Acute on chronic hypoxemic respiratory failure    -Per review documentation patient presenting with decompensated heart failure with peripheral edema however on exam now with no longer any continued peripheral edema and therefore no need for continued diuresis    -Continue metoprolol, patient currently on heart failure pathway    -Continue Bumex but monitor renal function closely and consider change to every other day if rising creatinine    TANA on CKD stage II    -Likely secondary to patient's sepsis from UTI with resultant bacteremia as well as community-acquired pneumonia    -Continue supportive care, TANA improving    Paroxysmal atrial fibrillation  Pulmonary hypertension    -Continue metoprolol and Eliquis    -Initiated on nitrate and hydralazine for pulmonary hypertension by cardiology    Chronic severe malnutrition    -Seen and evaluated by nutrition and dietary supplements added    Hypertension    -Continue Aldactone, metoprolol    Osteoarthritis  Rheumatoid arthritis    -Continue home prednisone 2.5 daily    Hard of hearing  Osteoporosis  Advanced age    -Complicates all aspects of care    Acute illness myopathy    -Patient with significant debility and weakness after recent illness and this acute hospitalization consistent with acute illness myopathy.  Consult PT and OT and discussed family and agreeable to SNF rehab.    Copied text in portions of the note has been reviewed and is accurate as of 05/16/23    VTE Prophylaxis:   Mechanical Order History:     None      Pharmalogical Order History:      Ordered     Dose Route Frequency Stop     23 1520  apixaban (ELIQUIS) tablet 2.5 mg         2.5 mg PO Every 12 Hours Scheduled --    05/10/23 1449  heparin (porcine) 5000 UNIT/ML injection 2,400 Units         60 Units/kg IV Once 05/10/23 1541    05/10/23 1449  heparin 86004 units/250 mL (100 units/mL) in 0.9% NaCl infusion  4.89 mL/hr,   Status:  Discontinued         12 Units/kg/hr IV Titrated 23 1520    05/10/23 1449  heparin (porcine) 5000 UNIT/ML injection 2,400 Units  Status:  Discontinued         60 Units/kg IV As Needed 23 1520    05/10/23 1449  heparin (porcine) 5000 UNIT/ML injection 1,200 Units  Status:  Discontinued         30 Units/kg IV As Needed 23 1520                Disposition skilled nursing facility    Didier Contreras DO  [facility]  23  16:59 EDT      Electronically signed by Didier Contreras DO at 23 1703          Consult Notes (most recent note)      Caryn Figueroa APRN at 23 1245      Consult Orders    1. Inpatient Infectious Diseases Consult [[id number]] ordered by Daisha Rm DO at 23 0838             Duplicate consult.  Infectious disease already following please see updated consult note.    ANA Hendrickson  Infectious disease    Electronically signed by Caryn Figueroa APRN at 23 1245          Physical Therapy Notes (most recent note)      Yaneth Le, PT at 23 1533  Version 1 of 1         Acute Care - Physical Therapy Treatment Note  [facility]     Patient Name: Anh Saldana  : 1934  MRN: [id number]  Today's Date: 2023   Onset of Illness/Injury or Date of Surgery: 05/10/23  Visit Dx:     ICD-10-CM ICD-9-CM   1. Non-STEMI (non-ST elevated myocardial infarction)  I21.4 410.70   2. Congestive heart failure, unspecified HF chronicity, unspecified heart failure type  I50.9 428.0   3. Sepsis, due to unspecified organism, unspecified whether acute organ dysfunction present  A41.9 038.9     995.91   4. TANA (acute kidney injury)   N17.9 584.9   5. Hypoxemia  R09.02 799.02     Patient Active Problem List   Diagnosis   • Osteoporosis, unspecified   • Hypertensive urgency   • Essential hypertension   • Dyspnea on exertion   • CHF (congestive heart failure)   • Persistent atrial fibrillation   • Hearing loss   • Acute on chronic heart failure with preserved ejection fraction (HFpEF)   • Acute respiratory failure with hypoxemia   • Severe Malnutrition (HCC)     Past Medical History:   Diagnosis Date   • Breast cancer 2011   • CHF (congestive heart failure)    • Chronic kidney disease     acute kidney failure   • [place] (hard of hearing)    • Hypertension    • Osteoarthritis    • Osteoporosis    • Rheumatoid arthritis    • Right-sided Bell's palsy    • Shortness of breath     sleeps with oxygen at night     Past Surgical History:   Procedure Laterality Date   • BREAST LUMPECTOMY Left     benign   • CATARACT EXTRACTION W/ INTRAOCULAR LENS  IMPLANT, BILATERAL Bilateral    • SHOULDER SURGERY Left     metal plate in left shoulder     PT Assessment (last 12 hours)     PT Evaluation and Treatment     Row Name 05/16/23 1522          Physical Therapy Time and Intention    Subjective Information complains of;pain  pt. has no complaints at rest, however, cries out in pain with mobilization and LE ROM.  -AG     Document Type therapy note (daily note)  -AG     Mode of Treatment physical therapy  -AG     Patient Effort poor  -AG     Symptoms Noted During/After Treatment increased pain  -AG     Row Name 05/16/23 1521          General Information    Patient Profile Reviewed yes  -AG     Patient Observations poorly cooperative;lethargic  -AG     Patient/Family/Caregiver Comments/Observations pt's family states she is very fatigued from not sleeping well last night.  He also reports she has been up in chair some today.  -AG     General Observations of Patient pt. R sidelying; verbalizes some but never opens eyes on therapist's commands.  -AG     Prior Level of  Function min assist:  ambulated short distances within home with walker.  -AG     Existing Precautions/Restrictions fall;oxygen therapy device and L/min  -AG     Limitations/Impairments safety/cognitive;hearing  -AG     Benefits Reviewed patient and family:;improve function;increase independence;increase strength;increase balance;increase knowledge;decrease risk of DVT  -AG     Barriers to Rehab cognitive status;previous functional deficit  -AG     Row Name 05/16/23 1522          Living Environment    Current Living Arrangements home  -AG     People in Home sibling(s)  -AG     Primary Care Provided by self  -AG     Row Name 05/16/23 1522          Pain    Additional Documentation Pain Scale: FACES Pre/Post-Treatment (Group)  -AG     Row Name 05/16/23 1522          Pain Scale: FACES Pre/Post-Treatment    Pain: FACES Scale, Pretreatment 0-->no hurt  -AG     Posttreatment Pain Rating 4-->hurts little more  -AG     Row Name 05/16/23 1522          Cognition    Affect/Mental Status (Cognition) low arousal/lethargic  -AG     Follows Commands (Cognition) follows one-step commands;verbal cues/prompting required;repetition of directions required;0-24% accuracy  -AG     Personal Safety Interventions supervised activity  -AG     Row Name 05/16/23 1522          Range of Motion (ROM)    Range of Motion --  pt. in excessive neck flexion; PT provided gentle PROM into extension, attempted rotation, however, pt. tolerated poorly.  -AG     Row Name 05/16/23 1522          Sensory    Hearing Status hearing impairment, bilaterally  -AG     Row Name 05/16/23 1522          Mobility    Extremity Weight-bearing Status --  no restrictions  -AG     Row Name 05/16/23 1522          Bed Mobility    Bed Mobility scooting/bridging;rolling left;rolling right;supine-sit;sit-supine  -     Rolling Left [place] (Bed Mobility) dependent (less than 25% patient effort)  -AG     Rolling Right [place] (Bed Mobility) dependent (less than 25%  patient effort)  -AG     Scooting/Bridging [place] (Bed Mobility) maximum assist (25% patient effort);2 person assist  -AG     Supine-Sit [place] (Bed Mobility) dependent (less than 25% patient effort)  -AG     Sit-Supine [place] (Bed Mobility) dependent (less than 25% patient effort)  -AG     Bed Mobility, Safety Issues impaired trunk control for bed mobility;decreased use of legs for bridging/pushing;decreased use of arms for pushing/pulling;cognitive deficits limit understanding  -AG     Assistive Device (Bed Mobility) bed rails;draw sheet  -AG     Row Name 05/16/23 1522          Transfers    Comment, (Transfers) unsafe to assess  -AG     Row Name 05/16/23 1522          Safety Issues, Functional Mobility    Safety Issues Affecting Function (Mobility) ability to follow commands;awareness of need for assistance;safety precautions follow-through/compliance;safety precaution awareness  -AG     Impairments Affecting Function (Mobility) balance;cognition;endurance/activity tolerance;strength;range of motion (ROM);pain  -AG     Cognitive Impairments, Mobility Safety/Performance insight into deficits/self-awareness;awareness, need for assistance;safety precaution awareness;safety precaution follow-through  -AG     Row Name 05/16/23 1522          Balance    Balance Assessment sitting static balance;sitting dynamic balance;sit to stand dynamic balance;standing static balance;standing dynamic balance  -AG     Static Sitting Balance verbal cues;non-verbal cues (demo/gesture);maximum assist  -AG     Position, Sitting Balance supported;sitting edge of bed  -AG     Comment, Balance excessive trunk, neck flexion; attempted extension as well as scapular retraction to which pt. resists and cries out in pain.  -AG     Row Name 05/16/23 1522          Motor Skills    Therapeutic Exercise hip;knee;ankle  -AG     Row Name 05/16/23 1522          Hip (Therapeutic Exercise)    Hip (Therapeutic Exercise) PROM (passive range  of motion)  -AG     Hip PROM (Therapeutic Exercise) bilateral;flexion;extension;aBduction;aDduction;external rotation;internal rotation;supine  -AG     Row Name 05/16/23 1522          Knee (Therapeutic Exercise)    Knee (Therapeutic Exercise) PROM (passive range of motion)  -AG     Knee PROM (Therapeutic Exercise) bilateral;flexion;extension;supine  -AG     Row Name 05/16/23 1522          Ankle (Therapeutic Exercise)    Ankle (Therapeutic Exercise) PROM (passive range of motion)  -AG     Ankle PROM (Therapeutic Exercise) bilateral;dorsiflexion;supine  -AG     Row Name             Wound 05/10/23 1727 coccyx    Wound - Properties Group Placement Date: 05/10/23  -SC Placement Time: 1727  -SC Present on Hospital Admission: Y  -SC Location: coccyx  -SC    Retired Wound - Properties Group Placement Date: 05/10/23  -SC Placement Time: 1727  -SC Present on Hospital Admission: Y  -SC Location: coccyx  -SC    Retired Wound - Properties Group Date first assessed: 05/10/23  -SC Time first assessed: 1727  -SC Present on Hospital Admission: Y  -SC Location: coccyx  -SC    Row Name 05/16/23 1522          Coping    Observed Emotional State uncooperative;withdrawn  -AG     Verbalized Emotional State acceptance  -AG     Trust Relationship/Rapport care explained;choices provided;thoughts/feelings acknowledged  -AG     Family/Support Persons family  -AG     Involvement in Care at bedside;attentive to patient  -AG     Family/Support System Care support provided;self-care encouraged  -AG     Row Name 05/16/23 1522          Plan of Care Review    Plan of Care Reviewed With patient;sibling  -AG     Progress no change  -AG     Outcome Evaluation PT treatment complete.  PT provided B LE PROM and positioning into B knee extension to which pt. voiced pain with all palpation and ROM of LE.  Encouraged hip and knee extension.  Max/ dependent for all bed mobility at this time.  Pt. lethargic, poor participant with PT.  -AG     Row Name 05/16/23  1522          Therapy Plan Review/Discharge Plan (PT)    Therapy Plan Review (PT) evaluation/treatment results reviewed;care plan/treatment goals reviewed;risks/benefits reviewed;current/potential barriers reviewed;participants voiced agreement with care plan;participants included;patient;sibling  -AG           User Key  (r) = Recorded By, (t) = Taken By, (c) = Cosigned By    Initials Name Provider Type     Yaneth Le, PT Physical Therapist    Lydia Cordero, RN Registered Nurse                Physical Therapy Education     Title: PT OT SLP Therapies (In Progress)     Topic: Physical Therapy (In Progress)     Point: Mobility training (In Progress)     Learning Progress Summary           Patient Acceptance, E,D, NL by  at 5/16/2023 1522   Family Acceptance, E,D, NR by  at 5/16/2023 1522                   Point: Home exercise program (In Progress)     Learning Progress Summary           Patient Acceptance, E,D, NL by  at 5/16/2023 1522   Family Acceptance, E,D, NR by  at 5/16/2023 1522                   Point: Body mechanics (In Progress)     Learning Progress Summary           Patient Acceptance, E,D, NL by  at 5/16/2023 1522   Family Acceptance, E,D, NR by  at 5/16/2023 1522                   Point: Precautions (In Progress)     Learning Progress Summary           Patient Acceptance, E,D, NL by  at 5/16/2023 1522   Family Acceptance, E,D, NR by  at 5/16/2023 1522                               User Key     Initials Effective Dates Name Provider Type [facility] 06/16/21 -  Yaneth Le, PT Physical Therapist PT              PT Recommendation and Plan     Plan of Care Reviewed With: patient, sibling  Progress: no change  Outcome Evaluation: PT treatment complete.  PT provided B LE PROM and positioning into B knee extension to which pt. voiced pain with all palpation and ROM of LE.  Encouraged hip and knee extension.  Max/ dependent for all bed mobility at this time.  Pt. lethargic,  poor participant with PT.       Time Calculation:    PT Charges     Row Name 23 1521             Time Calculation    PT Received On 23  -            User Key  (r) = Recorded By, (t) = Taken By, (c) = Cosigned By    Initials Name Provider Type    Yaneth Gerber, PT Physical Therapist              Therapy Charges for Today     Code Description Service Date Service Provider Modifiers Qty    [id number]  PT THER PROC EA 15 MIN 2023 Yaneth Le, PT GP 1    [id number] HC PT THERAPEUTIC ACT EA 15 MIN 2023 Yaneth Le, PT GP 1          PT G-Codes  AM-PAC 6 Clicks Score (PT): 8    Yaneth Le PT  2023      Electronically signed by Yaneth Le, PT at 23 1533          Occupational Therapy Notes (most recent note)      Odell Reno, OT at 23 1411          Acute Care - Occupational Therapy Initial Evaluation   [facility]     Patient Name: Anh Saldana  : 1934  MRN: [id number]  Today's Date: 2023  Onset of Illness/Injury or Date of Surgery: 05/10/23     Referring Physician: Jag    Admit Date: 5/10/2023       ICD-10-CM ICD-9-CM   1. Non-STEMI (non-ST elevated myocardial infarction)  I21.4 410.70   2. Congestive heart failure, unspecified HF chronicity, unspecified heart failure type  I50.9 428.0   3. Sepsis, due to unspecified organism, unspecified whether acute organ dysfunction present  A41.9 038.9     995.91   4. TANA (acute kidney injury)  N17.9 584.9   5. Hypoxemia  R09.02 799.02     Patient Active Problem List   Diagnosis   • Osteoporosis, unspecified   • Hypertensive urgency   • Essential hypertension   • Dyspnea on exertion   • CHF (congestive heart failure)   • Persistent atrial fibrillation   • Hearing loss   • Acute on chronic heart failure with preserved ejection fraction (HFpEF)   • Acute respiratory failure with hypoxemia   • Severe Malnutrition (HCC)     Past Medical History:   Diagnosis Date   • Breast cancer    • CHF (congestive heart  failure)    • Chronic kidney disease     acute kidney failure   • [place] (hard of hearing)    • Hypertension    • Osteoarthritis    • Osteoporosis    • Rheumatoid arthritis    • Right-sided Bell's palsy    • Shortness of breath     sleeps with oxygen at night     Past Surgical History:   Procedure Laterality Date   • BREAST LUMPECTOMY Left     benign   • CATARACT EXTRACTION W/ INTRAOCULAR LENS  IMPLANT, BILATERAL Bilateral    • SHOULDER SURGERY Left     metal plate in left shoulder         OT ASSESSMENT FLOWSHEET (last 12 hours)     OT Evaluation and Treatment     Row Name 05/16/23 1400                   OT Time and Intention    Document Type evaluation  -KR        Mode of Treatment occupational therapy  -KR        Patient Effort adequate  -KR           General Information    General Observations of Patient pleasant/cooperative  -KR           Living Environment    Current Living Arrangements home  -KR        People in Home sibling(s)  -KR        Primary Care Provided by self  -KR           Cognition    Affect/Mental Status (Cognition) WFL  -KR        Orientation Status (Cognition) oriented x 3  -KR        Follows Commands (Cognition) WFL  -KR           Range of Motion Comprehensive    Comment, General Range of Motion R shoulder 2/5, R elbow 4/5, R hand noted ulnar drift deformity but fxl for self care assist/performance, LUE 4/5  -KR           Strength Comprehensive (MMT)    Comment, General Manual Muscle Testing (MMT) Assessment 2+/3-  -KR           Activities of Daily Living    BADL Assessment/Intervention bathing;upper body dressing;lower body dressing;grooming;feeding;toileting  -KR           Bathing Assessment/Intervention    [place] Level (Bathing) bathing skills;maximum assist (25% patient effort)  -KR           Upper Body Dressing Assessment/Training    [place] Level (Upper Body Dressing) upper body dressing skills;moderate assist (50% patient effort)  -KR           Lower Body Dressing  Assessment/Training    [place] Level (Lower Body Dressing) lower body dressing skills;maximum assist (25% patient effort)  -KR           Grooming Assessment/Training    [place] Level (Grooming) grooming skills;minimum assist (75% patient effort)  -KR           Self-Feeding Assessment/Training    [place] Level (Feeding) feeding skills;minimum assist (75% patient effort)  -KR           Toileting Assessment/Training    [place] Level (Toileting) toileting skills;maximum assist (25% patient effort)  -KR           Wound 05/10/23 1727 coccyx    Wound - Properties Group Placement Date: 05/10/23  -SC Placement Time: 1727  -SC Present on Hospital Admission: Y  -SC Location: coccyx  -SC    Retired Wound - Properties Group Placement Date: 05/10/23  -SC Placement Time: 1727  -SC Present on Hospital Admission: Y  -SC Location: [facility]  -SC    [employer] Date first assessed: 05/10/23  -SC Time first assessed: 1727  -SC Present on Hospital Admission: Y  -SC Location: [facility]  -SC       Plan of Care Review    Plan of Care Reviewed With patient;family;sibling  -KR           Therapy Assessment/Plan (OT)    Planned Therapy Interventions (OT) activity tolerance training;adaptive equipment training;BADL retraining  -KR           Therapy Plan Review/Discharge Plan (OT)    Anticipated Discharge Disposition (OT) extended care facility;inpatient rehabilitation facility  -KR           OT Goals    Dressing Goal Selection (OT) dressing, OT goal 1  -KR        Activity Tolerance Goal Selection (OT) activity tolerance, OT goal 1  -KR           Dressing Goal 1 (OT)    Activity/Device (Dressing Goal 1, OT) dressing skills, all  -KR        [place]/Cues Needed (Dressing Goal 1, OT) minimum assist (75% or more patient effort)  -KR        Time Frame (Dressing Goal 1, OT) by discharge  -KR            Activity Tolerance Goal 1 (OT)    Activity Tolerance Goal 1 (OT) Increase to enhance self care performance  -KR         Activity Level (Endurance Goal 1, OT) 15 min activity  -KR        Time Frame (Activity Tolerance Goal 1, OT) by discharge  -KR           Patient Education Goal (OT)    Activity (Patient Education Goal, OT) AE/DME training to enhance self care safety/independence  -KR        [place]/Cues/Accuracy (Memory Goal 2, OT) verbalizes understanding  -KR        Time Frame (Patient Education Goal, OT) by discharge  -KR              User Key  (r) = Recorded By, (t) = Taken By, (c) = Cosigned By    Initials Name Effective Dates    Odell Batista OT 06/16/21 -     Lydia Cordero RN 01/24/23 -                        OT Recommendation and Plan  Planned Therapy Interventions (OT): activity tolerance training, adaptive equipment training, BADL retraining  Plan of Care Review  Plan of Care Reviewed With: patient, family, sibling  Plan of Care Reviewed With: patient, family, sibling        Time Calculation:     Therapy Charges for Today     Code Description Service Date Service Provider Modifiers Qty    [id number]  OT EVAL MOD COMPLEXITY 4 5/16/2023 Odell Reno OT GO 1               Odell Reno OT  5/16/2023    Electronically signed by Odell Reno OT at 05/16/23 5792

## 2023-05-17 NOTE — PLAN OF CARE
Goal Outcome Evaluation:  Plan of Care Reviewed With: patient       Progress: improving          Daily Care Plan Summary: Heart Failure    Diuretic in use (IV or PO):   IV        Daily weight (up or down):    gain: 0.5lbs      Output > Intake (yes/no):Yes      O2 Requirements (current, any change?):  1.5 liters/min via nasal cannula      Symptoms noted with Activity (Respiratory Tolerance, functional state):    na      Anticipated Discharge Plans:    SNF

## 2023-05-17 NOTE — PROGRESS NOTES
[facility] HOSPITALIST PROGRESS NOTE     Patient Identification:  Name:  Anh Saldana  Age:  88 y.o.  Sex:  female  :  1934  MRN:  [id number]  Visit Number:  [id number]  ROOM: [address] [place] [address]     Primary Care Provider:  Meme Medellin MD    Length of stay in inpatient status:  7    Subjective     Chief Compliant:    Chief Complaint   Patient presents with   • Edema       History of Presenting Illness: Patient seen and evaluated in follow-up for sepsis secondary to community-acquired pneumonia with E. coli bacteremia from acute UTI with acute on chronic HFpEF with resultant acute on chronic hypoxemic respiratory failure.  Patient at time of exam resting comfortably in bed 2 L supplemental oxygen.  Denies any acute complaints.  Patient currently being evaluated and working with physical therapy and awaiting SNF placement for short-term rehab.    Objective     Current Hospital Meds:  apixaban, 2.5 mg, Oral, Q12H  aspirin, 81 mg, Oral, Daily  atorvastatin, 20 mg, Oral, Nightly  budesonide-formoterol, 2 puff, Inhalation, BID - RT  cefTRIAXone, 2 g, Intravenous, Q24H  hydrALAZINE, 10 mg, Oral, Q8H  isosorbide mononitrate, 30 mg, Oral, Q24H  metoprolol tartrate, 50 mg, Oral, BID  pantoprazole, 40 mg, Oral, QAM AC  polyethylene glycol, 17 g, Oral, Daily  predniSONE, 2.5 mg, Oral, Daily With Breakfast  senna-docusate sodium, 1 tablet, Oral, BID  sodium chloride, 10 mL, Intravenous, Q12H         ----------------------------------------------------------------------------------------------------------------------  Vital Signs:  Temp:  [96.9 °F (36.1 °C)-98.8 °F (37.1 °C)] 97.8 °F (36.6 °C)  Heart Rate:  [57-80] 60  Resp:  [16-18] 18  BP: ()/(58-88) 107/58  SpO2:  [90 %-99 %] 96 %  on  Flow (L/min):  [1] 1;   Device (Oxygen Therapy): room air  Body mass index is 18.05 kg/m².      Intake/Output Summary (Last 24 hours) at 2023 1804  Last data filed at 2023 1300  Gross per 24 hour   Intake 1080  ml   Output 700 ml   Net 380 ml      ----------------------------------------------------------------------------------------------------------------------  Physical exam:  Constitutional: Elderly chronically ill-appearing frail cachectic female resting in bed in no distress   HENT:  Head:  Normocephalic and atraumatic.  Mouth:  Moist mucous membranes.    Eyes:  Conjunctivae and EOM are normal. No scleral icterus.    Cardiovascular:  Normal rate, regular rhythm and normal heart sounds with no murmur.  Pulmonary/Chest:  No respiratory distress, no wheezes, no crackles, with normal breath sounds and good air movement.  Abdominal:  Soft.  Bowel sounds are normal.  No distension and no tenderness.   Musculoskeletal:  No tenderness, and no deformity.  No red or swollen joints anywhere.  Functional ROM intact.   Neurological:  Alert and oriented to person, place, and time.  No cranial nerve deficit.  No tongue deviation.  No facial droop.  No slurred speech. Intact Sensation throughout  Skin:  Skin is warm and dry. No rash or lesion noted. No pallor.   Peripheral vascular:  Pulses in all 4 extremities with no clubbing, no cyanosis, no edema.  Psychiatric: Appropriate mood and affect, pleasant.   ----------------------------------------------------------------------------------------------------------------------  WBC/HGB/HCT/PLT   11.61/11.5/36.6/300 (05/17 0112)  BUN/CREAT/GLUC/ALT/AST/ARASH/LIP    41/1.25/107/--/--/--/-- (05/17 0112)  LYTES - Na/K/Cl/CO2: 133*/4.3/96*/25.8 (05/17 0112)        Urine Culture   Date Value Ref Range Status   05/10/2023 50,000 CFU/mL Mixed Sondra Isolated  Final     Blood Culture   Date Value Ref Range Status   05/12/2023 No growth at 3 days  Preliminary   05/12/2023 No growth at 3 days  Preliminary   05/10/2023 Escherichia coli (C)  Final   05/10/2023 Escherichia coli (C)  Final       I have personally looked at the labs and they are summarized  above.  ----------------------------------------------------------------------------------------------------------------------  Detailed radiology reports for the last 24 hours:  No radiology results for the last day  Assessment & Plan      Sepsis  Left basilar CAP  E. coli bacteremia 2/2 acute UTI    -Patient presenting with tachypnea, tachycardia, leukocytosis and with blood cultures growing E. coli BC ID PCR.  Repeat blood cultures with no growth to date.    -Infectious disease consulted and recommends continuation with growth Rocephin and stepdown to oral cephalosporin at discharge through 5/21/2023.    Acute on chronic HFpEF  Acute on chronic hypoxemic respiratory failure    -Per review documentation patient presenting with decompensated heart failure with peripheral edema however on exam now with no longer any continued peripheral edema and therefore no need for continued diuresis    -Continue metoprolol, patient currently on heart failure pathway    -Patient still requiring supplemental oxygen we will give one-time dose of Lasix 40 mg.  Consider every other day Bumex dosing.    TANA on CKD stage IIIb    -Likely secondary to patient's sepsis from UTI with resultant bacteremia as well as community-acquired pneumonia    -Continue supportive care, renal function stable at 1.25 today.    Paroxysmal atrial fibrillation  Pulmonary hypertension    -Continue metoprolol and Eliquis    -Initiated on nitrate and hydralazine for pulmonary hypertension by cardiology    Chronic severe malnutrition    -Seen and evaluated by nutrition and dietary supplements added    Hypertension    -Continue Aldactone, metoprolol    Osteoarthritis  Rheumatoid arthritis    -Continue home prednisone 2.5 daily    Hard of hearing  Osteoporosis  Advanced age    -Complicates all aspects of care    Acute illness myopathy    -Patient with significant debility and weakness after recent illness and this acute hospitalization consistent with acute  illness myopathy.  Consult PT and OT and discussed family and agreeable to SNF rehab.    Copied text in portions of the note has been reviewed and is accurate as of 05/17/23    VTE Prophylaxis:   Mechanical Order History:     None      Pharmalogical Order History:      Ordered     Dose Route Frequency Stop    05/12/23 1520  apixaban (ELIQUIS) tablet 2.5 mg         2.5 mg PO Every 12 Hours Scheduled --    05/10/23 1449  heparin (porcine) 5000 UNIT/ML injection 2,400 Units         60 Units/kg IV Once 05/10/23 1541    05/10/23 1449  heparin 68095 units/250 mL (100 units/mL) in 0.9% NaCl infusion  4.89 mL/hr,   Status:  Discontinued         12 Units/kg/hr IV Titrated 05/12/23 1520    05/10/23 1449  heparin (porcine) 5000 UNIT/ML injection 2,400 Units  Status:  Discontinued         60 Units/kg IV As Needed 05/12/23 1520    05/10/23 1449  heparin (porcine) 5000 UNIT/ML injection 1,200 Units  Status:  Discontinued         30 Units/kg IV As Needed 05/12/23 1520                Disposition skilled nursing facility    Didier Contreras DO  [facility]  05/17/23  18:04 EDT

## 2023-05-17 NOTE — PROGRESS NOTES
[facility]  PROGRESS NOTE      Patient information:  Name: Anh Saldana  Age/Sex: 88 y.o. female  :  1934        PCP: Meme Medellin MD  Attending: Daisha Rm DO  MRN:  [id number]  Visit Number:  [id number]    LOS:  LOS: 7 days   CODE STATUS:    Code Status and Medical Interventions:   Ordered at: 05/10/23 1720     Medical Intervention Limits:    NO intubation (DNI)     Level Of Support Discussed With:    Next of Kin (If No Surrogate)     Code Status (Patient has no pulse and is not breathing):    No CPR (Do Not Attempt to Resuscitate)     Medical Interventions (Patient has pulse or is breathing):    Limited Support     Release to patient:    Routine Release       PROBLEM LIST:Principal Problem:    Acute on chronic heart failure with preserved ejection fraction (HFpEF)  Active Problems:    Acute respiratory failure with hypoxemia    Severe Malnutrition (HCC)      Reason for Cardiology follow-up: NSTEMI and Acute on Chronic HFpEF    Subjective   ADMISSION INFORMATION:  Chief Complaint   Patient presents with   • Edema       HPI:  Anh Saldana is a 88 y.o. female has been admitted to [facility] ([facility]) with history of increasing shortness of breath and bilateral leg edema.  Patient is hearing impairment and somewhat difficult to get a reliable history.  Her past medical history includes HTN, CKD, RA,  persistent atrial fibrillation on Eliquis (however,her A-fib is paroxysmal during this hospital stay), HFpEF, (Echocardiogram done  showed LVEF-70%), moderate MR, mild AAS and mild AR, moderate to severe TR and severe pulmonary hypertension.         Interval History:   Patient is in room 325 and was examined by Dr. Jenkins.  Patient was given extra dose of IV Lasix 40 mg x 1 dose 2023 due to an elevated BNP of 66,260.0.  Her I/O flowsheet does not reflect any diuresing however there is some indication of a urinary incontinence x 1.   "Patient does have a PureWick in use.  Patient's sodium is 133, potassium 4.3, CO2 25.8, chloride 96, creatinine slightly bumped up to 1.25 from 1.20 and BUN is 41.  Magnesium is 2.3.  Hemoglobin is 11.5 and stable.  Respiratory reports they were unable to get a Reds vest reading secondary to patient's scoliosis.  Patient is lying in bed resting quietly.  No acute distress noted at this time.  Patient currently denies any chest pain or palpitations.  She reports her breathing is \"feeling better\".  Her brother is at bedside.    ORDERS: Stop Bumex, repeat Lasix 40 mg IV x 1 dose, and AM BMP for tomorrow (Thursday 05/18/2023).    Vital Signs  Temp:  [96.9 °F (36.1 °C)-98.8 °F (37.1 °C)] 98.5 °F (36.9 °C)  Heart Rate:  [57-80] 80  Resp:  [18] 18  BP: ()/(54-88) 139/80  Vital Signs (last 72 hrs)       05/14 0700  05/15 0659 05/15 0700  05/16 0659 05/16 0700  05/17 0659 05/17 0700  05/17 0714   Most Recent      Temp (°F) 97.3 -  98.3    97.1 -  98.3    96.9 -  98.8       98.5 (36.9) 05/17 0656    Heart Rate 62 -  70    65 -  81    57 -  80       80 05/17 0656    Resp 16 -  20    18 -  20      18       18 05/17 0656    /70 -  157/73    136/82 -  153/88    92/54 -  146/72       139/80 05/17 0656    SpO2 (%) 90 -  99    90 -  99    90 -  99       95 05/17 0656        Body mass index is 18.05 kg/m².    Intake/Output Summary (Last 24 hours) at 5/17/2023 0714  Last data filed at 5/17/2023 0400  Gross per 24 hour   Intake 360 ml   Output 100 ml   Net 260 ml                             Objective  Objective     I have seen and examined Ms. Saldana today.  Physical Exam:      General Appearance:    Alert, cooperative, in no acute distress. Thin, frail, and [place].    Head:    Normocephalic, without obvious abnormality, atraumatic.   Eyes:                          Conjunctivae and sclerae normal, no icterus, no pallor, corneas clear.   Neck:   No adenopathy, supple, trachea midline, no thyromegaly, no carotid bruit, no JVD. " Contracture noted to her neck.   Lungs:     Clear to auscultation, respirations regular, even and             unlabored.    Heart:    Regular rhythm and normal rate, normal S1 and S2, no          murmur, no gallop, no rub, no click   Chest Wall:    No abnormalities observed.  Has severe kyphosis   Abdomen:     Normal bowel sounds, no masses, no organomegaly, soft nontender, nondistended, no guarding, no rebound tenderness.   Extremities:  no edema, no cyanosis, no redness. Contractures noted to bilateral hands.   Pulses:   Pulses palpable and equal bilaterally.   Skin:   No bleeding, bruising or rash.   Neurologic:   Alert and Oriented x 3, Speech Clear & comprehensive.       Results review   Results Review:   Results from last 7 days   Lab Units 05/17/23  0112 05/14/23  0648 05/13/23  0207 05/12/23  0019 05/11/23  0019 05/10/23  1134   WBC 10*3/mm3 11.61* 9.01 9.38 16.70* 26.38* 20.55*   HEMOGLOBIN g/dL 11.5* 11.6* 11.6* 11.9* 12.0 12.7   PLATELETS 10*3/mm3 300 233 201 183 189 247     Results from last 7 days   Lab Units 05/17/23  0112 05/16/23  0220 05/15/23  0505 05/14/23  0648 05/13/23  0207 05/12/23  0019 05/11/23  0019 05/10/23  1134   SODIUM mmol/L 133* 132* 131* 137 136 138 144 144   POTASSIUM mmol/L 4.3 4.1 4.4 4.3 4.9 4.4 4.1 4.5   CHLORIDE mmol/L 96* 95* 98 99 100 102 106 103   CO2 mmol/L 25.8 25.0 22.1 29.5* 25.3 19.2* 25.2 22.5   BUN mg/dL 41* 40* 42* 46* 55* 50* 40* 35*   CREATININE mg/dL 1.25* 1.20* 1.26* 1.54* 1.97* 1.85* 1.76* 1.55*   CALCIUM mg/dL 9.0 8.9 8.8 9.1 9.0 8.7 9.0 9.5   GLUCOSE mg/dL 107* 112* 84 96 115* 121* 123* 133*   ALT (SGPT) U/L  --   --   --  21  --   --   --  10   AST (SGOT) U/L  --   --   --  26  --   --   --  32     Results from last 7 days   Lab Units 05/16/23  0656 05/16/23  0431 05/11/23  0935 05/10/23  1408 05/10/23  1134   HSTROP T ng/L 58* 59* 89* 75* 68*     Lab Results   Component Value Date    PROBNP 66,260.0 (H) 05/16/2023    PROBNP 56,301.0 (H) 05/10/2023     No  results found.     Lab Results   Component Value Date    INR 1.30 (H) 05/10/2023    INR 0.98 11/10/2018     Lab Results   Component Value Date    MG 2.3 05/17/2023    MG 2.0 05/16/2023    MG 2.5 (H) 05/15/2023     Lab Results   Component Value Date    TSH 3.950 05/10/2023    TRIG 126 05/11/2023    HDL 56 05/11/2023    LDL 67 05/11/2023      Pain Management Panel          View : No data to display.                       Imaging Results (Last 48 Hours)     ** No results found for the last 48 hours. **          ECHO:  Results for orders placed during the hospital encounter of 05/10/23    Adult Transthoracic Echo Complete W/ Cont if Necessary Per Protocol    Interpretation Summary  •  Left ventricular systolic function is normal. Left ventricular ejection fraction appears to be 66 - 70%.  •  Left ventricular diastolic function is consistent with (grade I) impaired relaxation.  •  The right ventricular cavity is moderately dilated.  •  The left atrial cavity is mild to moderately dilated.  •  The right atrial cavity is severely  dilated.  •  There is moderate calcification of the aortic valve mainly affecting the non-coronary and left coronary cusp(s).  •  Severe mitral valve regurgitation is present.  •  Severe tricuspid valve regurgitation is present.  •  Estimated right ventricular systolic pressure from tricuspid regurgitation is markedly elevated ( 94 mmHg).  •  Severe pulmonary hypertension is present.      STRESS TEST:  No results found for this or any previous visit.      HEART CATH:  No results found for this or any previous visit.      TELEMETRY: SR 80's           I reviewed the patient's new clinical results.    Medication Review:   Current list of medications may not reflect those currently placed in orders that are not signed or are being held.     apixaban, 2.5 mg, Oral, Q12H  aspirin, 81 mg, Oral, Daily  atorvastatin, 20 mg, Oral, Nightly  budesonide-formoterol, 2 puff, Inhalation, BID - RT  bumetanide, 1  mg, Oral, Daily  cefTRIAXone, 2 g, Intravenous, Q24H  hydrALAZINE, 10 mg, Oral, Q8H  isosorbide mononitrate, 30 mg, Oral, Q24H  metoprolol tartrate, 50 mg, Oral, BID  pantoprazole, 40 mg, Oral, QAM AC  polyethylene glycol, 17 g, Oral, Daily  predniSONE, 2.5 mg, Oral, Daily With Breakfast  senna-docusate sodium, 1 tablet, Oral, BID  sodium chloride, 10 mL, Intravenous, Q12H         •  magic barrier cream  •  nitroglycerin  •  [COMPLETED] Insert Peripheral IV **AND** sodium chloride  •  sodium chloride  •  sodium chloride    Assessment      1. Acute HFpEF, improving  2. Acute kidney injury on chronic kidney disease, improving  3. Elevated high since her troponin, possibly due to type II MI from heart failure and acute kidney injury, patient has been relatively asymptomatic and clinically stable.  She is not a candidate for further evaluation either with a nuclear stress test or chronic catheterization due to her severe kyphosis.  4. Paroxysmal atrial fibrillation, maintaining sinus rhythm.  Patient is on Eliquis for stroke prevention.  5. E. coli bacteremia, being managed by the infectious disease.    Plan     1. Continue with IV diuretics for 1 more day and continue to monitor the kidney function closely.  2. Continue with conservative management for her possible underlying coronary artery disease.      I have discussed the patients findings and my recommendations with patient.      Electronically signed by ANA Kevin, 05/17/23, 10:49 AM EDT.    Electronically signed by Alex Jenkins MD, 05/17/23, 2:02 PM EDT.                    Please note that portions of this note were completed with a voice recognition program.    Please note that portions of this note were copied and has been reviewed and is accurate as of 5/17/2023 .

## 2023-05-17 NOTE — PROGRESS NOTES
PROGRESS NOTE         Patient Identification:  Name:  Anh Saldana  Age:  88 y.o.  Sex:  female  :  1934  MRN:  [id number]  Visit Number:  [id number]  Primary Care Provider:  Meme Medellin MD         LOS: 7 days       ----------------------------------------------------------------------------------------------------------------------  Subjective       Chief Complaints:    Edema        Interval History:      Resting in bed this morning.  Currently on 1 L per nasal cannula with no apparent distress.  Brother at bedside.  Lungs clear to auscultation bilaterally.  Abdomen soft, nontender.  Afebrile, denies diarrhea.  7 reported runs of SVT overnight.  WBC slightly elevated 11.61.    Review of Systems:    Constitutional: no fever, chills and night sweats.  Generalized fatigue.  Eyes: no eye drainage, itching or redness.  HEENT: no mouth sores, dysphagia or nose bleed.  Respiratory: no for shortness of breath, cough or production of sputum.  Cardiovascular: no chest pain, no palpitations, no orthopnea.  Gastrointestinal: no nausea, vomiting or diarrhea. No abdominal pain, hematemesis or rectal bleeding.  Genitourinary: no dysuria or polyuria.  Hematologic/lymphatic: no lymph node abnormalities, no easy bruising or easy bleeding.  Musculoskeletal: no muscle or joint pain.  Skin: No rash and no itching.  Neurological: no loss of consciousness, no seizure, no headache.  Behavioral/Psych: no depression or suicidal ideation.  Endocrine: no hot flashes.  Immunologic: negative.    ----------------------------------------------------------------------------------------------------------------------      Objective       [facility] Meds:  apixaban, 2.5 mg, Oral, Q12H  aspirin, 81 mg, Oral, Daily  atorvastatin, 20 mg, Oral, Nightly  budesonide-formoterol, 2 puff, Inhalation, BID - RT  cefTRIAXone, 2 g, Intravenous, Q24H  hydrALAZINE, 10 mg, Oral, Q8H  isosorbide mononitrate, 30 mg, Oral,  Q24H  metoprolol tartrate, 50 mg, Oral, BID  pantoprazole, 40 mg, Oral, QAM AC  polyethylene glycol, 17 g, Oral, Daily  predniSONE, 2.5 mg, Oral, Daily With Breakfast  senna-docusate sodium, 1 tablet, Oral, BID  sodium chloride, 10 mL, Intravenous, Q12H         ----------------------------------------------------------------------------------------------------------------------    Vital Signs:  Temp:  [96.9 °F (36.1 °C)-98.8 °F (37.1 °C)] 97.8 °F (36.6 °C)  Heart Rate:  [57-80] 71  Resp:  [16-18] 18  BP: ()/(54-88) 96/58  Mean Arterial Pressure (Non-Invasive) for the past 24 hrs (Last 3 readings):   Noninvasive MAP (mmHg)   05/17/23 1041 73   05/17/23 0656 99   05/17/23 0458 108     SpO2 Percentage    05/17/23 0656 05/17/23 0712 05/17/23 1041   SpO2: 95% 99% 97%     SpO2:  [90 %-99 %] 97 %  on  Flow (L/min):  [1] 1;   Device (Oxygen Therapy): room air    Body mass index is 18.05 kg/m².  Wt Readings from Last 3 Encounters:   05/17/23 41.9 kg (92 lb 6.4 oz)   07/26/21 42.6 kg (94 lb)   10/15/20 45.8 kg (101 lb)        Intake/Output Summary (Last 24 hours) at 5/17/2023 1136  Last data filed at 5/17/2023 0800  Gross per 24 hour   Intake 480 ml   Output 100 ml   Net 380 ml     Diet: Regular/House Diet; Texture: Soft to Chew (NDD 3); Soft to Chew: Chopped Meat; Fluid Consistency: Thin (IDDSI 0)  ----------------------------------------------------------------------------------------------------------------------      Physical Exam:    Constitutional: Elderly, chronically ill-appearing.  Currently on 1 L per nasal cannula.  Brother at bedside.  HENT:  Head: Normocephalic and atraumatic.  Hard of hearing. Mouth:  Moist mucous membranes.    Eyes:  Conjunctivae and EOM are normal.  No scleral icterus.  Neck:  Neck supple.  No JVD present.    Cardiovascular:  Normal rate, regular rhythm and normal heart sounds with no murmur. No edema.  Pulmonary/Chest:  No respiratory distress, no wheezes, no crackles, with normal  breath sounds and good air movement.  Lungs clear to auscultation bilaterally.  Currently on 1 L per nasal cannula with no apparent distress.  Abdominal:  Soft.  Bowel sounds are normal.  No distension and no tenderness.   Musculoskeletal:  No edema, no tenderness, and no deformity.  No swelling or redness of joints.  Severe kyphosis.  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  Neurological:  Alert and oriented to person, place, and time.  No facial droop.  No slurred speech.   Skin:  Skin is warm and dry.  No rash noted.  No pallor.   Psychiatric:  Normal mood and affect.  Behavior is normal.        ----------------------------------------------------------------------------------------------------------------------  Results from last 7 days   Lab Units 05/16/23  0656 05/16/23  0431 05/11/23  0935   HSTROP T ng/L 58* 59* 89*     Results from last 7 days   Lab Units 05/16/23  0656   PROBNP pg/mL 66,260.0*     Results from last 7 days   Lab Units 05/11/23  1720   CHOLESTEROL mg/dL 145   TRIGLYCERIDES mg/dL 126   HDL CHOL mg/dL 56   LDL CHOL mg/dL 67     Results from last 7 days   Lab Units 05/10/23  2059   PH, ARTERIAL pH units 7.472*   PO2 ART mm Hg 73.0*   PCO2, ARTERIAL mm Hg 38.4   HCO3 ART mmol/L 28.1*     Results from last 7 days   Lab Units 05/17/23  0112 05/14/23  0648 05/13/23  0207 05/12/23  0019 05/11/23  0019 05/10/23  1243   CRP mg/dL  --   --   --  26.20*  --   --    LACTATE mmol/L  --   --   --   --   --  1.9   WBC 10*3/mm3 11.61* 9.01 9.38 16.70*   < >  --    HEMOGLOBIN g/dL 11.5* 11.6* 11.6* 11.9*   < >  --    HEMATOCRIT % 36.6 37.3 38.5 39.0   < >  --    MCV fL 103.1* 104.5* 111.0* 107.4*   < >  --    MCHC g/dL 31.4* 31.1* 30.1* 30.5*   < >  --    PLATELETS 10*3/mm3 300 233 201 183   < >  --     < > = values in this interval not displayed.     Results from last 7 days   Lab Units 05/17/23  0112 05/16/23  0431 05/16/23  0220 05/15/23  0505 05/14/23  0648   SODIUM mmol/L 133*  --  132* 131* 137    POTASSIUM mmol/L 4.3  --  4.1 4.4 4.3   MAGNESIUM mg/dL 2.3 2.0  --  2.5*  --    CHLORIDE mmol/L 96*  --  95* 98 99   CO2 mmol/L 25.8  --  25.0 22.1 29.5*   BUN mg/dL 41*  --  40* 42* 46*   CREATININE mg/dL 1.25*  --  1.20* 1.26* 1.54*   CALCIUM mg/dL 9.0  --  8.9 8.8 9.1   GLUCOSE mg/dL 107*  --  112* 84 96   ALBUMIN g/dL  --   --   --   --  3.1*   BILIRUBIN mg/dL  --   --   --   --  0.2   ALK PHOS U/L  --   --   --   --  119*   AST (SGOT) U/L  --   --   --   --  26   ALT (SGPT) U/L  --   --   --   --  21   Estimated Creatinine Clearance: 20.6 mL/min (A) (by C-G formula based on SCr of 1.25 mg/dL (H)).  No results found for: AMMONIA    No results found for: HGBA1C, POCGLU  No results found for: HGBA1C  Lab Results   Component Value Date    TSH 3.950 05/10/2023       Blood Culture   Date Value Ref Range Status   05/10/2023 Escherichia coli (C)  Preliminary   05/10/2023 Escherichia coli (C)  Preliminary     Urine Culture   Date Value Ref Range Status   05/10/2023 50,000 CFU/mL Mixed Nina Isolated  Final     No results found for: WOUNDCX  No results found for: STOOLCX  No results found for: RESPCX  Pain Management Panel            View : No data to display.                        ----------------------------------------------------------------------------------------------------------------------  Imaging Results (Last 24 Hours)       ** No results found for the last 24 hours. **            ----------------------------------------------------------------------------------------------------------------------    Pertinent Infectious Disease Results    Blood cultures from 5/12/2023 show no growth thus far.    Urine culture from 5/10/2023 finalized as 50,000 colonies of mixed nina.        Assessment/Plan       Assessment        Acute pyelonephritis  E. coli bacteremia  Aspiration pneumonia           Plan      I saw and examined the patient myself this morning and discussed the findings and plan of care with Caryn  ANA Figueroa and got report from primary RN and here are my findings:    The patient is currently resting in bed and receiving 1 L of oxygen per nasal cannula without any apparent distress.  The patient's brother is present at the bedside, providing support.  Upon auscultation, clear lung sounds are heard bilaterally, indicating no respiratory abnormalities.  The abdomen remains soft and non-tender, suggesting no abdominal discomfort.  The patient is afebrile and denies experiencing diarrhea.  Overnight, there were 7 reported runs of supraventricular tachycardia (SVT), a condition characterized by rapid heart rhythm originating in the upper chambers of the heart.  The recent WBC count is slightly elevated at 11.61.  The current treatment plan includes continuing ceftriaxone at a dosage of 2 g IV every 24 hours to address the E. coli bacteremia secondary to urinary tract infection (UTI) and pneumonia.  Upon discharge, the patient can be further de-escalated to oral cefdinir as per pharmacy dosing until 5/21/2023 to continue treatment for bacteremia, UTI, and pneumonia.    ANTIMICROBIAL THERAPY    cefTRIAXone (ROCEPHIN) 2gm IVPB in 100 mL NS (VTB)     Code Status:   Code Status and Medical Interventions:   Ordered at: 05/10/23 1720     Medical Intervention Limits:    NO intubation (DNI)     Level Of Support Discussed With:    Next of Kin (If No Surrogate)     Code Status (Patient has no pulse and is not breathing):    No CPR (Do Not Attempt to Resuscitate)     Medical Interventions (Patient has pulse or is breathing):    Limited Support     Release to patient:    Routine Release       ANA Hendrickson  05/17/23  11:36 EDT     Electronically signed by ANA Hendrickson, 05/17/23, 11:37 AM EDT.  Electronically signed by Jamie Vicente MD, 05/17/23, 6:31 PM EDT.

## 2023-05-18 LAB
ALBUMIN SERPL-MCNC: 2.8 G/DL (ref 3.5–5.2)
ALBUMIN/GLOB SERPL: 0.8 G/DL
ALP SERPL-CCNC: 99 U/L (ref 39–117)
ALT SERPL W P-5'-P-CCNC: 16 U/L (ref 1–33)
ANION GAP SERPL CALCULATED.3IONS-SCNC: 11.9 MMOL/L (ref 5–15)
AST SERPL-CCNC: 30 U/L (ref 1–32)
BILIRUB SERPL-MCNC: 0.3 MG/DL (ref 0–1.2)
BUN SERPL-MCNC: 41 MG/DL (ref 8–23)
BUN/CREAT SERPL: 31.5 (ref 7–25)
CALCIUM SPEC-SCNC: 8.9 MG/DL (ref 8.6–10.5)
CHLORIDE SERPL-SCNC: 98 MMOL/L (ref 98–107)
CO2 SERPL-SCNC: 25.1 MMOL/L (ref 22–29)
CREAT SERPL-MCNC: 1.3 MG/DL (ref 0.57–1)
EGFRCR SERPLBLD CKD-EPI 2021: 39.6 ML/MIN/1.73
GLOBULIN UR ELPH-MCNC: 3.5 GM/DL
GLUCOSE SERPL-MCNC: 98 MG/DL (ref 65–99)
POTASSIUM SERPL-SCNC: 4.1 MMOL/L (ref 3.5–5.2)
PROT SERPL-MCNC: 6.3 G/DL (ref 6–8.5)
SODIUM SERPL-SCNC: 135 MMOL/L (ref 136–145)

## 2023-05-18 PROCEDURE — 99232 SBSQ HOSP IP/OBS MODERATE 35: CPT | Performed by: INTERNAL MEDICINE

## 2023-05-18 PROCEDURE — 97530 THERAPEUTIC ACTIVITIES: CPT

## 2023-05-18 PROCEDURE — 99231 SBSQ HOSP IP/OBS SF/LOW 25: CPT | Performed by: INTERNAL MEDICINE

## 2023-05-18 PROCEDURE — 80053 COMPREHEN METABOLIC PANEL: CPT | Performed by: STUDENT IN AN ORGANIZED HEALTH CARE EDUCATION/TRAINING PROGRAM

## 2023-05-18 PROCEDURE — 25010000002 CEFTRIAXONE PER 250 MG: Performed by: STUDENT IN AN ORGANIZED HEALTH CARE EDUCATION/TRAINING PROGRAM

## 2023-05-18 PROCEDURE — 97110 THERAPEUTIC EXERCISES: CPT

## 2023-05-18 PROCEDURE — 94761 N-INVAS EAR/PLS OXIMETRY MLT: CPT

## 2023-05-18 PROCEDURE — 94799 UNLISTED PULMONARY SVC/PX: CPT

## 2023-05-18 PROCEDURE — 63710000001 PREDNISONE PER 5 MG: Performed by: STUDENT IN AN ORGANIZED HEALTH CARE EDUCATION/TRAINING PROGRAM

## 2023-05-18 PROCEDURE — 99232 SBSQ HOSP IP/OBS MODERATE 35: CPT | Performed by: STUDENT IN AN ORGANIZED HEALTH CARE EDUCATION/TRAINING PROGRAM

## 2023-05-18 RX ORDER — BUMETANIDE 1 MG/1
1 TABLET ORAL
Status: DISCONTINUED | OUTPATIENT
Start: 2023-05-19 | End: 2023-05-19 | Stop reason: HOSPADM

## 2023-05-18 RX ADMIN — ISOSORBIDE MONONITRATE 30 MG: 30 TABLET, EXTENDED RELEASE ORAL at 08:48

## 2023-05-18 RX ADMIN — HYDRALAZINE HYDROCHLORIDE 10 MG: 10 TABLET ORAL at 14:54

## 2023-05-18 RX ADMIN — PANTOPRAZOLE SODIUM 40 MG: 40 TABLET, DELAYED RELEASE ORAL at 06:17

## 2023-05-18 RX ADMIN — PREDNISONE 2.5 MG: 5 TABLET ORAL at 08:45

## 2023-05-18 RX ADMIN — ATORVASTATIN CALCIUM 20 MG: 20 TABLET, FILM COATED ORAL at 20:26

## 2023-05-18 RX ADMIN — HYDRALAZINE HYDROCHLORIDE 10 MG: 10 TABLET ORAL at 20:26

## 2023-05-18 RX ADMIN — DOCUSATE SODIUM 50 MG AND SENNOSIDES 8.6 MG 1 TABLET: 8.6; 5 TABLET, FILM COATED ORAL at 20:26

## 2023-05-18 RX ADMIN — BUDESONIDE AND FORMOTEROL FUMARATE DIHYDRATE 2 PUFF: 160; 4.5 AEROSOL RESPIRATORY (INHALATION) at 18:31

## 2023-05-18 RX ADMIN — CEFTRIAXONE 2 G: 2 INJECTION, POWDER, FOR SOLUTION INTRAMUSCULAR; INTRAVENOUS at 10:47

## 2023-05-18 RX ADMIN — HYDRALAZINE HYDROCHLORIDE 10 MG: 10 TABLET ORAL at 06:17

## 2023-05-18 RX ADMIN — Medication 10 ML: at 20:27

## 2023-05-18 RX ADMIN — DOCUSATE SODIUM 50 MG AND SENNOSIDES 8.6 MG 1 TABLET: 8.6; 5 TABLET, FILM COATED ORAL at 08:48

## 2023-05-18 RX ADMIN — APIXABAN 2.5 MG: 2.5 TABLET, FILM COATED ORAL at 08:46

## 2023-05-18 RX ADMIN — Medication 10 ML: at 08:48

## 2023-05-18 RX ADMIN — APIXABAN 2.5 MG: 2.5 TABLET, FILM COATED ORAL at 20:26

## 2023-05-18 RX ADMIN — METOPROLOL TARTRATE 50 MG: 50 TABLET, FILM COATED ORAL at 08:46

## 2023-05-18 RX ADMIN — BUDESONIDE AND FORMOTEROL FUMARATE DIHYDRATE 2 PUFF: 160; 4.5 AEROSOL RESPIRATORY (INHALATION) at 06:37

## 2023-05-18 RX ADMIN — ASPIRIN 81 MG: 81 TABLET, COATED ORAL at 08:48

## 2023-05-18 RX ADMIN — POLYETHYLENE GLYCOL 3350 17 G: 17 POWDER, FOR SOLUTION ORAL at 08:48

## 2023-05-18 NOTE — PLAN OF CARE
Goal Outcome Evaluation:      Pt has had a non eventful day. She has rested in bed with no complaints. No changes. Family at bedside. No s/s of distress. Will continue to follow plan of care.       Daily Care Plan Summary: Heart Failure    Diuretic in use (IV or PO):   PO        Daily weight (up or down):    loss: 0.635 lbs      Output > Intake (yes/no):Yes      O2 Requirements (current, any change?):  1  liters/min via nasal cannula      Symptoms noted with Activity (Respiratory Tolerance, functional state):     none      Anticipated Discharge Plans:     TBD

## 2023-05-18 NOTE — PROGRESS NOTES
PROGRESS NOTE         Patient Identification:  Name:  Anh Saldana  Age:  88 y.o.  Sex:  female  :  1934  MRN:  [id number]  Visit Number:  [id number]  Primary Care Provider:  Meme Medellin MD         LOS: 8 days       ----------------------------------------------------------------------------------------------------------------------  Subjective       Chief Complaints:    Edema        Interval History:      Patient is sitting up in bed on 1 L nasal cannula in no apparent distress.  Brother is at bedside.  No new issues or complaints today and states that she is feeling better without abdominal pain, nausea, vomiting, diarrhea, or dysuria.  Lungs are clear to auscultation bilaterally.  Abdomen is soft, nontender.  Right lower extremity in Kerlix this morning.  Afebrile, no diarrhea.  No new labs today.    Review of Systems:    Constitutional: no fever, chills and night sweats.  Generalized fatigue.  Eyes: no eye drainage, itching or redness.  HEENT: no mouth sores, dysphagia or nose bleed.  Respiratory: no for shortness of breath, cough or production of sputum.  Cardiovascular: no chest pain, no palpitations, no orthopnea.  Gastrointestinal: no nausea, vomiting or diarrhea. No abdominal pain, hematemesis or rectal bleeding.  Genitourinary: no dysuria or polyuria.  Hematologic/lymphatic: no lymph node abnormalities, no easy bruising or easy bleeding.  Musculoskeletal: no muscle or joint pain.  Skin: No rash and no itching.  Neurological: no loss of consciousness, no seizure, no headache.  Behavioral/Psych: no depression or suicidal ideation.  Endocrine: no hot flashes.  Immunologic: negative.    ----------------------------------------------------------------------------------------------------------------------      Objective       [facility] Meds:  apixaban, 2.5 mg, Oral, Q12H  aspirin, 81 mg, Oral, Daily  atorvastatin, 20 mg, Oral, Nightly  budesonide-formoterol, 2 puff, Inhalation,  BID - RT  [START ON 5/19/2023] bumetanide, 1 mg, Oral, Q48H  cefTRIAXone, 2 g, Intravenous, Q24H  hydrALAZINE, 10 mg, Oral, Q8H  isosorbide mononitrate, 30 mg, Oral, Q24H  metoprolol tartrate, 50 mg, Oral, BID  pantoprazole, 40 mg, Oral, QAM AC  polyethylene glycol, 17 g, Oral, Daily  predniSONE, 2.5 mg, Oral, Daily With Breakfast  senna-docusate sodium, 1 tablet, Oral, BID  sodium chloride, 10 mL, Intravenous, Q12H         ----------------------------------------------------------------------------------------------------------------------    Vital Signs:  Temp:  [96.4 °F (35.8 °C)-98.3 °F (36.8 °C)] 97.8 °F (36.6 °C)  Heart Rate:  [56-71] 63  Resp:  [18-24] 24  BP: ()/(58-82) 159/68  Mean Arterial Pressure (Non-Invasive) for the past 24 hrs (Last 3 readings):   Noninvasive MAP (mmHg)   05/18/23 0306 100   05/17/23 2242 96   05/17/23 1901 109     SpO2 Percentage    05/18/23 0306 05/18/23 0600 05/18/23 0637   SpO2: 98% 96% 93%     SpO2:  [93 %-98 %] 93 %  on  Flow (L/min):  [1] 1;   Device (Oxygen Therapy): nasal cannula    Body mass index is 17.77 kg/m².  Wt Readings from Last 3 Encounters:   05/18/23 41.3 kg (91 lb)   07/26/21 42.6 kg (94 lb)   10/15/20 45.8 kg (101 lb)        Intake/Output Summary (Last 24 hours) at 5/18/2023 1021  Last data filed at 5/18/2023 0400  Gross per 24 hour   Intake 720 ml   Output 400 ml   Net 320 ml     Diet: Regular/House Diet; Texture: Soft to Chew (NDD 3); Soft to Chew: Chopped Meat; Fluid Consistency: Thin (IDDSI 0)  ----------------------------------------------------------------------------------------------------------------------      Physical Exam:    Constitutional: Elderly, chronically ill-appearing female is on 1 L nasal cannula in no apparent distress.  Brother is at bedside.  HENT:  Head: Normocephalic and atraumatic.  Hard of hearing. Mouth:  Moist mucous membranes.    Eyes:  Conjunctivae and EOM are normal.  No scleral icterus.  Neck:  Neck supple.  No JVD  present.    Cardiovascular:  Normal rate, regular rhythm and normal heart sounds with no murmur. No edema.  Pulmonary/Chest:  No respiratory distress, no wheezes, no crackles, with normal breath sounds and good air movement.  Lungs clear to auscultation bilaterally.  Currently on 1 L per nasal cannula with no apparent distress.  Abdominal:  Soft.  Bowel sounds are normal.  No distension and no tenderness.   Musculoskeletal:  No edema, no tenderness, and no deformity.  No swelling or redness of joints.  Severe kyphosis.  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  Neurological:  Alert and oriented to person, place, and time.  No facial droop.  No slurred speech.   Skin:  Skin is warm and dry.  No rash noted.  No pallor.  Right lower extremity in Kerlix dressing.  Psychiatric:  Normal mood and affect.  Behavior is normal.        ----------------------------------------------------------------------------------------------------------------------  Results from last 7 days   Lab Units 05/16/23  0656 05/16/23  0431   HSTROP T ng/L 58* 59*     Results from last 7 days   Lab Units 05/16/23  0656   PROBNP pg/mL 66,260.0*     Results from last 7 days   Lab Units 05/11/23  1720   CHOLESTEROL mg/dL 145   TRIGLYCERIDES mg/dL 126   HDL CHOL mg/dL 56   LDL CHOL mg/dL 67         Results from last 7 days   Lab Units 05/17/23  0112 05/14/23  0648 05/13/23  0207 05/12/23  0019   CRP mg/dL  --   --   --  26.20*   WBC 10*3/mm3 11.61* 9.01 9.38 16.70*   HEMOGLOBIN g/dL 11.5* 11.6* 11.6* 11.9*   HEMATOCRIT % 36.6 37.3 38.5 39.0   MCV fL 103.1* 104.5* 111.0* 107.4*   MCHC g/dL 31.4* 31.1* 30.1* 30.5*   PLATELETS 10*3/mm3 300 233 201 183     Results from last 7 days   Lab Units 05/18/23  0110 05/17/23  0112 05/16/23  0431 05/16/23  0220 05/15/23  0505 05/14/23  0648   SODIUM mmol/L 135* 133*  --  132* 131* 137   POTASSIUM mmol/L 4.1 4.3  --  4.1 4.4 4.3   MAGNESIUM mg/dL  --  2.3 2.0  --  2.5*  --    CHLORIDE mmol/L 98 96*  --  95* 98 99    CO2 mmol/L 25.1 25.8  --  25.0 22.1 29.5*   BUN mg/dL 41* 41*  --  40* 42* 46*   CREATININE mg/dL 1.30* 1.25*  --  1.20* 1.26* 1.54*   CALCIUM mg/dL 8.9 9.0  --  8.9 8.8 9.1   GLUCOSE mg/dL 98 107*  --  112* 84 96   ALBUMIN g/dL 2.8*  --   --   --   --  3.1*   BILIRUBIN mg/dL 0.3  --   --   --   --  0.2   ALK PHOS U/L 99  --   --   --   --  119*   AST (SGOT) U/L 30  --   --   --   --  26   ALT (SGPT) U/L 16  --   --   --   --  21   Estimated Creatinine Clearance: 19.5 mL/min (A) (by C-G formula based on SCr of 1.3 mg/dL (H)).  No results found for: AMMONIA    No results found for: HGBA1C, POCGLU  No results found for: HGBA1C  Lab Results   Component Value Date    TSH 3.950 05/10/2023       Blood Culture   Date Value Ref Range Status   05/10/2023 Escherichia coli (C)  Preliminary   05/10/2023 Escherichia coli (C)  Preliminary     Urine Culture   Date Value Ref Range Status   05/10/2023 50,000 CFU/mL Mixed Nina Isolated  Final     No results found for: WOUNDCX  No results found for: STOOLCX  No results found for: RESPCX  Pain Management Panel            View : No data to display.                        ----------------------------------------------------------------------------------------------------------------------  Imaging Results (Last 24 Hours)       ** No results found for the last 24 hours. **            ----------------------------------------------------------------------------------------------------------------------    Pertinent Infectious Disease Results    Blood cultures on 5/10/2023 finalized as E. coli.  Blood cultures from 5/12/2023 finalized as no growth.    Urine culture from 5/10/2023 finalized as 50,000 colonies of mixed nina.        Assessment/Plan       Assessment        Acute pyelonephritis  E. coli bacteremia  Aspiration pneumonia           Plan      I have seen and examined the patient myself this morning and discussed the plan of care with Hilary Griffin PA-C and discussed overnight  changes with primary RN here are my findings:    The patient is currently sitting up in bed and receiving 1 L of nasal cannula oxygen without any apparent distress. The patient's brother is present at the bedside. There are no new issues or complaints reported today, and the patient states that she is feeling better without experiencing abdominal pain, nausea, vomiting, diarrhea, or dysuria.    Upon auscultation, the lungs are clear bilaterally, indicating no abnormal lung sounds. The abdomen is soft and non-tender. The right lower extremity is wrapped in Kerlix this morning. The patient is afebrile and does not have diarrhea. No new laboratory tests were conducted today.    It is recommended to continue the current treatment plan, which includes administering ceftriaxone intravenously at a dose of 2 g every 24 hours. Upon discharge, the treatment can be de-escalated to oral cefdinir. It is advised to continue antibiotic therapy until 5/21/2023 to effectively target and treat the bacteremia, urinary tract infection, and pneumonia.      ANTIMICROBIAL THERAPY    cefTRIAXone (ROCEPHIN) 2gm IVPB in 100 mL NS (VTB)     Code Status:   Code Status and Medical Interventions:   Ordered at: 05/10/23 1720     Medical Intervention Limits:    NO intubation (DNI)     Level Of Support Discussed With:    Next of Kin (If No Surrogate)     Code Status (Patient has no pulse and is not breathing):    No CPR (Do Not Attempt to Resuscitate)     Medical Interventions (Patient has pulse or is breathing):    Limited Support     Release to patient:    Routine Release       Hilary Griffin PA-C  05/18/23  10:21 EDT     Electronically signed by Hilary Griffin PA-C, 05/18/23, 10:25 AM EDT.    Electronically signed by Jamie Vicente MD, 05/18/23, 2:17 PM EDT.

## 2023-05-18 NOTE — THERAPY TREATMENT NOTE
Acute Care - Physical Therapy Treatment Note  CHANTEL Avelar     Patient Name: Anh Saldana  : 1934  MRN: [id number]  Today's Date: 2023   Onset of Illness/Injury or Date of Surgery: 05/10/23  Visit Dx:     ICD-10-CM ICD-9-CM   1. Non-STEMI (non-ST elevated myocardial infarction)  I21.4 410.70   2. Congestive heart failure, unspecified HF chronicity, unspecified heart failure type  I50.9 428.0   3. Sepsis, due to unspecified organism, unspecified whether acute organ dysfunction present  A41.9 038.9     995.91   4. TANA (acute kidney injury)  N17.9 584.9   5. Hypoxemia  R09.02 799.02     Patient Active Problem List   Diagnosis   • Osteoporosis, unspecified   • Hypertensive urgency   • Essential hypertension   • Dyspnea on exertion   • Acute on chronic heart failure with preserved ejection fraction (HFpEF)   • Persistent atrial fibrillation   • Hearing loss   • Acute on chronic heart failure with preserved ejection fraction (HFpEF)   • Acute respiratory failure with hypoxemia   • Severe Malnutrition (HCC)     Past Medical History:   Diagnosis Date   • Breast cancer    • CHF (congestive heart failure)    • Chronic kidney disease     acute kidney failure   • [place] (hard of hearing)    • Hypertension    • Osteoarthritis    • Osteoporosis    • Rheumatoid arthritis    • Right-sided Bell's palsy    • Shortness of breath     sleeps with oxygen at night     Past Surgical History:   Procedure Laterality Date   • BREAST LUMPECTOMY Left     benign   • CATARACT EXTRACTION W/ INTRAOCULAR LENS  IMPLANT, BILATERAL Bilateral    • SHOULDER SURGERY Left     metal plate in left shoulder     PT Assessment (last 12 hours)     PT Evaluation and Treatment     Row Name 23 1041          Physical Therapy Time and Intention    Subjective Information complains of;weakness;pain  -AG     Document Type therapy note (daily note)  -AG     Mode of Treatment physical therapy  -AG     Patient Effort adequate  -AG     Symptoms Noted  During/After Treatment fatigue;increased pain  -AG     Row Name 05/18/23 1041          General Information    Patient Profile Reviewed yes  -AG     Patient Observations agree to therapy;cooperative;alert  -AG     Patient/Family/Caregiver Comments/Observations pt. is sitting with HOB elevated upon PT entry to room.  She is observed to have difficulty eating d/t excessively flexed posture.  Also it is noted that she coughs after drinking water.  -AG     Existing Precautions/Restrictions fall;oxygen therapy device and L/min  hearing impairment  -AG     Limitations/Impairments hearing  -AG     Risks Reviewed patient and family:;increased discomfort  -AG     Benefits Reviewed patient and family:;improve function;increase independence;increase strength;increase balance;increase knowledge;decrease risk of DVT  -AG     Barriers to Rehab previous functional deficit;hearing deficit  -AG     Row Name 05/18/23 1041          Pain    Pain Intervention(s) --  again expresses pain with mobilization, especially of neck and trunk.  -AG     Row Name 05/18/23 1041          Cognition    Affect/Mental Status (Cognition) WFL  -AG     Orientation Status (Cognition) oriented to;person;place  -AG     Follows Commands (Cognition) follows one-step commands;repetition of directions required;verbal cues/prompting required;physical/tactile prompts required  -AG     Personal Safety Interventions supervised activity  -AG     Row Name 05/18/23 1041          Range of Motion Comprehensive    General Range of Motion --  CROM, shoulder and trunk ROM significantly impaired d/t muscle tightening and chronic, poor postural mechanics.  -AG     Row Name 05/18/23 1041          Bed Mobility    Rolling Left [place] (Bed Mobility) verbal cues;nonverbal cues (demo/gesture);maximum assist (25% patient effort);dependent (less than 25% patient effort)  -AG     Rolling Right [place] (Bed Mobility) verbal cues;nonverbal cues (demo/gesture);maximum assist  (25% patient effort);dependent (less than 25% patient effort)  -AG     Scooting/Bridging [place] (Bed Mobility) verbal cues;nonverbal cues (demo/gesture);dependent (less than 25% patient effort)  -AG     Row Name 05/18/23 1041          Balance    Static Sitting Balance --  poor static sitting balance d/t impaired trunk ROM and postural control  -AG     Row Name 05/18/23 1041          Hip (Therapeutic Exercise)    Hip (Therapeutic Exercise) PROM (passive range of motion)  -AG     Row Name 05/18/23 1041          Stretching (Therapeutic Exercise)    Comment (Stretching Therapeutic Exercise) PT performed passive CROM with emphasis on extension rotation; B scapular retraction.  -AG     Row Name             Wound 05/10/23 1727 coccyx    Wound - Properties Group Placement Date: 05/10/23  -SC Placement Time: 1727  -SC Present on Hospital Admission: Y  -SC Location: coccyx  -SC    Retired Wound - Properties Group Placement Date: 05/10/23  -SC Placement Time: 1727  -SC Present on Hospital Admission: Y  -SC Location: coccyx  -SC    Retired Wound - Properties Group Date first assessed: 05/10/23  -SC Time first assessed: 1727  -SC Present on Hospital Admission: Y  -SC Location: coccyx  -SC    Row Name             Wound 05/17/23 1200 Right lower leg Skin Tear    Wound - Properties Group Placement Date: 05/17/23  -SE Placement Time: 1200  -SE Side: Right  -SE Orientation: lower  -SE Location: leg  -SE Primary Wound Type: Skin tear  -SE    Retired Wound - Properties Group Placement Date: 05/17/23  -SE Placement Time: 1200  -SE Side: Right  -SE Orientation: lower  -SE Location: leg  -SE Primary Wound Type: Skin tear  -SE    Retired Wound - Properties Group Date first assessed: 05/17/23  -SE Time first assessed: 1200  -SE Side: Right  -SE Location: leg  -SE Primary Wound Type: Skin tear  -SE    Row Name 05/18/23 1041          Coping    Observed Emotional State calm;cooperative  -     Verbalized Emotional State acceptance  -AG      Trust Relationship/Rapport care explained;choices provided;thoughts/feelings acknowledged  -     Family/Support Persons sibling  -AG     Involvement in Care at bedside;attentive to patient  -AG     Family/Support System Care support provided;self-care encouraged  -AG     Row Name 05/18/23 1041          Plan of Care Review    Plan of Care Reviewed With patient  -AG     Outcome Evaluation PT treatment complete with emphasis on CROM and trunk ROM; performed passive stretches to facilitate cervical extension and rotation, scapular retraction.  Again educated pt and her brother in importance of mobilization and postural mechanics, especially since this may affect her ability to swallow.  PT will continue to follow.  -AG     Row Name 05/18/23 1041          Positioning and Restraints    Pre-Treatment Position in bed  -AG     Post Treatment Position bed  -AG     In Bed fowlers;call light within reach;encouraged to call for assist;with family/caregiver  pt. was returned to sitting up in bed with HOB elevated.  -     Row Name 05/18/23 1041          Therapy Plan Review/Discharge Plan (PT)    Therapy Plan Review (PT) evaluation/treatment results reviewed;care plan/treatment goals reviewed;risks/benefits reviewed;current/potential barriers reviewed;participants voiced agreement with care plan;participants included;patient;sibling  -AG           User Key  (r) = Recorded By, (t) = Taken By, (c) = Cosigned By    Initials Name Provider Type     Yaneth Le, PT Physical Therapist    Lydia Cordero, RN Registered Nurse    Tanisha Bright RN Registered Nurse                Physical Therapy Education     Title: PT OT SLP Therapies (Done)     Topic: Physical Therapy (Done)     Point: Mobility training (Done)     Learning Progress Summary           Patient Acceptance, E,D, NR,VU by  at 5/18/2023 1040    Acceptance, D,E, VU,NR by  at 5/17/2023 1149    Acceptance, E,D, NL by  at 5/16/2023 1522   Family  Acceptance, E,D, NR,VU by AG at 5/18/2023 1040    Acceptance, D,E, VU,NR by AG at 5/17/2023 1149    Acceptance, E,D, NR by AG at 5/16/2023 1522                   Point: Home exercise program (Done)     Learning Progress Summary           Patient Acceptance, E,D, NR,VU by AG at 5/18/2023 1040    Acceptance, D,E, VU,NR by AG at 5/17/2023 1149    Acceptance, E,D, NL by AG at 5/16/2023 1522   Family Acceptance, E,D, NR,VU by AG at 5/18/2023 1040    Acceptance, D,E, VU,NR by AG at 5/17/2023 1149    Acceptance, E,D, NR by AG at 5/16/2023 1522                   Point: Body mechanics (Done)     Learning Progress Summary           Patient Acceptance, E,D, NR,VU by AG at 5/18/2023 1040    Acceptance, D,E, VU,NR by AG at 5/17/2023 1149    Acceptance, E,D, NL by AG at 5/16/2023 1522   Family Acceptance, E,D, NR,VU by AG at 5/18/2023 1040    Acceptance, D,E, VU,NR by AG at 5/17/2023 1149    Acceptance, E,D, NR by AG at 5/16/2023 1522                   Point: Precautions (Done)     Learning Progress Summary           Patient Acceptance, E,D, NR,VU by AG at 5/18/2023 1040    Acceptance, D,E, VU,NR by AG at 5/17/2023 1149    Acceptance, E,D, NL by AG at 5/16/2023 1522   Family Acceptance, E,D, NR,VU by AG at 5/18/2023 1040    Acceptance, D,E, VU,NR by AG at 5/17/2023 1149    Acceptance, E,D, NR by AG at 5/16/2023 1522                               User Key     Initials Effective Dates Name Provider Type Discipline     06/16/21 -  Yaneth Le, PT Physical Therapist PT              PT Recommendation and Plan     Plan of Care Reviewed With: patient  Progress: no change  Outcome Evaluation: PT treatment complete with emphasis on CROM and trunk ROM; performed passive stretches to facilitate cervical extension and rotation, scapular retraction.  Again educated pt and her brother in importance of mobilization and postural mechanics, especially since this may affect her ability to swallow.  PT will continue to follow.       Time  Calculation:    PT Charges     Row Name 05/18/23 1041             Time Calculation    PT Received On 05/18/23  -            User Key  (r) = Recorded By, (t) = Taken By, (c) = Cosigned By    Initials Name Provider Type    Yaneth Gerber, PT Physical Therapist              Therapy Charges for Today     Code Description Service Date Service Provider Modifiers Qty    58018671703 HC PT THER PROC EA 15 MIN 5/17/2023 Rey, Yaneth Hinds, PT GP 1    66253990302 HC PT THERAPEUTIC ACT EA 15 MIN 5/17/2023 Yaneth Le, PT GP 1    76641622743 HC PT THER PROC EA 15 MIN 5/18/2023 Yaneth Le, PT GP 1    [id number]  PT THERAPEUTIC ACT EA 15 MIN 5/18/2023 Yaneth Le, PT GP 1          PT G-Codes  AM-PAC 6 Clicks Score (PT): 9    Yaneth Le, PT  5/18/2023

## 2023-05-18 NOTE — PLAN OF CARE
Daily Care Plan Summary: Heart Failure    Diuretic in use (IV or PO):   PO        Daily weight (up or down):    loss: 1lbs      Output > Intake (yes/no):Yes      O2 Requirements (current, any change?):  1 liters/min via nasal cannula      Symptoms noted with Activity (Respiratory Tolerance, functional state):     Pt is on bed rest HS and is RA as a baseline      Anticipated Discharge Plans:     [facility] awaiting room placement

## 2023-05-18 NOTE — PROGRESS NOTES
[facility] HOSPITALIST PROGRESS NOTE     Patient Identification:  Name:  Anh Saldana  Age:  88 y.o.  Sex:  female  :  1934  MRN:  [id number]  Visit Number:  [id number]  ROOM: [address] [place] [address]     Primary Care Provider:  Meme Medellin MD    Length of stay in inpatient status:  8    Subjective     Chief Compliant:    Chief Complaint   Patient presents with   • Edema       History of Presenting Illness: Patient seen and evaluated in follow-up for sepsis secondary to community-acquired pneumonia with E. coli bacteremia from acute UTI with acute on chronic HFpEF with resultant acute on chronic hypoxemic respiratory failure.  Patient at time of exam resting comfortably in bed 1 L supplemental oxygen.  Denies any acute complaints.  Patient currently being evaluated and working with physical therapy and awaiting SNF placement for short-term rehab.    Objective     Current Hospital Meds:  apixaban, 2.5 mg, Oral, Q12H  aspirin, 81 mg, Oral, Daily  atorvastatin, 20 mg, Oral, Nightly  budesonide-formoterol, 2 puff, Inhalation, BID - RT  [START ON 2023] bumetanide, 1 mg, Oral, Q48H  cefTRIAXone, 2 g, Intravenous, Q24H  hydrALAZINE, 10 mg, Oral, Q8H  isosorbide mononitrate, 30 mg, Oral, Q24H  metoprolol tartrate, 50 mg, Oral, BID  pantoprazole, 40 mg, Oral, QAM AC  polyethylene glycol, 17 g, Oral, Daily  predniSONE, 2.5 mg, Oral, Daily With Breakfast  senna-docusate sodium, 1 tablet, Oral, BID  sodium chloride, 10 mL, Intravenous, Q12H         ----------------------------------------------------------------------------------------------------------------------  Vital Signs:  Temp:  [96.4 °F (35.8 °C)-98.3 °F (36.8 °C)] 97.8 °F (36.6 °C)  Heart Rate:  [56-75] 60  Resp:  [18-24] 18  BP: (113-160)/(66-82) 113/66  SpO2:  [93 %-98 %] 96 %  on  Flow (L/min):  [1] 1;   Device (Oxygen Therapy): nasal cannula  Body mass index is 17.77 kg/m².      Intake/Output Summary (Last 24 hours) at 2023 1651  Last data  filed at 5/18/2023 1454  Gross per 24 hour   Intake 920 ml   Output 1300 ml   Net -380 ml      ----------------------------------------------------------------------------------------------------------------------  Physical exam:  Constitutional: Elderly chronically ill-appearing frail cachectic female resting in bed in no distress   HENT:  Head:  Normocephalic and atraumatic.  Mouth:  Moist mucous membranes.    Eyes:  Conjunctivae and EOM are normal. No scleral icterus.    Cardiovascular:  Normal rate, regular rhythm and normal heart sounds with no murmur.  Pulmonary/Chest:  No respiratory distress, no wheezes, no crackles, with normal breath sounds and good air movement.  Abdominal:  Soft.  Bowel sounds are normal.  No distension and no tenderness.   Musculoskeletal:  No tenderness, and no deformity.  No red or swollen joints anywhere.  Functional ROM intact.   Neurological:  Alert and oriented to person, place, and time.  No cranial nerve deficit.  No tongue deviation.  No facial droop.  No slurred speech. Intact Sensation throughout  Skin:  Skin is warm and dry. No rash or lesion noted. No pallor.   Peripheral vascular:  Pulses in all 4 extremities with no clubbing, no cyanosis, no edema.  Psychiatric: Appropriate mood and affect, pleasant.   ----------------------------------------------------------------------------------------------------------------------     BUN/CREAT/GLUC/ALT/AST/ARASH/LIP    41/1.30/98/16/30/--/-- (05/18 0110)  LYTES - Na/K/Cl/CO2: 135*/4.1/98/25.1 (05/18 0110)        Urine Culture   Date Value Ref Range Status   05/10/2023 50,000 CFU/mL Mixed Sondra Isolated  Final     Blood Culture   Date Value Ref Range Status   05/12/2023 No growth at 3 days  Preliminary   05/12/2023 No growth at 3 days  Preliminary   05/10/2023 Escherichia coli (C)  Final   05/10/2023 Escherichia coli (C)  Final       I have personally looked at the labs and they are summarized  above.  ----------------------------------------------------------------------------------------------------------------------  Detailed radiology reports for the last 24 hours:  No radiology results for the last day  Assessment & Plan      Sepsis  Left basilar CAP  E. coli bacteremia 2/2 acute UTI    -Patient presenting with tachypnea, tachycardia, leukocytosis and with blood cultures growing E. coli BC ID PCR.  Repeat blood cultures with no growth to date.    -Infectious disease consulted and recommends continuation with growth Rocephin and stepdown to oral cephalosporin at discharge through 5/21/2023.    Acute on chronic HFpEF  Acute on chronic hypoxemic respiratory failure    -Per review documentation patient presenting with decompensated heart failure with peripheral edema however on exam now with no longer any continued peripheral edema and therefore no need for continued diuresis    -Continue metoprolol, patient currently on heart failure pathway    -Transition to every other day Bumex dosing    TANA on CKD stage IIIb    -Likely secondary to patient's sepsis from UTI with resultant bacteremia as well as community-acquired pneumonia    -Continue supportive care, renal function stable at 1.25 today.    Paroxysmal atrial fibrillation  Pulmonary hypertension    -Continue metoprolol and Eliquis    -Initiated on nitrate and hydralazine for pulmonary hypertension by cardiology    Chronic severe malnutrition    -Seen and evaluated by nutrition and dietary supplements added    Hypertension    -Continue Aldactone, metoprolol    Osteoarthritis  Rheumatoid arthritis    -Continue home prednisone 2.5 daily    Hard of hearing  Osteoporosis  Advanced age    -Complicates all aspects of care    Acute illness myopathy    -Patient with significant debility and weakness after recent illness and this acute hospitalization consistent with acute illness myopathy.  Consult PT and OT and discussed family and agreeable to SNF  rehab.    Copied text in portions of the note has been reviewed and is accurate as of 05/18/23    VTE Prophylaxis:   Mechanical Order History:     None      Pharmalogical Order History:      Ordered     Dose Route Frequency Stop    05/12/23 1520  apixaban (ELIQUIS) tablet 2.5 mg         2.5 mg PO Every 12 Hours Scheduled --    05/10/23 1449  heparin (porcine) 5000 UNIT/ML injection 2,400 Units         60 Units/kg IV Once 05/10/23 1541    05/10/23 1449  heparin 17656 units/250 mL (100 units/mL) in 0.9% NaCl infusion  4.89 mL/hr,   Status:  Discontinued         12 Units/kg/hr IV Titrated 05/12/23 1520    05/10/23 1449  heparin (porcine) 5000 UNIT/ML injection 2,400 Units  Status:  Discontinued         60 Units/kg IV As Needed 05/12/23 1520    05/10/23 1449  heparin (porcine) 5000 UNIT/ML injection 1,200 Units  Status:  Discontinued         30 Units/kg IV As Needed 05/12/23 1520                Disposition skilled nursing facility    Didier Contreras DO  [facility] Hospitalist  05/18/23  16:52 EDT

## 2023-05-18 NOTE — THERAPY TREATMENT NOTE
Acute Care - Occupational Therapy Treatment Note  CHANTEL Avelar     Patient Name: Anh Saldana  : 1934  MRN: [id number]  Today's Date: 2023  Onset of Illness/Injury or Date of Surgery: 05/10/23     Referring Physician: Jag    Admit Date: 5/10/2023       ICD-10-CM ICD-9-CM   1. Non-STEMI (non-ST elevated myocardial infarction)  I21.4 410.70   2. Congestive heart failure, unspecified HF chronicity, unspecified heart failure type  I50.9 428.0   3. Sepsis, due to unspecified organism, unspecified whether acute organ dysfunction present  A41.9 038.9     995.91   4. TANA (acute kidney injury)  N17.9 584.9   5. Hypoxemia  R09.02 799.02     Patient Active Problem List   Diagnosis   • Osteoporosis, unspecified   • Hypertensive urgency   • Essential hypertension   • Dyspnea on exertion   • Acute on chronic heart failure with preserved ejection fraction (HFpEF)   • Persistent atrial fibrillation   • Hearing loss   • Acute on chronic heart failure with preserved ejection fraction (HFpEF)   • Acute respiratory failure with hypoxemia   • Severe Malnutrition (HCC)     Past Medical History:   Diagnosis Date   • Breast cancer    • CHF (congestive heart failure)    • Chronic kidney disease     acute kidney failure   • [place] (hard of hearing)    • Hypertension    • Osteoarthritis    • Osteoporosis    • Rheumatoid arthritis    • Right-sided Bell's palsy    • Shortness of breath     sleeps with oxygen at night     Past Surgical History:   Procedure Laterality Date   • BREAST LUMPECTOMY Left     benign   • CATARACT EXTRACTION W/ INTRAOCULAR LENS  IMPLANT, BILATERAL Bilateral    • SHOULDER SURGERY Left     metal plate in left shoulder         OT ASSESSMENT FLOWSHEET (last 12 hours)     OT Evaluation and Treatment     Row Name 23 1114                   OT Time and Intention    Document Type therapy note (daily note)  -KR        Mode of Treatment occupational therapy  -KR        Patient Effort adequate  -KR            General Information    General Observations of Patient pleasant/cooperative  -KR           Cognition    Affect/Mental Status (Cognition) WFL  -KR        Follows Commands (Cognition) WFL  -KR           Shoulder (Therapeutic Exercise)    Shoulder (Therapeutic Exercise) AAROM (active assistive range of motion)  -KR        Shoulder AAROM (Therapeutic Exercise) bilateral;flexion;extension;supine  -KR           Elbow/Forearm (Therapeutic Exercise)    Elbow/Forearm (Therapeutic Exercise) AAROM (active assistive range of motion)  -KR        Elbow/Forearm AAROM (Therapeutic Exercise) bilateral;flexion;extension;supine  -KR           Hand (Therapeutic Exercise)    Hand (Therapeutic Exercise) AROM (active range of motion)  -KR        Hand AROM/AAROM (Therapeutic Exercise) bilateral;finger flexion;finger extension  -KR           Wound 05/10/23 1727 coccyx    Wound - Properties Group Placement Date: 05/10/23  -SC Placement Time: 1727  -SC Present on Hospital Admission: Y  -SC Location: coccyx  -SC    Retired Wound - Properties Group Placement Date: 05/10/23  -SC Placement Time: 1727  -SC Present on Hospital Admission: Y  -SC Location: coccyx  -SC    Retired Wound - Properties Group Date first assessed: 05/10/23  -SC Time first assessed: 1727  -SC Present on Hospital Admission: Y  -SC Location: coccyx  -SC       Wound 05/17/23 1200 Right lower leg Skin Tear    Wound - Properties Group Placement Date: 05/17/23  -SE Placement Time: 1200  -SE Side: Right  -SE Orientation: lower  -SE Location: leg  -SE Primary Wound Type: Skin tear  -SE    Retired Wound - Properties Group Placement Date: 05/17/23  -SE Placement Time: 1200  -SE Side: Right  -SE Orientation: lower  -SE Location: leg  -SE Primary Wound Type: Skin tear  -SE    Retired Wound - Properties Group Date first assessed: 05/17/23  -SE Time first assessed: 1200  -SE Side: Right  -SE Location: leg  -SE Primary Wound Type: Skin tear  -SE       Plan of Care Review    Plan of Care  Reviewed With patient;sibling  -KR           Therapy Plan Review/Discharge Plan (OT)    Anticipated Discharge Disposition (OT) extended care facility  -KR              User Key  (r) = Recorded By, (t) = Taken By, (c) = Cosigned By    Initials Name Effective Dates    Odell Batista, DENISHA 06/16/21 -     Lydia Cordero RN 01/24/23 -     Tanisha Bright RN 03/02/23 -                        OT Recommendation and Plan  Planned Therapy Interventions (OT): activity tolerance training, adaptive equipment training, BADL retraining  Plan of Care Review  Plan of Care Reviewed With: patient, sibling  Plan of Care Reviewed With: patient, sibling        Time Calculation:     Therapy Charges for Today     Code Description Service Date Service Provider Modifiers Qty    38148156014  OT THERAPEUTIC ACT EA 15 MIN 5/18/2023 Odell Reno OT GO 1               Odell Reno OT  5/18/2023

## 2023-05-18 NOTE — PROGRESS NOTES
[facility]  PROGRESS NOTE      Patient information:  Name: Anh Saldana  Age/Sex: 88 y.o. female  :  1934        PCP: Meme Medellin MD  Attending: Daisha Rm DO  MRN:  [id number]  Visit Number:  [id number]    LOS:  LOS: 8 days   CODE STATUS:    Code Status and Medical Interventions:   Ordered at: 05/10/23 1720     Medical Intervention Limits:    NO intubation (DNI)     Level Of Support Discussed With:    Next of Kin (If No Surrogate)     Code Status (Patient has no pulse and is not breathing):    No CPR (Do Not Attempt to Resuscitate)     Medical Interventions (Patient has pulse or is breathing):    Limited Support     Release to patient:    Routine Release       PROBLEM LIST:Principal Problem:    Acute on chronic heart failure with preserved ejection fraction (HFpEF)  Active Problems:    Acute respiratory failure with hypoxemia    Severe Malnutrition (HCC)      Reason for Cardiology follow-up: NSTEMI and Acute on Chronic HFpEF    Subjective   ADMISSION INFORMATION:  Chief Complaint   Patient presents with   • Edema       HPI:  Anh Saldana is a 88 y.o. female has been admitted to [facility] ([facility]) with history of increasing shortness of breath and bilateral leg edema.  Patient is hearing impairment and somewhat difficult to get a reliable history.  Her past medical history includes HTN, CKD, RA,  persistent atrial fibrillation on Eliquis (however,her A-fib is paroxysmal during this hospital stay), HFpEF, (Echocardiogram done  showed LVEF-70%), moderate MR, mild AAS and mild AR, moderate to severe TR and severe pulmonary hypertension.      Interval History:  Patient is in room 325 and was examined by Dr. Jenkins.  Patient was given another dose of IV Lasix 40 mg x 1 on 2023.  However on her I/O flow sheet, her output did increase from the previous date, she did have some urinary incontinence documented, but then it was not in a  "negative and was documented as + 80 mL.  Her creatinine did slightly bump up to 1.30 from 1.25 with her range this admission being 1.20-1.97.  Sodium 135, potassium 4.1, CO2 25.1, chloride 98, and BUN of 41.  Patient is lying in bed resting quietly.  No acute distress noted at this time.  Patient continues to deny chest pain or palpitations.  She reports her breathing is \"good right now\".  Patient's brother is at bedside.    Vital Signs  Temp:  [96.4 °F (35.8 °C)-98.3 °F (36.8 °C)] 97.8 °F (36.6 °C)  Heart Rate:  [56-71] 63  Resp:  [18-24] 24  BP: ()/(58-82) 159/68  Flow (L/min):  [1] 1  Vital Signs (last 72 hrs)       05/15 0700  05/16 0659 05/16 0700  05/17 0659 05/17 0700  05/18 0659 05/18 0700  05/18 0732   Most Recent      Temp (°F) 97.1 -  98.3    96.9 -  98.8    96.4 -  98.3       97.8 (36.6) 05/18 0600    Heart Rate 65 -  81    57 -  80    56 -  71       63 05/18 0637    Resp 18 -  20      18    16 -  24       24 05/18 0637    /82 -  153/88    92/54 -  146/72    96/58 -  159/82       159/68 05/18 0600    SpO2 (%) 90 -  99    90 -  99    93 -  99       93 05/18 0637        Body mass index is 17.77 kg/m².    Intake/Output Summary (Last 24 hours) at 5/18/2023 0732  Last data filed at 5/18/2023 0400  Gross per 24 hour   Intake 1080 ml   Output 1000 ml   Net 80 ml           Objective  Objective         Physical Exam:      General Appearance:    Alert, cooperative, in no acute distress.  [place], thin, fragile, advanced aged.   Head:    Normocephalic, without obvious abnormality, atraumatic.   Eyes:                          Conjunctivae and sclerae normal, no icterus, no pallor, corneas clear.   Neck:   No adenopathy, supple, trachea midline, no thyromegaly, no carotid bruit, no JVD.  Contracture noted to her neck.    Lungs:     Right base rales to auscultation, respirations regular, even and unlabored.    Heart:    Regular rhythm and normal rate, normal S1 and S2, systolic murmur heard at the LLSB grade " 3/6, no gallop, no rub, no click   Chest Wall:    No abnormalities observed.   Abdomen:     Normal bowel sounds, no masses, no organomegaly, soft nontender, nondistended, no guarding, no rebound tenderness.   Extremities:  Contractures noted to bilateral hands, no edema, no cyanosis, no redness.   Pulses:   Pulses palpable and equal bilaterally.   Skin:   No bleeding, bruising or rash.   Neurologic:   Alert and Oriented x 3, Speech Clear & comprehensive.       Results review   Results Review:   Results from last 7 days   Lab Units 05/17/23  0112 05/14/23  0648 05/13/23  0207 05/12/23  0019   WBC 10*3/mm3 11.61* 9.01 9.38 16.70*   HEMOGLOBIN g/dL 11.5* 11.6* 11.6* 11.9*   PLATELETS 10*3/mm3 300 233 201 183     Results from last 7 days   Lab Units 05/18/23  0110 05/17/23  0112 05/16/23  0220 05/15/23  0505 05/14/23  0648 05/13/23  0207 05/12/23  0019   SODIUM mmol/L 135* 133* 132* 131* 137 136 138   POTASSIUM mmol/L 4.1 4.3 4.1 4.4 4.3 4.9 4.4   CHLORIDE mmol/L 98 96* 95* 98 99 100 102   CO2 mmol/L 25.1 25.8 25.0 22.1 29.5* 25.3 19.2*   BUN mg/dL 41* 41* 40* 42* 46* 55* 50*   CREATININE mg/dL 1.30* 1.25* 1.20* 1.26* 1.54* 1.97* 1.85*   CALCIUM mg/dL 8.9 9.0 8.9 8.8 9.1 9.0 8.7   GLUCOSE mg/dL 98 107* 112* 84 96 115* 121*   ALT (SGPT) U/L 16  --   --   --  21  --   --    AST (SGOT) U/L 30  --   --   --  26  --   --      Results from last 7 days   Lab Units 05/16/23  0656 05/16/23  0431 05/11/23  0935   HSTROP T ng/L 58* 59* 89*     Lab Results   Component Value Date    PROBNP 66,260.0 (H) 05/16/2023    PROBNP 56,301.0 (H) 05/10/2023     No results found.     Lab Results   Component Value Date    INR 1.30 (H) 05/10/2023    INR 0.98 11/10/2018     Lab Results   Component Value Date    MG 2.3 05/17/2023    MG 2.0 05/16/2023    MG 2.5 (H) 05/15/2023     Lab Results   Component Value Date    TSH 3.950 05/10/2023    TRIG 126 05/11/2023    HDL 56 05/11/2023    LDL 67 05/11/2023      Pain Management Panel          View : No  data to display.                       Imaging Results (Last 48 Hours)     ** No results found for the last 48 hours. **          ECHO:  Results for orders placed during the hospital encounter of 05/10/23    Adult Transthoracic Echo Complete W/ Cont if Necessary Per Protocol    Interpretation Summary  •  Left ventricular systolic function is normal. Left ventricular ejection fraction appears to be 66 - 70%.  •  Left ventricular diastolic function is consistent with (grade I) impaired relaxation.  •  The right ventricular cavity is moderately dilated.  •  The left atrial cavity is mild to moderately dilated.  •  The right atrial cavity is severely  dilated.  •  There is moderate calcification of the aortic valve mainly affecting the non-coronary and left coronary cusp(s).  •  Severe mitral valve regurgitation is present.  •  Severe tricuspid valve regurgitation is present.  •  Estimated right ventricular systolic pressure from tricuspid regurgitation is markedly elevated ( 94 mmHg).  •  Severe pulmonary hypertension is present.      STRESS TEST:  No results found for this or any previous visit.    HEART CATH:  No results found for this or any previous visit.      TELEMETRY: SR 60's           I reviewed the patient's new clinical results.    Medication Review:   Current list of medications may not reflect those currently placed in orders that are not signed or are being held.     apixaban, 2.5 mg, Oral, Q12H  aspirin, 81 mg, Oral, Daily  atorvastatin, 20 mg, Oral, Nightly  budesonide-formoterol, 2 puff, Inhalation, BID - RT  cefTRIAXone, 2 g, Intravenous, Q24H  hydrALAZINE, 10 mg, Oral, Q8H  isosorbide mononitrate, 30 mg, Oral, Q24H  metoprolol tartrate, 50 mg, Oral, BID  pantoprazole, 40 mg, Oral, QAM AC  polyethylene glycol, 17 g, Oral, Daily  predniSONE, 2.5 mg, Oral, Daily With Breakfast  senna-docusate sodium, 1 tablet, Oral, BID  sodium chloride, 10 mL, Intravenous, Q12H         •  magic barrier cream  •   nitroglycerin  •  [COMPLETED] Insert Peripheral IV **AND** sodium chloride  •  sodium chloride  •  sodium chloride    Assessment      1. Acute HFpEF, improving  2. Acute kidney injury on chronic kidney disease, improving  3. Elevated high since her troponin, possibly due to type II MI from heart failure and acute kidney injury, patient has been relatively asymptomatic and clinically stable.  She is not a candidate for further evaluation either with a nuclear stress test or chronic catheterization due to her severe kyphosis.  4. Paroxysmal atrial fibrillation, maintaining sinus rhythm.  Patient is on Eliquis for stroke prevention.  5. E. coli bacteremia, being managed by the infectious disease.  6. Essential hypertension.    Plan     1. Switch to p.o. diuretics  2. If blood pressure continues to stay high, will add amlodipine 5 mg daily.    I have discussed the patients findings and my recommendations with patient.      Electronically signed by ANA Kevin, 05/18/23, 11:01 AM EDT.    Electronically signed by Alex Jenkins MD, 05/18/23, 5:16 PM EDT.                    Please note that portions of this note were completed with a voice recognition program.    Please note that portions of this note were copied and has been reviewed and is accurate as of 5/18/2023 .

## 2023-05-18 NOTE — CASE MANAGEMENT/SOCIAL WORK
Discharge Planning Assessment  CHANTEL Avelar     Patient Name: Anh Saldana  MRN: [id number]  Today's Date: 5/18/2023    Admit Date: 5/10/2023    Plan: SS spoke with Latoya on this date.  Facility continues to await pre authorization from pt's insurance.  SS will follow.     Discharge Plan     Row Name 05/18/23 1308       Plan    Plan SS spoke with Latoya on this date.  Facility continues to await pre authorization from pt's insurance.  SS will follow.              Continued Care and Services - Admitted Since 5/10/2023     Destination Coordination complete.    Service Provider Request Status Selected Services Address Phone Fax Patient Preferred    [facility]  Selected Skilled Nursing [facility] [address] [place] [address] [phone] --                      RYAN HendersonW

## 2023-05-19 VITALS
HEIGHT: 60 IN | SYSTOLIC BLOOD PRESSURE: 136 MMHG | DIASTOLIC BLOOD PRESSURE: 73 MMHG | OXYGEN SATURATION: 97 % | WEIGHT: 90.8 LBS | TEMPERATURE: 98.2 F | HEART RATE: 69 BPM | RESPIRATION RATE: 16 BRPM | BODY MASS INDEX: 17.83 KG/M2

## 2023-05-19 PROBLEM — I50.32 CHRONIC HEART FAILURE WITH PRESERVED EJECTION FRACTION (HFPEF): Status: ACTIVE | Noted: 2023-05-19

## 2023-05-19 PROCEDURE — 94799 UNLISTED PULMONARY SVC/PX: CPT

## 2023-05-19 PROCEDURE — 97530 THERAPEUTIC ACTIVITIES: CPT

## 2023-05-19 PROCEDURE — 99231 SBSQ HOSP IP/OBS SF/LOW 25: CPT | Performed by: INTERNAL MEDICINE

## 2023-05-19 PROCEDURE — 63710000001 PREDNISONE PER 5 MG: Performed by: STUDENT IN AN ORGANIZED HEALTH CARE EDUCATION/TRAINING PROGRAM

## 2023-05-19 PROCEDURE — 99239 HOSP IP/OBS DSCHRG MGMT >30: CPT | Performed by: STUDENT IN AN ORGANIZED HEALTH CARE EDUCATION/TRAINING PROGRAM

## 2023-05-19 PROCEDURE — 25010000002 CEFTRIAXONE PER 250 MG: Performed by: STUDENT IN AN ORGANIZED HEALTH CARE EDUCATION/TRAINING PROGRAM

## 2023-05-19 PROCEDURE — 94761 N-INVAS EAR/PLS OXIMETRY MLT: CPT

## 2023-05-19 PROCEDURE — 97110 THERAPEUTIC EXERCISES: CPT

## 2023-05-19 PROCEDURE — 99232 SBSQ HOSP IP/OBS MODERATE 35: CPT | Performed by: PHYSICIAN ASSISTANT

## 2023-05-19 RX ORDER — CEFDINIR 300 MG/1
300 CAPSULE ORAL DAILY
Qty: 2 CAPSULE | Refills: 0 | Status: SHIPPED | OUTPATIENT
Start: 2023-05-19 | End: 2023-05-21

## 2023-05-19 RX ORDER — HYDRALAZINE HYDROCHLORIDE 10 MG/1
10 TABLET, FILM COATED ORAL EVERY 8 HOURS SCHEDULED
Qty: 90 TABLET | Refills: 0 | Status: SHIPPED | OUTPATIENT
Start: 2023-05-19 | End: 2023-05-25

## 2023-05-19 RX ORDER — ATORVASTATIN CALCIUM 20 MG/1
20 TABLET, FILM COATED ORAL NIGHTLY
Qty: 30 TABLET | Refills: 0 | Status: ON HOLD | OUTPATIENT
Start: 2023-05-19

## 2023-05-19 RX ORDER — BUMETANIDE 1 MG/1
1 TABLET ORAL
Qty: 15 TABLET | Refills: 0 | Status: ON HOLD | OUTPATIENT
Start: 2023-05-21 | End: 2023-06-20

## 2023-05-19 RX ORDER — POLYETHYLENE GLYCOL 3350 17 G/17G
17 POWDER, FOR SOLUTION ORAL DAILY
Qty: 30 PACKET | Refills: 0 | Status: ON HOLD | OUTPATIENT
Start: 2023-05-20 | End: 2023-06-19

## 2023-05-19 RX ORDER — ISOSORBIDE MONONITRATE 30 MG/1
30 TABLET, EXTENDED RELEASE ORAL
Qty: 30 TABLET | Refills: 0 | Status: ON HOLD | OUTPATIENT
Start: 2023-05-20 | End: 2023-06-19

## 2023-05-19 RX ORDER — AMOXICILLIN 250 MG
1 CAPSULE ORAL 2 TIMES DAILY
Qty: 60 TABLET | Refills: 0 | Status: ON HOLD | OUTPATIENT
Start: 2023-05-19 | End: 2023-06-18

## 2023-05-19 RX ADMIN — DOCUSATE SODIUM 50 MG AND SENNOSIDES 8.6 MG 1 TABLET: 8.6; 5 TABLET, FILM COATED ORAL at 09:09

## 2023-05-19 RX ADMIN — HYDRALAZINE HYDROCHLORIDE 10 MG: 10 TABLET ORAL at 06:50

## 2023-05-19 RX ADMIN — ASPIRIN 81 MG: 81 TABLET, COATED ORAL at 09:09

## 2023-05-19 RX ADMIN — Medication 10 ML: at 09:09

## 2023-05-19 RX ADMIN — BUDESONIDE AND FORMOTEROL FUMARATE DIHYDRATE 2 PUFF: 160; 4.5 AEROSOL RESPIRATORY (INHALATION) at 07:00

## 2023-05-19 RX ADMIN — POLYETHYLENE GLYCOL 3350 17 G: 17 POWDER, FOR SOLUTION ORAL at 09:09

## 2023-05-19 RX ADMIN — ISOSORBIDE MONONITRATE 30 MG: 30 TABLET, EXTENDED RELEASE ORAL at 09:09

## 2023-05-19 RX ADMIN — APIXABAN 2.5 MG: 2.5 TABLET, FILM COATED ORAL at 09:09

## 2023-05-19 RX ADMIN — CEFTRIAXONE 2 G: 2 INJECTION, POWDER, FOR SOLUTION INTRAMUSCULAR; INTRAVENOUS at 11:01

## 2023-05-19 RX ADMIN — BUMETANIDE 1 MG: 1 TABLET ORAL at 09:09

## 2023-05-19 RX ADMIN — PANTOPRAZOLE SODIUM 40 MG: 40 TABLET, DELAYED RELEASE ORAL at 06:50

## 2023-05-19 RX ADMIN — METOPROLOL TARTRATE 50 MG: 50 TABLET, FILM COATED ORAL at 09:09

## 2023-05-19 RX ADMIN — PREDNISONE 2.5 MG: 5 TABLET ORAL at 09:09

## 2023-05-19 NOTE — PROGRESS NOTES
[facility]  PROGRESS NOTE      Patient information:  Name: Anh Saldana  Age/Sex: 88 y.o. female  :  1934        PCP: Meme Medellin MD  Attending: Daisha Rm DO  MRN:  [id number]  Visit Number:  [id number]    LOS:  LOS: 9 days   CODE STATUS:    Code Status and Medical Interventions:   Ordered at: 05/10/23 1720     Medical Intervention Limits:    NO intubation (DNI)     Level Of Support Discussed With:    Next of Kin (If No Surrogate)     Code Status (Patient has no pulse and is not breathing):    No CPR (Do Not Attempt to Resuscitate)     Medical Interventions (Patient has pulse or is breathing):    Limited Support     Release to patient:    Routine Release       PROBLEM LIST:Principal Problem:    Acute on chronic heart failure with preserved ejection fraction (HFpEF)  Active Problems:    Acute respiratory failure with hypoxemia    Severe Malnutrition (HCC)      Reason for Cardiology follow-up: NSTEMI and Acute on Chronic HFpEF    Subjective   ADMISSION INFORMATION:  Chief Complaint   Patient presents with    Edema       HPI:  Anh Saldana is a 88 y.o. female has been admitted to [facility] ([facility]) with history of increasing shortness of breath and bilateral leg edema.  Patient is hearing impairment and somewhat difficult to get a reliable history.  Her past medical history includes HTN, CKD, RA,  persistent atrial fibrillation on Eliquis (however,her A-fib is paroxysmal during this hospital stay), HFpEF, (Echocardiogram done  showed LVEF-70%), moderate MR, mild AAS and mild AR, moderate to severe TR and severe pulmonary hypertension.      Interval History:   Patient is in room 325 and was examined by Dr. Jenkins.   Patient's I&O flowsheet reflects a diuresing of -480 mL overnight.  Sodium of 135, potassium 4.8, CO2 25.1, chloride  98, creatinine 1.30 which is a slight bump up from 1.25 and a BUN of 41.  Patient is lying in bed resting  "quietly.  No acute distress noted at this time.  Patient continues to deny chest pain, palpitations.  Patient reports her breathing is \"hanging in there\".  Patient's brother is at bedside.  Patient is awaiting SNF placement for short-term rehab. Brother reports PT has been in today and sat her up on the edge of the bed and worked her legs and arms.     Vital Signs  Temp:  [97.6 °F (36.4 °C)-98.2 °F (36.8 °C)] 98.2 °F (36.8 °C)  Heart Rate:  [59-75] 67  Resp:  [16-20] 16  BP: (113-165)/(66-94) 165/94  Flow (L/min):  [1] 1  Vital Signs (last 72 hrs)         05/16 0700  05/17 0659 05/17 0700  05/18 0659 05/18 0700  05/19 0659 05/19 0700  05/19 0736   Most Recent      Temp (°F) 96.9 -  98.8    96.4 -  98.3    97.6 -  98.2       98.2 (36.8) 05/19 0600    Heart Rate 57 -  80    56 -  71    59 -  75       67 05/19 0600    Resp   18    16 -  24    16 -  20       16 05/19 0600    BP 92/54 -  146/72    96/58 -  159/82    113/66 -  165/94       165/94 05/19 0600    SpO2 (%) 90 -  99    93 -  99    95 -  99       95 05/19 0600          Body mass index is 17.73 kg/m².    Intake/Output Summary (Last 24 hours) at 5/19/2023 0736  Last data filed at 5/19/2023 0245  Gross per 24 hour   Intake 920 ml   Output 1400 ml   Net -480 ml     Objective  Objective     Physical Exam:      General Appearance:    Alert, cooperative, in no acute distress. [place], thin, fragile, advanced aged.   Head:    Normocephalic, without obvious abnormality, atraumatic.   Eyes:                          Conjunctivae and sclerae normal, no icterus, no pallor, corneas clear.   Neck:   No adenopathy, supple, trachea midline, no thyromegaly, no carotid bruit, no JVD. Contracture noted to her neck.   Lungs:     Clear to auscultation, respirations regular, even and             unlabored.    Heart:    Regular rhythm and normal rate, normal S1 and S2, systolic murmur heard at the LLSB grade 3/6, no gallop, no rub, no click   Chest Wall:    No abnormalities observed. "   Abdomen:     Normal bowel sounds, no masses, no organomegaly, soft nontender, nondistended, no guarding, no rebound tenderness.   Extremities: Contractures noted to bilateral hands, no edema, no cyanosis, no redness.   Pulses:   Pulses palpable and equal bilaterally.   Skin:   No bleeding, bruising or rash.   Neurologic:   Alert and Oriented x 3, Speech Clear & comprehensive.       Results review   Results Review:   Results from last 7 days   Lab Units 05/17/23  0112 05/14/23  0648 05/13/23  0207   WBC 10*3/mm3 11.61* 9.01 9.38   HEMOGLOBIN g/dL 11.5* 11.6* 11.6*   PLATELETS 10*3/mm3 300 233 201     Results from last 7 days   Lab Units 05/18/23  0110 05/17/23  0112 05/16/23  0220 05/15/23  0505 05/14/23  0648 05/13/23  0207   SODIUM mmol/L 135* 133* 132* 131* 137 136   POTASSIUM mmol/L 4.1 4.3 4.1 4.4 4.3 4.9   CHLORIDE mmol/L 98 96* 95* 98 99 100   CO2 mmol/L 25.1 25.8 25.0 22.1 29.5* 25.3   BUN mg/dL 41* 41* 40* 42* 46* 55*   CREATININE mg/dL 1.30* 1.25* 1.20* 1.26* 1.54* 1.97*   CALCIUM mg/dL 8.9 9.0 8.9 8.8 9.1 9.0   GLUCOSE mg/dL 98 107* 112* 84 96 115*   ALT (SGPT) U/L 16  --   --   --  21  --    AST (SGOT) U/L 30  --   --   --  26  --      Results from last 7 days   Lab Units 05/16/23  0656 05/16/23  0431   HSTROP T ng/L 58* 59*     Lab Results   Component Value Date    PROBNP 66,260.0 (H) 05/16/2023    PROBNP 56,301.0 (H) 05/10/2023     No results found.     Lab Results   Component Value Date    INR 1.30 (H) 05/10/2023    INR 0.98 11/10/2018     Lab Results   Component Value Date    MG 2.3 05/17/2023    MG 2.0 05/16/2023    MG 2.5 (H) 05/15/2023     Lab Results   Component Value Date    TSH 3.950 05/10/2023    TRIG 126 05/11/2023    HDL 56 05/11/2023    LDL 67 05/11/2023      Pain Management Panel            View : No data to display.                    Microbiology Results (last 10 days)       Procedure Component Value - Date/Time    Blood Culture - Blood, Hand, Left [[id number]]  (Normal) Collected:  05/12/23 1611    Lab Status: Final result Specimen: Blood from Hand, Left Updated: 05/17/23 1830     Blood Culture No growth at 5 days    Blood Culture - Blood, Hand, Right [[id number]]  (Normal) Collected: 05/12/23 1610    Lab Status: Final result Specimen: Blood from Hand, Right Updated: 05/17/23 1830     Blood Culture No growth at 5 days    Urine Culture - Urine, Urine, Catheter [[id number]] Collected: 05/10/23 1702    Lab Status: Final result Specimen: Urine, Catheter Updated: 05/11/23 2059     Urine Culture 50,000 CFU/mL Mixed Sondra Isolated    Narrative:      Specimen contains mixed organisms of questionable pathogenicity suggestive of contamination. If symptoms persist, suggest recollection.  Colonization of the urinary tract without infection is common. Treatment is discouraged unless the patient is symptomatic, pregnant, or undergoing an invasive urologic procedure.    Blood Culture - Blood, Arm, Left [[id number]]  (Abnormal) Collected: 05/10/23 1248    Lab Status: Final result Specimen: Blood from Arm, Left Updated: 05/13/23 0621     Blood Culture Escherichia coli     Isolated from Aerobic and Anaerobic Bottles     Gram Stain Anaerobic Bottle Gram negative bacilli      Aerobic Bottle Gram negative bacilli    Narrative:      No BCID performed, refer to previous blood culture specimen collected on 5-10-23 at 12:43    Blood Culture - Blood, Arm, Right [[id number]]  (Abnormal)  (Susceptibility) Collected: 05/10/23 1243    Lab Status: Final result Specimen: Blood from Arm, Right Updated: 05/13/23 0621     Blood Culture Escherichia coli     Isolated from Aerobic and Anaerobic Bottles     Gram Stain Aerobic Bottle Gram negative bacilli      Anaerobic Bottle Gram negative bacilli    Susceptibility        Escherichia coli      GRACE      Ampicillin Susceptible      Ampicillin + Sulbactam Susceptible      Cefepime Susceptible      Ceftazidime Susceptible      Ceftriaxone Susceptible      Gentamicin Susceptible       Levofloxacin Susceptible      Piperacillin + Tazobactam Susceptible      Trimethoprim + Sulfamethoxazole Susceptible                       Susceptibility Comments       Escherichia coli    Cefazolin sensitivity will not be reported for Enterobacteriaceae in non-urine isolates. If cefazolin is preferred, please call the microbiology lab to request an E-test.  With the exception of urinary-sourced infections, aminoglycosides should not be used as monotherapy.               Blood Culture ID, PCR - Blood, Arm, Right [[id number]]  (Abnormal) Collected: 05/10/23 1243    Lab Status: Final result Specimen: Blood from Arm, Right Updated: 05/11/23 0405     BCID, PCR Escherichia coli. Identification by BCID2 PCR.    Narrative:      No resistance genes detected.    COVID-19 and FLU A/B PCR - Swab, Nasopharynx [[id number]]  (Normal) Collected: 05/10/23 1135    Lab Status: Final result Specimen: Swab from Nasopharynx Updated: 05/10/23 1204     COVID19 Not Detected     Influenza A PCR Not Detected     Influenza B PCR Not Detected    Narrative:      Fact sheet for providers: https://www.fda.gov/media/324355/download    Fact sheet for patients: https://www.fda.gov/media/950269/download    Test performed by PCR.           Imaging Results (Last 48 Hours)       ** No results found for the last 48 hours. **            ECHO:  Results for orders placed during the hospital encounter of 05/10/23    Adult Transthoracic Echo Complete W/ Cont if Necessary Per Protocol    Interpretation Summary    Left ventricular systolic function is normal. Left ventricular ejection fraction appears to be 66 - 70%.    Left ventricular diastolic function is consistent with (grade I) impaired relaxation.    The right ventricular cavity is moderately dilated.    The left atrial cavity is mild to moderately dilated.    The right atrial cavity is severely  dilated.    There is moderate calcification of the aortic valve mainly affecting the non-coronary and left  coronary cusp(s).    Severe mitral valve regurgitation is present.    Severe tricuspid valve regurgitation is present.    Estimated right ventricular systolic pressure from tricuspid regurgitation is markedly elevated ( 94 mmHg).    Severe pulmonary hypertension is present.      STRESS TEST:  No results found for this or any previous visit.    HEART CATH:  No results found for this or any previous visit.      TELEMETRY:  SR 70's with a BBB           I reviewed the patient's new clinical results.    Medication Review:   Current list of medications may not reflect those currently placed in orders that are not signed or are being held.     apixaban, 2.5 mg, Oral, Q12H  aspirin, 81 mg, Oral, Daily  atorvastatin, 20 mg, Oral, Nightly  budesonide-formoterol, 2 puff, Inhalation, BID - RT  bumetanide, 1 mg, Oral, Q48H  cefTRIAXone, 2 g, Intravenous, Q24H  hydrALAZINE, 10 mg, Oral, Q8H  isosorbide mononitrate, 30 mg, Oral, Q24H  metoprolol tartrate, 50 mg, Oral, BID  pantoprazole, 40 mg, Oral, QAM AC  polyethylene glycol, 17 g, Oral, Daily  predniSONE, 2.5 mg, Oral, Daily With Breakfast  senna-docusate sodium, 1 tablet, Oral, BID  sodium chloride, 10 mL, Intravenous, Q12H           magic barrier cream    nitroglycerin    [COMPLETED] Insert Peripheral IV **AND** sodium chloride    sodium chloride    sodium chloride    Assessment      Acute HFpEF, improving  Acute non-STEMI (possibly type II from heart failure)      Plan     For her heart failure, continue with IV diuretics as needed and tolerated  For her acute non-STEMI, continue with low-dose aspirin and atorvastatin.  She is not a candidate for nuclear stress test or cardiac catheterization due to severe kyphosis and her advanced age.    I have discussed the patients findings and my recommendations with patient.    Electronically signed by ANA Kevin, 05/19/23, 11:40 AM EDT.    Electronically signed by Alex Jenkins MD, 05/19/23, 4:45 PM  EDT.                    Please note that portions of this note were completed with a voice recognition program.    Please note that portions of this note were copied and has been reviewed and is accurate as of 5/19/2023 .

## 2023-05-19 NOTE — THERAPY TREATMENT NOTE
Acute Care - Physical Therapy Treatment Note  [facility]     Patient Name: Anh Saldana  : 1934  MRN: [id number]  Today's Date: 2023   Onset of Illness/Injury or Date of Surgery: 05/10/23  Visit Dx:     ICD-10-CM ICD-9-CM   1. Non-STEMI (non-ST elevated myocardial infarction)  I21.4 410.70   2. Congestive heart failure, unspecified HF chronicity, unspecified heart failure type  I50.9 428.0   3. Sepsis, due to unspecified organism, unspecified whether acute organ dysfunction present  A41.9 038.9     995.91   4. TANA (acute kidney injury)  N17.9 584.9   5. Hypoxemia  R09.02 799.02   6. Pulmonary hypertension  I27.20 416.8     Patient Active Problem List   Diagnosis   • Osteoporosis, unspecified   • Hypertensive urgency   • Essential hypertension   • Dyspnea on exertion   • Acute on chronic heart failure with preserved ejection fraction (HFpEF)   • Persistent atrial fibrillation   • Hearing loss   • Acute on chronic heart failure with preserved ejection fraction (HFpEF)   • Acute respiratory failure with hypoxemia   • Severe Malnutrition (HCC)   • Chronic heart failure with preserved ejection fraction (HFpEF)     Past Medical History:   Diagnosis Date   • Breast cancer    • CHF (congestive heart failure)    • Chronic kidney disease     acute kidney failure   • [place] (hard of hearing)    • Hypertension    • Osteoarthritis    • Osteoporosis    • Rheumatoid arthritis    • Right-sided Bell's palsy    • Shortness of breath     sleeps with oxygen at night     Past Surgical History:   Procedure Laterality Date   • BREAST LUMPECTOMY Left     benign   • CATARACT EXTRACTION W/ INTRAOCULAR LENS  IMPLANT, BILATERAL Bilateral    • SHOULDER SURGERY Left     metal plate in left shoulder     PT Assessment (last 12 hours)     PT Evaluation and Treatment     Row Name 23 1100          Physical Therapy Time and Intention    Subjective Information complains of;weakness  -KM     Document Type therapy note (daily note)   -KM     Mode of Treatment physical therapy  -KM     Patient Effort adequate  -KM     Symptoms Noted During/After Treatment fatigue;increased pain  -KM     Row Name 05/19/23 1100          General Information    Patient Profile Reviewed yes  -KM     Patient Observations alert;cooperative;agree to therapy  -KM     Existing Precautions/Restrictions fall;oxygen therapy device and L/min  hearing impairment  -KM     Limitations/Impairments hearing  -KM     Row Name 05/19/23 1100          Cognition    Affect/Mental Status (Cognition) WFL  -KM     Follows Commands (Cognition) follows one-step commands;repetition of directions required;verbal cues/prompting required;physical/tactile prompts required  -KM     Row Name 05/19/23 1100          Bed Mobility    Bed Mobility rolling left;rolling right;supine-sit;sit-supine  -KM     Rolling Left [place] (Bed Mobility) verbal cues;nonverbal cues (demo/gesture);maximum assist (25% patient effort)  -KM     Rolling Right [place] (Bed Mobility) verbal cues;nonverbal cues (demo/gesture);maximum assist (25% patient effort)  -KM     Supine-Sit [place] (Bed Mobility) maximum assist (25% patient effort);verbal cues  -KM     Sit-Supine [place] (Bed Mobility) maximum assist (25% patient effort);verbal cues  -KM     Bed Mobility, Safety Issues decreased use of arms for pushing/pulling;decreased use of legs for bridging/pushing;impaired trunk control for bed mobility  -KM     Assistive Device (Bed Mobility) bed rails;draw sheet  -KM     Row Name 05/19/23 1100          Transfers    Comment, (Transfers) unable to perform  -KM     Row Name 05/19/23 1100          Gait/Stairs (Locomotion)    Comment, (Gait/Stairs) unable to perform  -KM     Row Name 05/19/23 1100          Safety Issues, Functional Mobility    Impairments Affecting Function (Mobility) balance;coordination;endurance/activity tolerance;motor control;range of motion (ROM);pain;strength  -KM     Cognitive Impairments,  Mobility Safety/Performance insight into deficits/self-awareness  -KM     Row Name 05/19/23 1100          Motor Skills    Comments, Therapeutic Exercise EOB ther-ex  -KM     Additional Documentation Comments, Therapeutic Exercise (Row)  -KM     Row Name             Wound 05/10/23 1727 coccyx    Wound - Properties Group Placement Date: 05/10/23  -SC Placement Time: 1727  -SC Present on Hospital Admission: Y  -SC Location: coccyx  -SC    Retired Wound - Properties Group Placement Date: 05/10/23  -SC Placement Time: 1727  -SC Present on Hospital Admission: Y  -SC Location: coccyx  -SC    Retired Wound - Properties Group Date first assessed: 05/10/23  -SC Time first assessed: 1727  -SC Present on Hospital Admission: Y  -SC Location: coccyx  -SC    Row Name             Wound 05/17/23 1200 Right lower leg Skin Tear    Wound - Properties Group Placement Date: 05/17/23  -SE Placement Time: 1200  -SE Side: Right  -SE Orientation: lower  -SE Location: leg  -SE Primary Wound Type: Skin tear  -SE    Retired Wound - Properties Group Placement Date: 05/17/23  -SE Placement Time: 1200  -SE Side: Right  -SE Orientation: lower  -SE Location: leg  -SE Primary Wound Type: Skin tear  -SE    Retired Wound - Properties Group Date first assessed: 05/17/23  -SE Time first assessed: 1200  -SE Side: Right  -SE Location: leg  -SE Primary Wound Type: Skin tear  -SE    Row Name             Wound 05/18/23 1539 Left posterior elbow Skin Tear    Wound - Properties Group Placement Date: 05/18/23  -VC Placement Time: 1539  -VC Side: Left  -VC Orientation: posterior  -VC Location: elbow  -VC Primary Wound Type: Skin tear  -VC    Retired Wound - Properties Group Placement Date: 05/18/23  -VC Placement Time: 1539  -VC Side: Left  -VC Orientation: posterior  -VC Location: elbow  -VC Primary Wound Type: Skin tear  -VC    Retired Wound - Properties Group Date first assessed: 05/18/23  -VC Time first assessed: 1539  -VC Side: Left  -VC Location: elbow  -VC  Primary Wound Type: Skin tear  -VC    Row Name 05/19/23 1100          Progress Summary (PT)    Daily Progress Summary (PT) Pt. was able to demonstrate bed mobility w/ maxA. She deferred transfers d/t feeling too weak and unsafe, although encouraged by PT to attempt. She tolerated EOB ther-ex w/ modA to maintain seated position. Pt. would continue to benefit from skilled PT services.  -KM           User Key  (r) = Recorded By, (t) = Taken By, (c) = Cosigned By    Initials Name Provider Type    VC Kelsey Reese, RN Registered Nurse    Lydia Cordero RN Registered Nurse    Bennett Moses, PT Physical Therapist    Tanisha Bright RN Registered Nurse                Physical Therapy Education     Title: PT OT SLP Therapies (Resolved)     Topic: Physical Therapy (Resolved)     Point: Mobility training (Resolved)     Learning Progress Summary           Patient Acceptance, E,D, NR,VU by AG at 5/18/2023 1040    Acceptance, D,E, VU,NR by AG at 5/17/2023 1149    Acceptance, E,D, NL by AG at 5/16/2023 1522   Family Acceptance, E,D, NR,VU by AG at 5/18/2023 1040    Acceptance, D,E, VU,NR by AG at 5/17/2023 1149    Acceptance, E,D, NR by AG at 5/16/2023 1522                   Point: Home exercise program (Resolved)     Learning Progress Summary           Patient Acceptance, E,D, NR,VU by AG at 5/18/2023 1040    Acceptance, D,E, VU,NR by AG at 5/17/2023 1149    Acceptance, E,D, NL by AG at 5/16/2023 1522   Family Acceptance, E,D, NR,VU by AG at 5/18/2023 1040    Acceptance, D,E, VU,NR by AG at 5/17/2023 1149    Acceptance, E,D, NR by AG at 5/16/2023 1522                   Point: Body mechanics (Resolved)     Learning Progress Summary           Patient Acceptance, E,D, NR,VU by AG at 5/18/2023 1040    Acceptance, D,E, VU,NR by AG at 5/17/2023 1149    Acceptance, E,D, NL by AG at 5/16/2023 1522   Family Acceptance, E,D, NR,VU by AG at 5/18/2023 1040    Acceptance, D,E, VU,NR by AG at 5/17/2023 1149    Acceptance,  E,D, NR by  at 5/16/2023 1522                   Point: Precautions (Resolved)     Learning Progress Summary           Patient Acceptance, E,D, NR,VU by  at 5/18/2023 1040    Acceptance, D,E, VU,NR by  at 5/17/2023 1149    Acceptance, E,D, NL by  at 5/16/2023 1522   Family Acceptance, E,D, NR,VU by  at 5/18/2023 1040    Acceptance, D,E, VU,NR by  at 5/17/2023 1149    Acceptance, E,D, NR by  at 5/16/2023 1522                               User Key     Initials Effective Dates Name Provider Type Discipline     06/16/21 -  Yaneth Le, PT Physical Therapist PT              PT Recommendation and Plan     Progress Summary (PT)  Daily Progress Summary (PT): Pt. was able to demonstrate bed mobility w/ maxA. She deferred transfers d/t feeling too weak and unsafe, although encouraged by PT to attempt. She tolerated EOB ther-ex w/ modA to maintain seated position. Pt. would continue to benefit from skilled PT services.       Time Calculation:    PT Charges     Row Name 05/19/23 1059             Time Calculation    PT Received On 05/19/23  -KM         Time Calculation- PT    Total Timed Code Minutes- PT 23 minute(s)  -KM            User Key  (r) = Recorded By, (t) = Taken By, (c) = Cosigned By    Initials Name Provider Type    Bennett Moses, PT Physical Therapist              Therapy Charges for Today     Code Description Service Date Service Provider Modifiers Qty    38288969502 HC PT THER PROC EA 15 MIN 5/19/2023 Bennett Roy, PT GP 1    09911055396 HC PT THERAPEUTIC ACT EA 15 MIN 5/19/2023 Bennett Roy, PT GP 1          PT G-Codes  AM-PAC 6 Clicks Score (PT): 9    Bennett Roy PT  5/19/2023

## 2023-05-19 NOTE — DISCHARGE PLACEMENT REQUEST
"Kathryn Saldana (88 y.o. Female)     Date of Birth   1934    Social Security Number       Address   [address] [facility] [address]    Home Phone   [phone]    MRN   [id number]       Hinduism   Other    Marital Status                               Admission Date   5/10/23    Admission Type   Emergency    Admitting Provider   Daisha Rm DO    Attending Provider   Beltran Burgess DO    Department, Room/Bed   [address], [address]/       Discharge Date       Discharge Disposition   Skilled Nursing Facility (DC - External)    Discharge Destination                               Attending Provider: Beltran Burgess DO    Allergies: Penicillins, Doxycycline, Hydroxychloroquine, Latex    Isolation: None   Infection: None   Code Status: No CPR    Ht: 152.4 cm (60\")   Wt: 41.2 kg (90 lb 12.8 oz)    Admission Cmt: None   Principal Problem: Acute on chronic heart failure with preserved ejection fraction (HFpEF) [I50.33]                 Active Insurance as of 5/10/2023     Primary Coverage     Payor Plan Insurance Group Employer/Plan Group    [facility] MEDICARE REPLACEMENT [facility] MEDICARE REPLACEMENT [id number]     Payor Plan Address Payor Plan Phone Number Payor Plan Fax Number Effective Dates    [address] [address]   6/1/2016 - None Entered    [facility] [id number]       Subscriber Name Subscriber Birth Date Member ID       KATHRYN SALDANA 1934 [id number]                 Emergency Contacts      (Rel.) Home Phone Work Phone Mobile Phone    Octavio Mosqueda (Brother) [phone] -- --            Lines, Drains & Airways     Active LDAs     Name Placement date Placement time Site Days    Peripheral IV 05/10/23 1139 Right Antecubital 05/10/23  1139  Antecubital  8    External Urinary Catheter 05/12/23  0032  --  7                  Lab Results (last 24 hours)     ** No results found for the last 24 hours. **        Orders (last 24 hrs)      " Start     Ordered    05/21/23 0000  bumetanide (BUMEX) 1 MG tablet  Every 48 Hours         05/19/23 1031    05/20/23 0000  aspirin 81 MG EC tablet  Daily         05/19/23 1031    05/20/23 0000  isosorbide mononitrate (IMDUR) 30 MG 24 hr tablet  Every 24 Hours Scheduled         05/19/23 1031    05/20/23 0000  polyethylene glycol (MIRALAX) 17 g packet  Daily         05/19/23 1031    05/19/23 1030  Heart Failure Cause: Other; Heart Failure Cause - Other: pHTN  Once         05/19/23 1031    05/19/23 1029  Discharge patient  Once         05/19/23 1031    05/19/23 0900  bumetanide (BUMEX) tablet 1 mg  Every 48 Hours         05/18/23 0827    05/19/23 0000  atorvastatin (LIPITOR) 20 MG tablet  Nightly         05/19/23 1031    05/19/23 0000  hydrALAZINE (APRESOLINE) 10 MG tablet  Every 8 Hours Scheduled         05/19/23 1031    05/19/23 0000  sennosides-docusate (PERICOLACE) 8.6-50 MG per tablet  2 Times Daily         05/19/23 1031    05/19/23 0000  cefdinir (OMNICEF) 300 MG capsule  Daily         05/19/23 1031    05/19/23 0000  Discharge Follow-up with PCP         05/19/23 1034    05/19/23 0000  Discharge Follow-up with Specialty: cardiology; 2 Weeks         05/19/23 1034    05/19/23 0000  Oxygen Therapy         05/19/23 1034    05/19/23 0000  Discharge Follow-up with Specialty: nephrology; 2 Weeks         05/19/23 1034    05/19/23 0000  Diet: Regular/House Diet; Soft to Chew (NDD 3); Ground Meat; Thin (IDDSI 0)         05/19/23 1034    05/15/23 1800  DIET MESSAGE Yogurt with all meals And additional in unit fridge  Daily With Breakfast, Lunch & Dinner      Comments: Yogurt with all meals   And additional in unit fridge    05/15/23 1235    05/15/23 1800  Dietary Nutrition Supplements Other (See Comment); yogurt with all meals  Daily With Lunch & Dinner       05/15/23 1453    05/14/23 2200  magic barrier cream 1 application  4 Times Daily PRN         05/14/23 2200 05/14/23 1400  hydrALAZINE (APRESOLINE) tablet 10 mg   Every 8 Hours Scheduled         05/14/23 1208    05/14/23 1400  isosorbide mononitrate (IMDUR) 24 hr tablet 30 mg  Every 24 Hours Scheduled         05/14/23 1208    05/13/23 1245  sennosides-docusate (PERICOLACE) 8.6-50 MG per tablet 1 tablet  2 Times Daily         05/13/23 1154    05/13/23 1245  polyethylene glycol (MIRALAX) packet 17 g  Daily         05/13/23 1154    05/13/23 0800  Dietary Nutrition Supplements Boost Plus (Ensure Enlive, Ensure Plus); chocolate  Daily With Breakfast & Lunch       05/12/23 1611    05/12/23 1700  apixaban (ELIQUIS) tablet 2.5 mg  Every 12 Hours Scheduled         05/12/23 1520    05/11/23 2100  atorvastatin (LIPITOR) tablet 20 mg  Nightly         05/11/23 1718    05/11/23 1815  aspirin EC tablet 81 mg  Daily         05/11/23 1718    05/11/23 1130  budesonide-formoterol (SYMBICORT) 160-4.5 MCG/ACT inhaler 2 puff  2 Times Daily - RT         05/11/23 0958    05/11/23 1100  metoprolol tartrate (LOPRESSOR) tablet 50 mg  2 Times Daily         05/11/23 0958    05/11/23 1100  predniSONE (DELTASONE) tablet 2.5 mg  Daily With Breakfast         05/11/23 0958    05/11/23 1100  pantoprazole (PROTONIX) EC tablet 40 mg  Every Morning Before Breakfast         05/11/23 0958    05/11/23 1000  cefTRIAXone (ROCEPHIN) 2 g in sodium chloride 0.9 % 100 mL IVPB-VTB  Every 24 Hours         05/11/23 0900    05/11/23 0913  Stage II Pressure Ulcer Care Daily  Daily      Comments: - Gently Cleanse With Normal Saline  -Apply Thera-honey   - Cover With Silicone Border Dressing    05/11/23 0912    05/11/23 0030  sodium chloride 0.9 % flush 10 mL  Every 12 Hours Scheduled         05/10/23 2336    05/10/23 2336  sodium chloride 0.9 % flush 10 mL  As Needed         05/10/23 2336    05/10/23 2336  sodium chloride 0.9 % infusion 40 mL  As Needed         05/10/23 2336    05/10/23 2000  Strict Intake & Output  Every 4 Hours       05/10/23 1756    05/10/23 1757  Daily Weights  Daily       05/10/23 1756    05/10/23 1756   nitroglycerin (NITROSTAT) SL tablet 0.4 mg  Every 5 Minutes PRN         05/10/23 1756    05/10/23 1117  sodium chloride 0.9 % flush 10 mL  As Needed        See Hyperspace for full Linked Orders Report.    05/10/23 1117    Unscheduled  Telemetry - Pulse Oximetry  Continuous PRN      Comments: If Patient Develops Unresponsiveness, Acute Dyspnea, Cyanosis or Suspected Hypoxemia Start Continuous Pulse Ox Monitoring, Apply Oxygen & Notify Provider    05/10/23 1756    Unscheduled  Oxygen Therapy- Nasal Cannula; Titrate for SPO2: 90% - 95%  Continuous PRN      Comments: If Patient Develops Unresponsiveness, Acute Dyspnea, Cyanosis or Suspected Hypoxemia Start Continuous Pulse Ox Monitoring, Apply Oxygen & Notify Provider    05/10/23 1756    Unscheduled  ECG 12 Lead Chest Pain  As Needed      Comments: Nurse to Release if Patient Expericences Acute Chest Pain or Dysrhythmias    05/10/23 1756    Unscheduled  Potassium  As Needed      Comments: For Ventricular Arrhythmias      05/10/23 1756    Unscheduled  Magnesium  As Needed      Comments: For Ventricular Arrhythmias      05/10/23 1756    Unscheduled  High Sensitivity Troponin T  As Needed      Comments: For Chest Pain      05/10/23 1756    Unscheduled  Blood Gas, Arterial -With Co-Ox Panel: Yes  As Needed      Comments: Draw for Acute Dyspnea, Cyanosis, Suspected Hypoxemia or UnresponsivenessNotify Provider of Results      05/10/23 1756    Unscheduled  Change Dressing to IV Site As Needed When Damp, Loose or Soiled  As Needed       05/10/23 2336    Unscheduled  Insert New Peripheral IV  As Needed      Comments: Frequency Per Facility Policy    05/10/23 2336    Unscheduled  Stage II Pressure Ulcer Care PRN  As Needed      Comments: - Gently Cleanse With Normal Saline  -Apply Thera-honey   - Cover With Silicone Border Dressing    05/11/23 0912    --  furosemide (LASIX) 40 MG tablet  Daily         05/10/23 1859    --  spironolactone (ALDACTONE) 50 MG tablet  Daily          05/10/23 1900    Pending  budesonide-formoterol (SYMBICORT) 160-4.5 MCG/ACT inhaler 2 puff  2 Times Daily - RT         Pending    Pending  furosemide (LASIX) tablet 40 mg  Daily         Pending    Pending  metoprolol tartrate (LOPRESSOR) tablet 50 mg  2 Times Daily         Pending    Pending  pantoprazole (PROTONIX) EC tablet 40 mg  Daily         Pending    Pending  predniSONE (DELTASONE) tablet 2.5 mg  Daily         Pending    --  SCANNED - TELEMETRY           05/10/23 0000    --  SCANNED - TELEMETRY           05/10/23 0000    --  SCANNED - TELEMETRY           05/10/23 0000    --  SCANNED - TELEMETRY           05/10/23 0000    --  SCANNED - TELEMETRY           05/10/23 0000    --  SCANNED - TELEMETRY           05/10/23 0000    --  SCANNED - TELEMETRY           05/10/23 0000    --  SCANNED - TELEMETRY           05/10/23 0000    --  SCANNED - TELEMETRY           05/10/23 0000    --  SCANNED - TELEMETRY           05/10/23 0000    --  SCANNED - TELEMETRY           05/10/23 0000    --  SCANNED - TELEMETRY           05/10/23 0000    --  SCANNED - TELEMETRY           05/10/23 0000    --  SCANNED - TELEMETRY           05/10/23 0000    --  SCANNED - TELEMETRY           05/10/23 0000    --  SCANNED - TELEMETRY           05/10/23 0000    --  SCANNED - TELEMETRY           05/10/23 0000    --  SCANNED - TELEMETRY           05/10/23 0000    --  SCANNED - TELEMETRY           05/10/23 0000    --  SCANNED - TELEMETRY           05/10/23 0000    --  SCANNED - TELEMETRY           05/10/23 0000    --  SCANNED - TELEMETRY           05/10/23 0000    --  SCANNED - TELEMETRY           05/10/23 0000    --  SCANNED - TELEMETRY           05/10/23 0000    --  SCANNED - TELEMETRY           05/10/23 0000    --  SCANNED - TELEMETRY           05/10/23 0000    --  SCANNED - TELEMETRY           05/10/23 0000    --  SCANNED - TELEMETRY           05/10/23 0000    --  SCANNED - TELEMETRY           05/10/23 0000    --  SCANNED - TELEMETRY            "05/10/23 0000    --  SCANNED - TELEMETRY           05/10/23 0000    --  SCANNED - TELEMETRY           05/10/23 0000    --  SCANNED - TELEMETRY           05/10/23 0000    --  SCANNED - TELEMETRY           05/10/23 0000    --  SCANNED - TELEMETRY           05/10/23 0000    --  SCANNED - TELEMETRY           05/10/23 0000                ADL Documentation (last day)     Date/Time Transferring Toileting Bathing Dressing Eating Communication Swallowing    05/18/23 2130 2 - assistive person 2 - assistive person 2 - assistive person 2 - assistive person 2 - assistive person 0 - understands/communicates without difficulty 0 - swallows foods/liquids without difficulty    05/18/23 0800 2 - assistive person 2 - assistive person 2 - assistive person 2 - assistive person 2 - assistive person 0 - understands/communicates without difficulty 0 - swallows foods/liquids without difficulty            After Visit Summary  Anh Saldana  MRN: [id number]    Icon primary diagnosis          Acute on chronic heart failure with preserved ejection fraction (HFpEF)   Icon Date   5/10/2023 - 5/19/2023   Icon Location   [address]   Icon Checklist header    Your Next Steps    Icon destination   Destination    [place] [id number]   [phone]    Icon do   Do     Icon Checkbox     8 medications from [employer] [place] [address] [facility] - [phone]  - [phone] FX  Icon read   Read     Icon Checkbox    Read these attachments  1 High-Protein and High-Calorie Diet (English)  2 Heart Failure  Diagnosis  Easy-to-Read (English)  3 Heart Failure  Self-Care  Easy-to-Read (English)  4 Living With Heart Failure (English)  5 Preventing Heart Failure (English)  [employer] Sign-Up    Send messages to your doctor, view your test results, renew your prescriptions, schedule appointments, and more.   Go to [email or web address], click \"Sign " "Up Now\", and enter your personal activation code: [email or web address]. Activation code expires 6/9/2023.  After Visit Summary  Instructions     Icon Orders placed today                  Your Care at Home    Oxygen Therapy    Icon medication changes this visit           Your medications have changed    Icon medications to start taking   START taking:   aspirin   Start taking on: May 20, 2023   atorvastatin (LIPITOR)    bumetanide (BUMEX)   Start taking on: May 21, 2023   cefdinir (OMNICEF)    hydrALAZINE (APRESOLINE)    isosorbide mononitrate (IMDUR)   Start taking on: May 20, 2023   polyethylene glycol (MIRALAX)   Start taking on: May 20, 2023   sennosides-docusate (PERICOLACE)   Icon medications to stop taking   STOP taking:   furosemide 40 MG tablet (LASIX)    spironolactone 50 MG tablet (ALDACTONE)   Review your updated medication list below.  Your Next Steps  Icon destination   Destination  Icon do   Do  Icon read   Read  COVID-19 Vaccination Information  Why Get Vaccinated?  Building defenses against COVID-19 is a team effort, and you are a wheeler part of that team. Getting the COVID-19 vaccine adds one more layer of protection for you, your coworkers, and family. Here are ways you can build people’s confidence in the COVID-19 vaccines in your community and at home.     • Get vaccinated and enroll in the [employer] text messaging program to help CDC monitor vaccine safety.   • Tell others why you are getting vaccinated and encourage them to get vaccinated. Share your success story.    • Learn how to have conversations about COVID-19 vaccine with coworkers, family, and friends.  • https://www.cdc.gov/coronavirus/2019-ncov/vaccines/index.html     How do I schedule an appointment for a vaccine?  https://www.vaccines.gov/ helps you find locations that carry COVID-19 vaccines and their contact information. Because every location handles appointments differently, you will need to schedule your appointment directly with the " location you choose.        If you have any questions about your recovery, please call the [facility] Nurse Call Center at [phone]. A registered nurse is available 24 hours a day 7 days a week to assist you.   If you have any COVID-19 related questions, please call [phone].     Conversations that Matter: Have you thought about who would speak for you if you could not speak for yourself?     Consider appointing someone to make healthcare decisions for you if you are unable to do so. We can help! Call our Advance Care Planning helpline at [phone], or visit   [facility].   Icon activity    Activity instructions    Resume activity as tolerated       Icon diet    Diet instructions    Diet: Regular/House Diet; Soft to Chew (NDD 3); Ground Meat; Thin (IDDSI 0)   Discharge Diet:   Regular/House Diet  Texture:   Soft to Chew (NDD 3)  Soft to Chew:   Ground Meat  Fluid Consistency:   Thin (IDDSI 0)   Icon Orders placed today                  Other instructions    Discharge Follow-up with PCP   Currently Documented PCP:   Meme Medellin MD   PCP Phone Number:   [phone]   Discharge Follow-up with Specialty: cardiology; 2 Weeks   Discharge Follow-up with Specialty: nephrology; 2 Weeks   Oxygen Therapy   What's Next    What's Next          Follow up with Meme Medellin MD  [address] [place] [address] [address] [facility] [address] [phone]          Follow up with Andrea Nobles MD in 2 month(s)  Follow up appt. with Dr. Nobles July 20 @ 11:30AM. [address] [facility] [address] [phone]       Continuing Care     Icon destination    Destination    [facility]  Services: Skilled Nursing   Address: [address] [place] [id number]   Phone: [phone]     Your Allergies  Date Reviewed: 5/10/2023  Your Allergies   Allergen Reactions   Penicillins Swelling   Rash       Doxycycline Other (See Comments)   ?       Hydroxychloroquine Unknown (See Comments)   ?       Latex Rash   ?      Patient Belongings Returned    Document Return of Belongings Flowsheet    Were the patient bedside belongings sent home? Yes   Medication Retrieved from Unit & Sent Home --   Belongings Sent to Safe --   Belongings sent with: --   Belongings Retrieved from Security & Sent Home N/A   Medication Retrieved from Unit & Sent Home --   Medications Retrieved from Pharmacy & Sent Home N/A         [address] [facility] Signup    [facility] [employer] allows you to send messages to your doctor, view your test results, renew your prescriptions, schedule appointments, and more. To sign up, go to [employer] and click on the Sign Up Now link in the New User? box. Enter your [employer] Activation Code exactly as it appears below along with the last four digits of your Social Security Number and your Date of Birth () to complete the sign-up process. If you do not sign up before the expiration date, you must request a new code.     [employer] Activation Code: [email or web address]  Expires: 2023 11:28 AM     If you have questions, you can email [email or web address] or call [phone] to talk to our [employer] staff. Remember, [employer] is NOT to be used for urgent needs. For medical emergencies, dial 911.     Medication List  Medication List     Morning Afternoon Evening Bedtime As Needed    apixaban 2.5 MG tablet tablet  Commonly known as: ELIQUIS  Take 1 tablet by mouth Every 12 (Twelve) Hours.  For: Atrial Fibrillation  Last time this was given: 2.5 mg on May 19, 2023  9:09 AM  Signed by: Cassie Asher MD        Icon medications to start taking   aspirin 81 MG EC tablet  Start taking on: May 20, 2023  Take 1 tablet by mouth Daily.  Last time this was given: 81 mg on May 19, 2023  9:09 AM  Signed by: DO Cece Lorenzanaon medications to start taking   atorvastatin 20 MG tablet  Commonly known as: LIPITOR  Take 1 tablet by mouth Every Night.  Last time this was given: 20 mg on May 18, 2023  8:26  PM  Signed by: Beltran Burgess DO        Icon medications to start taking   bumetanide 1 MG tablet  Commonly known as: BUMEX  Start taking on: May 21, 2023  Take 1 tablet by mouth Every Other Day for 30 days.  Last time this was given: 1 mg on May 19, 2023  9:09 AM  Signed by: Beltran Burgess DO Every Other Day        Icon medications to start taking   cefdinir 300 MG capsule  Commonly known as: OMNICEF  Take 1 capsule by mouth Daily for 2 days.  Signed by: Beltran Burgess DO         Fluticasone Furoate-Vilanterol 200-25 MCG/INH inhaler  Commonly known as: BREO ELLIPTA  Inhale 1 puff Daily.        Icon medications to start taking   hydrALAZINE 10 MG tablet  Commonly known as: APRESOLINE  Take 1 tablet by mouth Every 8 (Eight) Hours for 30 days.  Last time this was given: 10 mg on May 19, 2023  6:50 AM  Signed by: Beltran Burgess DO        Icon medications to start taking   isosorbide mononitrate 30 MG 24 hr tablet  Commonly known as: IMDUR  Start taking on: May 20, 2023  Take 1 tablet by mouth Daily for 30 days.  Last time this was given: 30 mg on May 19, 2023  9:09 AM  Signed by: Beltran Burgess DO         metoprolol tartrate 50 MG tablet  Commonly known as: LOPRESSOR  Take 1 tablet by mouth 2 (Two) Times a Day.  Last time this was given: 50 mg on May 19, 2023  9:09 AM         pantoprazole 40 MG EC tablet  Commonly known as: PROTONIX  Take 1 tablet by mouth Daily.  Last time this was given: 40 mg on May 19, 2023  6:50 AM        Icon medications to start taking   polyethylene glycol 17 g packet  Commonly known as: MIRALAX  Start taking on: May 20, 2023  Take 17 g by mouth Daily for 30 days.  Last time this was given: 17 g on May 19, 2023  9:09 AM  Signed by: Beltran Burgess DO         predniSONE 5 MG tablet  Commonly known as: DELTASONE  Take 2.5 mg by mouth Daily.  Last time this was given: 2.5 mg on May 19, 2023  9:09 AM        Icon medications to start taking    sennosides-docusate 8.6-50 MG per tablet  Commonly known as: PERICOLACE  Take 1 tablet by mouth 2 (Two) Times a Day for 30 days.  Last time this was given: 1 tablet on May 19, 2023  9:09 AM  Signed by: Beltran Burgess, DO          Where to  your medications     Icon medication  information                   these medications at [place], [place] - [address] [facility] - [phone]  - [phone] FX     aspirin • atorvastatin • bumetanide • cefdinir • hydrALAZINE • isosorbide mononitrate • polyethylene glycol • sennosides-docusate  Address: [address] [place] [address] [address], [place] [id number]   Phone: [phone]     Always carry an updated list of your medications with you. If there is an emergency, a responder can quickly see what medications you are taking. Take this paperwork with you the next time you see your health care provider.        [facility] [employer] Sign-Up  Icon List of Attachments     Attached Information  High-Protein and High-Calorie Diet (English)  High-Protein and High-Calorie Diet  Eating high-protein and high-calorie foods can help you to gain weight, heal after an injury, and recover after an illness or surgery. The specific amount of daily protein and calories you need depends on:  • Your body weight.  • The reason this diet is recommended for you.  Generally, a high-protein, high-calorie diet involves:  • Eating 250-500 extra calories each day.  • Making sure that you get enough of your daily calories from protein. Ask your health care provider how many of your calories should come from protein.  Talk with a health care provider or a dietitian about how much protein and how many calories you need each day. Follow the diet as directed by your health care provider.  What are tips for following this plan?  A bowl and a spoon above it. Powdered milk is in the bowl and falling from the spoon.  ?    Reading food labels  • Check the nutrition facts label for calories,  grams of fat and protein. Items with more than 4 grams of protein are high-protein foods.  Preparing meals  • Add whole milk, half-and-half, or heavy cream to cereal, pudding, soup, or hot cocoa.  • Add whole milk to instant breakfast drinks.  • Add peanut butter to oatmeal or smoothies.  • Add powdered milk to baked goods, smoothies, or milkshakes.  • Add powdered milk, cream, or butter to mashed potatoes.  • Add cheese to cooked vegetables.  • Make whole-milk yogurt parfaits. Top them with granola, fruit, or nuts.  • Add cottage cheese to fruit.  • Add avocado, cheese, or both to sandwiches or salads. Add avocado to smoothies.  • Add meat, poultry, or seafood to rice, pasta, casseroles, salads, and soups.  • Use mayonnaise when making egg salad, chicken salad, or tuna salad.  • Use peanut butter as a dip for fruits and vegetables or as a topping for pretzels, celery, or crackers.  • Add beans to casseroles, dips, and spreads.  • Add pureed beans to sauces and soups.  • Replace calorie-free drinks with calorie-containing drinks, such as milk and fruit juice.  • Replace water with milk or heavy cream when making foods such as oatmeal, pudding, or cocoa.  • Add oil or butter to cooked vegetables and grains.  • Add cream cheese to sandwiches or as a topping on crackers and bread.  • Make cream-based pastas and soups.  General information  • Ask your health care provider if you should take a nutritional supplement.  • Try to eat six small meals each day instead of three large meals. A general goal is to eat every 2 to 3 hours.  • Eat a balanced diet. In each meal, include one food that is high in protein and one food with fat in it.  • Keep nutritious snacks available, such as nuts, trail mixes, dried fruit, and yogurt.  • If you have kidney disease or diabetes, talk with your health care provider about how much protein is safe for you. Too much protein may put extra stress on your kidneys.  • Drink your calories.  Choose high-calorie drinks and have them after your meals.  • Consider setting a timer to remind you to eat. You will want to eat even if you do not feel very hungry.  What high-protein foods should I eat?  Chicken, fish, and eggs.  ?    Vegetables  Soybeans. Peas.  Grains  Quinoa. Bulgur wheat. Buckwheat.  Meats and other proteins  Beef, pork, and poultry. Fish and seafood. Eggs. Tofu. Textured vegetable protein (TVP). Peanut butter. Nuts and seeds. Dried beans. Protein powders. Hummus.  Dairy  Whole milk. Whole-milk yogurt. Powdered milk. Cheese. Cottage Cheese. Eggnog.  Beverages  High-protein supplement drinks. Soy milk.  Other foods  Protein bars.  The items listed above may not be a complete list of foods and beverages you can eat and drink. Contact a dietitian for more information.  What high-calorie foods should I eat?  Fruits  Dried fruit. Fruit leather. Canned fruit in syrup. Fruit juice. Avocado.  Vegetables  Vegetables cooked in oil or butter. Fried potatoes.  Grains  Pasta. Quick breads. Muffins. Pancakes. Ready-to-eat cereal.  Meats and other proteins  Peanut butter. Nuts and seeds.  Dairy  Heavy cream. Whipped cream. Cream cheese. Sour cream. Ice cream. Custard. Pudding. Whole milk dairy products.  Beverages  Meal-replacement beverages. Nutrition shakes. Fruit juice.  Seasonings and condiments  Salad dressing. Mayonnaise. Fab sauce. Fruit preserves or jelly. Honey. Syrup.  Sweets and desserts  Cake. Cookies. Pie. Pastries. Candy bars. Chocolate.  Fats and oils  Butter or margarine. Oil. Gravy.  Other foods  Meal-replacement bars.  The items listed above may not be a complete list of foods and beverages you can eat and drink. Contact a dietitian for more information.  Summary  • A high-protein, high-calorie diet can help you gain weight or heal faster after an injury, illness, or surgery.  • To increase your protein and calories, add ingredients such as whole milk, peanut butter, cheese, beans,  meat, or seafood to meal items.  • To get enough extra calories each day, include high-calorie foods and beverages at each meal.  • Adding a high-calorie drink or shake can be an easy way to help you get enough calories each day. Talk with your healthcare provider or dietitian about the best options for you.  This information is not intended to replace advice given to you by your health care provider. Make sure you discuss any questions you have with your health care provider.  Document Revised: 11/21/2021 Document Reviewed: 11/21/2021  [employer] Patient Education © 2022 [employer]           Icon List of Attachments     Attached Information  Heart Failure  Diagnosis  Easy-to-Read (English)  Heart Failure, Diagnosis  The heart inside the body.  ?    Heart failure means that your heart is not able to pump blood in the right way. This makes it hard for your body to work well. Heart failure is usually a long-term (chronic) condition. You must take good care of yourself and follow your treatment plan from your doctor.  Different stages of heart failure have different treatment plans. The stages are:  • Stage A: At risk for heart failure.  • Stage B: Pre-heart failure.  • Stage C: Symptomatic heart failure.  • Stage D: Advanced heart failure.  What are the causes?  • High blood pressure.  • Buildup of cholesterol and fat in the arteries.  • Heart attack. This injures the heart muscle.  • Heart valves that do not open and close properly.  • Damage of the heart muscle. This is also called cardiomyopathy.  • Infection of the heart muscle. This is also called myocarditis.  • Lung disease.  What increases the risk?  • Getting older. The risk of heart failure goes up as a person ages.  • Being overweight.  • Using tobacco or nicotine products.  • Abusing alcohol or drugs.  • Having taken medicines that can damage the heart.  • Having any of these conditions:  ? Diabetes.  ? Abnormal heart rhythms.  ? Thyroid problems.  ? Low  blood counts (anemia).  • Having a family history of heart failure.  What are the signs or symptoms?  • Shortness of breath.  • Coughing.  • Swelling of the feet, ankles, legs, or belly.  • Losing or gaining weight for no reason.  • Trouble breathing.  • Waking from sleep because of the need to sit up and get more air.  • Fast heartbeat.  Other symptoms may include:  • Being very tired.  • Feeling dizzy, or feeling like you may pass out (faint).  • Having no desire to eat.  • Feeling like you may vomit (nauseous).  • Peeing (urinating) more at night.  • Feeling confused.  How is this treated?  This condition may be treated with:  • Medicines. These can be given to treat blood pressure and to make the heart muscles stronger.  • Changes in your daily life. These may include:  ? Eating a healthy diet.  ? Staying at a healthy body weight.  ? Quitting tobacco, alcohol, and drug use.  ? Doing exercises.  ? Participating in a cardiac rehabilitation program. This program helps you improve your health through exercise, education, and counseling.  • Surgery. Surgery can be done to open blocked valves or to put devices in the heart, such as pacemakers.  • A donor heart (heart transplant). You will receive a healthy heart from a donor.  Follow these instructions at home:  • Treat other conditions as told by your doctor. These may include high blood pressure, diabetes, thyroid disease, or abnormal heart rhythms.  • Learn as much as you can about heart failure.  • Get support as you need it.  • Keep all follow-up visits.  Where to find more information  • American Heart Association: www.heart.org  • Centers for Disease Control and Prevention: www.cdc.gov  • National [place] on Aging: www.joel.nih.gov  Summary  • Heart failure means that your heart is not able to pump blood in the right way.  • This condition is often caused by high blood pressure, heart attack, or damage of the heart muscle.  • Symptoms of this condition include  shortness of breath and swelling of the feet, ankles, legs, or belly. You may also feel very tired or feel like you may vomit.  • You may be treated with medicines, surgery, or changes in your daily life.  • Treat other health conditions as told by your doctor.  This information is not intended to replace advice given to you by your health care provider. Make sure you discuss any questions you have with your health care provider.  Document Revised: 06/16/2022 Document Reviewed: 07/10/2021  "[employer]" Patient Education © 2022 [employer].           Icon List of Attachments     Attached Information  Heart Failure  Self-Care  Easy-to-Read (English)  Heart Failure, Self-Care  Heart failure is a serious condition. The following information explains things you need to do to take care of yourself at home. To help you stay as healthy as possible, you may be asked to change your diet, take certain medicines, and make other changes in your life. Your doctor may also give you more specific instructions. If you have problems or questions, call your doctor.  What are the risks?  Having heart failure makes it more likely for you to have some problems. These problems can get worse if you do not take good care of yourself. Problems may include:  • Damage to the kidneys, liver, or lungs.  • Malnutrition.  • Abnormal heart rhythms.  • Blood clotting problems that could cause a stroke.  Supplies needed:  • Scale for weighing yourself.  • Blood pressure monitor.  • Notebook.  • Medicines.  How to care for yourself when you have heart failure  Medicines  Take over-the-counter and prescription medicines only as told by your doctor. Take your medicines every day.  • Do not stop taking your medicine unless your doctor tells you to do so.  • Do not skip any medicines.  • Get your prescriptions refilled before you run out of medicine. This is important.  • Talk with your doctor if you cannot afford your medicines.  Eating and  drinking  ?    • Eat heart-healthy foods. Talk with a diet specialist (dietitian) to create an eating plan.  • Limit salt (sodium) if told by your doctor. Ask your diet specialist to tell you which seasonings are healthy for your heart.  • Cook in healthy ways instead of frying. Healthy ways of cooking include roasting, grilling, broiling, baking, poaching, steaming, and stir-frying.  • Choose foods that:  ? Have no trans fat.  ? Are low in saturated fat and cholesterol.  • Choose healthy foods, such as:  ? Fresh or frozen fruits and vegetables.  ? Fish.  ? Low-fat (lean) meats.  ? Legumes, such as beans, peas, and lentils.  ? Fat-free or low-fat dairy products.  ? Whole-grain foods.  ? High-fiber foods.  • Limit how much fluid you drink, if told by your doctor.  Alcohol use  • Do not drink alcohol if:  ? Your doctor tells you not to drink.  ? Your heart was damaged by alcohol, or you have very bad heart failure.  ? You are pregnant, may be pregnant, or are planning to become pregnant.  • If you drink alcohol:  ? Limit how much you have to:  - 0-1 drink a day for women.  - 0-2 drinks a day for men.  ? Know how much alcohol is in your drink. In the [place] one drink equals one 12 oz bottle of beer (355 mL), one 5 oz glass of wine (148 mL), or one 1½ oz glass of hard liquor (44 mL).  Lifestyle  ?    • Do not smoke or use any products that contain nicotine or tobacco. If you need help quitting, ask your doctor.  ? Do not use nicotine gum or patches before talking to your doctor.  • Do not use illegal drugs.  • Lose weight if told by your doctor.  • Do physical activity if told by your doctor. Talk to your doctor before you begin an exercise if:  ? You are an older adult.  ? You have very bad heart failure.  • Learn to manage stress. If you need help, ask your doctor.  • Get physical rehab (rehabilitation) to help you stay independent and to help with your quality of life.  • Participate in a cardiac rehab program. This  program helps you improve your health through exercise, education, and counseling.  • Plan time to rest when you get tired.  Check weight and blood pressure  ?    • Weigh yourself every day. This will help you to know if fluid is building up in your body.  ? Weigh yourself every morning after you pee (urinate) and before you eat breakfast.  ? Wear the same amount of clothing each time.  ? Write down your daily weight. Give your record to your doctor.  • Check and write down your blood pressure as told by your doctor.  • Check your pulse as told by your doctor.  Dealing with very hot and very cold weather  • If it is very hot:  ? Avoid activities that take a lot of energy.  ? Use air conditioning or fans, or find a cooler place.  ? Avoid caffeine and alcohol.  ? Wear clothing that is loose-fitting, lightweight, and light-colored.  • If it is very cold:  ? Avoid activities that take a lot of energy.  ? Layer your clothes.  ? Wear mittens or gloves, a hat, and a face covering when you go outside.  ? Avoid alcohol.  Follow these instructions at home:  • Stay up to date with shots (vaccines). Get pneumococcal and flu (influenza) shots.  • Keep all follow-up visits.  Contact a doctor if:  • You gain 2-3 lb (1-1.4 kg) in 24 hours or 5 lb (2.3 kg) in a week.  • You have increasing shortness of breath.  • You cannot do your normal activities.  • You get tired easily.  • You cough a lot.  • You do not feel like eating or feel like you may vomit (nauseous).  • You have swelling in your hands, feet, ankles, or belly (abdomen).  • You cannot sleep well because it is hard to breathe.  • You feel like your heart is beating fast (palpitations).  • You get dizzy when you stand up.  • You feel depressed or sad.  Get help right away if:  • You have trouble breathing.  • You or someone else notices a change in your behavior, such as having trouble staying awake.  • You have chest pain or discomfort.  • You pass out (faint).  These  symptoms may be an emergency. Get help right away. Call your local emergency services (911 in the [place]  • Do not wait to see if the symptoms will go away.  • Do not drive yourself to the hospital.  Summary  • Heart failure is a serious condition. To care for yourself, you may have to change your diet, take medicines, and make other lifestyle changes.  • Take your medicines every day. Do not stop taking them unless your doctor tells you to do so.  • Limit salt and eat heart-healthy foods.  • Ask your doctor if you can drink alcohol. You may have to stop alcohol use if you have very bad heart failure.  • Contact your doctor if you gain weight quickly or feel that your heart is beating too fast. Get help right away if you pass out or have chest pain or trouble breathing.  This information is not intended to replace advice given to you by your health care provider. Make sure you discuss any questions you have with your health care provider.  Document Revised: 07/10/2021 Document Reviewed: 07/10/2021  [employer] Patient Education © 2022 [employer]           Icon List of Attachments     Attached Information  Living With Heart Failure (English)  Living With Heart Failure  Heart failure is a long-term (chronic) condition in which the heart cannot pump enough blood through the body. When this happens, parts of the body do not get the blood and oxygen they need.  There is no cure for heart failure at this time, so it is important for you to take good care of yourself and follow the treatment plan you set with your health care provider. If you are living with heart failure, there are ways to help you manage the disease.  How to manage lifestyle changes  Living with heart failure requires you to make changes in your life. Your health care team will teach you about the changes you need to make in order to relieve your symptoms and lower your risk of going to the hospital. Work with your health care provider to develop a  treatment plan that works for you.  Activity  • Ask your health care provider about attending cardiac rehabilitation. These programs include aerobic physical activity, which provides many benefits for your heart.  • If no cardiac rehabilitation program is available, ask your health care provider what aerobic exercises are safe for you to do.  • Return to your normal activities as told by your health care provider. Ask your health care provider what activities are safe for you.  • Pace your daily activities and allow time for rest as needed.  Managing stress  It is normal to have many emotions about your diagnosis, such as fear, sadness, anger, and loss. If you feel any of these emotions and need help coping, contact your health care provider. Here are some ways to help yourself manage these emotions:  • Talk to friends and family members about your condition. They can give you support and guidance. Explain your symptoms to them and, if comfortable, invite them to attend appointments or rehabilitation with you.  • Join a support group for people with chronic heart failure. Talking with other people who have the same symptoms may give you new ways of coping with your disease and your emotions.  • Accept help from others. Do not be ashamed if you need help with certain tasks.  • Use stress management techniques, such as meditation, breathing exercises, or listening to relaxing music.  Conditions such as depression and anxiety are common in persons with heart failure. Pay attention to changes in your mood, emotions, and stress levels. Tell your health care provider if you have any of the following symptoms:  • Trouble sleeping or a change in your sleeping patterns.  • Feeling sad, down, or depressed more often than not, every day for more than 2 weeks.  • Losing interest in activities you normally enjoy.  • Feeling irritable or crying for no reason.  • Finding yourself worrying about the future often.  Work  You may  need to develop a plan with your health care provider if heart failure interferes with your ability to work. This may include:  • Reducing your work hours.  • Finding functions that are less active or require less effort.  • Planning rest periods during your work hours.  Travel  • Talk with your health care provider if you plan to travel. There may be circumstances in which your health care provider recommends that you do not travel or that you delay travel until your condition is under control.  • When you travel, bring your medicine and a list of your medicines. If you are traveling by air, keep your medicines with you in a carry-on bag.  • Consider finding a medical facility in the area you will be traveling to and determine what your health insurance will cover.  • If you will be traveling by public transportation (airplane, train, bus), contact the company prior to traveling if you have special needs. This may include needs related to diet, oxygen, a wheelchair, a seating request, or help with luggage.  • If you use oxygen, make sure to bring enough oxygen with you.  • If you have a battery-powered device, bring a fully charged extra battery with you.  • If you have a device, bring a note from your health care provider and inform all security screening personnel that you have the device. You may need to go through special screening for safety.  Sexual activity  ?    • Ask your health care provider when it is safe for you to resume sexual activity.  • You may need to start slowly and gradually increase intimacy. You can increase intimacy by doing such things as caressing, touching, and holding each other.  • Get regular exercise as told by your health care provider. This can benefit your sex life by building strength and endurance.  Sleep  ?    If your condition interferes with your sleep, find ways to improve your sleep quality, such as:  • Sleep lying on your side, or sleep with your head elevated by raising  the head of your bed or using multiple pillows.  • Ask your health care provider about screening for sleep apnea.  • Try to go to sleep and wake up at the same times every day.  • Sleep in a dark, cool room.  • Do not do any physical activity or eating for a few hours before bedtime.  • Plan rest periods during the day, but do not take long naps during the day.     Where to find support  • Consider talking with:  ? Family members.  ? Close friends.  ? A mental health professional or therapist.  ? A member of your Religion, diana, or community group.  • Other sources of support include:  ? Local support groups. Ask your health care provider about groups near you.  ? Online support groups, such as those found through the American Heart Association: supportnetwork.heart.org  ? Local home care agencies, community agencies, or social agencies.  ? A palliative care specialist. Palliative care can help you manage symptoms, promote comfort, improve quality of life, and maintain dignity.  Where to find more information  • American Heart Association: heart.org  • National Heart, Lung, and Blood [place]: nhlbi.nih.gov  • Centers for Disease Control and Prevention: cdc.gov/heartdisease  • Heart Failure Society of [place]: hfsa.org/patient  Contact a health care provider if:  • You have a rapid weight gain.  • You have increasing shortness of breath that is unusual for you.  • You are unable to participate in your usual physical activities.  • You tire easily.  • You have difficulty sleeping, such as:  ? You wake up feeling short of breath.  ? You have to use more pillows to raise your head in order to sleep.  • You cough more than normal, especially with physical activity.  • You have any swelling or more swelling in areas such as your hands, feet, ankles, or abdomen.  • You become dizzy or light-headed when you stand up.  • You have changes to your appetite.  • You have symptoms of depression or anxiety.  Get help right away  if:  • You have difficulty breathing.  • You notice or your family notices a change in your awareness, such as having trouble staying awake or having difficulty with concentration.  • You have pain or discomfort in your chest.  • You have an episode of fainting (syncope).  • You feel like your heart is beating quickly (palpitations).  • You have extreme feelings of sadness or loss of hope, or you have thoughts about hurting yourself or others.  These symptoms may represent a serious problem that is an emergency. Do not wait to see if the symptoms will go away. Get medical help right away. Call your local emergency services (911 in the [place] Do not drive yourself to the hospital.  Summary  • There is no cure for heart failure, so it is important for you to take good care of yourself and follow the treatment plan set by your health care provider.  • Ask your health care provider about attending cardiac rehabilitation. These programs include aerobic physical activity, which provides many benefits for your heart.  • It is normal to have many emotions about your diagnosis, such as fear, sadness, anger, and loss. If you feel any of these emotions and need help coping, contact your health care provider.  • You may need to develop a plan with your health care provider if heart failure interferes with your ability to work.  This information is not intended to replace advice given to you by your health care provider. Make sure you discuss any questions you have with your health care provider.  Document Revised: 08/02/2021 Document Reviewed: 08/02/2021  [employer] Patient Education © 2022 [employer].           Icon List of Attachments     Attached Information  Preventing Heart Failure (English)  Preventing Heart Failure  Heart failure is a condition in which the heart has trouble pumping blood. This may mean that the heart cannot pump enough blood out to the body or that the heart does not fill up with enough blood. Either of  those problems can lead to symptoms such as tiredness (fatigue), trouble breathing, and swelling throughout the body.  This is a common medical condition that affects not only the heart, but the entire body. Making certain nutrition and lifestyle changes can help you prevent heart failure and avoid serious health problems.  How can this condition affect me?  Heart failure can cause very serious problems that may get worse over time, such as:  • Extreme fatigue during normal physical activities.  • Shortness of breath or trouble breathing.  • Swelling in your abdomen, legs, ankles, feet, or neck.  • Fluid buildup throughout the body.  • Weight gain.  • Cough.  • Frequent urination.  What can increase my risk?  The risk of heart failure increases as a person ages. The following factors may also make you more likely to develop this condition:  • Being obese.  • Using tobacco or nicotine products.  • Abusing alcohol or drugs.  • Having taken medicines that can damage the heart, such as chemotherapy drugs.  • Have a family history of heart failure.  • Having any of these medical conditions:  ? High blood pressure (hypertension) or coronary artery disease.  ? Diabetes.  ? Abnormal heart rhythms.  ? Thyroid problems.  ? Low blood counts (anemia).  ? Lung disease.  ? Chronic kidney disease.  What actions can I take to prevent heart failure?  Nutrition  A plate with examples of foods in a healthy diet.  ?    • If you are overweight or obese, reduce how many calories you eat each day so that you lose weight. Work with your health care provider or a diet and nutrition specialist (dietitian) to determine how many calories you need each day.  • Eat foods that are low in salt (sodium). Avoid adding extra salt to foods. This can help keep your blood pressure in a normal range.  • Eat a well-balanced diet that includes a lot of:  ? Plant-based foods.  ? Fresh fruits and vegetables.  ? Whole grains.  ? Lean  meats.  ? Beans.  ? Fat-free or low-fat dairy products.  • Avoid foods that contain a lot of:  ? Trans fats.  ? Saturated fats.  ? Sugar.  ? Cholesterol.  Alcohol  • Do not drink alcohol if:  ? Your health care provider tells you not to drink.  ? You are pregnant, may be pregnant, or are planning to become pregnant.  • If you drink alcohol:  ? Limit how much you have to:  - 0-1 drink a day for women.  - 0-2 drinks a day for men.  ? Know how much alcohol is in your drink. In the [place] one drink equals one 12 oz bottle of beer (355 mL), one 5 oz glass of wine (148 mL), or one 1½ oz glass of hard liquor (44 mL).  Lifestyle  Silhouette of a person sitting on the floor doing yoga.  ?    • Do not use any products that contain nicotine or tobacco. These products include cigarettes, chewing tobacco, and vaping devices, such as e-cigarettes. If you need help quitting, ask your health care provider.  • Exercise for at least 30 minutes, 5 days each week, or as much as told by your health care provider.  ? Do moderate-intensity exercise, such as brisk walking, bicycling, or water aerobics.  ? Ask your health care provider which activities are safe for you.  • Try to get 7-9 hours of sleep each night. To help with sleep:  ? Keep your bedroom cool and dark.  ? Do not eat a heavy meal during the hour before you go to bed.  ? Do not drink alcohol or caffeinated drinks before bed.  ? Avoid screen time before bedtime. This means avoiding television, computers, tablets, and mobile phones.  • Find ways to relax and manage stress. These may include:  ? Breathing exercises.  ? Meditation.  ? Yoga.  ? Listening to music.  General instructions  • See a health care provider regularly for screening and wellness checks. Work with your health care provider to manage your:  ? Blood pressure.  ? Cholesterol levels.  ? Blood sugar (glucose) levels.  ? Weight.  • If you have diabetes, manage your condition and follow your treatment plan as  instructed.  Where to find more information  • National Heart, Lung, and Blood Valentines: www.nhlbi.nih.gov  • Centers for Disease Control and Prevention: www.cdc.gov  • National Valentines on Aging: www.joel.nih.gov  • American Heart Association: www.heart.org  Contact a health care provider if:  • You have rapid weight gain.  • You have increasing shortness of breath that is unusual for you.  • You tire easily or you are unable to participate in your usual activities.  • You cough more than normal, especially with physical activity.  • You have any swelling or more swelling in areas such as your hands, feet, ankles, or abdomen.  Get help right away if:  • You have trouble breathing.  • You have pain or discomfort in your chest.  • You faint.  These symptoms may represent a serious problem that is an emergency. Do not wait to see if the symptoms will go away. Get medical help right away. Call your local emergency services (911 in the [place] Do not drive yourself to the hospital.  Summary  • Heart failure can cause very serious problems that may get worse over time.  • Heart failure can be prevented by making changes to your diet and lifestyle.  • It is important to eat a healthy diet, manage your weight, exercise regularly, manage stress, avoid drugs and alcohol, and keep your cholesterol and blood pressure under control.  This information is not intended to replace advice given to you by your health care provider. Make sure you discuss any questions you have with your health care provider.  Document Revised: 06/16/2022 Document Reviewed: 07/10/2021  Elsevier Patient Education © 2022 Elsevier Inc.           Heart Failure    For more information on managing Heart Failure, please visit the American Heart Association's free on-line interactive workbook at: http://www.ksw-gtg.com/aha-heartfailure/             Pneumonia Vaccination    Please follow up with your primary care provider or retail pharmacy to see if you are  eligible for a pneumonia vaccination.        Opioid Resource    If you or someone you know needs information on substance abuse, please visit   https://www.findhelpnowky.org/ for listings of facilities and resources across [place].  Stroke Symptoms    • Call 911 or have someone take you to the Emergency Department if you have any of the following:  • Sudden numbness or weakness of your face, arm or leg especially on one side of the body  • Sudden confusion, difficulty speaking or trouble understanding   • Changes in your vision or loss of sight in one eye  • Sudden severe headache with no known cause  • Sudden dizziness, trouble walking, loss of balance or coordination     It is important to seek emergency care right away if you have further stroke symptoms. If you get emergency help quickly, the powerful clot-dissolving medicines can reduce the disabilities caused by a stroke.      For more information:  American Stroke Association  7-515-1-STROKE  www.strokeassociation.org  Smoking Cessation    IF YOU SMOKE OR USE TOBACCO PLEASE READ THE FOLLOWING:  Why is smoking bad for me?  Smoking increases the risk of heart disease, lung disease, vascular disease, stroke, and cancer. If you smoke, STOP!     For more information:  Quit Now [place]  1-800-QUIT-NOW  https://[place].quitlogix.org/en-[place]/  Suicidal Feelings    If you feel like life is too tough and are thinking of suicide or injuring yourself, get help right away!  • Call or text 988 to speak to someone.     Patient Experience    Thank you for choosing [facility]. You may receive a survey following your visit. Please take a moment to share what went well, where we need improvement, and which staff members deserve recognition. We value your input.           ? ?              YOU ARE THE MOST IMPORTANT FACTOR IN YOUR RECOVERY.      Follow all instructions carefully.      I have reviewed my discharge instructions with my nurse, including the following  information, if applicable:                 Information about my illness and diagnosis              Follow up appointments (including lab draws)              Wound Care              Equipment Needs              Medications (new and continuing) along with side effects              Preventative information such as vaccines and smoking cessations              Diet              Pain              I know when to contact my Doctor's office or seek emergency care        I want my nurse to describe the side effects of my medications: YES NO   If the answer is no, I understand the side effects of my medications: YES NO   My nurse described the side effects of my medications in a way that I could understand: YES NO   I have taken my personal belongings and my own medications with me at discharge: YES NO      I have received this information and my questions have been answered. I have discussed any concerns I see with this plan with the nurse or physician. I understand these instructions.     Signature of Patient or Responsible Person: _____________________________________     Date: _________________  Time: __________________     Signature of Healthcare Provider: _______________________________________  Date: _________________  Time: __________________       Discharge Summary    No notes of this type exist for this encounter.         Discharge Order (From admission, onward)     Start     Ordered    05/19/23 1029  Discharge patient  Once        Expected Discharge Date: 05/19/23    Expected Discharge Time: Morning    Discharge Disposition: Skilled Nursing Facility (DC - External)    Physician of Record for Attribution - Please select from Treatment Team: SUSANNE WELSH [[id number]]    Review needed by CMO to determine Physician of Record: No       Question Answer Comment   Physician of Record for Attribution - Please select from Treatment Team SUSANNE WELSH    Review needed by CMO to determine Physician of Record No        05/19/23  8731

## 2023-05-19 NOTE — CASE MANAGEMENT/SOCIAL WORK
Discharge Planning Assessment   [place]     Patient Name: Anh Saldana  MRN: [id number]  Today's Date: 5/19/2023    Admit Date: 5/10/2023    Plan: [facility] has received authorization to accept pt per pt's insurance.  SS notified Physician and changed pharmacy to [facility] ([facility]).  SS will follow.     Discharge Plan     Row Name 05/19/23 0840       Plan    Plan [facility] has received authorization to accept pt per pt's insurance.  SS notified Physician and changed pharmacy to [facility] ([facility]).  SS will follow.              Continued Care and Services - Admitted Since 5/10/2023     Destination Coordination complete.    Service Provider Request Status Selected Services Address Phone Fax Patient Preferred    [facility]  Selected Skilled Nursing [facility] [address] [place] [address] [phone] --                      RYAN HendersonW

## 2023-05-19 NOTE — THERAPY TREATMENT NOTE
Acute Care - Occupational Therapy Treatment Note  CHANTEL Avelar     Patient Name: Anh Saldana  : 1934  MRN: [id number]  Today's Date: 2023  Onset of Illness/Injury or Date of Surgery: 05/10/23     Referring Physician: Jag    Admit Date: 5/10/2023       ICD-10-CM ICD-9-CM   1. Non-STEMI (non-ST elevated myocardial infarction)  I21.4 410.70   2. Congestive heart failure, unspecified HF chronicity, unspecified heart failure type  I50.9 428.0   3. Sepsis, due to unspecified organism, unspecified whether acute organ dysfunction present  A41.9 038.9     995.91   4. TANA (acute kidney injury)  N17.9 584.9   5. Hypoxemia  R09.02 799.02   6. Pulmonary hypertension  I27.20 416.8     Patient Active Problem List   Diagnosis   • Osteoporosis, unspecified   • Hypertensive urgency   • Essential hypertension   • Dyspnea on exertion   • Acute on chronic heart failure with preserved ejection fraction (HFpEF)   • Persistent atrial fibrillation   • Hearing loss   • Acute on chronic heart failure with preserved ejection fraction (HFpEF)   • Acute respiratory failure with hypoxemia   • Severe Malnutrition (HCC)   • Chronic heart failure with preserved ejection fraction (HFpEF)     Past Medical History:   Diagnosis Date   • Breast cancer    • CHF (congestive heart failure)    • Chronic kidney disease     acute kidney failure   • [place] (hard of hearing)    • Hypertension    • Osteoarthritis    • Osteoporosis    • Rheumatoid arthritis    • Right-sided Bell's palsy    • Shortness of breath     sleeps with oxygen at night     Past Surgical History:   Procedure Laterality Date   • BREAST LUMPECTOMY Left     benign   • CATARACT EXTRACTION W/ INTRAOCULAR LENS  IMPLANT, BILATERAL Bilateral    • SHOULDER SURGERY Left     metal plate in left shoulder         OT ASSESSMENT FLOWSHEET (last 12 hours)     OT Evaluation and Treatment     Row Name 23 1145                   OT Time and Intention    Document Type therapy note (daily  note)  -KR        Mode of Treatment occupational therapy  -KR        Patient Effort adequate  -KR           Shoulder (Therapeutic Exercise)    Shoulder AAROM (Therapeutic Exercise) bilateral;flexion;extension;sitting  supported in bed  -KR           Hand (Therapeutic Exercise)    Hand (Therapeutic Exercise) strengthening exercise  -KR        Hand AROM/AAROM (Therapeutic Exercise) bilateral;finger flexion;finger extension  -KR        Hand Strengthening (Therapeutic Exercise) hand gripper  -KR           Wound 05/10/23 1727 coccyx    Wound - Properties Group Placement Date: 05/10/23  -SC Placement Time: 1727  -SC Present on Hospital Admission: Y  -SC Location: coccyx  -SC    Retired Wound - Properties Group Placement Date: 05/10/23  -SC Placement Time: 1727 -SC Present on Hospital Admission: Y  -SC Location: coccyx  -SC    Retired Wound - Properties Group Date first assessed: 05/10/23  -SC Time first assessed: 1727 -SC Present on Hospital Admission: Y  -SC Location: coccyx  -SC       Wound 05/17/23 1200 Right lower leg Skin Tear    Wound - Properties Group Placement Date: 05/17/23  -SE Placement Time: 1200  -SE Side: Right  -SE Orientation: lower  -SE Location: leg  -SE Primary Wound Type: Skin tear  -SE    Retired Wound - Properties Group Placement Date: 05/17/23  -SE Placement Time: 1200  -SE Side: Right  -SE Orientation: lower  -SE Location: leg  -SE Primary Wound Type: Skin tear  -SE    Retired Wound - Properties Group Date first assessed: 05/17/23  -SE Time first assessed: 1200  -SE Side: Right  -SE Location: leg  -SE Primary Wound Type: Skin tear  -SE       Wound 05/18/23 1539 Left posterior elbow Skin Tear    Wound - Properties Group Placement Date: 05/18/23  -VC Placement Time: 1539  -VC Side: Left  -VC Orientation: posterior  -VC Location: elbow  -VC Primary Wound Type: Skin tear  -VC    Retired Wound - Properties Group Placement Date: 05/18/23  -VC Placement Time: 1539  -VC Side: Left  -VC Orientation:  posterior  -VC Location: elbow  -VC Primary Wound Type: Skin tear  -VC    Retired Wound - Properties Group Date first assessed: 05/18/23  -VC Time first assessed: 1539  -VC Side: Left  -VC Location: elbow  -VC Primary Wound Type: Skin tear  -VC          User Key  (r) = Recorded By, (t) = Taken By, (c) = Cosigned By    Initials Name Effective Dates    VC Kelsey Reese RN 06/16/21 -     Odell Batista OT 06/16/21 -     Lydia Cordero RN 01/24/23 -     Tanisha Bright RN 03/02/23 -                        OT Recommendation and Plan  Planned Therapy Interventions (OT): activity tolerance training, adaptive equipment training, BADL retraining  Plan of Care Review  Plan of Care Reviewed With: patient, sibling  Plan of Care Reviewed With: patient, sibling        Time Calculation:     Therapy Charges for Today     Code Description Service Date Service Provider Modifiers Qty    [id number]  OT THERAPEUTIC ACT EA 15 MIN 5/18/2023 Odell Reno OT GO 1    [id number]  OT THERAPEUTIC ACT EA 15 MIN 5/19/2023 Odell Reno OT GO 1               Odell Reno OT  5/19/2023

## 2023-05-19 NOTE — PLAN OF CARE
Goal Outcome Evaluation:  Patient discharged per Aditya DO. IV and telemetry D/C'd. Patient has tolerated all interventions. No complaints or concerns at this time. No signs of acute distress noted. Will continue to monitor until patient off unit.

## 2023-05-19 NOTE — DISCHARGE SUMMARY
[facility] HOSPITALISTS DISCHARGE SUMMARY    Patient Identification:  Name:  Anh Saldana  Age:  88 y.o.  Sex:  female  :  1934  MRN:  [id number]  Visit Number:  [id number]    Date of Admission: 5/10/2023  Date of Discharge:  2023     PCP: Meme Medellin MD    DISCHARGE DIAGNOSIS  #Sepsis due to community-acquired pneumonia  #Urinary tract infection, POA  #E. coli bacteremia  #Acute on chronic HFpEF  #Acute on chronic hypoxemic respiratory failure  #Severe pulmonary hypertension, RVSP 98  #Acute kidney injury on CKD stage IIIb  #Paroxysmal atrial fibrillation  #Chronic severe malnutrition  #Essential hypertension  #Rheumatoid arthritis  #Osteoarthritis  #Acute illness myopathy    CONSULTS   Cardiology  Infectious disease  Nephrology    PROCEDURES PERFORMED  None    HOSPITAL COURSE  Patient is a 88 y.o. female presented to [facility] complaining of shortness of breath and lower extremity edema.  Please see the admitting history and physical for further details.  Patient was admitted for hypoxemic respiratory failure due to community-acquired pneumonia.  She was noted to be bacteremic with pansensitive E. coli therefore infectious disease was consulted.  She was started on IV Rocephin and de-escalated to p.o. Omnicef to complete treatment course.    Patient has HFpEF and chronic hypoxia which is most likely related to severe pulmonary hypertension as noted with RVSP of 98.  She developed an acute kidney injury which prompted nephrology consultation.  Renal function ultimately returned to baseline at the time of discharge.    At the time of discharge, patient was hemodynamically stable on 1 L nasal cannula.  She did develop a short run of PV ST however electrolytes were normal and beta-blocker was continued.  She was asymptomatic during the short episode.  She was discharged to SNF with plans for short-term rehab.    VITAL SIGNS:  Temp:  [97.6 °F (36.4 °C)-98.2 °F (36.8 °C)]  98.2 °F (36.8 °C)  Heart Rate:  [59-75] 72  Resp:  [16-20] 20  BP: (113-165)/(66-94) 165/94  SpO2:  [95 %-99 %] 98 %  on  Flow (L/min):  [1] 1;   Device (Oxygen Therapy): nasal cannula    Body mass index is 17.73 kg/m².  Wt Readings from Last 3 Encounters:   05/19/23 41.2 kg (90 lb 12.8 oz)   07/26/21 42.6 kg (94 lb)   10/15/20 45.8 kg (101 lb)       PHYSICAL EXAM:  Constitutional: Elderly chronically ill-appearing frail cachectic female resting in bed in no distress   HENT:  Head:  Normocephalic and atraumatic.  Mouth:  Moist mucous membranes.    Eyes:  Conjunctivae and EOM are normal. No scleral icterus.    Cardiovascular:  Normal rate, regular rhythm and normal heart sounds with no murmur.  Pulmonary/Chest:  No respiratory distress, no wheezes, no crackles, with normal breath sounds and good air movement.  Abdominal:  Soft.  Bowel sounds are normal.  No distension and no tenderness.   Musculoskeletal:  No tenderness, and no deformity.  No red or swollen joints anywhere.  Functional ROM intact.   Neurological:  Alert and oriented to person, place, and time.  No cranial nerve deficit.  No tongue deviation.  No facial droop.  No slurred speech. Intact Sensation throughout  Skin:  Skin is warm and dry. No rash or lesion noted. No pallor.   Peripheral vascular:  Pulses in all 4 extremities with no clubbing, no cyanosis, no edema.  Psychiatric: Appropriate mood and affect, pleasant.     DISCHARGE DISPOSITION   Stable    DISCHARGE MEDICATIONS:     Discharge Medications      New Medications      Instructions Start Date   aspirin 81 MG EC tablet   81 mg, Oral, Daily   Start Date: May 20, 2023     atorvastatin 20 MG tablet  Commonly known as: LIPITOR   20 mg, Oral, Nightly      bumetanide 1 MG tablet  Commonly known as: BUMEX   1 mg, Oral, Every 48 Hours   Start Date: May 21, 2023     cefdinir 300 MG capsule  Commonly known as: OMNICEF   300 mg, Oral, Daily      hydrALAZINE 10 MG tablet  Commonly known as: APRESOLINE   10  mg, Oral, Every 8 Hours Scheduled      isosorbide mononitrate 30 MG 24 hr tablet  Commonly known as: IMDUR   30 mg, Oral, Every 24 Hours Scheduled   Start Date: May 20, 2023     polyethylene glycol 17 g packet  Commonly known as: MIRALAX   17 g, Oral, Daily   Start Date: May 20, 2023     sennosides-docusate 8.6-50 MG per tablet  Commonly known as: PERICOLACE   1 tablet, Oral, 2 Times Daily         Continue These Medications      Instructions Start Date   apixaban 2.5 MG tablet tablet  Commonly known as: ELIQUIS   2.5 mg, Oral, Every 12 Hours Scheduled      Fluticasone Furoate-Vilanterol 200-25 MCG/INH inhaler  Commonly known as: BREO ELLIPTA   1 puff, Inhalation, Daily      metoprolol tartrate 50 MG tablet  Commonly known as: LOPRESSOR   50 mg, Oral, 2 Times Daily      pantoprazole 40 MG EC tablet  Commonly known as: PROTONIX   40 mg, Oral, Daily      predniSONE 5 MG tablet  Commonly known as: DELTASONE   2.5 mg, Oral, Daily         Stop These Medications    furosemide 40 MG tablet  Commonly known as: LASIX     spironolactone 50 MG tablet  Commonly known as: ALDACTONE            Diet Instructions     Diet: Regular/House Diet; Soft to Chew (NDD 3); Ground Meat; Thin (IDDSI 0)      Discharge Diet: Regular/House Diet    Texture: Soft to Chew (NDD 3)    Soft to Chew: Ground Meat    Fluid Consistency: Thin (IDDSI 0)        Additional Instructions for the Follow-ups that You Need to Schedule     Discharge Follow-up with PCP   As directed       Currently Documented PCP:    Meme Medellin MD    PCP Phone Number:    [phone]     [facility]         Discharge Follow-up with Specialty: cardiology; 2 Weeks   As directed      Specialty: cardiology    Follow Up: 2 Weeks    Follow Up Details: afib, severe pHTN         Discharge Follow-up with Specialty: nephrology; 2 Weeks   As directed      Specialty: nephrology    Follow Up: 2 Weeks    Follow Up Details: CKD4            Contact information for  follow-up providers     Andrea Nobles MD Follow up in 2 month(s).    Specialty: Nephrology  Why: Follow up appt. with Dr. Nobles July 20 @ 11:30AM.  Contact information:  [address] [facility] [address]  [phone]             Meme Medellin MD .    Specialty: Geriatric Medicine  Why: [facility]  Contact information:  [address]  [facility] [address]  [phone]                   Contact information for after-discharge care     Destination     [facility] .    Service: Skilled Nursing  Contact information:  [address]  [facility] [address]  [phone]                              TEST  RESULTS PENDING AT DISCHARGE  None     CODE STATUS  Code Status and Medical Interventions:   Ordered at: 05/10/23 1720     Medical Intervention Limits:    NO intubation (DNI)     Level Of Support Discussed With:    Next of Kin (If No Surrogate)     Code Status (Patient has no pulse and is not breathing):    No CPR (Do Not Attempt to Resuscitate)     Medical Interventions (Patient has pulse or is breathing):    Limited Support     Release to patient:    Routine Release       The ASCVD Risk score (Ross BLACKWELL, et al., 2019) failed to calculate for the following reasons:    The 2019 ASCVD risk score is only valid for ages 40 to 79    The patient has a prior MI or stroke diagnosis     Beltran Burgess DO  [facility]  05/19/23  10:34 EDT    Please note that this discharge summary required more than 30 minutes to complete.

## 2023-05-19 NOTE — PROGRESS NOTES
PROGRESS NOTE         Patient Identification:  Name:  Anh Saldana  Age:  88 y.o.  Sex:  female  :  1934  MRN:  [id number]  Visit Number:  [id number]  Primary Care Provider:  Meme Medellin MD         LOS: 9 days       ----------------------------------------------------------------------------------------------------------------------  Subjective       Chief Complaints:    Edema        Interval History:      The patient is sitting up in bed on 1 L nasal cannula, which is stable.  Eating breakfast and appears comfortable.  Brother is at bedside.  Patient has no new issues or complaints and states that she is feeling better every day.  Lungs are clear to auscultation bilaterally.  Abdomen is soft and nontender.  Afebrile, no diarrhea.  No new labs today.    Review of Systems:    Constitutional: no fever, chills and night sweats.  Generalized fatigue.  Eyes: no eye drainage, itching or redness.  HEENT: no mouth sores, dysphagia or nose bleed.  Respiratory: no for shortness of breath, cough or production of sputum.  Cardiovascular: no chest pain, no palpitations, no orthopnea.  Gastrointestinal: no nausea, vomiting or diarrhea. No abdominal pain, hematemesis or rectal bleeding.  Genitourinary: no dysuria or polyuria.  Hematologic/lymphatic: no lymph node abnormalities, no easy bruising or easy bleeding.  Musculoskeletal: no muscle or joint pain.  Skin: No rash and no itching.  Neurological: no loss of consciousness, no seizure, no headache.  Behavioral/Psych: no depression or suicidal ideation.  Endocrine: no hot flashes.  Immunologic: negative.    ----------------------------------------------------------------------------------------------------------------------      Objective       [facility] Meds:  apixaban, 2.5 mg, Oral, Q12H  aspirin, 81 mg, Oral, Daily  atorvastatin, 20 mg, Oral, Nightly  budesonide-formoterol, 2 puff, Inhalation, BID - RT  bumetanide, 1 mg, Oral,  Q48H  cefTRIAXone, 2 g, Intravenous, Q24H  hydrALAZINE, 10 mg, Oral, Q8H  isosorbide mononitrate, 30 mg, Oral, Q24H  metoprolol tartrate, 50 mg, Oral, BID  pantoprazole, 40 mg, Oral, QAM AC  polyethylene glycol, 17 g, Oral, Daily  predniSONE, 2.5 mg, Oral, Daily With Breakfast  senna-docusate sodium, 1 tablet, Oral, BID  sodium chloride, 10 mL, Intravenous, Q12H         ----------------------------------------------------------------------------------------------------------------------    Vital Signs:  Temp:  [97.8 °F (36.6 °C)-98.2 °F (36.8 °C)] 98.2 °F (36.8 °C)  Heart Rate:  [59-72] 72  Resp:  [16-20] 20  BP: (113-165)/(66-94) 165/94  Mean Arterial Pressure (Non-Invasive) for the past 24 hrs (Last 3 readings):   Noninvasive MAP (mmHg)   05/19/23 0600 140   05/19/23 0245 114   05/18/23 2335 120     SpO2 Percentage    05/19/23 0245 05/19/23 0600 05/19/23 0700   SpO2: 98% 95% 98%     SpO2:  [95 %-99 %] 98 %  on  Flow (L/min):  [1] 1;   Device (Oxygen Therapy): nasal cannula    Body mass index is 17.73 kg/m².  Wt Readings from Last 3 Encounters:   05/19/23 41.2 kg (90 lb 12.8 oz)   07/26/21 42.6 kg (94 lb)   10/15/20 45.8 kg (101 lb)        Intake/Output Summary (Last 24 hours) at 5/19/2023 1052  Last data filed at 5/19/2023 0245  Gross per 24 hour   Intake 580 ml   Output 1100 ml   Net -520 ml     Diet: Regular/House Diet; Texture: Soft to Chew (NDD 3); Soft to Chew: Chopped Meat; Fluid Consistency: Thin (IDDSI 0)  ----------------------------------------------------------------------------------------------------------------------      Physical Exam:    Constitutional: Elderly, chronically ill-appearing female is on 1 L nasal cannula in no apparent distress.  Eating breakfast and appears comfortable.  Brother is at bedside.  HENT:  Head: Normocephalic and atraumatic.  Hard of hearing. Mouth:  Moist mucous membranes.    Eyes:  Conjunctivae and EOM are normal.  No scleral icterus.  Neck:  Neck supple.  No JVD  present.    Cardiovascular:  Normal rate, regular rhythm and normal heart sounds with no murmur. No edema.  Pulmonary/Chest:  No respiratory distress, no wheezes, no crackles, with normal breath sounds and good air movement.  Lungs clear to auscultation bilaterally.  Currently on 1 L per nasal cannula with no apparent distress.  Abdominal:  Soft.  Bowel sounds are normal.  No distension and no tenderness.   Musculoskeletal:  No edema, no tenderness, and no deformity.  No swelling or redness of joints.  Severe kyphosis.  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  Neurological:  Alert and oriented to person, place, and time.  No facial droop.  No slurred speech.   Skin:  Skin is warm and dry.  No rash noted.  No pallor.  Right lower extremity in Kerlix dressing.  Psychiatric:  Normal mood and affect.  Behavior is normal.        ----------------------------------------------------------------------------------------------------------------------  Results from last 7 days   Lab Units 05/16/23  0656 05/16/23  0431   HSTROP T ng/L 58* 59*     Results from last 7 days   Lab Units 05/16/23  0656   PROBNP pg/mL 66,260.0*             Results from last 7 days   Lab Units 05/17/23  0112 05/14/23  0648 05/13/23  0207   WBC 10*3/mm3 11.61* 9.01 9.38   HEMOGLOBIN g/dL 11.5* 11.6* 11.6*   HEMATOCRIT % 36.6 37.3 38.5   MCV fL 103.1* 104.5* 111.0*   MCHC g/dL 31.4* 31.1* 30.1*   PLATELETS 10*3/mm3 300 233 201     Results from last 7 days   Lab Units 05/18/23  0110 05/17/23  0112 05/16/23  0431 05/16/23  0220 05/15/23  0505 05/14/23  0648   SODIUM mmol/L 135* 133*  --  132* 131* 137   POTASSIUM mmol/L 4.1 4.3  --  4.1 4.4 4.3   MAGNESIUM mg/dL  --  2.3 2.0  --  2.5*  --    CHLORIDE mmol/L 98 96*  --  95* 98 99   CO2 mmol/L 25.1 25.8  --  25.0 22.1 29.5*   BUN mg/dL 41* 41*  --  40* 42* 46*   CREATININE mg/dL 1.30* 1.25*  --  1.20* 1.26* 1.54*   CALCIUM mg/dL 8.9 9.0  --  8.9 8.8 9.1   GLUCOSE mg/dL 98 107*  --  112* 84 96   ALBUMIN  g/dL 2.8*  --   --   --   --  3.1*   BILIRUBIN mg/dL 0.3  --   --   --   --  0.2   ALK PHOS U/L 99  --   --   --   --  119*   AST (SGOT) U/L 30  --   --   --   --  26   ALT (SGPT) U/L 16  --   --   --   --  21   Estimated Creatinine Clearance: 19.5 mL/min (A) (by C-G formula based on SCr of 1.3 mg/dL (H)).  No results found for: AMMONIA    No results found for: HGBA1C, POCGLU  No results found for: HGBA1C  Lab Results   Component Value Date    TSH 3.950 05/10/2023       Blood Culture   Date Value Ref Range Status   05/10/2023 Escherichia coli (C)  Preliminary   05/10/2023 Escherichia coli (C)  Preliminary     Urine Culture   Date Value Ref Range Status   05/10/2023 50,000 CFU/mL Mixed Nina Isolated  Final     No results found for: WOUNDCX  No results found for: STOOLCX  No results found for: RESPCX  Pain Management Panel            View : No data to display.                        ----------------------------------------------------------------------------------------------------------------------  Imaging Results (Last 24 Hours)       ** No results found for the last 24 hours. **            ----------------------------------------------------------------------------------------------------------------------    Pertinent Infectious Disease Results    Blood cultures on 5/10/2023 finalized as E. coli.  Blood cultures from 5/12/2023 finalized as no growth.    Urine culture from 5/10/2023 finalized as 50,000 colonies of mixed nina.    Assessment/Plan       Assessment      Acute pyelonephritis  E. coli bacteremia  Aspiration pneumonia     Plan      I examined the patient this morning and she is sitting up in bed on 1 L nasal cannula, which is stable.  Eating breakfast and appears comfortable.  Brother is at bedside.  Patient has no new issues or complaints and states that she is feeling better every day.  Lungs are clear to auscultation bilaterally.  Abdomen is soft and nontender.  Afebrile, no diarrhea.  No new labs  today.    Recommend to continue on Rocephin 2 g IV every 24 hours.  Upon discharge, she could be de-escalated to oral cefdinir to continue through 5/21/2023.  This should be appropriate treatment for bacteremia, UTI, and pneumonia.  Seems to be showing clinical improvement from an ID standpoint.    ANTIMICROBIAL THERAPY    cefdinir - 300 MG  cefTRIAXone (ROCEPHIN) 2gm IVPB in 100 mL NS (VTB)     Code Status:   Code Status and Medical Interventions:   Ordered at: 05/10/23 1720     Medical Intervention Limits:    NO intubation (DNI)     Level Of Support Discussed With:    Next of Kin (If No Surrogate)     Code Status (Patient has no pulse and is not breathing):    No CPR (Do Not Attempt to Resuscitate)     Medical Interventions (Patient has pulse or is breathing):    Limited Support     Release to patient:    Routine Release       Hilary Griffin PA-C  05/19/23  10:52 EDT

## 2023-05-19 NOTE — CASE MANAGEMENT/SOCIAL WORK
Discharge Planning Assessment   [facility]     Patient Name: Anh Saldana  MRN: [id number]  Today's Date: 5/19/2023    Admit Date: 5/10/2023    Plan: [facility] pt per pt's insurance.  SS notified Physician and changed pharmacy to [facility] ([facility]).  SS will follow.     Discharge Plan     Row Name 05/19/23 1054       Plan    Final Discharge Disposition Code 03 - skilled nursing facility (SNF)    Final Note Pt to be discharged to [facility] on this date.  Pt and brother Octavio aware and agreeable.  Pt to be transported via EMS.    Row Name 05/19/23 0840       Plan    Plan [facility] to accept pt per pt's insurance.  SS notified Physician and changed pharmacy to [facility] ([facility]).  SS will follow.              Continued Care and Services - Admitted Since 5/10/2023     Destination Coordination complete.    Service Provider Request Status Selected Services Address Phone Fax Patient Preferred    [facility]  Selected Skilled Nursing [facility] [address] [place] [address] [phone] --              Hui Corey, RYANW

## 2023-05-19 NOTE — CASE MANAGEMENT/SOCIAL WORK
Discharge Planning Assessment  [facility]     Patient Name: Anh Saldana  MRN: [id number]  Today's Date: 5/19/2023    Admit Date: 5/10/2023    Plan: [facility] is Scheduled.     Discharge Plan     Row Name 05/19/23 1215       Plan    Plan [facility] is Scheduled.    Row Name 05/19/23 1054       Plan    Final Discharge Disposition Code 03 - skilled nursing facility (SNF)    Final Note Pt to be discharged to [facility] on this date.  Pt and brother Octavio aware and agreeable.  Pt to be transported via EMS.              Continued Care and Services - Admitted Since 5/10/2023     Destination Coordination complete.    Service Provider Request Status Selected Services Address Phone Fax Patient Preferred    [facility]  Selected Skilled Nursing [facility] [address] [place] [address] [phone] --                RYAN HendersonW

## 2023-05-22 ENCOUNTER — LAB REQUISITION (OUTPATIENT)
Dept: LAB | Facility: HOSPITAL | Age: 88
DRG: 177 | End: 2023-05-22
Payer: MEDICARE

## 2023-05-22 DIAGNOSIS — I10 ESSENTIAL (PRIMARY) HYPERTENSION: ICD-10-CM

## 2023-05-22 LAB
ALBUMIN SERPL-MCNC: 3.2 G/DL (ref 3.5–5.2)
ALBUMIN/GLOB SERPL: 1 G/DL
ALP SERPL-CCNC: 102 U/L (ref 39–117)
ALT SERPL W P-5'-P-CCNC: 14 U/L (ref 1–33)
ANION GAP SERPL CALCULATED.3IONS-SCNC: 9.9 MMOL/L (ref 5–15)
AST SERPL-CCNC: 22 U/L (ref 1–32)
BASOPHILS # BLD AUTO: 0.08 10*3/MM3 (ref 0–0.2)
BASOPHILS NFR BLD AUTO: 0.7 % (ref 0–1.5)
BILIRUB SERPL-MCNC: 0.4 MG/DL (ref 0–1.2)
BUN SERPL-MCNC: 41 MG/DL (ref 8–23)
BUN/CREAT SERPL: 32.5 (ref 7–25)
CALCIUM SPEC-SCNC: 9.1 MG/DL (ref 8.6–10.5)
CHLORIDE SERPL-SCNC: 98 MMOL/L (ref 98–107)
CO2 SERPL-SCNC: 29.1 MMOL/L (ref 22–29)
CREAT SERPL-MCNC: 1.26 MG/DL (ref 0.57–1)
CRP SERPL-MCNC: 2.16 MG/DL (ref 0–0.5)
DEPRECATED RDW RBC AUTO: 62.9 FL (ref 37–54)
EGFRCR SERPLBLD CKD-EPI 2021: 41.1 ML/MIN/1.73
EOSINOPHIL # BLD AUTO: 0.18 10*3/MM3 (ref 0–0.4)
EOSINOPHIL NFR BLD AUTO: 1.6 % (ref 0.3–6.2)
ERYTHROCYTE [DISTWIDTH] IN BLOOD BY AUTOMATED COUNT: 16.9 % (ref 12.3–15.4)
GLOBULIN UR ELPH-MCNC: 3.3 GM/DL
GLUCOSE SERPL-MCNC: 97 MG/DL (ref 65–99)
HCT VFR BLD AUTO: 36.7 % (ref 34–46.6)
HGB BLD-MCNC: 11.6 G/DL (ref 12–15.9)
IMM GRANULOCYTES # BLD AUTO: 0.15 10*3/MM3 (ref 0–0.05)
IMM GRANULOCYTES NFR BLD AUTO: 1.3 % (ref 0–0.5)
LYMPHOCYTES # BLD AUTO: 2 10*3/MM3 (ref 0.7–3.1)
LYMPHOCYTES NFR BLD AUTO: 17.3 % (ref 19.6–45.3)
MCH RBC QN AUTO: 33.3 PG (ref 26.6–33)
MCHC RBC AUTO-ENTMCNC: 31.6 G/DL (ref 31.5–35.7)
MCV RBC AUTO: 105.5 FL (ref 79–97)
MONOCYTES # BLD AUTO: 0.59 10*3/MM3 (ref 0.1–0.9)
MONOCYTES NFR BLD AUTO: 5.1 % (ref 5–12)
NEUTROPHILS NFR BLD AUTO: 74 % (ref 42.7–76)
NEUTROPHILS NFR BLD AUTO: 8.53 10*3/MM3 (ref 1.7–7)
NRBC BLD AUTO-RTO: 0 /100 WBC (ref 0–0.2)
PLATELET # BLD AUTO: 336 10*3/MM3 (ref 140–450)
PMV BLD AUTO: 10 FL (ref 6–12)
POTASSIUM SERPL-SCNC: 4.1 MMOL/L (ref 3.5–5.2)
PROT SERPL-MCNC: 6.5 G/DL (ref 6–8.5)
RBC # BLD AUTO: 3.48 10*6/MM3 (ref 3.77–5.28)
SODIUM SERPL-SCNC: 137 MMOL/L (ref 136–145)
WBC NRBC COR # BLD: 11.53 10*3/MM3 (ref 3.4–10.8)

## 2023-05-22 PROCEDURE — 80053 COMPREHEN METABOLIC PANEL: CPT | Performed by: INTERNAL MEDICINE

## 2023-05-22 PROCEDURE — 86140 C-REACTIVE PROTEIN: CPT | Performed by: INTERNAL MEDICINE

## 2023-05-22 PROCEDURE — 85025 COMPLETE CBC W/AUTO DIFF WBC: CPT | Performed by: INTERNAL MEDICINE

## 2023-05-25 PROBLEM — I50.9 ACUTE ON CHRONIC CONGESTIVE HEART FAILURE, UNSPECIFIED HEART FAILURE TYPE: Status: ACTIVE | Noted: 2023-05-25

## 2023-05-25 NOTE — ED PROVIDER NOTES
Subjective   History of Present Illness  88-year-old white female presents with dyspnea.  Patient was sent from the nursing home for increased shortness of breath.  She has a history of lower extremity edema and Dr. Medellin had ordered Unna boots.  The patient cannot give very limited history possibly related to hearing loss, although she does seem to answer questions appropriately.  She denied any chest pain, abdominal pain, fever, chills.  She does have a history of CHF, CKD, RA.        Review of Systems   All other systems reviewed and are negative.      Past Medical History:   Diagnosis Date   • Breast cancer 2011   • CHF (congestive heart failure)    • Chronic kidney disease     acute kidney failure   • [place] (hard of hearing)    • Hypertension    • Osteoarthritis    • Osteoporosis    • Rheumatoid arthritis    • Right-sided Bell's palsy    • Shortness of breath     sleeps with oxygen at night       Allergies   Allergen Reactions   • Penicillins Swelling and Rash   • Doxycycline Other (See Comments)     ?   • Hydroxychloroquine Unknown (See Comments)     ?   • Latex Rash     ?       Past Surgical History:   Procedure Laterality Date   • BREAST LUMPECTOMY Left     benign   • CATARACT EXTRACTION W/ INTRAOCULAR LENS  IMPLANT, BILATERAL Bilateral    • SHOULDER SURGERY Left     metal plate in left shoulder       Family History   Problem Relation Age of Onset   • Breast cancer Maternal Aunt    • Hypertension Mother    • Hypertension Father    • Heart disease Sister         MI in her 80's       Social History     Socioeconomic History   • Marital status:    Tobacco Use   • Smoking status: Never   • Smokeless tobacco: Never   Vaping Use   • Vaping Use: Never used   Substance and Sexual Activity   • Alcohol use: No   • Drug use: No   • Sexual activity: Defer           Objective   Physical Exam  Vitals and nursing note reviewed.   Constitutional:       Appearance: She is well-developed and normal weight.   HENT:       Head: Normocephalic and atraumatic.      Mouth/Throat:      Pharynx: Oropharynx is clear.   Cardiovascular:      Rate and Rhythm: Normal rate and regular rhythm.      Heart sounds: Normal heart sounds. No murmur heard.    No friction rub. No gallop.   Pulmonary:      Effort: Pulmonary effort is normal. Tachypnea present. No respiratory distress (Mild).      Breath sounds: Examination of the right-lower field reveals rales. Examination of the left-lower field reveals rales. Rales present. No decreased breath sounds, wheezing or rhonchi.   Abdominal:      General: Abdomen is flat. Bowel sounds are normal. There is no distension.      Palpations: Abdomen is soft.      Tenderness: There is no abdominal tenderness.   Musculoskeletal:         General: Normal range of motion.      Right lower leg: Edema present.      Left lower leg: Edema present.   Skin:     General: Skin is warm and dry.   Neurological:      General: No focal deficit present.      Mental Status: She is alert and oriented to person, place, and time.      Comments: Extremely hard of hearing.  But answers questions appropriately.   Psychiatric:         Mood and Affect: Mood normal.         Behavior: Behavior normal.         Procedures  Results for orders placed or performed during the hospital encounter of 05/25/23   Blood Culture - Blood, Hand, Right    Specimen: Hand, Right; Blood   Result Value Ref Range    Blood Culture No growth at 24 hours    Blood Culture - Blood, Hand, Right    Specimen: Hand, Right; Blood   Result Value Ref Range    Blood Culture No growth at 24 hours    COVID-19 and FLU A/B PCR - Swab, Nasopharynx    Specimen: Nasopharynx; Swab   Result Value Ref Range    COVID19 Detected (C) Not Detected - Ref. Range    Influenza A PCR Not Detected Not Detected    Influenza B PCR Not Detected Not Detected   Comprehensive Metabolic Panel    Specimen: Hand, Right; Blood   Result Value Ref Range    Glucose 155 (H) 65 - 99 mg/dL    BUN 44 (H) 8 - 23  mg/dL    Creatinine 1.33 (H) 0.57 - 1.00 mg/dL    Sodium 137 136 - 145 mmol/L    Potassium 3.9 3.5 - 5.2 mmol/L    Chloride 100 98 - 107 mmol/L    CO2 27.2 22.0 - 29.0 mmol/L    Calcium 9.1 8.6 - 10.5 mg/dL    Total Protein 6.5 6.0 - 8.5 g/dL    Albumin 3.2 (L) 3.5 - 5.2 g/dL    ALT (SGPT) 13 1 - 33 U/L    AST (SGOT) 25 1 - 32 U/L    Alkaline Phosphatase 107 39 - 117 U/L    Total Bilirubin 0.5 0.0 - 1.2 mg/dL    Globulin 3.3 gm/dL    A/G Ratio 1.0 g/dL    BUN/Creatinine Ratio 33.1 (H) 7.0 - 25.0    Anion Gap 9.8 5.0 - 15.0 mmol/L    eGFR 38.6 (L) >60.0 mL/min/1.73   BNP    Specimen: Hand, Right; Blood   Result Value Ref Range    proBNP 64,766.0 (H) 0.0 - 1,800.0 pg/mL   High Sensitivity Troponin T    Specimen: Hand, Right; Blood   Result Value Ref Range    HS Troponin T 78 (C) <10 ng/L   Lactic Acid, Plasma    Specimen: Hand, Right; Blood   Result Value Ref Range    Lactate 2.1 (C) 0.5 - 2.0 mmol/L   CBC Auto Differential    Specimen: Hand, Right; Blood   Result Value Ref Range    WBC 10.73 3.40 - 10.80 10*3/mm3    RBC 3.62 (L) 3.77 - 5.28 10*6/mm3    Hemoglobin 12.0 12.0 - 15.9 g/dL    Hematocrit 39.0 34.0 - 46.6 %    .7 (H) 79.0 - 97.0 fL    MCH 33.1 (H) 26.6 - 33.0 pg    MCHC 30.8 (L) 31.5 - 35.7 g/dL    RDW 16.8 (H) 12.3 - 15.4 %    RDW-SD 65.1 (H) 37.0 - 54.0 fl    MPV 10.0 6.0 - 12.0 fL    Platelets 271 140 - 450 10*3/mm3   Blood Gas, Arterial With Co-Ox    Specimen: Arterial Blood   Result Value Ref Range    Site Left Radial     Deondre's Test Positive     pH, Arterial 7.448 7.350 - 7.450 pH units    pCO2, Arterial 44.8 35.0 - 45.0 mm Hg    pO2, Arterial 57.2 (L) 83.0 - 108.0 mm Hg    HCO3, Arterial 31.0 (H) 20.0 - 26.0 mmol/L    Base Excess, Arterial 6.1 (H) 0.0 - 2.0 mmol/L    O2 Saturation, Arterial 90.8 (L) 94.0 - 99.0 %    Hemoglobin, Blood Gas 11.6 (L) 13.5 - 17.5 g/dL    Hematocrit, Blood Gas 35.5 (L) 38.0 - 51.0 %    Oxyhemoglobin 89.3 (L) 94 - 99 %    Methemoglobin 0.30 0.00 - 3.00 %     Carboxyhemoglobin 1.3 0 - 5 %    A-a DO2 83.7 0.0 - 300.0 mmHg    CO2 Content 32.3 22 - 33 mmol/L    Barometric Pressure for Blood Gas 729 mmHg    Modality Nasal Cannula     FIO2 28 %    Flow Rate 2.0 lpm    Ventilator Mode NA     Note      Collected by [id number]     pH, Temp Corrected      pCO2, Temperature Corrected      pO2, Temperature Corrected     High Sensitivity Troponin T 2Hr    Specimen: Hand, Left; Blood   Result Value Ref Range    HS Troponin T 77 (C) <10 ng/L    Troponin T Delta -1 >=-4 - <+4 ng/L   STAT Lactic Acid, Reflex    Specimen: Hand, Left; Blood   Result Value Ref Range    Lactate 1.1 0.5 - 2.0 mmol/L   C-reactive Protein    Specimen: Hand, Left; Blood   Result Value Ref Range    C-Reactive Protein 3.71 (H) 0.00 - 0.50 mg/dL   Procalcitonin    Specimen: Blood   Result Value Ref Range    Procalcitonin 0.21 0.00 - 0.25 ng/mL   CBC (No Diff)    Specimen: Blood   Result Value Ref Range    WBC 11.42 (H) 3.40 - 10.80 10*3/mm3    RBC 3.53 (L) 3.77 - 5.28 10*6/mm3    Hemoglobin 11.4 (L) 12.0 - 15.9 g/dL    Hematocrit 37.3 34.0 - 46.6 %    .7 (H) 79.0 - 97.0 fL    MCH 32.3 26.6 - 33.0 pg    MCHC 30.6 (L) 31.5 - 35.7 g/dL    RDW 16.8 (H) 12.3 - 15.4 %    RDW-SD 64.5 (H) 37.0 - 54.0 fl    MPV 9.7 6.0 - 12.0 fL    Platelets 242 140 - 450 10*3/mm3   Comprehensive Metabolic Panel    Specimen: Blood   Result Value Ref Range    Glucose 109 (H) 65 - 99 mg/dL    BUN 43 (H) 8 - 23 mg/dL    Creatinine 1.28 (H) 0.57 - 1.00 mg/dL    Sodium 136 136 - 145 mmol/L    Potassium 3.5 3.5 - 5.2 mmol/L    Chloride 96 (L) 98 - 107 mmol/L    CO2 26.5 22.0 - 29.0 mmol/L    Calcium 9.0 8.6 - 10.5 mg/dL    Total Protein 6.3 6.0 - 8.5 g/dL    Albumin 3.0 (L) 3.5 - 5.2 g/dL    ALT (SGPT) 13 1 - 33 U/L    AST (SGOT) 23 1 - 32 U/L    Alkaline Phosphatase 101 39 - 117 U/L    Total Bilirubin 0.5 0.0 - 1.2 mg/dL    Globulin 3.3 gm/dL    A/G Ratio 0.9 g/dL    BUN/Creatinine Ratio 33.6 (H) 7.0 - 25.0    Anion Gap 13.5 5.0 - 15.0  mmol/L    eGFR 40.4 (L) >60.0 mL/min/1.73   ReDs Vest   Result Value Ref Range    Absolute Lung Fluid Content 30 20 - 35 %   ECG 12 Lead Dyspnea   Result Value Ref Range    QT Interval 420 ms    QTC Interval 436 ms   ECG 12 Lead Rhythm Change   Result Value Ref Range    QT Interval 414 ms    QTC Interval 465 ms   ECG 12 Lead Tachycardia   Result Value Ref Range    QT Interval 366 ms    QTC Interval 447 ms     Adult Transthoracic Echo Complete W/ Cont if Necessary Per Protocol    Result Date: 5/16/2023  Narrative: •  Left ventricular systolic function is normal. Left ventricular ejection fraction appears to be 66 - 70%. •  Left ventricular diastolic function is consistent with (grade I) impaired relaxation. •  The right ventricular cavity is moderately dilated. •  The left atrial cavity is mild to moderately dilated. •  The right atrial cavity is severely  dilated. •  There is moderate calcification of the aortic valve mainly affecting the non-coronary and left coronary cusp(s). •  Severe mitral valve regurgitation is present. •  Severe tricuspid valve regurgitation is present. •  Estimated right ventricular systolic pressure from tricuspid regurgitation is markedly elevated ( 94 mmHg). •  Severe pulmonary hypertension is present.     CT Chest Without Contrast Diagnostic    Result Date: 5/26/2023  Narrative: EXAM:   CT Chest Without Intravenous Contrast  EXAM DATE:   5/26/2023 7:48 AM  CLINICAL HISTORY:   Dyspnea, chronic, unclear etiology; I50.9-Heart failure, unspecified; J96.21-Acute and chronic respiratory failure with hypoxia  TECHNIQUE:   Axial computed tomography images of the chest without intravenous contrast.  Sagittal and coronal reformatted images were created and reviewed.  This CT exam was performed using one or more of the following dose reduction techniques:  automated exposure control, adjustment of the mA and/or kV according to patient size, and/or use of iterative reconstruction technique.   COMPARISON:   No relevant prior studies available.  FINDINGS:   LUNGS AND PLEURAL SPACES:  Small right and tiny left pleural effusion.  Left lung base atelectasis and probable superimposed pneumonia.   HEART:  Cardiomegaly.  No significant pericardial effusion.  No significant coronary artery calcifications.   BONES/JOINTS:  Multilevel compression deformity of thoracic vertebral bodies.  No dislocation.   SOFT TISSUES:  Unremarkable.   VASCULATURE:  Atherosclerotic disease.  No thoracic aortic aneurysm.   LYMPH NODES:  Unremarkable.  No enlarged lymph nodes.      Impression: 1.  Cardiomegaly. 2.  Small right and tiny left pleural effusion. 3.  Left lung base atelectasis and probable superimposed pneumonia.  This report was finalized on 5/26/2023 8:05 AM by Dr. River Webb MD.      XR Chest 1 View    Result Date: 5/25/2023  Narrative: EXAM:   XR Chest, 1 View  EXAM DATE:   5/25/2023 1:24 PM  CLINICAL HISTORY:   sob  TECHNIQUE:   Frontal view of the chest.  COMPARISON:   05/10/2023  FINDINGS:   LUNGS AND PLEURAL SPACES:  Coarsened interstitial markings noted throughout the lungs.  Probably tiny bilateral pleural effusions.  No pneumothorax.   HEART:  Cardiomegaly.   MEDIASTINUM:  Unremarkable.   BONES/JOINTS:  Unremarkable.      Impression: 1.  Cardiomegaly. 2.  Coarsened interstitial markings noted throughout the lungs. 3.  Probably tiny bilateral pleural effusions.  This report was finalized on 5/25/2023 2:43 PM by Dr. River Webb MD.      XR Chest 1 View    Result Date: 5/10/2023  Narrative: XR CHEST 1 VW-  CLINICAL INDICATION: increased sob; I21.4-Non-ST elevation (NSTEMI) myocardial infarction; I50.9-Heart failure, unspecified; A41.9-Sepsis, unspecified organism; N17.9-Acute kidney failure, unspecified; R09.02-Hypoxemia   COMPARISON: 05/10/2023  TECHNIQUE: Single frontal view of the chest.  FINDINGS:  LUNGS: Bibasilar airspace disease  HEART AND MEDIASTINUM: Heart and mediastinal contours are unremarkable    SKELETON: Bony and soft tissue structures are unremarkable.        Impression: Bibasilar airspace disease similar to the prior study  This report was finalized on 5/10/2023 4:32 PM by Dr. Salvador Bill MD.      XR Chest 1 View    Result Date: 5/10/2023  Narrative: XR CHEST 1 VW-  CLINICAL INDICATION: edema   COMPARISON: 01/07/2019  TECHNIQUE: Single frontal view of the chest.  FINDINGS:  LUNGS: Small bibasilar effusions and left basilar airspace disease  HEART AND MEDIASTINUM: Heart and mediastinal contours are unremarkable   SKELETON: Bony and soft tissue structures are unremarkable.        Impression: Small right effusion and left basilar airspace disease  This report was finalized on 5/10/2023 12:13 PM by Dr. Salvador Bill MD.      US Renal Bilateral    Result Date: 5/12/2023  Narrative: EXAMINATION: US RENAL BILATERAL-  CLINICAL INDICATION: marjorie; I21.4-Non-ST elevation (NSTEMI) myocardial infarction; I50.9-Heart failure, unspecified; A41.9-Sepsis, unspecified organism; N17.9-Acute kidney failure, unspecified; R09.02-Hypoxemia   COMPARISON: None available  PROCEDURE: Sonographic imaging of the kidneys  FINDINGS: Imaging of the right kidney demonstrates the right kidney measuring 4.93 x 2.77 x 3.56 cm. No solid mass or hydronephrosis.  Unable to visualize the left kidney.      Impression: 1. Right kidney appears to be atrophic. 2. Unable to visualize the left kidney.  This report was finalized on 5/12/2023 7:34 AM by Dr. Salvador Bill MD.      US Venous Doppler Lower Extremity Bilateral (duplex)    Result Date: 5/10/2023  Narrative: US VENOUS DOPPLER LOWER EXTREMITY BILATERAL (DUPLEX)-  CLINICAL INDICATION: swelling, pain   COMPARISON: None available  TECHNIQUE: Color Doppler imaging was used with compression and augmentation to evaluate the lower extremity deep venous system.  FINDINGS: There is patent spontaneous flow from the common femoral vein through the posterior tibial veins. There was no internal clot or  area of noncompressibility. Normal augmentation was elicited where applicable.      Impression: No DVT in the lower extremities on today's exam.  This report was finalized on 5/10/2023 12:23 PM by Dr. Salvador Bill MD.               ED Course  ED Course as of 05/26/23 1654   Thu May 25, 2023   1454 ECG 12 Lead Dyspnea  Normal sinus rhythm.  Rate 65.  Normal axis.  Incomplete right bundle branch block.  Nonspecific T wave changes.  No ST elevation or depression.  Abnormal EKG.  Interpreted by me.  Electronically signed by Eris Hammond MD, 05/25/23, 2:58 PM EDT.   [BC]   6781 Discussed with Dr. Brothers.  Patient admitted, see orders. [BC]   5100 Updated Dr. Brothers on the patient's labs, including positive COVID.  Further orders per him. [BC]      ED Course User Index  [BC] Eris Hammond MD                                           [facility]    Final diagnoses:   Acute on chronic congestive heart failure, unspecified heart failure type   Acute on chronic respiratory failure with hypoxia       ED Disposition  ED Disposition     ED Disposition   Decision to Admit    Condition   --    Comment   Level of Care: Telemetry [5]   Diagnosis: Acute on chronic congestive heart failure, unspecified heart failure type [[id number]]   Admitting Physician: MICHELLE BROTHERS [[id number]]   Attending Physician: MICHELLE BROTHERS [[id number]]               No follow-up provider specified.       Medication List      ASK your doctor about these medications    hydrALAZINE 10 MG tablet  Commonly known as: APRESOLINE  Ask about: Which instructions should I use?             Eris Hammond MD  05/25/23 1601       Eris Hammond MD  05/25/23 1603       Eris Hammond MD  05/26/23 [id number]

## 2023-05-25 NOTE — ED NOTES
Pt is clean and dry with purewick in place. Pt's IV clean, dry and intact. VS are stable. Pt is in no acute distress. ER tech to transport pt to the floor at this time.

## 2023-05-25 NOTE — H&P
[facility]    Patient Identification:  Name:  Anh Saldana  Age:  88 y.o.  Sex:  female  :  1934  MRN:  [id number]   Visit Number:  [id number]  Admit Date: 2023   Room number:  3313/2S  Primary Care Physician:  Meme Medellin MD    Date of Admission: 2023     Subjective     Chief complaint:    Chief Complaint   Patient presents with   • Shortness of Breath   • Edema     History of presenting illness:  88 y.o. female who was admitted on 2023 for hypoxia.    Anh Saldana has relevant PMH of  HFpEF, CKD stage III, hard of hearing, severe PAH, HTN, osteoarthritis, osteoporosis, rheumatoid arthritis, chronic respiratory failure on O2 at night presenting for cough and progressive hypoxia from SNF. Patient extremely hard of hearing and thus history obtained from patient brother at bedside as well as chart review and discussion with ED provider.     Patient recently hospitalized for CHF exacerbation c/b e.coli bacteremia improved with appropriate abx coverage and diuretics, discharged on daily steroid on  and home diuretic. Had progressive dyspnea over last few days with mild LE edema with grant boots placed at SNF, noted hypoxia at SNF however and sent ot ED for eval. On arrival initial abg with mild hypoxia, paO2 57.2 with bnp eleveated to 64K similar to prior but covid test noted as positive. CXR without over opacifications or significant effusions, limited by extreme kyphosis. CRP down significantly from prior while bacteremic.     Given IV diuretic by ED physician and admited to tele floor for further management.         Prior to admission I discussed patient's presentation and management with attending ER physician and verbal handoff received.  ---------------------------------------------------------------------------------------------------------------------   A thorough systems based relevant ROS was asked and was negative except as  noted above.  ---------------------------------------------------------------------------------------------------------------------   Past Medical History:   Diagnosis Date   • Breast cancer 2011   • CHF (congestive heart failure)    • Chronic kidney disease     acute kidney failure   • [place] (hard of hearing)    • Hypertension    • Osteoarthritis    • Osteoporosis    • Rheumatoid arthritis    • Right-sided Bell's palsy    • Shortness of breath     sleeps with oxygen at night     Past Surgical History:   Procedure Laterality Date   • BREAST LUMPECTOMY Left     benign   • CATARACT EXTRACTION W/ INTRAOCULAR LENS  IMPLANT, BILATERAL Bilateral    • SHOULDER SURGERY Left     metal plate in left shoulder     Family History   Problem Relation Age of Onset   • Breast cancer Maternal Aunt    • Hypertension Mother    • Hypertension Father    • Heart disease Sister         MI in her 80's     Social History     Socioeconomic History   • Marital status:    Tobacco Use   • Smoking status: Never   • Smokeless tobacco: Never   Vaping Use   • Vaping Use: Never used   Substance and Sexual Activity   • Alcohol use: No   • Drug use: No   • Sexual activity: Defer     ---------------------------------------------------------------------------------------------------------------------   Allergies:  Penicillins, Doxycycline, Hydroxychloroquine, and Latex  ---------------------------------------------------------------------------------------------------------------------   Medications below are reported home medications pulling from within the system; at this time, these medications have not been reconciled unless otherwise specified and are in the verification process for further verifcation as current home medications.      Prior to Admission Medications     Prescriptions Last Dose Informant Patient Reported? Taking?    apixaban (ELIQUIS) 2.5 MG tablet tablet 5/25/2023 [facility] No Yes    Take 1 tablet by mouth Every 12 (Twelve)  Hours.    arformoterol (BROVANA) 15 MCG/2ML nebulizer solution 5/25/2023 Nursing Home Yes Yes    Take 2 mL by nebulization 2 (Two) Times a Day.    aspirin 81 MG EC tablet 5/25/2023 [facility] No Yes    Take 1 tablet by mouth Daily.    atorvastatin (LIPITOR) 20 MG tablet 5/24/2023 [facility] No Yes    Take 1 tablet by mouth Every Night.    budesonide (PULMICORT) 0.5 MG/2ML nebulizer solution 5/24/2023 Nursing Home Yes Yes    Take 2 mL by nebulization 2 (Two) Times a Day.    bumetanide (BUMEX) 1 MG tablet 5/24/2023 [facility] No Yes    Take 1 tablet by mouth Every Other Day for 30 days.    hydrALAZINE (APRESOLINE) 10 MG tablet 5/24/2023 [facility] Yes Yes    Take 1 tablet by mouth 3 (Three) Times a Day.    isosorbide mononitrate (IMDUR) 30 MG 24 hr tablet 5/25/2023 [facility] No Yes    Take 1 tablet by mouth Daily for 30 days.    metoprolol tartrate (LOPRESSOR) 50 MG tablet 5/25/2023 [facility] Yes Yes    Take 1 tablet by mouth 2 (Two) Times a Day.    pantoprazole (PROTONIX) 40 MG EC tablet 5/24/2023 [facility] Yes Yes    Take 1 tablet by mouth Daily.    polyethylene glycol (MIRALAX) 17 g packet 5/25/2023 [facility] No Yes    Take 17 g by mouth Daily for 30 days.    predniSONE (DELTASONE) 5 MG tablet 5/25/2023 [facility] Yes Yes    Take 2.5 mg by mouth Daily.    sennosides-docusate (PERICOLACE) 8.6-50 MG per tablet 5/25/2023 [facility] No Yes    Take 1 tablet by mouth 2 (Two) Times a Day for 30 days.    acetaminophen (TYLENOL) 500 MG tablet Unknown Nursing Home Yes No    Take 1 tablet by mouth Every 6 (Six) Hours As Needed for Mild Pain or Fever.    bisacodyl (DULCOLAX) 10 MG suppository Unknown Nursing Home Yes No    Insert 1 suppository into the rectum Daily As Needed for Constipation.    lactulose (CHRONULAC) 10 GM/15ML solution Unknown Nursing Home Yes No    Take 30 mL by mouth Daily As Needed (constipation).        Objective     Vital Signs:  Temp:  [97.2 °F (36.2 °C)-97.9 °F (36.6 °C)] 97.2  °F (36.2 °C)  Heart Rate:  [59-68] 68  Resp:  [18-22] 18  BP: (100-136)/(63-76) 118/70    Mean Arterial Pressure (Non-Invasive) for the past 24 hrs (Last 3 readings):   Noninvasive MAP (mmHg)   05/25/23 1815 86   05/25/23 1745 82   05/25/23 1715 100     SpO2:  [90 %-99 %] 96 %  on  Flow (L/min):  [1-4] 4;   Device (Oxygen Therapy): nasal cannula  Body mass index is 17.38 kg/m².    Wt Readings from Last 3 Encounters:   05/25/23 41.7 kg (92 lb)   05/19/23 41.2 kg (90 lb 12.8 oz)   07/26/21 42.6 kg (94 lb)      ----------------------------------------------------------------------------------------------------------------------  Physical Exam  Vitals and nursing note reviewed.   Constitutional:       General: She is not in acute distress.     Appearance: She is ill-appearing.   HENT:      Head: Normocephalic and atraumatic.      Mouth/Throat:      Mouth: Mucous membranes are dry.      Pharynx: Oropharynx is clear.   Eyes:      General: No scleral icterus.     Extraocular Movements: Extraocular movements intact.   Cardiovascular:      Rate and Rhythm: Tachycardia present. Rhythm irregular.      Pulses: Normal pulses.   Pulmonary:      Effort: Pulmonary effort is normal. No respiratory distress.      Breath sounds: No wheezing.   Abdominal:      General: Abdomen is flat. There is no distension.   Musculoskeletal:      Right lower leg: No edema.      Left lower leg: No edema.      Comments: Extreme kyphosis and muscle wasting    B/l LE wrapped in kerlex with weeping from R leg   Neurological:      General: No focal deficit present.      Mental Status: She is alert. Mental status is at baseline.   Psychiatric:      Comments: Unable to assess but pleasant appearing       --------------------------------------------------------------------------------------------------------------------  LABS:    CBC and coagulation:  Results from last 7 days   Lab Units 05/25/23  1700 05/25/23  1640 05/25/23  1407 05/22/23  1052   LACTATE  mmol/L 1.1  --  2.1*  --    CRP mg/dL  --   --   --  2.16*   WBC 10*3/mm3  --  10.59 10.73 11.53*   HEMOGLOBIN g/dL  --  11.6* 12.0 11.6*   HEMATOCRIT %  --  37.0 39.0 36.7   MCV fL  --  103.1* 107.7* 105.5*   MCHC g/dL  --  31.4* 30.8* 31.6   PLATELETS 10*3/mm3  --  190 271 336     Acid/base balance:  Results from last 7 days   Lab Units 05/25/23  1302   PH, ARTERIAL pH units 7.448   PO2 ART mm Hg 57.2*   PCO2, ARTERIAL mm Hg 44.8   HCO3 ART mmol/L 31.0*     Renal and electrolytes:  Results from last 7 days   Lab Units 05/25/23 1640 05/25/23  1407 05/22/23  1052   SODIUM mmol/L 136 137 137   POTASSIUM mmol/L 4.4 3.9 4.1   CHLORIDE mmol/L 98 100 98   CO2 mmol/L 27.3 27.2 29.1*   BUN mg/dL 44* 44* 41*   CREATININE mg/dL 1.34* 1.33* 1.26*   CALCIUM mg/dL 9.0 9.1 9.1   GLUCOSE mg/dL 109* 155* 97     Estimated Creatinine Clearance: 19.1 mL/min (A) (by C-G formula based on SCr of 1.34 mg/dL (H)).    Liver and pancreatic function:  Results from last 7 days   Lab Units 05/25/23 1640 05/25/23  1407 05/22/23  1052   ALBUMIN g/dL 3.2* 3.2* 3.2*   BILIRUBIN mg/dL 0.6 0.5 0.4   ALK PHOS U/L 108 107 102   AST (SGOT) U/L 26 25 22   ALT (SGPT) U/L 13 13 14     Endocrine function:  No results found for: HGBA1C  Point of care bedside glucose levels:      Lab Results   Component Value Date    TSH 3.950 05/10/2023     Cardiac:  Results from last 7 days   Lab Units 05/25/23  1700 05/25/23  1407   HSTROP T ng/L 77* 78*   PROBNP pg/mL  --  64,766.0*       Cultures:  Lab Results   Component Value Date    COLORU Yellow 05/10/2023    CLARITYU Clear 05/10/2023    PHUR 6.5 05/10/2023    GLUCOSEU Negative 05/10/2023    KETONESU Negative 05/10/2023    BLOODU Moderate (2+) (A) 05/10/2023    NITRITEU Positive (A) 05/10/2023    LEUKOCYTESUR Small (1+) (A) 05/10/2023    BILIRUBINUR Negative 05/10/2023    UROBILINOGEN 0.2 E.U./dL 05/10/2023    RBCUA 13-20 (A) 05/10/2023    WBCUA 13-20 (A) 05/10/2023    BACTERIA 2+ (A) 05/10/2023     Microbiology  Results (last 10 days)     Procedure Component Value - Date/Time    COVID-19 and FLU A/B PCR - Swab, Nasopharynx [[id number]]  (Abnormal) Collected: 05/25/23 1637    Lab Status: Final result Specimen: Swab from Nasopharynx Updated: 05/25/23 1733     COVID19 Detected     Influenza A PCR Not Detected     Influenza B PCR Not Detected    Narrative:      Fact sheet for providers: https://www.fda.gov/media/250704/download    Fact sheet for patients: https://www.fda.gov/media/891692/download    Test performed by PCR.  Influenza A and Influenza B negative results should be considered presumptive in samples that have a positive SARS-CoV-2 result.    Competitive inhibition studies showed that SARS-CoV-2 virus, when present at concentrations above 3.6E+04 copies/mL, can inhibit the detection and amplification of influenza A and influenza B virus RNA if present at or below 1.8E+02 copies/mL or 4.9E+02 copies/mL, respectively, and may lead to false negative influenza virus results. If co-infection with influenza A or influenza B virus is suspected in samples with a positive SARS-CoV-2 result, the sample should be re-tested with another FDA cleared, approved, or authorized influenza test, if influenza virus detection would change clinical management.          No results found for: PREGTESTUR, PREGSERUM, HCG, HCGQUANT  Pain Management Panel          View : No data to display.                      I have personally looked at the labs and they are summarized above.  ----------------------------------------------------------------------------------------------------------------------  Detailed radiology reports for the last 24 hours:    Imaging Results (Last 24 Hours)     Procedure Component Value Units Date/Time    XR Chest 1 View [[id number]] Collected: 05/25/23 1442     Updated: 05/25/23 1445    Narrative:      EXAM:    XR Chest, 1 View     EXAM DATE:    5/25/2023 1:24 PM     CLINICAL HISTORY:    sob     TECHNIQUE:    Frontal view  of the chest.     COMPARISON:    05/10/2023     FINDINGS:    LUNGS AND PLEURAL SPACES:  Coarsened interstitial markings noted  throughout the lungs.  Probably tiny bilateral pleural effusions.  No  pneumothorax.    HEART:  Cardiomegaly.    MEDIASTINUM:  Unremarkable.    BONES/JOINTS:  Unremarkable.       Impression:      1.  Cardiomegaly.  2.  Coarsened interstitial markings noted throughout the lungs.  3.  Probably tiny bilateral pleural effusions.     This report was finalized on 5/25/2023 2:43 PM by Dr. River Webb MD.           Final impressions for the last 30 days of radiology reports:    XR Chest 1 View    Result Date: 5/25/2023  1.  Cardiomegaly. 2.  Coarsened interstitial markings noted throughout the lungs. 3.  Probably tiny bilateral pleural effusions.  This report was finalized on 5/25/2023 2:43 PM by Dr. River Webb MD.      XR Chest 1 View    Result Date: 5/10/2023  Bibasilar airspace disease similar to the prior study  This report was finalized on 5/10/2023 4:32 PM by Dr. Salvador Bill MD.      XR Chest 1 View    Result Date: 5/10/2023  Small right effusion and left basilar airspace disease  This report was finalized on 5/10/2023 12:13 PM by Dr. Salvador Bill MD.      US Renal Bilateral    Result Date: 5/12/2023  1. Right kidney appears to be atrophic. 2. Unable to visualize the left kidney.  This report was finalized on 5/12/2023 7:34 AM by Dr. Salvador Bill MD.      US Venous Doppler Lower Extremity Bilateral (duplex)    Result Date: 5/10/2023  No DVT in the lower extremities on today's exam.  This report was finalized on 5/10/2023 12:23 PM by Dr. Salvador Bill MD.        I have personally looked at the radiology images, my personal interpretation is as follows:    CXR with extreme kyphosis and course b/l infiltrates w/o large effusion    Assessment & Plan       #Acute on chronic hypoxic respiratory failure secondary to CV19 (1L chronically)  #Severe PAH  #Extrathoracic respiratory compromise  secondary to spinal kyphosis  -CV19 positive and patient symptomatic with worsened O2 requirement above baseline  -Remdesivir, dexamethasone 6mg daily, duonebs  -Added procal, crp down from last admission and will hold initiation of abx for now  -CT chest added to better eval PNA  -TTE reviewed from recent admission with significant PAH  -Ed provider concerned regarding HF exacerbation and s/p IV bumex however not clinical in HF exacerbation and no significant effusions on cxr. BNP elevated but stable. ECG with right heart strain pattern and will repeat in am. Troponin elevated but flat    #Persistnet Troponin elevation secondary to CKD  -No evidence of acute ishcemia    #HFpEF, severe PAH  -TTE reviewed, resume home every other day bumex  -Resume Imdur and hydral with holding parameters    #Lactate elevation, clinically insignificant    - - Chronic conditions - -     #CKD  #Severe OA, RA  #GERD: PPI      VTE Prophylaxis:   Mechanical Order History:     None      Pharmalogical Order History:      Ordered     Dose Route Frequency Stop    Signed and Held  apixaban (ELIQUIS) tablet 2.5 mg         2.5 mg PO Every 12 Hours Scheduled --              The patient is high risk due to the following diagnoses/reasons:  Covid 19 with acute hypoxia    Admission Status:  I certify that this patient requires inpatient hospitalization for greater than 2 midnights in INPATIENT status.  I anticipate there to be the need for care which can only be reasonably provided in a hospital setting such as possible need for aggressive/expedited ancillary services and/or consultation services, IV medications, close physician monitoring, and/or procedures. In such, I feel patient’s risk for adverse outcomes and need for care warrant INPATIENT evaluation and predict the patient’s care encounter to likely last beyond 2 midnights.    Code Status and Medical Interventions:   Ordered at: 05/25/23 1799     Medical Intervention Limits:    NO intubation  (DNI)     Code Status (Patient has no pulse and is not breathing):    No CPR (Do Not Attempt to Resuscitate)     Medical Interventions (Patient has pulse or is breathing):    Limited Support        Disposition: Admit tele    Temo Brothers MD  [facility]  05/25/23  19:24 EDT

## 2023-05-26 NOTE — PLAN OF CARE
Pt resting in bed. sinus melania per tele with run of PSVT around 0337 & 0434. Stable VS otherwise. No changes from baseline. Continue care plan.

## 2023-05-26 NOTE — CONSULTS
Palliative Care Initial Consult     Attending Physician: Temo Brothers MD  Referring Provider: Temo Brothers MD    Palliative care reason for consult: GOC/ACP, pt family and support, hospice referral discussion  Code Status:   Code Status and Medical Interventions:   Ordered at: 05/25/23 163     Medical Intervention Limits:    NO intubation (DNI)     Code Status (Patient has no pulse and is not breathing):    No CPR (Do Not Attempt to Resuscitate)     Medical Interventions (Patient has pulse or is breathing):    Limited Support      Advanced Directives: Advance Directive Status: Patient does not have advance directive   Healthcare surrogate: SRAVAN brother Octavio Mosqueda  Goals of Care: Anh and brother Octavio confirmed she does not want to be resuscitated or intubated, we also discussed hospice services when she was able to return home as this is what they both want.    HPI:  Anh Saldana is a 88 y.o. female admitted on 5/25/2023  Due to shortness of breath, cough and edema. Anh has a medical history of HFpEF, CKD stage III, hard of hearing, severe PAH, HTN, osteoarthritis, osteoporosis, rheumatoid arthritis, chronic respiratory failure on O2 at night. Per report Anh has been recently hospitalized for CHF exacerbation, e coli with abx and diuretics and was d/tyrone on 5/19 to [facility].Per Nursing facility staff pt had been experiencing progressive shortness of breath and lower extremity edema for several days before being sent to the ER. Pt was found to be hypoxic and to be COVID positive. Pt was admitted to tele floor.        ROS: Negative except as above in HPI.     Past Medical History:   Diagnosis Date   • Breast cancer 2011   • CHF (congestive heart failure)    • Chronic kidney disease     acute kidney failure   • [place] (hard of hearing)    • Hypertension    • Osteoarthritis    • Osteoporosis    • Rheumatoid arthritis    • Right-sided Bell's palsy    • Shortness of breath     sleeps with oxygen at  night     Past Surgical History:   Procedure Laterality Date   • BREAST LUMPECTOMY Left     benign   • CATARACT EXTRACTION W/ INTRAOCULAR LENS  IMPLANT, BILATERAL Bilateral    • SHOULDER SURGERY Left     metal plate in left shoulder     Social History     Socioeconomic History   • Marital status:    Tobacco Use   • Smoking status: Never   • Smokeless tobacco: Never   Vaping Use   • Vaping Use: Never used   Substance and Sexual Activity   • Alcohol use: No   • Drug use: No   • Sexual activity: Defer     Family History   Problem Relation Age of Onset   • Breast cancer Maternal Aunt    • Hypertension Mother    • Hypertension Father    • Heart disease Sister         MI in her 80's       Allergies   Allergen Reactions   • Penicillins Swelling and Rash   • Doxycycline Other (See Comments)     ?   • Hydroxychloroquine Unknown (See Comments)     ?   • Latex Rash     ?       Current Facility-Administered Medications   Medication Dose Route Frequency Provider Last Rate Last Admin   • acetaminophen (TYLENOL) tablet 500 mg  500 mg Oral Q6H PRN Temo Brothers MD       • apixaban (ELIQUIS) tablet 2.5 mg  2.5 mg Oral Q12H Temo Brothers MD   2.5 mg at 05/26/23 0913   • aspirin EC tablet 81 mg  81 mg Oral Daily Temo Brothers MD       • atorvastatin (LIPITOR) tablet 20 mg  20 mg Oral Nightly Temo Brothers MD   20 mg at 05/25/23 2244   • sennosides-docusate (PERICOLACE) 8.6-50 MG per tablet 2 tablet  2 tablet Oral BID Temo Brothers MD   2 tablet at 05/26/23 0912    And   • polyethylene glycol (MIRALAX) packet 17 g  17 g Oral Daily PRN Temo Brothers MD        And   • bisacodyl (DULCOLAX) EC tablet 5 mg  5 mg Oral Daily PRN Temo Brothers MD        And   • bisacodyl (DULCOLAX) suppository 10 mg  10 mg Rectal Daily PRN Temo Brothers MD       • budesonide (PULMICORT) nebulizer solution 0.5 mg  0.5 mg Nebulization BID Temo Brothers MD   0.5 mg at 05/26/23 0720   • bumetanide (BUMEX) tablet 1 mg  1  mg Oral Q48H Temo Brothers MD   1 mg at 05/26/23 0913   • Calcium Replacement - Follow Nurse / BPA Driven Protocol   Does not apply PRN Temo Brothers MD       • dexamethasone sodium phosphate injection 6 mg  6 mg Intravenous Q24H Temo Brothers MD   6 mg at 05/26/23 0912   • hydrALAZINE (APRESOLINE) tablet 10 mg  10 mg Oral Q8H Temo Brothers MD   10 mg at 05/25/23 2244   • ipratropium-albuterol (DUO-NEB) nebulizer solution 3 mL  3 mL Nebulization 4x Daily - RT Temo Brothers MD   3 mL at 05/26/23 1430   • isosorbide mononitrate (IMDUR) 24 hr tablet 30 mg  30 mg Oral Q24H Temo Brothers MD   30 mg at 05/26/23 0912   • Magnesium Standard Dose Replacement - Follow Nurse / BPA Driven Protocol   Does not apply PRN Temo Brothers MD       • metoprolol tartrate (LOPRESSOR) tablet 25 mg  25 mg Oral BID Temo Brothers MD   25 mg at 05/26/23 0913   • pantoprazole (PROTONIX) EC tablet 40 mg  40 mg Oral QAM AC Temo Brothers MD   40 mg at 05/26/23 0913   • Phosphorus Replacement - Follow Nurse / BPA Driven Protocol   Does not apply PRN Temo Brothers MD       • Potassium Replacement - Follow Nurse / BPA Driven Protocol   Does not apply PRN Temo Brothers MD       • remdesivir 100 mg in 270 mL NS  100 mg Intravenous Q24H Temo Brothers MD       • sodium chloride 0.9 % flush 10 mL  10 mL Intravenous PRN Eris Hammond MD       • sodium chloride 0.9 % flush 10 mL  10 mL Intravenous Q12H Temo Brothers MD   10 mL at 05/26/23 0913   • sodium chloride 0.9 % flush 10 mL  10 mL Intravenous PRN Temo Brothers MD       • sodium chloride 0.9 % infusion 40 mL  40 mL Intravenous PRN Temo Brothers MD            •  acetaminophen  •  senna-docusate sodium **AND** polyethylene glycol **AND** bisacodyl **AND** bisacodyl  •  Calcium Replacement - Follow Nurse / BPA Driven Protocol  •  Magnesium Standard Dose Replacement - Follow Nurse / BPA Driven Protocol  •  Phosphorus Replacement - Follow Nurse  "/ BPA Driven Protocol  •  Potassium Replacement - Follow Nurse / BPA Driven Protocol  •  [COMPLETED] Insert Peripheral IV **AND** sodium chloride  •  sodium chloride  •  sodium chloride    Current medication reviewed for route, type, dose and frequency and are current per MAR.    Palliative Performance Scale Score:     /80 (BP Location: Right arm, Patient Position: Lying)   Pulse 83   Temp 98.7 °F (37.1 °C) (Axillary)   Resp 24   Ht 154.9 cm (61\")   Wt 41.7 kg (92 lb)   SpO2 92%   BMI 17.38 kg/m²     Intake/Output Summary (Last 24 hours) at 5/26/2023 1547  Last data filed at 5/25/2023 1556  Gross per 24 hour   Intake 150 ml   Output --   Net 150 ml       PE:  General Appearance:    Chronically ill appearing, thin, frail, weak, alert, cooperative, severe Kyphosis NAD   HEENT:    NC/AT, without obvious abnormality, EOMI, anicteric    Neck:   supple, trachea midline, no JVD   Lungs:     Tachypnea, clear with no wheezes rhonchi or rales.    Heart:    Regular rate irregular rhythm, normal S1 and S2, no M/R/G   Abdomen:     Soft, NT, ND, NABS    Extremities:   Moves all extremities, lower extremities weeping, no edema   Pulses:   Pulses palpable and equal bilaterally   Skin:   Warm, dry   Neurologic:   A/Ox3, cooperative   Psych:   Calm, appropriate       Labs:   Results from last 7 days   Lab Units 05/26/23  0127   WBC 10*3/mm3 11.42*   HEMOGLOBIN g/dL 11.4*   HEMATOCRIT % 37.3   PLATELETS 10*3/mm3 242     Results from last 7 days   Lab Units 05/26/23  0127   SODIUM mmol/L 136   POTASSIUM mmol/L 3.5   CHLORIDE mmol/L 96*   CO2 mmol/L 26.5   BUN mg/dL 43*   CREATININE mg/dL 1.28*   GLUCOSE mg/dL 109*   CALCIUM mg/dL 9.0     Results from last 7 days   Lab Units 05/26/23  0127   SODIUM mmol/L 136   POTASSIUM mmol/L 3.5   CHLORIDE mmol/L 96*   CO2 mmol/L 26.5   BUN mg/dL 43*   CREATININE mg/dL 1.28*   CALCIUM mg/dL 9.0   BILIRUBIN mg/dL 0.5   ALK PHOS U/L 101   ALT (SGPT) U/L 13   AST (SGOT) U/L 23   GLUCOSE " mg/dL 109*     Imaging Results (Last 72 Hours)     Procedure Component Value Units Date/Time    CT Chest Without Contrast Diagnostic [[id number]] Collected: 05/26/23 0804     Updated: 05/26/23 0807    Narrative:      EXAM:    CT Chest Without Intravenous Contrast     EXAM DATE:    5/26/2023 7:48 AM     CLINICAL HISTORY:    Dyspnea, chronic, unclear etiology; I50.9-Heart failure, unspecified;  J96.21-Acute and chronic respiratory failure with hypoxia     TECHNIQUE:    Axial computed tomography images of the chest without intravenous  contrast.  Sagittal and coronal reformatted images were created and  reviewed.  This CT exam was performed using one or more of the following  dose reduction techniques:  automated exposure control, adjustment of  the mA and/or kV according to patient size, and/or use of iterative  reconstruction technique.     COMPARISON:    No relevant prior studies available.     FINDINGS:    LUNGS AND PLEURAL SPACES:  Small right and tiny left pleural effusion.   Left lung base atelectasis and probable superimposed pneumonia.    HEART:  Cardiomegaly.  No significant pericardial effusion.  No  significant coronary artery calcifications.    BONES/JOINTS:  Multilevel compression deformity of thoracic vertebral  bodies.  No dislocation.    SOFT TISSUES:  Unremarkable.    VASCULATURE:  Atherosclerotic disease.  No thoracic aortic aneurysm.    LYMPH NODES:  Unremarkable.  No enlarged lymph nodes.       Impression:      1.  Cardiomegaly.  2.  Small right and tiny left pleural effusion.  3.  Left lung base atelectasis and probable superimposed pneumonia.     This report was finalized on 5/26/2023 8:05 AM by Dr. River Webb MD.       XR Chest 1 View [[id number]] Collected: 05/25/23 1442     Updated: 05/25/23 1445    Narrative:      EXAM:    XR Chest, 1 View     EXAM DATE:    5/25/2023 1:24 PM     CLINICAL HISTORY:    sob     TECHNIQUE:    Frontal view of the chest.     COMPARISON:    05/10/2023      FINDINGS:    LUNGS AND PLEURAL SPACES:  Coarsened interstitial markings noted  throughout the lungs.  Probably tiny bilateral pleural effusions.  No  pneumothorax.    HEART:  Cardiomegaly.    MEDIASTINUM:  Unremarkable.    BONES/JOINTS:  Unremarkable.       Impression:      1.  Cardiomegaly.  2.  Coarsened interstitial markings noted throughout the lungs.  3.  Probably tiny bilateral pleural effusions.     This report was finalized on 5/25/2023 2:43 PM by Dr. River Webb MD.             Diagnostics: Reviewed    A: Anh Saldana is a 88 y.o. female admitted on 5/25/2023  Due to shortness of breath, cough and edema. Anh has a medical history of HFpEF, CKD stage III, hard of hearing, severe PAH, HTN, osteoarthritis, osteoporosis, rheumatoid arthritis, chronic respiratory failure on O2 at night. Per report Anh has been recently hospitalized for CHF exacerbation, e coli with abx and diuretics and was d/tyrone on 5/19 to [facility].Per Nursing facility staff pt had been experiencing progressive shortness of breath and lower extremity edema for several days before being sent to the ER. Pt was found to be hypoxic and to be COVID positive. Pt was admitted to tele floor.      P:   I was able to speak with Anh and her brother regarding her goals of care. Both Anh and Octavio confirmed that Anh would not want to be resuscitated or to be intubated. I discussed with both of them hospice services at home and what this entailed. Anh towards the end of the conversation started to become [place] so it was unclear what she actually heard or understood. Octavio stated they he would like to get her home and I told him that we would have to take it one day at a time to see if she were strong enough to return home and said that he would be agreeable to hospice as she felt it would make it easier on her. All I could get out of Anh is that she wanted to go home, she is alert and oriented and hard of hearing, however I feel her  hearing is selective. I discussed this pt with Dr Brothers.    We appreciate the consult and the opportunity to participate in Anh Saldana's care. We will continue to follow along. Please do not hesitate to contact us regarding further symptom management or goals of care needs, including after hours or on weekends via our on call provider at [phone].     Time: 75 minutes spent reviewing medical and medication records, assessing and examining patient, discussing with family, answering questions, providing some guidance about a plan and documentation of care, and coordinating care with other healthcare members, with > 50% time spent face to face.     Jennifer Daniels, APRN    5/26/2023

## 2023-05-26 NOTE — PROGRESS NOTES
[facility] HOSPITALIST PROGRESS NOTE     Patient Identification:  Name:  Anh Saldana  Age:  88 y.o.  Sex:  female  :  1934  MRN:  [id number]  Visit Number:  [id number]  ROOM: [address] [place] [address]     Primary Care Provider:  Meme Medellin MD     Date of Admission: 2023    Length of stay in inpatient status:  1    Subjective     Chief Compliant:    Chief Complaint   Patient presents with   • Shortness of Breath   • Edema        Remains on 4L NC this am but no significant desats noted overnight. Met with palliative care team today. Communication remains difficult as patient extremely hard of hearing. Had 8 beat run pSVT early this am, asymptomatic and similar to that seen during prior admission.         Objective     Current Hospital Meds:  apixaban, 2.5 mg, Oral, Q12H  atorvastatin, 20 mg, Oral, Nightly  budesonide, 0.5 mg, Nebulization, BID  bumetanide, 1 mg, Oral, Q48H  dexamethasone sodium phosphate, 6 mg, Intravenous, Q24H  hydrALAZINE, 10 mg, Oral, Q8H  ipratropium-albuterol, 3 mL, Nebulization, 4x Daily - RT  isosorbide mononitrate, 30 mg, Oral, Q24H  metoprolol tartrate, 25 mg, Oral, BID  pantoprazole, 40 mg, Oral, QAM AC  remdesivir, 100 mg, Intravenous, Q24H  senna-docusate sodium, 2 tablet, Oral, BID  sodium chloride, 10 mL, Intravenous, Q12H       Current Antimicrobial Therapy:  Anti-Infectives (From admission, onward)    Ordered     Dose/Rate Route Frequency Start Stop    23  remdesivir 100 mg in 270 mL NS        Ordering Provider: Temo Brothers MD   See Hyperspace for full Linked Orders Report.    100 mg  over 60 Minutes Intravenous Every 24 Hours 23 2100 23  remdesivir 200 mg in 290 mL NS        Ordering Provider: Temo Brothers MD   See Hyperspace for full Linked Orders Report.    200 mg  over 60 Minutes Intravenous Once 23 1249  levoFLOXacin (LEVAQUIN) 750 mg/150 mL D5W (premix) (LEVAQUIN)  750 mg        Ordering Provider: Eris Hammond MD    750 mg  over 90 Minutes Intravenous Once 05/25/23 1305 05/25/23 1556        Current Diuretic Therapy:  Diuretics (From admission, onward)    Ordered     Dose/Rate Route Frequency Start Stop    05/25/23 1926  bumetanide (BUMEX) tablet 1 mg        Ordering Provider: Temo Brothers MD    1 mg Oral Every 48 Hours 05/26/23 0900      05/25/23 1514  bumetanide (BUMEX) injection 2 mg        Ordering Provider: Eris Hammond MD    2 mg Intravenous Once 05/25/23 1530 05/25/23 1626        ----------------------------------------------------------------------------------------------------------------------  Vital Signs:  Temp:  [97.2 °F (36.2 °C)-98.1 °F (36.7 °C)] 97.8 °F (36.6 °C)  Heart Rate:  [59-85] 85  Resp:  [18-22] 20  BP: ()/(55-90) 145/90  SpO2:  [92 %-99 %] 96 %  on  Flow (L/min):  [4] 4;   Device (Oxygen Therapy): nasal cannula  Body mass index is 17.38 kg/m².    Wt Readings from Last 3 Encounters:   05/25/23 41.7 kg (92 lb)   05/19/23 41.2 kg (90 lb 12.8 oz)   07/26/21 42.6 kg (94 lb)     Intake & Output (last 3 days)       05/23 0701 05/24 0700 05/24 0701 05/25 0700 05/25 0701 05/26 0700 05/26 0701 05/27 0700    IV Piggyback   150     Total Intake(mL/kg)   150 (3.6)     Net   +150             Urine Unmeasured Occurrence   1 x         Diet: Cardiac Diets; Healthy Heart (2-3 Na+); Texture: Regular Texture (IDDSI 7); Fluid Consistency: Thin (IDDSI 0)  ----------------------------------------------------------------------------------------------------------------------  Physical Exam  Vitals and nursing note reviewed.   Constitutional:       General: She is not in acute distress.     Appearance: She is ill-appearing.   HENT:      Head: Normocephalic and atraumatic.      Mouth/Throat:      Mouth: Mucous membranes are dry.      Pharynx: Oropharynx is clear.   Eyes:      General: No scleral icterus.     Extraocular Movements: Extraocular movements intact.    Cardiovascular:      Rate and Rhythm: Tachycardia present. Rhythm irregular.      Pulses: Normal pulses.   Pulmonary:      Effort: Pulmonary effort is normal. No respiratory distress.      Breath sounds: No wheezing.   Abdominal:      General: Abdomen is flat. There is no distension.   Musculoskeletal:      Right lower leg: No edema.      Left lower leg: No edema.      Comments: Extreme kyphosis and muscle wasting    B/l LE wrapped in kerlex with weeping from R leg   Neurological:      General: No focal deficit present.      Mental Status: She is alert. Mental status is at baseline.   Psychiatric:      Comments: Unable to assess but pleasant appearing   ----------------------------------------------------------------------------------------------------------------------    ----------------------------------------------------------------------------------------------------------------------  LABS:    CBC and coagulation:  Results from last 7 days   Lab Units 05/26/23  0127 05/25/23  1929 05/25/23  1700 05/25/23  1640 05/25/23  1407 05/22/23  1052   PROCALCITONIN ng/mL  --  0.21  --   --   --   --    LACTATE mmol/L  --   --  1.1  --  2.1*  --    CRP mg/dL  --   --  3.71*  --   --  2.16*   WBC 10*3/mm3 11.42*  --   --  10.59 10.73 11.53*   HEMOGLOBIN g/dL 11.4*  --   --  11.6* 12.0 11.6*   HEMATOCRIT % 37.3  --   --  37.0 39.0 36.7   MCV fL 105.7*  --   --  103.1* 107.7* 105.5*   MCHC g/dL 30.6*  --   --  31.4* 30.8* 31.6   PLATELETS 10*3/mm3 242  --   --  190 271 336     Acid/base balance:  Results from last 7 days   Lab Units 05/25/23  1302   PH, ARTERIAL pH units 7.448   PO2 ART mm Hg 57.2*   PCO2, ARTERIAL mm Hg 44.8   HCO3 ART mmol/L 31.0*     Renal and electrolytes:  Results from last 7 days   Lab Units 05/26/23  0127 05/25/23  1640 05/25/23  1407 05/22/23  1052   SODIUM mmol/L 136 136 137 137   POTASSIUM mmol/L 3.5 4.4 3.9 4.1   CHLORIDE mmol/L 96* 98 100 98   CO2 mmol/L 26.5 27.3 27.2 29.1*   BUN mg/dL 43*  44* 44* 41*   CREATININE mg/dL 1.28* 1.34* 1.33* 1.26*   CALCIUM mg/dL 9.0 9.0 9.1 9.1   GLUCOSE mg/dL 109* 109* 155* 97     Estimated Creatinine Clearance: 20 mL/min (A) (by C-G formula based on SCr of 1.28 mg/dL (H)).    Liver and pancreatic function:  Results from last 7 days   Lab Units 05/26/23  0127 05/25/23  1640 05/25/23  1407 05/22/23  1052   ALBUMIN g/dL 3.0* 3.2* 3.2* 3.2*   BILIRUBIN mg/dL 0.5 0.6 0.5 0.4   ALK PHOS U/L 101 108 107 102   AST (SGOT) U/L 23 26 25 22   ALT (SGPT) U/L 13 13 13 14     Endocrine function:  No results found for: HGBA1C  Point of care bedside glucose levels:      Glucose levels from the [facility]:  Results from last 7 days   Lab Units 05/26/23  0127 05/25/23  1640 05/25/23  1407 05/22/23  1052   GLUCOSE mg/dL 109* 109* 155* 97     Lab Results   Component Value Date    TSH 3.950 05/10/2023     Cardiac:  Results from last 7 days   Lab Units 05/25/23  1700 05/25/23  1407   HSTROP T ng/L 77* 78*   PROBNP pg/mL  --  64,766.0*       Cultures:  Lab Results   Component Value Date    COLORU Yellow 05/10/2023    CLARITYU Clear 05/10/2023    PHUR 6.5 05/10/2023    GLUCOSEU Negative 05/10/2023    KETONESU Negative 05/10/2023    BLOODU Moderate (2+) (A) 05/10/2023    NITRITEU Positive (A) 05/10/2023    LEUKOCYTESUR Small (1+) (A) 05/10/2023    BILIRUBINUR Negative 05/10/2023    UROBILINOGEN 0.2 E.U./dL 05/10/2023    RBCUA 13-20 (A) 05/10/2023    WBCUA 13-20 (A) 05/10/2023    BACTERIA 2+ (A) 05/10/2023     Microbiology Results (last 10 days)     Procedure Component Value - Date/Time    COVID-19 and FLU A/B PCR - Swab, Nasopharynx [[id number]]  (Abnormal) Collected: 05/25/23 1637    Lab Status: Final result Specimen: Swab from Nasopharynx Updated: 05/25/23 8289     COVID19 Detected     Influenza A PCR Not Detected     Influenza B PCR Not Detected    Narrative:      Fact sheet for providers: https://www.fda.gov/media/092944/download    Fact sheet for patients:  https://www.fda.gov/media/639670/download    Test performed by PCR.  Influenza A and Influenza B negative results should be considered presumptive in samples that have a positive SARS-CoV-2 result.    Competitive inhibition studies showed that SARS-CoV-2 virus, when present at concentrations above 3.6E+04 copies/mL, can inhibit the detection and amplification of influenza A and influenza B virus RNA if present at or below 1.8E+02 copies/mL or 4.9E+02 copies/mL, respectively, and may lead to false negative influenza virus results. If co-infection with influenza A or influenza B virus is suspected in samples with a positive SARS-CoV-2 result, the sample should be re-tested with another FDA cleared, approved, or authorized influenza test, if influenza virus detection would change clinical management.    Blood Culture - Blood, Hand, Right [[id number]]  (Normal) Collected: 05/25/23 1425    Lab Status: Preliminary result Specimen: Blood from Hand, Right Updated: 05/26/23 1431     Blood Culture No growth at 24 hours    Blood Culture - Blood, Hand, Right [[id number]]  (Normal) Collected: 05/25/23 1407    Lab Status: Preliminary result Specimen: Blood from Hand, Right Updated: 05/26/23 1431     Blood Culture No growth at 24 hours          No results found for: PREGTESTUR, PREGSERUM, HCG, HCGQUANT  Pain Management Panel          View : No data to display.                      I have personally looked at the labs and they are summarized above.  ----------------------------------------------------------------------------------------------------------------------  Detailed radiology reports for the last 24 hours:    Imaging Results (Last 24 Hours)     Procedure Component Value Units Date/Time    CT Chest Without Contrast Diagnostic [[id number]] Collected: 05/26/23 0804     Updated: 05/26/23 0807    Narrative:      EXAM:    CT Chest Without Intravenous Contrast     EXAM DATE:    5/26/2023 7:48 AM     CLINICAL HISTORY:    Dyspnea,  chronic, unclear etiology; I50.9-Heart failure, unspecified;  J96.21-Acute and chronic respiratory failure with hypoxia     TECHNIQUE:    Axial computed tomography images of the chest without intravenous  contrast.  Sagittal and coronal reformatted images were created and  reviewed.  This CT exam was performed using one or more of the following  dose reduction techniques:  automated exposure control, adjustment of  the mA and/or kV according to patient size, and/or use of iterative  reconstruction technique.     COMPARISON:    No relevant prior studies available.     FINDINGS:    LUNGS AND PLEURAL SPACES:  Small right and tiny left pleural effusion.   Left lung base atelectasis and probable superimposed pneumonia.    HEART:  Cardiomegaly.  No significant pericardial effusion.  No  significant coronary artery calcifications.    BONES/JOINTS:  Multilevel compression deformity of thoracic vertebral  bodies.  No dislocation.    SOFT TISSUES:  Unremarkable.    VASCULATURE:  Atherosclerotic disease.  No thoracic aortic aneurysm.    LYMPH NODES:  Unremarkable.  No enlarged lymph nodes.       Impression:      1.  Cardiomegaly.  2.  Small right and tiny left pleural effusion.  3.  Left lung base atelectasis and probable superimposed pneumonia.     This report was finalized on 5/26/2023 8:05 AM by Dr. River Webb MD.       XR Chest 1 View [[id number]] Collected: 05/25/23 1442     Updated: 05/25/23 1445    Narrative:      EXAM:    XR Chest, 1 View     EXAM DATE:    5/25/2023 1:24 PM     CLINICAL HISTORY:    sob     TECHNIQUE:    Frontal view of the chest.     COMPARISON:    05/10/2023     FINDINGS:    LUNGS AND PLEURAL SPACES:  Coarsened interstitial markings noted  throughout the lungs.  Probably tiny bilateral pleural effusions.  No  pneumothorax.    HEART:  Cardiomegaly.    MEDIASTINUM:  Unremarkable.    BONES/JOINTS:  Unremarkable.       Impression:      1.  Cardiomegaly.  2.  Coarsened interstitial markings noted  throughout the lungs.  3.  Probably tiny bilateral pleural effusions.     This report was finalized on 5/25/2023 2:43 PM by Dr. River Webb MD.           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 BNP elevated but stable. ECG with right heart strain pattern and will repeat in am. Troponin elevated but flat   -Palliative care consulted given end stage HF and malnutrition, likely hospice appropriate on discharge if able to recover from acute CV19 inf  -NC O2 titrated to >88%     #Persistnet Troponin elevation secondary to CKD  -No evidence of acute ishcemia     #HFpEF, severe PAH  -TTE reviewed, resumed home every other day bumex  -Resumed Imdur and hydral with holding parameters  -Redsvest 30 consistent with suspected compensated HF not in exacerbation. Hypoxia purely driven by covid PNA  -Metoprolol decreased from 50 to 25mg BID given mild bradycardia and HR consistently <70bpm. Monitor for tachy arrhythmia      #Lactate elevation, clinically insignificant (resolved)    #Severe protein malnutrition  -Per RD assessment, supplement as recommended     - - Chronic conditions - -      #CKD: Cr at baseline  #Severe OA, RA  #GERD: PPI    VTE Prophylaxis:   Mechanical Order History:     None      Pharmalogical Order History:      Ordered     Dose Route Frequency  Stop    05/25/23 1926  apixaban (ELIQUIS) tablet 2.5 mg         2.5 mg PO Every 12 Hours Scheduled --                Code Status and Medical Interventions:   Ordered at: 05/25/23 1634     Medical Intervention Limits:    NO intubation (DNI)     Code Status (Patient has no pulse and is not breathing):    No CPR (Do Not Attempt to Resuscitate)     Medical Interventions (Patient has pulse or is breathing):    Limited Support         Disposition: Cont tele monitoring    I have reviewed any copied/forwarded text or data, verified its accuracy, and updated as necessary above.    Temo Brothers MD  [facility]  05/26/23  14:38 EDT

## 2023-05-26 NOTE — CASE MANAGEMENT/SOCIAL WORK
Discharge Planning Assessment   [place]     Patient Name: Anh Saldana  MRN: [id number]  Today's Date: 5/26/2023    Admit Date: 5/25/2023    Plan: SS received a consult for Advanced age, discharge planning. Pt was admitted form [facility] on 5/25/23. SS contacted [facility] per Latoya who states pt does not have a bedhold but they will take pt back at discharge. Pt requires a pre-auth to be done before discharge. SS to follow and assist with discharge planning.         Discharge Plan     Row Name 05/26/23 1041       Plan    Plan SS received a consult for Advanced age, discharge planning. Pt was admitted form [facility] on 5/25/23. SS contacted [facility] per Latoya who states pt does not have a bedhold but they will take pt back at discharge. Pt requires a pre-auth to be done before discharge. SS to follow and assist with discharge planning.               Continued Care and Services - Admitted Since 5/25/2023    Coordination has not been started for this encounter.     Selected Continued Care - Prior Encounters Includes continued care and service providers with selected services from prior encounters from 2/24/2023 to 5/26/2023    Discharged on 5/19/2023 Admission date: 5/10/2023 - Discharge disposition: Skilled Nursing Facility (DC - External)    Destination     Service Provider Selected Services Address Phone Fax Patient Preferred    [facility] [address] [place] [address] [phone] --                    Expected Discharge Date and Time     Expected Discharge Date Expected Discharge Time    May 30, 2023          Demographic Summary     Row Name 05/26/23 1040       General Information    Admission Type inpatient    Referral Source nursing    Reason for Consult discharge planning  SS received a consult for Advanced age, discharge planning.              MARIA C Loco

## 2023-05-26 NOTE — THERAPY EVALUATION
Acute Care - Physical Therapy Initial Evaluation  CHANTEL Avelar     Patient Name: Anh Saldana  : 1934  MRN: [id number]  Today's Date: 2023      Visit Dx:     ICD-10-CM ICD-9-CM   1. Acute on chronic congestive heart failure, unspecified heart failure type  I50.9 428.0   2. Acute on chronic respiratory failure with hypoxia  J96.21 518.84     799.02     Patient Active Problem List   Diagnosis   • Osteoporosis, unspecified   • Hypertensive urgency   • Essential hypertension   • Dyspnea on exertion   • Acute on chronic heart failure with preserved ejection fraction (HFpEF)   • Persistent atrial fibrillation   • Hearing loss   • Acute on chronic heart failure with preserved ejection fraction (HFpEF)   • Acute respiratory failure with hypoxemia   • Severe Malnutrition (HCC)   • Chronic heart failure with preserved ejection fraction (HFpEF)   • Acute on chronic congestive heart failure, unspecified heart failure type     Past Medical History:   Diagnosis Date   • Breast cancer    • CHF (congestive heart failure)    • Chronic kidney disease     acute kidney failure   • [place] (hard of hearing)    • Hypertension    • Osteoarthritis    • Osteoporosis    • Rheumatoid arthritis    • Right-sided Bell's palsy    • Shortness of breath     sleeps with oxygen at night     Past Surgical History:   Procedure Laterality Date   • BREAST LUMPECTOMY Left     benign   • CATARACT EXTRACTION W/ INTRAOCULAR LENS  IMPLANT, BILATERAL Bilateral    • SHOULDER SURGERY Left     metal plate in left shoulder     PT Assessment (last 12 hours)     PT Evaluation and Treatment     Row Name 23 1500          Physical Therapy Time and Intention    Document Type evaluation  -KM     Mode of Treatment individual therapy;physical therapy  -KM     Patient Effort fair  -KM     Row Name 23 1500          General Information    Patient Profile Reviewed yes  -KM     Patient Observations alert  -KM     Prior Level of Function --  pt. was poor  historian  -KM     Existing Precautions/Restrictions fall;oxygen therapy device and L/min  -KM     Risks Reviewed patient:;LOB;nausea/vomiting;dizziness;increased discomfort  -KM     Benefits Reviewed patient:;improve function;increase independence;increase strength;increase balance  -KM     Barriers to Rehab previous functional deficit  -KM     Row Name 05/26/23 1500          Living Environment    Current Living Arrangements assisted living facility  -KM     Row Name 05/26/23 1500          Home Use of Assistive/Adaptive Equipment    Equipment Currently Used at Home hospital bed;oxygen  -KM     Row Name 05/26/23 1500          Cognition    Affect/Mental Status (Cognition) flat/blunted affect  -KM     Orientation Status (Cognition) oriented to;person  -KM     Follows Commands (Cognition) unable/difficult to assess  -KM     Row Name 05/26/23 1500          Range of Motion (ROM)    Range of Motion --  BLE ROM limited  -KM     Row Name 05/26/23 1500          Strength (Manual Muscle Testing)    Strength (Manual Muscle Testing) --  BLE strength 2+/5  -KM     Row Name 05/26/23 1500          Bed Mobility    Bed Mobility supine-sit;sit-supine  -KM     Supine-Sit [place] (Bed Mobility) maximum assist (25% patient effort);dependent (less than 25% patient effort);verbal cues;nonverbal cues (demo/gesture)  -KM     Sit-Supine [place] (Bed Mobility) maximum assist (25% patient effort);dependent (less than 25% patient effort);verbal cues;nonverbal cues (demo/gesture)  -KM     Comment, (Bed Mobility) Pt. heart rate increased to 180s upon sitting up  -KM     Row Name 05/26/23 1500          Transfers    Comment, (Transfers) not assessed  -KM     Row Name 05/26/23 1500          Gait/Stairs (Locomotion)    Comment, (Gait/Stairs) not assessed  -KM     Row Name 05/26/23 1500          Safety Issues, Functional Mobility    Safety Issues Affecting Function (Mobility) ability to follow commands;awareness of need for  assistance;friction/shear risk;insight into deficits/self-awareness;judgment  -KM     Impairments Affecting Function (Mobility) balance;endurance/activity tolerance;postural/trunk control;range of motion (ROM);strength  -KM     Row Name 05/26/23 1500          Balance    Balance Assessment sitting static balance  -KM     Static Sitting Balance moderate assist;maximum assist;verbal cues  -KM     Position, Sitting Balance sitting edge of bed  -KM     Row Name             Wound 05/25/23 2127 coccyx    Wound - Properties Group Placement Date: 05/25/23  -HL Placement Time: 2127  -HL Present on Hospital Admission: Y  -HL Location: coccyx  -HL    Retired Wound - Properties Group Placement Date: 05/25/23  -HL Placement Time: 2127 -HL Present on Hospital Admission: Y  -HL Location: coccyx  -HL    Retired Wound - Properties Group Date first assessed: 05/25/23  -HL Time first assessed: 2127 -HL Present on Hospital Admission: Y  -HL Location: coccyx  -HL    Row Name 05/26/23 1500          Plan of Care Review    Plan of Care Reviewed With patient  -KM     Outcome Evaluation Pt. evaluation completed during PT session. She was maxA-dependent w/ bed mobility. Her HR increased to 180s upon sitting EOB. Pt. to be placed on caseload to assess for any improvement while in house.  -KM     Row Name 05/26/23 1500          Therapy Assessment/Plan (PT)    Functional Level at Time of Evaluation (PT) maxA  -KM     PT Diagnosis (PT) decreased mobility  -KM     Rehab Potential (PT) fair, will monitor progress closely  -KM     Criteria for Skilled Interventions Met (PT) yes;skilled treatment is necessary  -KM     Therapy Frequency (PT) 2 times/wk  2-5x/wk  -KM     Predicted Duration of Therapy Intervention (PT) until discharge  -KM     Problem List (PT) problems related to;balance;mobility;range of motion (ROM);strength;postural control  -KM     Activity Limitations Related to Problem List (PT) unable to ambulate safely;unable to transfer  safely  -     Row Name 05/26/23 1500          Therapy Plan Review/Discharge Plan (PT)    Therapy Plan Review (PT) evaluation/treatment results reviewed;care plan/treatment goals reviewed;risks/benefits reviewed;patient  -KM     Row Name 05/26/23 1500          Physical Therapy Goals    Bed Mobility Goal Selection (PT) bed mobility, PT goal 1  -KM     Transfer Goal Selection (PT) transfer, PT goal 1  -KM     Row Name 05/26/23 1500          Bed Mobility Goal 1 (PT)    Activity/Assistive Device (Bed Mobility Goal 1, PT) bed mobility activities, all  -KM     [place] Level/Cues Needed (Bed Mobility Goal 1, PT) moderate assist (50-74% patient effort)  -KM     Time Frame (Bed Mobility Goal 1, PT) by discharge  -     Row Name 05/26/23 1500          Transfer Goal 1 (PT)    Activity/Assistive Device (Transfer Goal 1, PT) sit-to-stand/stand-to-sit;bed-to-chair/chair-to-bed  -KM     [place] Level/Cues Needed (Transfer Goal 1, PT) moderate assist (50-74% patient effort)  -KM     Time Frame (Transfer Goal 1, PT) by discharge  -           User Key  (r) = Recorded By, (t) = Taken By, (c) = Cosigned By    Initials Name Provider Type    Bennett Moses, PT Physical Therapist    Toney Govea, RN Registered Nurse                Physical Therapy Education     Title: PT OT SLP Therapies (Done)     Topic: Physical Therapy (Done)     Point: Mobility training (Done)     Learning Progress Summary           Patient Acceptance, E,TB, VU by KM at 5/26/2023 1510                   Point: Home exercise program (Done)     Learning Progress Summary           Patient Acceptance, E,TB, VU by KM at 5/26/2023 1510                   Point: Body mechanics (Done)     Learning Progress Summary           Patient Acceptance, E,TB, VU by KM at 5/26/2023 1510                   Point: Precautions (Done)     Learning Progress Summary           Patient Acceptance, E,TB, VU by  at 5/26/2023 1510                               User Key      Initials Effective Dates Name Provider Type Discipline     05/24/22 -  Bennett Roy, PT Physical Therapist PT              PT Recommendation and Plan  Planned Therapy Interventions (PT): balance training, bed mobility training, gait training, home exercise program, patient/family education, postural re-education, ROM (range of motion), stair training, strengthening, stretching, transfer training  Therapy Frequency (PT): 2 times/wk (2-5x/wk)  Plan of Care Reviewed With: patient  Outcome Evaluation: Pt. evaluation completed during PT session. She was maxA-dependent w/ bed mobility. Her HR increased to 180s upon sitting EOB. Pt. to be placed on caseload to assess for any improvement while in house.       Time Calculation:    PT Charges     Row Name 05/26/23 1500             Time Calculation    PT Received On 05/26/23  -KM      PT Goal Re-Cert Due Date 06/09/23  -KM            User Key  (r) = Recorded By, (t) = Taken By, (c) = Cosigned By    Initials Name Provider Type     Bennett Roy, FRANCIE Physical Therapist              Therapy Charges for Today     Code Description Service Date Service Provider Modifiers Qty    78803310566 HC PT EVAL MOD COMPLEXITY 4 5/26/2023 Bennett Roy, PT GP 1          PT G-Codes  AM-PAC 6 Clicks Score (PT): 6    Bennett Roy PT  5/26/2023

## 2023-05-26 NOTE — PLAN OF CARE
Goal Outcome Evaluation:  Patient is resting in bed watching television. Patient A&Ox4. Patient is currently on 4L nasal cannula. Patient has tolerated all interventions. No complaints or concerns at this time. No signs of acute distress noted. Will continue to follow plan of care.

## 2023-05-26 NOTE — SIGNIFICANT NOTE
05/26/23 0832   Plan   Plan Comments pt arrived to ED from [facility] for increased SOB; covid +,received x1 dose IV Bumex 2mg, and x1 dose IV Levaquin; pt to tele unit, INP status; IV Remdesivir, IV dexamethasone, no abx per attending's H&P note at this time; sat of 93% on 4 liters via NC; Cr 1.28, BUN 43; palliative consulted for goals of care, and hospice discussion.

## 2023-05-26 NOTE — NURSING NOTE
Patient with stage 2 PI to coccyx.  Ina wound with MASD.  Will treat with Magic Barrier Cream 3 times daily and leave open to air.    PI prevention orders initiated.

## 2023-05-27 NOTE — PROGRESS NOTES
Hospitalist Update:    Rapid response called overhead. Patient converted to SVT, persisted for 10 minutes despite attempts to abort with valsalva maneuvers. Administered 6mg IV adenosine and patient converted to sinus rhythm in the 60-70s. K+ 3.9 on morning labs, will not replace as creatinine is rising. Will check magnesium and EKG. Informed attending Dr Brothers of this event.

## 2023-05-27 NOTE — CONSULTS
Cardiology  CONSULT NOTE    Consults    Patient Identification:  Name:  Anh Saldana  Age:  88 y.o.  Sex:  female  :  1934  MRN:  [id number]  Visit Number:  [id number]  Primary care provider:  Meme Medellin MD    Subjective       Reason for the consult:  Episodes of PAF and PSVT    Chief complaints:  Shortness of breath      History of presenting illness:    Anh Saldana is a 88 y.o. female has been admitted with history of increasing shortness of breath with a diagnosis of COVID-19 pneumonia and acute on chronic hypoxic respiratory failure.    I have been consulted as she developed episodes of paroxysmal SVT and PAF.  She was given IV adenosine.  Arrhythmia did not resolve but heart rate slowed down.  Subsequently she spontaneously converted to sinus tachycardia.  She was on metoprolol and missed her doses in the process of admission.    Patient is a poor historian due to hearing difficulty.  No history of increased shortness of breath, chest discomfort or palpitations.    Recently she was admitted with pneumonia and respiratory failure.    Her cardiovascular history is remarkable for paroxysmal atrial fibrillation, recent NSTEMI-decided to treat medically due to comorbid conditions and absence of chest pain, and heart failure with preserved EF.  She also has history of severe MR, severe TR and severe pulmonary hypertension.  History of benign essential hypertension.      --------------------------------------------------------------------------------------------------------------------  Review of Systems:  Unable to do review of the system as patient is a poor historian and has hearing difficulty.    ---------------------------------------------------------------------------------------------------------------------   Past History:  Family History   Problem Relation Age of Onset   • Breast cancer Maternal Aunt    • Hypertension Mother    • Hypertension Father    • Heart disease Sister         MI  in her 80's     Past Medical History:   Diagnosis Date   • Breast cancer 2011   • CHF (congestive heart failure)    • Chronic kidney disease     acute kidney failure   • [place] (hard of hearing)    • Hypertension    • Osteoarthritis    • Osteoporosis    • Rheumatoid arthritis    • Right-sided Bell's palsy    • Shortness of breath     sleeps with oxygen at night     Past Surgical History:   Procedure Laterality Date   • BREAST LUMPECTOMY Left     benign   • CATARACT EXTRACTION W/ INTRAOCULAR LENS  IMPLANT, BILATERAL Bilateral    • SHOULDER SURGERY Left     metal plate in left shoulder     Social History     Socioeconomic History   • Marital status:    Tobacco Use   • Smoking status: Never   • Smokeless tobacco: Never   Vaping Use   • Vaping Use: Never used   Substance and Sexual Activity   • Alcohol use: No   • Drug use: No   • Sexual activity: Defer     ---------------------------------------------------------------------------------------------------------------------   Allergies:  Penicillins, Doxycycline, Hydroxychloroquine, and Latex  ---------------------------------------------------------------------------------------------------------------------     Hospital Meds:  apixaban, 2.5 mg, Oral, Q12H  aspirin, 81 mg, Oral, Daily  atorvastatin, 20 mg, Oral, Nightly  budesonide, 0.5 mg, Nebulization, BID  dexamethasone sodium phosphate, 6 mg, Intravenous, Q24H  hydrALAZINE, 10 mg, Oral, Q8H  ipratropium, 0.5 mg, Nebulization, 4x Daily - RT  isosorbide mononitrate, 30 mg, Oral, Q24H  magic barrier cream, , Topical, TID  metoprolol tartrate, 50 mg, Oral, BID  pantoprazole, 40 mg, Oral, QAM AC  remdesivir, 100 mg, Intravenous, Q24H  senna-docusate sodium, 2 tablet, Oral, BID  sodium chloride, 10 mL, Intravenous, Q12H         ---------------------------------------------------------------------------------------------------------------------     Objective     Vital Signs:  Temp:  [97.6 °F (36.4 °C)-98.7 °F (37.1  °C)] 97.6 °F (36.4 °C)  Heart Rate:  [] 108  Resp:  [18-26] 18  BP: (112-171)/() 112/80      05/25/23  1222 05/25/23  1900 05/27/23  0500   Weight: 41.3 kg (91 lb) 41.7 kg (92 lb) 41.5 kg (91 lb 8 oz)     Body mass index is 17.29 kg/m².  ---------------------------------------------------------------------------------------------------------------------   Physical exam:       General Appearance:  HEENT:   Neck:     Alert, cooperative, in no acute distress  Grossly normal   No JVD    Lungs:    Bilateral equal air entry.  Scattered wheezes are heard.     Heart:   Tachycardia.  Regular rhythm.  Systolic murmur heard in the apex.    Chest Wall:  No abnormalities observed    Abdomen   Soft, nontender, no masses, no organomegaly and bowel sounds are heard.     Extremities: No pedal edema    Pulses: Pulses palpable and equal bilaterally    Neurologic:  Deferred        Telemetry:  Sinus tachycardia      Echocardiogram-      Interpretation Summary       •  Left ventricular systolic function is normal. Left ventricular ejection fraction appears to be 66 - 70%.  •  Left ventricular diastolic function is consistent with (grade I) impaired relaxation.  •  The right ventricular cavity is moderately dilated.  •  The left atrial cavity is mild to moderately dilated.  •  The right atrial cavity is severely  dilated.  •  There is moderate calcification of the aortic valve mainly affecting the non-coronary and left coronary cusp(s).  •  Severe mitral valve regurgitation is present.  •  Severe tricuspid valve regurgitation is present.  •  Estimated right ventricular systolic pressure from tricuspid regurgitation is markedly elevated ( 94 mmHg).  •  Severe pulmonary hypertension is present.       ---------------------------------------------------------------------------------------------------------------------   ECG:   ECG/EMG Results (last 24 hours)     Procedure Component Value Units Date/Time    ECG 12 Lead Tachycardia  [[id number]] Collected: 05/26/23 1404     Updated: 05/26/23 1434     QT Interval 366 ms      QTC Interval 447 ms     Narrative:      Test Reason : Tachycardia  Blood Pressure :   */*   mmHG  Vent. Rate :  90 BPM     Atrial Rate :  90 BPM     P-R Int : 150 ms          QRS Dur : 100 ms      QT Int : 366 ms       P-R-T Axes :  61  31 -17 degrees     QTc Int : 447 ms    Normal sinus rhythm  Nonspecific ST and T wave abnormality  Abnormal ECG  When compared with ECG of 26-MAY-2023 14:04, (Unconfirmed)  premature ventricular complexes are no longer present  premature supraventricular complexes are no longer present    Referred By: OSVALDO           Confirmed By:     SCANNED - TELEMETRY   [[id number]] Resulted: 05/25/23     Updated: 05/26/23 1931    SCANNED - TELEMETRY   [[id number]] Resulted: 05/25/23     Updated: 05/26/23 1931    ECG 12 Lead Tachycardia [[id number]] Collected: 05/27/23 0655     Updated: 05/27/23 0759     QT Interval 398 ms      QTC Interval 459 ms     Narrative:      Test Reason : Tachycardia  Blood Pressure :   */*   mmHG  Vent. Rate :  80 BPM     Atrial Rate :  80 BPM     P-R Int : 116 ms          QRS Dur : 102 ms      QT Int : 398 ms       P-R-T Axes :  23 148 -12 degrees     QTc Int : 459 ms    Sinus rhythm with premature atrial complexes  Right axis deviation  Possible Right ventricular hypertrophy  T wave abnormality, consider inferior ischemia  Abnormal ECG  When compared with ECG of 27-MAY-2023 06:55, (Unconfirmed)  premature ventricular complexes are no longer present    Referred By: AHSAN           Confirmed By:           --------------------------------------------------------------------------------------------------------------------   Results from last 7 days   Lab Units 05/26/23  0127 05/25/23  1700 05/25/23  1640 05/25/23  1407 05/22/23  1052   CRP mg/dL  --  3.71*  --   --  2.16*   LACTATE mmol/L  --  1.1  --  2.1*  --    WBC 10*3/mm3 11.42*  --  10.59 10.73 11.53*   HEMOGLOBIN g/dL 11.4*   --  11.6* 12.0 11.6*   HEMATOCRIT % 37.3  --  37.0 39.0 36.7   MCV fL 105.7*  --  103.1* 107.7* 105.5*   MCHC g/dL 30.6*  --  31.4* 30.8* 31.6   PLATELETS 10*3/mm3 242  --  190 271 336     Results from last 7 days   Lab Units 05/25/23  1302   PH, ARTERIAL pH units 7.448   PO2 ART mm Hg 57.2*   PCO2, ARTERIAL mm Hg 44.8   HCO3 ART mmol/L 31.0*     Results from last 7 days   Lab Units 05/27/23  0102 05/26/23  0127 05/25/23  1640   SODIUM mmol/L 140 136 136   POTASSIUM mmol/L 3.9 3.5 4.4   MAGNESIUM mg/dL 2.1  --   --    CHLORIDE mmol/L 100 96* 98   CO2 mmol/L 25.8 26.5 27.3   BUN mg/dL 51* 43* 44*   CREATININE mg/dL 1.45* 1.28* 1.34*   CALCIUM mg/dL 9.1 9.0 9.0   GLUCOSE mg/dL 152* 109* 109*   ALBUMIN g/dL 3.2* 3.0* 3.2*   BILIRUBIN mg/dL 0.5 0.5 0.6   ALK PHOS U/L 108 101 108   AST (SGOT) U/L 27 23 26   ALT (SGPT) U/L 13 13 13   Estimated Creatinine Clearance: 17.6 mL/min (A) (by C-G formula based on SCr of 1.45 mg/dL (H)).  No results found for: AMMONIA  Results from last 7 days   Lab Units 05/25/23  1700 05/25/23  1407   HSTROP T ng/L 77* 78*     Results from last 7 days   Lab Units 05/25/23  1407   PROBNP pg/mL 64,766.0*     No results found for: HGBA1C  Lab Results   Component Value Date    TSH 3.950 05/10/2023     No results found for: PREGTESTUR, PREGSERUM, HCG, HCGQUANT  Pain Management Panel          View : No data to display.                    Blood Culture   Date Value Ref Range Status   05/25/2023 No growth at 24 hours  Preliminary   05/25/2023 No growth at 24 hours  Preliminary     No results found for: URINECX  No results found for: WOUNDCX  No results found for: STOOLCX      ---------------------------------------------------------------------------------------------------------------------   Radiology:    No results found for this or any previous visit.      Results for orders placed during the hospital encounter of 01/07/19    XR Chest PA & Lateral    Narrative  EXAMINATION: XR CHEST PA AND  LATERAL-    CLINICAL INDICATION:     Rule out pneumonia or heart failure.;  R06.09-Other forms of dyspnea    TECHNIQUE:  XR CHEST PA AND LATERAL-    COMPARISON: 11/10/2018    FINDINGS:    Chronic lung changes are stable. Mild CHF again noted.  Cardiomegaly stable.  No pneumothorax.  No effusions.  Stable appearance of the bony structures.    Impression  CHF/edema. Chronic lung changes appear stable.    This report was finalized on 1/7/2019 1:09 PM by Dr. Odell Godoy MD.      Results for orders placed during the hospital encounter of 05/25/23    XR Chest 1 View    Narrative  EXAM:  XR Chest, 1 View    EXAM DATE:  5/25/2023 1:24 PM    CLINICAL HISTORY:  sob    TECHNIQUE:  Frontal view of the chest.    COMPARISON:  05/10/2023    FINDINGS:  LUNGS AND PLEURAL SPACES:  Coarsened interstitial markings noted  throughout the lungs.  Probably tiny bilateral pleural effusions.  No  pneumothorax.  HEART:  Cardiomegaly.  MEDIASTINUM:  Unremarkable.  BONES/JOINTS:  Unremarkable.    Impression  1.  Cardiomegaly.  2.  Coarsened interstitial markings noted throughout the lungs.  3.  Probably tiny bilateral pleural effusions.    This report was finalized on 5/25/2023 2:43 PM by Dr. River eWbb MD.      No results found for this or any previous visit.      I have personally reviewed the radiology images and read the final radiology report.        Assessment      1.  Brief episodes of PSVT and PAF.  Resolved.  2.  History of paroxysmal atrial fibrillation.  3.  Chronic heart failure with preserved EF.  Compensated.  4.  Severe MR associated with severe TR and severe pulmonary hypertension  5.  Recent NSTEMI on medical therapy.  6.  COVID-19 pneumonia associated with acute on chronic hypoxic respiratory failure    Recommendations     1.  PAF- in sinus rhythm now.  Continue metoprolol and Eliquis.    2.  Recent NSTEMI- on medical therapy for due to absence of angina and presence of comorbidities.  Continue aspirin, atorvastatin,  Imdur and metoprolol.    3.  HFpEF- stable with no evidence of fluid overload.  No need of diuretic therapy at present.    Thank you     For the opportunity to participate in the care of your patient. Please do not hesitate to call with any questions or concerns.     Rosa Maria Hunter MD, [facility],  05/27/23  12:29 EDT    Patent

## 2023-05-27 NOTE — PLAN OF CARE
Goal Outcome Evaluation:     Patient resting in bed. No complaints or requests. No indicators of acute distress noted. Will continue to follow plan of care this shift.      Update: Informed patient sustaining SVT HR 170s, Dr. Garcia paged, rapid response called, received call back from Dr. Garcia. Dr. Garcia arrived to bedside, see MAR, see results. No indicators of acute distress noted at this time.

## 2023-05-27 NOTE — PROGRESS NOTES
[facility] HOSPITALIST PROGRESS NOTE     Patient Identification:  Name:  Anh Saldana  Age:  88 y.o.  Sex:  female  :  1934  MRN:  [id number]  Visit Number:  [id number]  ROOM: [address] [place] [address]     Primary Care Provider:  Meme Medellin MD     Date of Admission: 2023    Length of stay in inpatient status:  2    Subjective     Chief Compliant:    Chief Complaint   Patient presents with   • Shortness of Breath   • Edema        Early this am had sustained SVT w/dyspnea reported but stable BP. Treated with 6mg adenosine with conversion to NSR. Remained NSR breifly with afib w/rvr noted during rounds. Resumed reduced dose 25mg metoprolol yesterday and increased to home dose 50mg BID this am but increased PO dose without improvement, cardiology consulted and eventually placed on diltiazem gtt after bolus. O2 requirement reduced to 2L but with movement will desat and sluggish to improve. Kept at 4L to avoid worsening rvr        Objective     Current Hospital Meds:  apixaban, 2.5 mg, Oral, Q12H  aspirin, 81 mg, Oral, Daily  atorvastatin, 20 mg, Oral, Nightly  budesonide, 0.5 mg, Nebulization, BID  dexamethasone sodium phosphate, 6 mg, Intravenous, Q24H  hydrALAZINE, 10 mg, Oral, Q8H  ipratropium, 0.5 mg, Nebulization, 4x Daily - RT  isosorbide mononitrate, 30 mg, Oral, Q24H  magic barrier cream, , Topical, TID  metoprolol tartrate, 25 mg, Oral, Once  metoprolol tartrate, 50 mg, Oral, BID  pantoprazole, 40 mg, Oral, QAM AC  remdesivir, 100 mg, Intravenous, Q24H  senna-docusate sodium, 2 tablet, Oral, BID  sodium chloride, 10 mL, Intravenous, Q12H    dilTIAZem, 5 mg/hr, Last Rate: 5 mg/hr (23 1451)      Current Antimicrobial Therapy:  Anti-Infectives (From admission, onward)    Ordered     Dose/Rate Route Frequency Start Stop    23  remdesivir 100 mg in 270 mL NS        Ordering Provider: Temo Brothers MD   See Hyperspace for full Linked Orders Report.    100 mg  over 60 Minutes  Intravenous Every 24 Hours 05/26/23 2100 05/28/23 2059 05/25/23 1935  remdesivir 200 mg in 290 mL NS        Ordering Provider: Temo Brothers MD   See [facility] for full Linked Orders Report.    200 mg  over 60 Minutes Intravenous Once 05/25/23 2030 05/25/23 2145 05/25/23 1249  levoFLOXacin (LEVAQUIN) 750 mg/150 mL D5W (premix) (LEVAQUIN) 750 mg        Ordering Provider: Eris Hammond MD    750 mg  over 90 Minutes Intravenous Once 05/25/23 1305 05/25/23 1556        Current Diuretic Therapy:  Diuretics (From admission, onward)    Ordered     Dose/Rate Route Frequency Start Stop    05/25/23 1514  bumetanide (BUMEX) injection 2 mg        Ordering Provider: Eris Hammond MD    2 mg Intravenous Once 05/25/23 1530 05/25/23 1626        ----------------------------------------------------------------------------------------------------------------------  Vital Signs:  Temp:  [97.6 °F (36.4 °C)-98.2 °F (36.8 °C)] 97.6 °F (36.4 °C)  Heart Rate:  [] 140  Resp:  [18-26] 22  BP: (106-171)/() 113/76  SpO2:  [88 %-98 %] 88 %  on  Flow (L/min):  [2-4] 4;   Device (Oxygen Therapy): nasal cannula;humidified  Body mass index is 17.29 kg/m².    Wt Readings from Last 3 Encounters:   05/27/23 41.5 kg (91 lb 8 oz)   05/19/23 41.2 kg (90 lb 12.8 oz)   07/26/21 42.6 kg (94 lb)     Intake & Output (last 3 days)       05/24 0701 05/25 0700 05/25 0701 05/26 0700 05/26 0701 05/27 0700 05/27 0701 05/28 0700    P.O.   110 120    IV Piggyback  150      Total Intake(mL/kg)  150 (3.6) 110 (2.7) 120 (2.9)    Urine (mL/kg/hr)   550 (0.6)     Total Output   550     Net  +150 -440 +120            Urine Unmeasured Occurrence  1 x 1 x         Diet: Cardiac Diets; Healthy Heart (2-3 Na+); Feeding Assistance - Nursing; Texture: Pureed (NDD 1); Fluid Consistency: Thin (IDDSI 0)  ----------------------------------------------------------------------------------------------------------------------  Physical Exam  Vitals and nursing  note reviewed.   Constitutional:       General: She is not in acute distress.     Appearance: She is ill-appearing.   HENT:      Head: Normocephalic and atraumatic.      Mouth/Throat:      Mouth: Mucous membranes are dry.      Pharynx: Oropharynx is clear.   Eyes:      General: No scleral icterus.     Extraocular Movements: Extraocular movements intact.   Cardiovascular:      Rate and Rhythm: Tachycardia present. Rhythm irregular.      Pulses: Normal pulses.   Pulmonary:      Effort: Pulmonary effort is normal. No respiratory distress.      Breath sounds: No wheezing.   Abdominal:      General: Abdomen is flat. There is no distension.   Musculoskeletal:      Right lower leg: No edema.      Left lower leg: No edema.      Comments: Extreme kyphosis and muscle wasting    B/l LE wrapped in kerlex with weeping from R leg   Neurological:      General: No focal deficit present.      Mental Status: She is alert. Mental status is at baseline.   Psychiatric:      Comments: Unable to assess but pleasant appearing   ----------------------------------------------------------------------------------------------------------------------    ----------------------------------------------------------------------------------------------------------------------  LABS:    CBC and coagulation:  Results from last 7 days   Lab Units 05/26/23  0127 05/25/23  1929 05/25/23  1700 05/25/23  1640 05/25/23  1407 05/22/23  1052   PROCALCITONIN ng/mL  --  0.21  --   --   --   --    LACTATE mmol/L  --   --  1.1  --  2.1*  --    CRP mg/dL  --   --  3.71*  --   --  2.16*   WBC 10*3/mm3 11.42*  --   --  10.59 10.73 11.53*   HEMOGLOBIN g/dL 11.4*  --   --  11.6* 12.0 11.6*   HEMATOCRIT % 37.3  --   --  37.0 39.0 36.7   MCV fL 105.7*  --   --  103.1* 107.7* 105.5*   MCHC g/dL 30.6*  --   --  31.4* 30.8* 31.6   PLATELETS 10*3/mm3 242  --   --  190 295 550     Acid/base balance:  Results from last 7 days   Lab Units 05/25/23  1302   PH, ARTERIAL pH units  7.448   PO2 ART mm Hg 57.2*   PCO2, ARTERIAL mm Hg 44.8   HCO3 ART mmol/L 31.0*     Renal and electrolytes:  Results from last 7 days   Lab Units 05/27/23 0102 05/26/23 0127 05/25/23 1640 05/25/23 1407 05/22/23  1052   SODIUM mmol/L 140 136 136 137 137   POTASSIUM mmol/L 3.9 3.5 4.4 3.9 4.1   MAGNESIUM mg/dL 2.1  --   --   --   --    CHLORIDE mmol/L 100 96* 98 100 98   CO2 mmol/L 25.8 26.5 27.3 27.2 29.1*   BUN mg/dL 51* 43* 44* 44* 41*   CREATININE mg/dL 1.45* 1.28* 1.34* 1.33* 1.26*   CALCIUM mg/dL 9.1 9.0 9.0 9.1 9.1   GLUCOSE mg/dL 152* 109* 109* 155* 97     Estimated Creatinine Clearance: 17.6 mL/min (A) (by C-G formula based on SCr of 1.45 mg/dL (H)).    Liver and pancreatic function:  Results from last 7 days   Lab Units 05/27/23  0102 05/26/23 0127 05/25/23 1640 05/25/23 1407 05/22/23  1052   ALBUMIN g/dL 3.2* 3.0* 3.2* 3.2* 3.2*   BILIRUBIN mg/dL 0.5 0.5 0.6 0.5 0.4   ALK PHOS U/L 108 101 108 107 102   AST (SGOT) U/L 27 23 26 25 22   ALT (SGPT) U/L 13 13 13 13 14     Endocrine function:  No results found for: HGBA1C  Point of care bedside glucose levels:      Glucose levels from the [facility]:  Results from last 7 days   Lab Units 05/27/23 0102 05/26/23 0127 05/25/23 1640 05/25/23 1407 05/22/23  1052   GLUCOSE mg/dL 152* 109* 109* 155* 97     Lab Results   Component Value Date    TSH 3.950 05/10/2023     Cardiac:  Results from last 7 days   Lab Units 05/25/23  1700 05/25/23  1407   HSTROP T ng/L 77* 78*   PROBNP pg/mL  --  64,766.0*       Cultures:  Lab Results   Component Value Date    COLORU Yellow 05/10/2023    CLARITYU Clear 05/10/2023    PHUR 6.5 05/10/2023    GLUCOSEU Negative 05/10/2023    KETONESU Negative 05/10/2023    BLOODU Moderate (2+) (A) 05/10/2023    NITRITEU Positive (A) 05/10/2023    LEUKOCYTESUR Small (1+) (A) 05/10/2023    BILIRUBINUR Negative 05/10/2023    UROBILINOGEN 0.2 E.U./dL 05/10/2023    RBCUA 13-20 (A) 05/10/2023    WBCUA 13-20 (A) 05/10/2023    BACTERIA 2+ (A) 05/10/2023      Microbiology Results (last 10 days)     Procedure Component Value - Date/Time    COVID-19 and FLU A/B PCR - Swab, Nasopharynx [[id number]]  (Abnormal) Collected: 05/25/23 1637    Lab Status: Final result Specimen: Swab from Nasopharynx Updated: 05/25/23 1733     COVID19 Detected     Influenza A PCR Not Detected     Influenza B PCR Not Detected    Narrative:      Fact sheet for providers: https://www.fda.gov/media/906549/download    Fact sheet for patients: https://www.fda.gov/media/211954/download    Test performed by PCR.  Influenza A and Influenza B negative results should be considered presumptive in samples that have a positive SARS-CoV-2 result.    Competitive inhibition studies showed that SARS-CoV-2 virus, when present at concentrations above 3.6E+04 copies/mL, can inhibit the detection and amplification of influenza A and influenza B virus RNA if present at or below 1.8E+02 copies/mL or 4.9E+02 copies/mL, respectively, and may lead to false negative influenza virus results. If co-infection with influenza A or influenza B virus is suspected in samples with a positive SARS-CoV-2 result, the sample should be re-tested with another FDA cleared, approved, or authorized influenza test, if influenza virus detection would change clinical management.    Blood Culture - Blood, Hand, Right [[id number]]  (Normal) Collected: 05/25/23 1425    Lab Status: Preliminary result Specimen: Blood from Hand, Right Updated: 05/27/23 1431     Blood Culture No growth at 2 days    Blood Culture - Blood, Hand, Right [[id number]]  (Normal) Collected: 05/25/23 1407    Lab Status: Preliminary result Specimen: Blood from Hand, Right Updated: 05/27/23 1431     Blood Culture No growth at 2 days          No results found for: PREGTESTUR, PREGSERUM, HCG, HCGQUANT  Pain Management Panel          View : No data to display.                      I have personally looked at the labs and they are summarized  above.  ----------------------------------------------------------------------------------------------------------------------  Detailed radiology reports for the last 24 hours:    Imaging Results (Last 24 Hours)     ** No results found for the last 24 hours. **        ECG reviewed with t-wave inversions in similar distribution to prior    Assessment & Plan      #Acute on chronic hypoxic respiratory failure secondary to CV19 (1L chronically)  #Severe PAH  #Extrathoracic respiratory compromise secondary to spinal kyphosis  -CV19 positive and patient symptomatic with worsened O2 requirement above baseline  -Remdesivir, dexamethasone 6mg daily, duonebs  -CRP down compared to discharge, procal wnl, holding abx  -CT chest consistent with viral PNA w/o dense lobar consolidation  -TTE reviewed from recent admission with significant PAH  -Ed provider concerned regarding HF exacerbation and s/p IV bumex however not clinical in HF exacerbation and no significant effusions on cxr. BNP elevated but stable. ECG with right heart strain pattern. Troponin elevated but flat.  -Palliative care consulted given end stage HF and malnutrition, likely hospice appropriate on discharge if able to recover from acute CV19 inf  -NC O2 titrated to >88%     #Persistnet Troponin elevation secondary to CKD  -No evidence of acute ishcemia    #Persistent Afib w/rvr  -Cont PO metoprolol 50mg BID, cards consulted and follows w/dr. Hunter outpatient. Dilt bolus and gtt started 5/27  -Increase diltiazem rate prn to keep rate <110 bpm goal  -500cc IVF bolus given  -Holding PO bumex and will resume prn, appears euvolemic  -Duonebs changed to atrovent     #HFpEF, severe PAH  -TTE reviewed, resumed home every other day bumex  -Resumed Imdur and hydral with holding parameters  -Redsvest 30 consistent with suspected compensated HF not in exacerbation. Hypoxia purely driven by covid PNA    #Lactate elevation, clinically insignificant (resolved)    #Severe  protein malnutrition  -Per RD assessment, supplement as recommended     - - Chronic conditions - -      #CKD: Cr at baseline  #Severe OA, RA  #GERD: PPI    VTE Prophylaxis:   Mechanical Order History:     None      Pharmalogical Order History:      Ordered     Dose Route Frequency Stop    05/25/23 1926  apixaban (ELIQUIS) tablet 2.5 mg         2.5 mg PO Every 12 Hours Scheduled --                Code Status and Medical Interventions:   Ordered at: 05/25/23 1634     Medical Intervention Limits:    NO intubation (DNI)     Code Status (Patient has no pulse and is not breathing):    No CPR (Do Not Attempt to Resuscitate)     Medical Interventions (Patient has pulse or is breathing):    Limited Support         Disposition: Cont tele monitoring    I have reviewed any copied/forwarded text or data, verified its accuracy, and updated as necessary above.    Temo Brothers MD  [facility] Hospitalist  05/27/23  16:56 EDT

## 2023-05-27 NOTE — NURSING NOTE
Notified by telemetry that patient's heart rate is PSVT in the 170's @ approximately 06:27. Paged Dr. Garcia went to check on patient. Patient was having some shortness of air. Vitals checked. Patient's heart rate was maintaining 170-180's. Rapid response was called. Dr. Garcia was at bedside. Orders to give iv Adenosine noted. Patient converted to SR 70-80's. Patient stated she was feeling better. V/s stable.

## 2023-05-27 NOTE — PLAN OF CARE
Goal Outcome Evaluation:      Pt has had a high heart rate for most of the day. Her morning doses of BP meds didn't help BP. She was eventually started on a Cardizem gtt at 5 ml/hr with a 10 mg bolus. He heart rate came down into the 80s briefly but started to climb up at the end of shift. Her oxygen was titrated down to 2 lnc from 4lnc but Pt had a non relieved feeling of suffocating. Family got updates on Pt through out the day. Will continue to follow plan of care.

## 2023-05-27 NOTE — THERAPY EVALUATION
Acute Care - Speech Language Pathology   Swallow Initial Evaluation [facility]   CLINICAL DYSPHAGIA ASSESSMENT     Patient Name: Anh Saldana  : 1934  MRN: [id number]  Today's Date: 2023             Admit Date: 2023    Anh Saldana  is seen at bedside this am on 3S to assess safety/efficacy of swallowing fnx, determine safest/least restrictive diet tolerance.     Ms. Saldana presented to [facility] ED 23 from local SNF for evaluation of worsening hypoxia, cough. She was  recently hospitalized for CHF exacerbation c/b e.coli bacteremia improved with appropriate abx coverage and diuretics, discharged on daily steroid on  and home diuretic. Had progressive dyspnea over last few days with mild LE edema with grant boots placed at SNF, noted hypoxia at SNF however and sent ot ED for eval. On arrival initial abg with mild hypoxia, paO2 57.2 with bnp eleveated to 64K similar to prior but covid test noted as positive. CXR without over opacifications or significant effusions, limited by extreme kyphosis. CRP down significantly from prior while bacteremic. COVID-19 positive in ED. She was admitted to further evaluation and management.     PMH is significant for osteoporosis, HTN, dyspnea, atrial fibrillation, [place] bilaterally, CHF, ARF, malnutrition, spinal kyphosis, breast CA, right Marc's Palsy,RA, OA. No previous SLP notes appreciated in EMR review.     She is noted s/p rapid response this am @ 655, converted to SVT, persisted for 10 minutes despite attempts to abort with valsalva maneuvers. Administered 6mg IV adenosine and patient converted to sinus rhythm in the 60-70s. Cardiology consultation pending.     She is referred to rule out dysphagia.     Social History     Socioeconomic History   • Marital status:    Tobacco Use   • Smoking status: Never   • Smokeless tobacco: Never   Vaping Use   • Vaping Use: Never used   Substance and Sexual Activity   • Alcohol use: No   • Drug use: No   • Sexual  activity: Defer      Imaging:  CT Chest Without Intravenous Contrast     EXAM DATE:    5/26/2023 7:48 AM     CLINICAL HISTORY:    Dyspnea, chronic, unclear etiology; I50.9-Heart failure, unspecified;  J96.21-Acute and chronic respiratory failure with hypoxia     TECHNIQUE:    Axial computed tomography images of the chest without intravenous  contrast.  Sagittal and coronal reformatted images were created and  reviewed.  This CT exam was performed using one or more of the following  dose reduction techniques:  automated exposure control, adjustment of  the mA and/or kV according to patient size, and/or use of iterative  reconstruction technique.     COMPARISON:    No relevant prior studies available.     FINDINGS:    LUNGS AND PLEURAL SPACES:  Small right and tiny left pleural effusion.   Left lung base atelectasis and probable superimposed pneumonia.    HEART:  Cardiomegaly.  No significant pericardial effusion.  No  significant coronary artery calcifications.    BONES/JOINTS:  Multilevel compression deformity of thoracic vertebral  bodies.  No dislocation.    SOFT TISSUES:  Unremarkable.    VASCULATURE:  Atherosclerotic disease.  No thoracic aortic aneurysm.    LYMPH NODES:  Unremarkable.  No enlarged lymph nodes.     IMPRESSION:  1.  Cardiomegaly.  2.  Small right and tiny left pleural effusion.  3.  Left lung base atelectasis and probable superimposed pneumonia.     This report was finalized on 5/26/2023 8:05 AM by Dr. River Webb MD.     Diet Orders (active) (From admission, onward)     Start     Ordered    05/27/23 1155  Diet: Cardiac Diets; Healthy Heart (2-3 Na+); Feeding Assistance - Nursing; Texture: Pureed (NDD 1); Fluid Consistency: Thin (IDDSI 0)  Diet Effective Now         05/27/23 1155    05/26/23 1800  Dietary Nutrition Supplements Magic Cup  Daily With Breakfast, Lunch & Dinner       05/26/23 1234              She is observed on O2 via NC 4L.     Ms. Saldana is positioned upright and centered in to  "her maximal potential 2/2 significant kyphosis, to accept multiple po presentations of solid cracker, puree, and thin liquids via spoon, cup and straw.  She is able to self provide, however, she fatigues easily in general across this assessment.     Facial/oral structures are symmetrical upon observation. Lingual protrusion reveals no deviation. Oral mucosa are moist, pink, and clean. Secretions are clear, thin, and  controlled. OROM/FABBY is moderately weak in general with delays in general to imitate oral postures. Gag is not assessed. Volitional cough is intact w/ weak intensity, congestive in quality, non-productive. Voice is adequate in intensity, clear in quality w/ intelligible speech. She is markedly [place] bilaterally, complicating communications. She does report fatigue with po acceptance, stating, \"Just a few bites fills me up, honey. Chewing makes me tired.\" Multiple PO trays are present at bedside with acceptance primarily of liquids and soft/puree foods only. Minimal acceptance of trays evident. She does report fatigue with self feeding.     Upon po presentations, adequate bolus anticipation and acceptance w/ good labial seal for bolus clearance via spoon bowl, cup rim stability and suction via straw. Bolus formation, manipulation and control are moderately weak in general with mixed phasic rotary mastication pattern. Increased oral prep time with solids with fatigue evidenced. Piecemeal deglultition of solids. No significant oral residue appreciated. No overt s/s aspiration before the swallow.     Pharyngeal swallow is timely w/ adequate hyolaryngeal elevation per palpation. No overt s/s aspiration evidenced across this evaluation. No silent aspiration suspected.      Visit Dx:     ICD-10-CM ICD-9-CM   1. Acute on chronic congestive heart failure, unspecified heart failure type  I50.9 428.0   2. Acute on chronic respiratory failure with hypoxia  J96.21 518.84     799.02     Patient Active Problem List " "  Diagnosis   • Osteoporosis, unspecified   • Hypertensive urgency   • Essential hypertension   • Dyspnea on exertion   • Acute on chronic heart failure with preserved ejection fraction (HFpEF)   • Persistent atrial fibrillation   • Hearing loss   • Acute on chronic heart failure with preserved ejection fraction (HFpEF)   • Acute respiratory failure with hypoxemia   • Severe Malnutrition (HCC)   • Chronic heart failure with preserved ejection fraction (HFpEF)   • Acute on chronic congestive heart failure, unspecified heart failure type     Past Medical History:   Diagnosis Date   • Breast cancer 2011   • CHF (congestive heart failure)    • Chronic kidney disease     acute kidney failure   • [place] (hard of hearing)    • Hypertension    • Osteoarthritis    • Osteoporosis    • Rheumatoid arthritis    • Right-sided Bell's palsy    • Shortness of breath     sleeps with oxygen at night     Past Surgical History:   Procedure Laterality Date   • BREAST LUMPECTOMY Left     benign   • CATARACT EXTRACTION W/ INTRAOCULAR LENS  IMPLANT, BILATERAL Bilateral    • SHOULDER SURGERY Left     metal plate in left shoulder     EDUCATION  The patient has been educated in the following areas:   Dysphagia (Swallowing Impairment) Oral Care/Hydration Modified Diet Instruction.     Impression: Ms. Saldana presents with a moderate oral dysphagia 2/2 generalized weakness, fatigue, limited endurance with po presentations, mastication, formation. WFL pharyngeal swallow w/o s/s aspiration.No s/s indicative of silent aspiration.  No odynophagia reported. She does report fatigue with po acceptance, stating, \"Just a few bites fills me up, honey. Chewing makes me tired.\" Multiple PO trays are present at bedside with acceptance primarily of liquids and soft/puree foods only. Minimal acceptance of trays evident. She does report fatigue with self feeding.     She is felt to most beneift from modified po diet of puree consistencies, thin liquids. Medications " whole in puree, single pill presentations only, crushed in puree PRN pend size and coating. 1:1 assistance with all po intake to avoid fatigue effects with self feeding attempts, encourage max po acceptance per malnutrition status. Universal aspiration precautions, HAZEL precautions, oral care protocol. Please offer frequent snacks at points of care to encourage po acceptance, limit fatigue across large meals.      She does present with moderate [place] bilaterally, premorbid. She denies any hearing amplification systems. I will try to locate an amplification device, dry erase board  in [facility] IPR for use during this admission in an attempt to improve communicates. PPE only complicates this further.     SLP Recommendation and Plan    1. Puree consistencies, thin liquids.    2. Medications whole in puree/thins, single pill presentations only, please. Crushed PRN pend size and coating.   3. Upright and centered for all po intake with 1:1 assistance with all po intake.   4. Universal aspiration precautions.   5. HAZEL precautions.  6. Oral care protocol.  7. Please offer frequent snacks at points of care to encourage po acceptance, limit fatigue across large meals.      SLP to follow up for diet safety/acceptance/ upgrades.     D/w ms. Les results and recommendations w/ verbal agreement.    Thank you for allowing me to participate in the care of your patient-  Haley Lange M.S., [facility]/SLP                                                                                           Time Calculation:    Time Calculation- SLP     Time Calculation- SLP     Row Name 05/27/23 1155    SLP Received On 05/27/23  -Recorded by: DORA    SLP - Next Appointment 05/29/23  -Recorded by: DORA                User Key     Initials Name Provider Type    Haley Arenas, MS CCC-SLP Speech and Language Pathologist                Therapy Charges for Today     Code Description Service Date Service Provider Modifiers Qty    [id number] [facility]  ORAL PHARYNG SWALLOW 6 5/27/2023 Haley Lange, MS CCC-SLP GN 1               Haley Lange, MS CCC-SLP  5/27/2023

## 2023-05-28 NOTE — PLAN OF CARE
Goal Outcome Evaluation:    Pt is resting in bed with no s/s of distress noted. VSS. IV access maintained. Wound care completed. Will continue with plan of care.

## 2023-05-28 NOTE — PROGRESS NOTES
"    [facility] HOSPITALIST PROGRESS NOTE     Patient Identification:  Name:  Anh Saldana  Age:  88 y.o.  Sex:  female  :  1934  MRN:  [id number]  Visit Number:  [id number]  ROOM: [address] [place] [address]     Primary Care Provider:  Meme Medellin MD     Date of Admission: 2023    Length of stay in inpatient status:  3    Subjective     Chief Compliant:    Chief Complaint   Patient presents with   • Shortness of Breath   • Edema        Patient with SVT intermixed with sinus bradycardia yesterday after cardizem infusion, unclear what time it was stopped but not running this am. Remains on 4l NC. Discussed condition and patient reports \"her body is just giving out\". Wants to cont current treatment but GOC discussion limited by significant hearing deficits.         Objective     Current Hospital Meds:  apixaban, 2.5 mg, Oral, Q12H  aspirin, 81 mg, Oral, Daily  atorvastatin, 20 mg, Oral, Nightly  budesonide, 0.5 mg, Nebulization, BID  bumetanide, 1 mg, Oral, Daily  dexamethasone sodium phosphate, 6 mg, Intravenous, Q24H  hydrALAZINE, 10 mg, Oral, Q8H  ipratropium, 0.5 mg, Nebulization, 4x Daily - RT  isosorbide mononitrate, 30 mg, Oral, Q24H  magic barrier cream, , Topical, TID  metoprolol tartrate, 25 mg, Oral, BID  nystatin, , Topical, Q12H  pantoprazole, 40 mg, Oral, QAM AC  senna-docusate sodium, 2 tablet, Oral, BID  sodium chloride, 10 mL, Intravenous, Q12H       Current Antimicrobial Therapy:  Anti-Infectives (From admission, onward)    Ordered     Dose/Rate Route Frequency Start Stop    23  remdesivir 100 mg in 270 mL NS        Ordering Provider: Temo Brothers MD   See Hyperspace for full Linked Orders Report.    100 mg  over 60 Minutes Intravenous Every 24 Hours 23 2100 23 2317    23  remdesivir 200 mg in 290 mL NS        Ordering Provider: Temo Brothers MD   See Hyperspace for full Linked Orders Report.    200 mg  over 60 Minutes Intravenous Once 23 " 2030 05/25/23 2145 05/25/23 1249  levoFLOXacin (LEVAQUIN) 750 mg/150 mL D5W (premix) (LEVAQUIN) 750 mg        Ordering Provider: Eris Hammond MD    750 mg  over 90 Minutes Intravenous Once 05/25/23 1305 05/25/23 1556        Current Diuretic Therapy:  Diuretics (From admission, onward)    Ordered     Dose/Rate Route Frequency Start Stop    05/28/23 1038  bumetanide (BUMEX) tablet 1 mg        Ordering Provider: Temo Brothers MD    1 mg Oral Daily 05/28/23 1100      05/25/23 1514  bumetanide (BUMEX) injection 2 mg        Ordering Provider: Eris Hammond MD    2 mg Intravenous Once 05/25/23 1530 05/25/23 1626        ----------------------------------------------------------------------------------------------------------------------  Vital Signs:  Temp:  [97.2 °F (36.2 °C)-98.5 °F (36.9 °C)] 98.5 °F (36.9 °C)  Heart Rate:  [] 60  Resp:  [16-20] 18  BP: (106-142)/(64-88) 106/65  SpO2:  [97 %-99 %] 99 %  on  Flow (L/min):  [4] 4;   Device (Oxygen Therapy): nasal cannula  Body mass index is 17.31 kg/m².    Wt Readings from Last 3 Encounters:   05/28/23 41.5 kg (91 lb 9.6 oz)   05/19/23 41.2 kg (90 lb 12.8 oz)   07/26/21 42.6 kg (94 lb)     Intake & Output (last 3 days)       05/25 0701 05/26 0700 05/26 0701 05/27 0700 05/27 0701 05/28 0700 05/28 0701 05/29 0700    P.O.  110 120 290    IV Piggyback 150       Total Intake(mL/kg) 150 (3.6) 110 (2.7) 120 (2.9) 290 (7)    Urine (mL/kg/hr)  550 (0.6) 700 (0.7)     Total Output  550 700     Net +150 -440 -580 +290            Urine Unmeasured Occurrence 1 x 1 x 2 x 2 x    Stool Unmeasured Occurrence    2 x        Diet: Cardiac Diets; Healthy Heart (2-3 Na+); Feeding Assistance - Nursing; Texture: Pureed (NDD 1); Fluid Consistency: Thin (IDDSI 0)  ----------------------------------------------------------------------------------------------------------------------  Physical Exam  Vitals and nursing note reviewed.   Constitutional:       General: She is not in  acute distress.     Appearance: She is ill-appearing.   HENT:      Head: Normocephalic and atraumatic.      Mouth/Throat:      Mouth: Mucous membranes are dry.      Pharynx: Oropharynx is clear.   Eyes:      General: No scleral icterus.     Extraocular Movements: Extraocular movements intact.   Cardiovascular:      Rate and Rhythm: Bradycardia present. Rhythm regular.      Pulses: Normal pulses.   Pulmonary:      Effort: Pulmonary effort is normal. No respiratory distress.      Breath sounds: No wheezing.   Abdominal:      General: Abdomen is flat. There is no distension.   Musculoskeletal:      Right lower leg: 3+ edema.      Left lower leg: 3+edema.      Comments: Extreme kyphosis and muscle wasting    B/l LE wrapped in kerlex with weeping from R leg   Neurological:      General: No focal deficit present.      Mental Status: She is alert. Mental status is at baseline.   Psychiatric:      Comments: Pleasant appearing, hard of hearing but mood stable  ----------------------------------------------------------------------------------------------------------------------    ----------------------------------------------------------------------------------------------------------------------  LABS:    CBC and coagulation:  Results from last 7 days   Lab Units 05/28/23  0209 05/26/23  0127 05/25/23  1929 05/25/23  1700 05/25/23  1640 05/25/23  1407 05/22/23  1052   PROCALCITONIN ng/mL  --   --  0.21  --   --   --   --    LACTATE mmol/L  --   --   --  1.1  --  2.1*  --    CRP mg/dL 3.84*  --   --  3.71*  --   --  2.16*   WBC 10*3/mm3 9.82 11.42*  --   --  10.59 10.73 11.53*   HEMOGLOBIN g/dL 12.1 11.4*  --   --  11.6* 12.0 11.6*   HEMATOCRIT % 38.1 37.3  --   --  37.0 39.0 36.7   MCV fL 104.4* 105.7*  --   --  103.1* 107.7* 105.5*   MCHC g/dL 31.8 30.6*  --   --  31.4* 30.8* 31.6   PLATELETS 10*3/mm3 277 242  --   --  190 080 336     Acid/base balance:  Results from last 7 days   Lab Units 05/25/23  1302   PH, ARTERIAL pH  units 7.448   PO2 ART mm Hg 57.2*   PCO2, ARTERIAL mm Hg 44.8   HCO3 ART mmol/L 31.0*     Renal and electrolytes:  Results from last 7 days   Lab Units 05/28/23  0209 05/27/23 0102 05/26/23 0127 05/25/23 1640 05/25/23 1407 05/22/23  1052   SODIUM mmol/L 141 140 136 136 137 137   POTASSIUM mmol/L 3.7 3.9 3.5 4.4 3.9 4.1   MAGNESIUM mg/dL 2.2 2.1  --   --   --   --    CHLORIDE mmol/L 100 100 96* 98 100 98   CO2 mmol/L 23.4 25.8 26.5 27.3 27.2 29.1*   BUN mg/dL 57* 51* 43* 44* 44* 41*   CREATININE mg/dL 1.63* 1.45* 1.28* 1.34* 1.33* 1.26*   CALCIUM mg/dL 9.1 9.1 9.0 9.0 9.1 9.1   PHOSPHORUS mg/dL 5.0*  --   --   --   --   --    GLUCOSE mg/dL 118* 152* 109* 109* 155* 97     Estimated Creatinine Clearance: 15.6 mL/min (A) (by C-G formula based on SCr of 1.63 mg/dL (H)).    Liver and pancreatic function:  Results from last 7 days   Lab Units 05/28/23 0209 05/27/23 0102 05/26/23 0127 05/25/23 1640 05/25/23 1407 05/22/23  1052   ALBUMIN g/dL 3.2* 3.2* 3.0* 3.2* 3.2* 3.2*   BILIRUBIN mg/dL 0.5 0.5 0.5 0.6 0.5 0.4   ALK PHOS U/L 109 108 101 108 107 102   AST (SGOT) U/L 35* 27 23 26 25 22   ALT (SGPT) U/L 15 13 13 13 13 14     Endocrine function:  No results found for: HGBA1C  Point of care bedside glucose levels:  Results from last 7 days   Lab Units 05/28/23  0909   GLUCOSE mg/dL 98     Glucose levels from the [facility]:  Results from last 7 days   Lab Units 05/28/23 0209 05/27/23 0102 05/26/23  0127 05/25/23  1640 05/25/23  1407 05/22/23  1052   GLUCOSE mg/dL 118* 152* 109* 109* 155* 97     Lab Results   Component Value Date    TSH 3.950 05/10/2023     Cardiac:  Results from last 7 days   Lab Units 05/25/23  1700 05/25/23  1407   HSTROP T ng/L 77* 78*   PROBNP pg/mL  --  64,766.0*       Cultures:  Lab Results   Component Value Date    COLORU Yellow 05/10/2023    CLARITYU Clear 05/10/2023    PHUR 6.5 05/10/2023    GLUCOSEU Negative 05/10/2023    KETONESU Negative 05/10/2023    BLOODU Moderate (2+) (A) 05/10/2023     NITRITEU Positive (A) 05/10/2023    LEUKOCYTESUR Small (1+) (A) 05/10/2023    BILIRUBINUR Negative 05/10/2023    UROBILINOGEN 0.2 E.U./dL 05/10/2023    RBCUA 13-20 (A) 05/10/2023    WBCUA 13-20 (A) 05/10/2023    BACTERIA 2+ (A) 05/10/2023     Microbiology Results (last 10 days)     Procedure Component Value - Date/Time    COVID-19 and FLU A/B PCR - Swab, Nasopharynx [[id number]]  (Abnormal) Collected: 05/25/23 1637    Lab Status: Final result Specimen: Swab from Nasopharynx Updated: 05/25/23 1733     COVID19 Detected     Influenza A PCR Not Detected     Influenza B PCR Not Detected    Narrative:      Fact sheet for providers: https://www.fda.gov/media/515461/download    Fact sheet for patients: https://www.fda.gov/media/503173/download    Test performed by PCR.  Influenza A and Influenza B negative results should be considered presumptive in samples that have a positive SARS-CoV-2 result.    Competitive inhibition studies showed that SARS-CoV-2 virus, when present at concentrations above 3.6E+04 copies/mL, can inhibit the detection and amplification of influenza A and influenza B virus RNA if present at or below 1.8E+02 copies/mL or 4.9E+02 copies/mL, respectively, and may lead to false negative influenza virus results. If co-infection with influenza A or influenza B virus is suspected in samples with a positive SARS-CoV-2 result, the sample should be re-tested with another FDA cleared, approved, or authorized influenza test, if influenza virus detection would change clinical management.    Blood Culture - Blood, Hand, Right [[id number]]  (Normal) Collected: 05/25/23 1425    Lab Status: Preliminary result Specimen: Blood from Hand, Right Updated: 05/28/23 1431     Blood Culture No growth at 3 days    Blood Culture - Blood, Hand, Right [[id number]]  (Normal) Collected: 05/25/23 1407    Lab Status: Preliminary result Specimen: Blood from Hand, Right Updated: 05/28/23 1431     Blood Culture No growth at 3 days          No  results found for: PREGTESTUR, PREGSERUM, HCG, HCGQUANT  Pain Management Panel          View : No data to display.                      I have personally looked at the labs and they are summarized above.  ----------------------------------------------------------------------------------------------------------------------  Detailed radiology reports for the last 24 hours:    Imaging Results (Last 24 Hours)     Procedure Component Value Units Date/Time    XR Chest 1 View [[id number]] Collected: 05/28/23 0515     Updated: 05/28/23 0518    Narrative:      Procedure: Portable chest x-ray examination performed on 05/28/2023.  Single view. Portable technique.     HISTORY: Possible viral pneumonia.     FINDINGS:     Enlarged heart size.  Small right and left pleural effusion.  Hazy airspace opacities in the right upper lobe, right lower lobe, and  left lower lobe most consistent with underlying multifocal pneumonia  with possible contribution from compressive atelectasis from the  effusions.  Mild central pulmonary vascular congestion with mild edema.  Fixation plate and screws at the proximal left humerus.  Severe kyphosis limits assessment of the upper chest on the right due to  overlying structures.  No free air in the upper abdomen.       Impression:         1.  Enlarged heart size.  2.  Mild edema.  3.  Small right and left pleural effusion.  4.  No pneumothorax.  5.  Multifocal pneumonia with contribution from atelectasis in the lower  lungs.  6.  No pneumomediastinum.  7.  No free air in the upper abdomen.     This report was finalized on 5/28/2023 5:16 AM by Tuan Coto MD.           Most recent ECG w/NSR    CXR performed this am but difficult to interpret given obstruction from mandible and head secondary to severe kyphosis    Assessment & Plan      #Acute on chronic hypoxic respiratory failure secondary to CV19 (1L chronically)  #Severe PAH  #Extrathoracic respiratory compromise secondary to spinal  kyphosis  -CV19 positive and patient symptomatic with worsened O2 requirement above baseline  -Remdesivir, dexamethasone 6mg daily, duonebs  -CRP down compared to discharge, procal wnl, holding abx  -CT chest consistent with viral PNA w/o dense lobar consolidation  -TTE reviewed from recent admission with significant PAH  -Ed provider concerned regarding HF exacerbation and s/p IV bumex however not clinical in HF exacerbation and no significant effusions on cxr. BNP elevated but stable. ECG with right heart strain pattern. Troponin elevated but flat.  -Palliative care consulted given end stage HF and malnutrition, likely hospice appropriate on discharge if able to recover from acute CV19 inf  -NC O2 titrated to >88%  -Patient on stable 4L NC but not tolerating attempts to wean and continues to desat significantly with repositioning or bathing. GOC discussions limited by hearing deficits but confirmed DNR/DNI status      #Persistnet Troponin elevation secondary to CKD  -No evidence of acute ishcemia    #Persistent Afib w/rvr  -Initially improved with gentle IVF but now that LE edema developing will resume home PO diuretic  -Difficult to control SVT as she will have afib with rvr that's difficult to break but then once back to NSR will become bradycardic w/BP decline. Hypoxia also causing acute rhythm changes.  -Cardiology consulted, initially started diltiazem gtt but off this am. Cont metoprolol at reduced 25mg BID dose and can give additional 25mg tartrate prn for recurrent RVR episodes (home dose metoprolol 50mg BID)     #HFpEF, severe PAH  -TTE reviewed, EF preserved but with significant diastolic dysfunction and multiple severe valvular abnormalities.   -On q48hr bumex at home but resumed once daily here until edema improving. Was hypovolemic on arrival w/afib improved with gentle IVF however now with significant LE edema and evidence of cardiorenal syndrome  -Resumed Imdur and hydral with holding  parameters  -Redsvest 30 consistent with suspected compensated HF not in exacerbation.on arrival. Hypoxia purely driven by covid PNA    #CKD w/concern for early acute cardiorenal syndrome  -Baseline 1.3-1.5, hypervolemic on exam and diuretic resumed 5/28 given increased Cr and BUN concerning for cardiorenal syndrome  -Repeat renal panel in am along with bnp    #Lactate elevation, clinically insignificant (resolved)    #Severe protein malnutrition  -Per RD assessment, supplement as recommended     - - Chronic conditions - -     #Severe OA, RA  #GERD: PPI    VTE Prophylaxis:   Mechanical Order History:     None      Pharmalogical Order History:      Ordered     Dose Route Frequency Stop    05/25/23 1926  apixaban (ELIQUIS) tablet 2.5 mg         2.5 mg PO Every 12 Hours Scheduled --                Code Status and Medical Interventions:   Ordered at: 05/25/23 1634     Medical Intervention Limits:    NO intubation (DNI)     Code Status (Patient has no pulse and is not breathing):    No CPR (Do Not Attempt to Resuscitate)     Medical Interventions (Patient has pulse or is breathing):    Limited Support         Disposition: Cont tele monitoring, prognosis poor. GOC ongoing    I have reviewed any copied/forwarded text or data, verified its accuracy, and updated as necessary above.    Temo Brothers MD  [facility] Hospitalist  05/28/23  14:52 EDT

## 2023-05-28 NOTE — PLAN OF CARE
Goal Outcome Evaluation:     Patient resting in bed. No complaints or requests. No indicators of acute distress noted. Will continue to follow plan of care this shift.

## 2023-05-28 NOTE — PROGRESS NOTES
.  Patient Identification:  Name:  Anh Saldana  Age:  88 y.o.  Sex:  female  :  1934  MRN:  [id number]  Visit Number:  [id number]    Chief Complaint:   Tachycardia    Subjective:    History of presenting illness:     Anh Saldana is a 88 y.o. female has been admitted with history of increasing shortness of breath with a diagnosis of COVID-19 pneumonia and acute on chronic hypoxic respiratory failure.     I have been consulted as she developed episodes of paroxysmal SVT and PAF.  She was given IV adenosine.  Arrhythmia did not resolve but heart rate slowed down.  Subsequently she spontaneously converted to sinus tachycardia.  She was on metoprolol and missed her doses in the process of admission.     Patient is a poor historian due to hearing difficulty.  No history of increased shortness of breath, chest discomfort or palpitations.     Recently she was admitted with pneumonia and respiratory failure.     Her cardiovascular history is remarkable for paroxysmal atrial fibrillation, recent NSTEMI-decided to treat medically due to comorbid conditions and absence of chest pain, and heart failure with preserved EF.  She also has history of severe MR, severe TR and severe pulmonary hypertension.  History of benign essential hypertension.    Interval history:  Patient sleeping comfortably in bed.  No distress.      ----------------------------------------------------------------------------------------------------------------------  Current Hospital Meds:  apixaban, 2.5 mg, Oral, Q12H  aspirin, 81 mg, Oral, Daily  atorvastatin, 20 mg, Oral, Nightly  budesonide, 0.5 mg, Nebulization, BID  bumetanide, 1 mg, Oral, Daily  dexamethasone sodium phosphate, 6 mg, Intravenous, Q24H  hydrALAZINE, 10 mg, Oral, Q8H  ipratropium, 0.5 mg, Nebulization, 4x Daily - RT  isosorbide mononitrate, 30 mg, Oral, Q24H  magic barrier cream, , Topical, TID  metoprolol tartrate, 25 mg, Oral, BID  nystatin, , Topical, Q12H  pantoprazole, 40  mg, Oral, QAM AC  senna-docusate sodium, 2 tablet, Oral, BID  sodium chloride, 10 mL, Intravenous, Q12H         ----------------------------------------------------------------------------------------------------------------------  Vital Signs:  Temp:  [97.2 °F (36.2 °C)-98.5 °F (36.9 °C)] 97.7 °F (36.5 °C)  Heart Rate:  [] 63  Resp:  [16-20] 16  BP: (106-142)/(64-88) 128/83      05/25/23  1900 05/27/23  0500 05/28/23  0500   Weight: 41.7 kg (92 lb) 41.5 kg (91 lb 8 oz) 41.5 kg (91 lb 9.6 oz)     Body mass index is 17.31 kg/m².    Intake/Output Summary (Last 24 hours) at 5/28/2023 1640  Last data filed at 5/28/2023 1200  Gross per 24 hour   Intake 290 ml   Output 700 ml   Net -410 ml     Diet: Cardiac Diets; Healthy Heart (2-3 Na+); Feeding Assistance - Nursing; Texture: Pureed (NDD 1); Fluid Consistency: Thin (IDDSI 0)  ----------------------------------------------------------------------------------------------------------------------  Physical exam:  Constitutional:    HENT:  Head:  Normocephalic and atraumatic.    Eyes:  Conjunctivae and EOM are normal.  Pupils are equal, round, and reactive to light.  No scleral icterus.    Neck:  Neck supple.  No JVD present.    Cardiovascular: Normal rate, regular rhythm, S1 S2+, NO S3 / S4  Pulmonary/Chest:  Vesicular breath sounds B/L  Abdominal:  Soft.  Bowel sounds are normal.  No distension and no tenderness.      Neurological: Sleepy but arousable  Skin:  Skin is warm and dry. No rash noted. No pallor.   Musculoskeletal: Right leg with bandage in place   peripheral vascular:  2+ Pulses B/L DP  ----------------------------------------------------------------------------------------------------------------------    ----------------------------------------------------------------------------------------------------------------------  Results from last 7 days   Lab Units 05/25/23  1700 05/25/23  1407   HSTROP T ng/L 77* 78*     Results from last 7 days   Lab Units  05/28/23  0209 05/26/23  0127 05/25/23  1700 05/25/23  1640 05/25/23  1407 05/22/23  1052   CRP mg/dL 3.84*  --  3.71*  --   --  2.16*   LACTATE mmol/L  --   --  1.1  --  2.1*  --    WBC 10*3/mm3 9.82 11.42*  --  10.59 10.73 11.53*   HEMOGLOBIN g/dL 12.1 11.4*  --  11.6* 12.0 11.6*   HEMATOCRIT % 38.1 37.3  --  37.0 39.0 36.7   MCV fL 104.4* 105.7*  --  103.1* 107.7* 105.5*   MCHC g/dL 31.8 30.6*  --  31.4* 30.8* 31.6   PLATELETS 10*3/mm3 277 242  --  190 271 336     Results from last 7 days   Lab Units 05/25/23  1302   PH, ARTERIAL pH units 7.448   PO2 ART mm Hg 57.2*   PCO2, ARTERIAL mm Hg 44.8   HCO3 ART mmol/L 31.0*     Results from last 7 days   Lab Units 05/28/23  0209 05/27/23  0102 05/26/23  0127   SODIUM mmol/L 141 140 136   POTASSIUM mmol/L 3.7 3.9 3.5   MAGNESIUM mg/dL 2.2 2.1  --    CHLORIDE mmol/L 100 100 96*   CO2 mmol/L 23.4 25.8 26.5   BUN mg/dL 57* 51* 43*   CREATININE mg/dL 1.63* 1.45* 1.28*   CALCIUM mg/dL 9.1 9.1 9.0   GLUCOSE mg/dL 118* 152* 109*   ALBUMIN g/dL 3.2* 3.2* 3.0*   BILIRUBIN mg/dL 0.5 0.5 0.5   ALK PHOS U/L 109 108 101   AST (SGOT) U/L 35* 27 23   ALT (SGPT) U/L 15 13 13   Estimated Creatinine Clearance: 15.6 mL/min (A) (by C-G formula based on SCr of 1.63 mg/dL (H)).    No results found for: AMMONIA      Blood Culture   Date Value Ref Range Status   05/25/2023 No growth at 3 days  Preliminary   05/25/2023 No growth at 3 days  Preliminary     No results found for: URINECX  No results found for: WOUNDCX  No results found for: STOOLCX  Echo:  Results for orders placed during the hospital encounter of 05/10/23    Adult Transthoracic Echo Complete W/ Cont if Necessary Per Protocol    Interpretation Summary  •  Left ventricular systolic function is normal. Left ventricular ejection fraction appears to be 66 - 70%.  •  Left ventricular diastolic function is consistent with (grade I) impaired relaxation.  •  The right ventricular cavity is moderately dilated.  •  The left atrial cavity is  mild to moderately dilated.  •  The right atrial cavity is severely  dilated.  •  There is moderate calcification of the aortic valve mainly affecting the non-coronary and left coronary cusp(s).  •  Severe mitral valve regurgitation is present.  •  Severe tricuspid valve regurgitation is present.  •  Estimated right ventricular systolic pressure from tricuspid regurgitation is markedly elevated ( 94 mmHg).  •  Severe pulmonary hypertension is present.    @EKG: Sinus rhythm  I have personally looked at the labs and they are summarized above.  ----------------------------------------------------------------------------------------------------------------------  Imaging Results (Last 24 Hours)     Procedure Component Value Units Date/Time    XR Chest 1 View [[id number]] Collected: 05/28/23 0515     Updated: 05/28/23 0518    Narrative:      Procedure: Portable chest x-ray examination performed on 05/28/2023.  Single view. Portable technique.     HISTORY: Possible viral pneumonia.     FINDINGS:     Enlarged heart size.  Small right and left pleural effusion.  Hazy airspace opacities in the right upper lobe, right lower lobe, and  left lower lobe most consistent with underlying multifocal pneumonia  with possible contribution from compressive atelectasis from the  effusions.  Mild central pulmonary vascular congestion with mild edema.  Fixation plate and screws at the proximal left humerus.  Severe kyphosis limits assessment of the upper chest on the right due to  overlying structures.  No free air in the upper abdomen.       Impression:         1.  Enlarged heart size.  2.  Mild edema.  3.  Small right and left pleural effusion.  4.  No pneumothorax.  5.  Multifocal pneumonia with contribution from atelectasis in the lower  lungs.  6.  No pneumomediastinum.  7.  No free air in the upper abdomen.     This report was finalized on 5/28/2023 5:16 AM by Tuan Coto MD.            ----------------------------------------------------------------------------------------------------------------------    Assessment:  PSVT and paroxysmal atrial fibrillation  Mildly elevated high-sensitivity troponin  Paroxysmal atrial fibrillation  Severe pulm hypertension  COVID-19    Plan:  #1 cardiac.  Patient with history of paroxysmal atrial fibrillation currently sinus rhythm.  On beta-blockers which will be continued.  Anticoagulation with Eliquis to continue.  #2 mitral regurgitation.  Patient will benefit from afterload reduction if tolerated.  #3 elevated high-sensitivity troponin.  Possible type II MI.  On aspirin Lipitor, Imdur and metoprolol        This document has been electronically signed by Mary Kay Ernandez MD [facility], Interventional Cardiology  May 28, 2023 16:40 EDT

## 2023-05-29 NOTE — PROGRESS NOTES
Liberalized diet to regular - pureed texture - thin liquids- providing magic cup and boost plus with all meals - minimal po intake noted

## 2023-05-29 NOTE — PLAN OF CARE
Goal Outcome Evaluation:   VSS on 4 L humidified nasal cannula, patient did have an episode of oxygen sats 79-84% requiring up to 6 L. Oxygen was titrated down to 3 L nasal cannula. SB/SR on the monitor. No indicators of acute distress noted. Bed alarm on and call light within reach.

## 2023-05-29 NOTE — THERAPY DISCHARGE NOTE
Acute Care - Speech Language Pathology   Swallow Re-Assessment/Discharge [facility]     Patient Name: Anh Saldana  : 1934  MRN: [id number]  Today's Date: 2023             Admit Date: 2023    Ms. Saldana was seen at bedside this am on 3S to reasses safety/acceptance of modified po diet.    Ms. Saldana presented to [facility] ED 23 from local SNF for evaluation of worsening hypoxia, cough. She was  recently hospitalized for CHF exacerbation c/b e.coli bacteremia improved with appropriate abx coverage and diuretics, discharged on daily steroid on  and home diuretic. Had progressive dyspnea over last few days with mild LE edema with grant boots placed at SNF, noted hypoxia at SNF however and sent ot ED for eval. On arrival initial abg with mild hypoxia, paO2 57.2 with bnp eleveated to 64K similar to prior but covid test noted as positive. CXR without over opacifications or significant effusions, limited by extreme kyphosis. CRP down significantly from prior while bacteremic. COVID-19 positive in ED. She was admitted to further evaluation and management.      PMH is significant for osteoporosis, HTN, dyspnea, atrial fibrillation, [place] bilaterally, CHF, ARF, malnutrition, spinal kyphosis, breast CA, right Marc's Palsy,RA, OA. No previous SLP notes appreciated in EMR review.     She is s/p clinical dysphagia assessment 23 w/o overt s/s aspiration. She was noted with a moderate oral dysphagia 2/2 generalized weakness, fatigue, limited endurance with po presentations, mastication, formation. WFL pharyngeal swallow w/o s/s aspiration.No s/s indicative of silent aspiration.  No odynophagia reported. She was recommended modified po diet of puree consistencies, thin liquids. Medications whole in puree, single pill presentations only, crushed in puree PRN pend size and coating. 1:1 assistance with all po intake to avoid fatigue effects with self feeding attempts, encourage max po acceptance per malnutrition  status. Universal aspiration precautions, HAZEL precautions, oral care protocol. Please offer frequent snacks at points of care to encourage po acceptance, limit fatigue across large meals.    Today, she continues in enhanced droplet isolation precautions for COVID-19. She is noted with liberalized po diet and addition of Magic Cup to encourage maximal nutritional benefits from very limited po intake per dietitian.     She is sitting upright and centered in bed, to her best ability per singificant kyphosis. She is agreeable to accept thin liquids via straw, Magic Cup.     Facial/oral structures are symmetrical upon observation. Lingual protrusion reveals no deviation. Oral mucosa are moist, pink, and clean. Secretions are clear, thin, and  controlled. OROM/FABBY is moderately weak in general with delays in general to imitate oral postures. Gag is not assessed. Volitional cough is intact w/ weak intensity, congestive in quality, non-productive. Voice is adequate in intensity, clear in quality w/ intelligible speech. She is markedly [place] bilaterally, complicating communications. She does report fatigue with po acceptance.    Upon po presentations, adequate bolus anticipation and acceptance w/ good labial seal for bolus clearance via spoon bowl, cup rim stability and suction via straw. Bolus formation, manipulation and control are moderately weak in general. No significant oral residue appreciated. No overt s/s aspiration before the swallow.  She denies any odynophagia. She continues to report minimal appetite.      Visit Dx:    ICD-10-CM ICD-9-CM   1. Acute on chronic congestive heart failure, unspecified heart failure type  I50.9 428.0   2. Acute on chronic respiratory failure with hypoxia  J96.21 518.84     799.02     Patient Active Problem List   Diagnosis   • Osteoporosis, unspecified   • Hypertensive urgency   • Essential hypertension   • Dyspnea on exertion   • Acute on chronic heart failure with preserved ejection  "fraction (HFpEF)   • Persistent atrial fibrillation   • Hearing loss   • Acute on chronic heart failure with preserved ejection fraction (HFpEF)   • Acute respiratory failure with hypoxemia   • Severe Malnutrition (HCC)   • Chronic heart failure with preserved ejection fraction (HFpEF)   • Acute on chronic congestive heart failure, unspecified heart failure type     Past Medical History:   Diagnosis Date   • Breast cancer 2011   • CHF (congestive heart failure)    • Chronic kidney disease     acute kidney failure   • [place] (hard of hearing)    • Hypertension    • Osteoarthritis    • Osteoporosis    • Rheumatoid arthritis    • Right-sided Bell's palsy    • Shortness of breath     sleeps with oxygen at night     Past Surgical History:   Procedure Laterality Date   • BREAST LUMPECTOMY Left     benign   • CATARACT EXTRACTION W/ INTRAOCULAR LENS  IMPLANT, BILATERAL Bilateral    • SHOULDER SURGERY Left     metal plate in left shoulder     EDUCATION  The patient has been educated in the following areas:   Dysphagia (Swallowing Impairment) Oral Care/Hydration Modified Diet Instruction.    Impression: Ms. Saldana continues to accept modified po diet of puree consistencies, thin liquids. She continues with limited po acceptance/intake with minimal appetite reported. She continues with generalized fatigue effects. She does report preference for \"soft foods like this (referring to ice cream.\" She further status, \"ice cold water is what I really want. That's it.\" No overt s/s aspiration across this reassessment.     She is felt to most benefit from continued modified po diet of puree consistencies, thin liquids. Medications whole in puree, single pill presentations only, crushed in puree PRN pend size and coating. 1:1 assistance with all po intake to avoid fatigue effects with self feeding attempts, encourage max po acceptance per malnutrition status. Universal aspiration precautions, HAZEL precautions, oral care protocol. Please " offer frequent snacks at points of care to encourage po acceptance, limit fatigue across large meals.    SLP Recommendation and Plan    1. Continue modified po diet of puree consistencies, thin liquids.    2. Medications whole in puree/thins, single pill presentations only, please. Crushed PRN pend size and coating.   3. Upright and centered for all po intake with 1:1 assistance with all po intake.   4. Universal aspiration precautions.   5. HAZEL precautions.  6. Oral care protocol.  7. Please offer frequent snacks at points of care to encourage po acceptance, limit fatigue across large meals.       No further formal SLP follow up as this is felt to be representative of her preferred, least restrictive modified po diet.      D/w ms. Saldana results and recommendations w/ verbal agreement.     Thank you for allowing me to participate in the care of your patient-  Haley Lange M.S., CCC/SLP                                                                                         Time Calculation:       Therapy Charges for Today     Code Description Service Date Service Provider Modifiers Qty    86237822409 HC ST EVAL ORAL PHARYNG SWALLOW 4 5/29/2023 Haley Lange, MS CCC-SLP GN 1                    Haley Lange, MS CCC-SLP  5/29/2023

## 2023-05-29 NOTE — PROGRESS NOTES
"    [facility] HOSPITALIST PROGRESS NOTE     Patient Identification:  Name:  Anh Saldana  Age:  88 y.o.  Sex:  female  :  1934  MRN:  [id number]  Visit Number:  [id number]  ROOM: [address] [place] [address]     Primary Care Provider:  Meme Medellin MD    Length of stay in inpatient status:  4    Subjective     Chief Compliant:    Chief Complaint   Patient presents with   • Shortness of Breath   • Edema       History of Presenting Illness:    Patient very hard of hearting and hard to communicate with. What I could understand she reported \"she wished she felt better\". She was also able to communicate she felt more confused than normal. No family bedside.       ROS:  Otherwise 10 point ROS negative other than documented above in HPI.     Objective     Current Hospital Meds:apixaban, 2.5 mg, Oral, Q12H  aspirin, 81 mg, Oral, Daily  atorvastatin, 20 mg, Oral, Nightly  budesonide, 0.5 mg, Nebulization, BID  bumetanide, 1 mg, Oral, Daily  dexamethasone sodium phosphate, 6 mg, Intravenous, Q24H  hydrALAZINE, 10 mg, Oral, Q8H  ipratropium, 0.5 mg, Nebulization, 4x Daily - RT  isosorbide mononitrate, 30 mg, Oral, Q24H  magic barrier cream, , Topical, TID  metoprolol tartrate, 25 mg, Oral, BID  nystatin, , Topical, Q12H  pantoprazole, 40 mg, Oral, QAM AC  senna-docusate sodium, 2 tablet, Oral, BID  sodium chloride, 10 mL, Intravenous, Q12H         Current Antimicrobial Therapy:  Anti-Infectives (From admission, onward)    Ordered     Dose/Rate Route Frequency Start Stop    23  remdesivir 100 mg in 270 mL NS        Ordering Provider: Temo Brothers MD   See Hyperspace for full Linked Orders Report.    100 mg  over 60 Minutes Intravenous Every 24 Hours 23 2100 23 2317    23  remdesivir 200 mg in 290 mL NS        Ordering Provider: Temo Brothers MD   See Hyperspace for full Linked Orders Report.    200 mg  over 60 Minutes Intravenous Once 23 21423 1249 "  levoFLOXacin (LEVAQUIN) 750 mg/150 mL D5W (premix) (LEVAQUIN) 750 mg        Ordering Provider: Eris Hammond MD    750 mg  over 90 Minutes Intravenous Once 05/25/23 1305 05/25/23 1556        Current Diuretic Therapy:  Diuretics (From admission, onward)    Ordered     Dose/Rate Route Frequency Start Stop    05/28/23 1038  bumetanide (BUMEX) tablet 1 mg        Ordering Provider: Temo Brothers MD    1 mg Oral Daily 05/28/23 1100      05/25/23 1514  bumetanide (BUMEX) injection 2 mg        Ordering Provider: Eris Hammond MD    2 mg Intravenous Once 05/25/23 1530 05/25/23 1626        ----------------------------------------------------------------------------------------------------------------------  Vital Signs:  Temp:  [97.2 °F (36.2 °C)-98.3 °F (36.8 °C)] 97.2 °F (36.2 °C)  Heart Rate:  [61-72] 65  Resp:  [18-22] 18  BP: (135-154)/(62-91) 146/91  SpO2:  [81 %-99 %] 99 %  on  Flow (L/min):  [2-6] 4;   Device (Oxygen Therapy): humidified;nasal cannula  Body mass index is 17.4 kg/m².    Wt Readings from Last 3 Encounters:   05/29/23 41.8 kg (92 lb 1.6 oz)   05/19/23 41.2 kg (90 lb 12.8 oz)   07/26/21 42.6 kg (94 lb)     Intake & Output (last 3 days)       05/26 0701 05/27 0700 05/27 0701 05/28 0700 05/28 0701 05/29 0700 05/29 0701 05/30 0700    P.O. 110 120 290 60    I.V. (mL/kg)   910.4 (21.8)     IV Piggyback        Total Intake(mL/kg) 110 (2.7) 120 (2.9) 1200.4 (28.7) 60 (1.4)    Urine (mL/kg/hr) 550 (0.6) 700 (0.7) 400 (0.4)     Stool   0     Total Output 550 700 400     Net -440 -580 +800.4 +60            Urine Unmeasured Occurrence 1 x 2 x 7 x     Stool Unmeasured Occurrence   5 x         Diet: Regular/House Diet; Feeding Assistance - Nursing; Texture: Pureed (NDD 1); Fluid Consistency: Thin (IDDSI 0)  ----------------------------------------------------------------------------------------------------------------------  Physical exam:  Constitutional:  Elderly appearing, chronically ill appearing, thin       HENT:  Head:  Normocephalic and atraumatic.  Mouth:  Moist mucous membranes.    Eyes:  Conjunctivae and EOM are normal. No scleral icterus.    Neck:  Neck supple.  No JVD present.    Cardiovascular:  Normal rate, regular rhythm and normal heart sounds with no murmur.  Pulmonary/Chest:  No respiratory distress, no wheezes, no crackles, with normal breath sounds and good air movement.  Abdominal:  Soft.  Bowel sounds are normal.  No distension and no tenderness.   Musculoskeletal:  Severe kyphosis with anterior head.   Neurological:  Alert and oriented to person. No focal deficit.   Skin:  Skin is warm and dry. No rash noted. No pallor.   Peripheral vascular:  Pulses in all 4 extremities with no clubbing, no cyanosis, no edema.  ----------------------------------------------------------------------------------------------------------------------  Tele:    ----------------------------------------------------------------------------------------------------------------------  Results from last 7 days   Lab Units 05/29/23  0220 05/28/23  0209 05/26/23  0127 05/25/23  1700 05/25/23  1640 05/25/23  1407   CRP mg/dL  --  3.84*  --  3.71*  --   --    LACTATE mmol/L  --   --   --  1.1  --  2.1*   WBC 10*3/mm3 8.63 9.82 11.42*  --    < > 10.73   HEMOGLOBIN g/dL 12.7 12.1 11.4*  --    < > 12.0   HEMATOCRIT % 40.3 38.1 37.3  --    < > 39.0   MCV fL 104.7* 104.4* 105.7*  --    < > 107.7*   MCHC g/dL 31.5 31.8 30.6*  --    < > 30.8*   PLATELETS 10*3/mm3 270 277 242  --    < > 271    < > = values in this interval not displayed.     Results from last 7 days   Lab Units 05/29/23  0446   PH, ARTERIAL pH units 7.384   PO2 ART mm Hg 129.0*   PCO2, ARTERIAL mm Hg 46.4*   HCO3 ART mmol/L 27.7*     Results from last 7 days   Lab Units 05/29/23  0220 05/28/23  0209 05/27/23  0102   SODIUM mmol/L 141 141 140   POTASSIUM mmol/L 4.4 3.7 3.9   MAGNESIUM mg/dL  --  2.2 2.1   CHLORIDE mmol/L 101 100 100   CO2 mmol/L 22.9 23.4 25.8   BUN mg/dL  64* 57* 51*   CREATININE mg/dL 1.74* 1.63* 1.45*   CALCIUM mg/dL 9.1 9.1 9.1   PHOSPHORUS mg/dL  --  5.0*  --    GLUCOSE mg/dL 126* 118* 152*   ALBUMIN g/dL 3.1* 3.2* 3.2*   BILIRUBIN mg/dL 0.4 0.5 0.5   ALK PHOS U/L 102 109 108   AST (SGOT) U/L 45* 35* 27   ALT (SGPT) U/L 17 15 13   Estimated Creatinine Clearance: 14.7 mL/min (A) (by C-G formula based on SCr of 1.74 mg/dL (H)).  No results found for: AMMONIA  Results from last 7 days   Lab Units 05/25/23  1700 05/25/23  1407   HSTROP T ng/L 77* 78*     Results from last 7 days   Lab Units 05/29/23  0220 05/25/23  1407   PROBNP pg/mL >70,000.0* 64,766.0*         Glucose   Date/Time Value Ref Range Status   05/28/2023 0909 98 70 - 130 mg/dL Final     Comment:     Meter: [id number] : [id number] RODY MCMILLAN     Lab Results   Component Value Date    TSH 3.950 05/10/2023     No results found for: PREGTESTUR, PREGSERUM, HCG, HCGQUANT  Pain Management Panel          View : No data to display.                    Brief Urine Lab Results  (Last result in the past 365 days)      Color   Clarity   Blood   Leuk Est   Nitrite   Protein   CREAT   Urine HCG        05/10/23 1702 Yellow   Clear   Moderate (2+)   Small (1+)   Positive   100 mg/dL (2+)               Blood Culture   Date Value Ref Range Status   05/25/2023 No growth at 4 days  Preliminary   05/25/2023 No growth at 4 days  Preliminary     No results found for: URINECX  No results found for: WOUNDCX  No results found for: STOOLCX  No results found for: RESPCX  No results found for: AFBCX  Results from last 7 days   Lab Units 05/28/23  0209 05/25/23  1929 05/25/23  1700 05/25/23  1407   PROCALCITONIN ng/mL  --  0.21  --   --    LACTATE mmol/L  --   --  1.1 2.1*   CRP mg/dL 3.84*  --  3.71*  --        I have personally looked at the labs and they are summarized above.  ----------------------------------------------------------------------------------------------------------------------  Detailed radiology reports for  the last 24 hours:    Imaging Results (Last 24 Hours)     ** No results found for the last 24 hours. **        Assessment & Plan    #Acute on chronic hypoxic respiratory failure secondary to CV19 (1L chronically)  #Severe PAH  #Extrathoracic respiratory compromise secondary to spinal kyphosis  -CV19 positive and patient symptomatic with worsened O2 requirement above baseline  -Remdesivir, dexamethasone 6mg daily, duonebs  -CRP down compared to discharge, procal wnl, holding abx  -CT chest consistent with viral PNA w/o dense lobar consolidation  -TTE reviewed from recent admission with significant PAH  -Ed provider concerned regarding HF exacerbation and s/p IV bumex however not clinical in HF exacerbation and no significant effusions on cxr. BNP elevated but stable. ECG with right heart strain pattern. Troponin elevated but flat.  -Palliative care consulted given end stage HF and malnutrition, likely hospice appropriate on discharge if able to recover from acute CV19 inf  -NC O2 titrated to >88%  -Oxygenation improving down to 2L NC.  Continue dexamethasone 6 mg IV daily.      #Persistnet Troponin elevation secondary to CKD  -No evidence of acute ishcemia     #Persistent Afib w/rvr  -Initially improved with gentle IVF but now that LE edema developing will resume home PO diuretic  -Difficult to control SVT as she will have afib with rvr that's difficult to break but then once back to NSR will become bradycardic w/BP decline. Hypoxia also causing acute rhythm changes.  -Cardiology consulted, initially started diltiazem gtt but off this am. Cont metoprolol at reduced 25mg BID dose and can give additional 25mg tartrate prn for recurrent RVR episodes (home dose metoprolol 50mg BID)     #HFpEF, severe PAH  -TTE reviewed, EF preserved but with significant diastolic dysfunction and multiple severe valvular abnormalities.   -On q48hr bumex at home but resumed once daily here until edema improving. Was hypovolemic on arrival  w/afib improved with gentle IVF however now with significant LE edema and evidence of cardiorenal syndrome  - Cr up to 1.74 with continued >70k proBNP. Will continue daily bumex but may need to stop if contiues to increase.   -Resumed Imdur and hydral with holding parameters  -Redsvest 30 consistent with suspected compensated HF not in exacerbation.on arrival. Hypoxia purely driven by covid PNA     #CKD w/concern for early acute cardiorenal syndrome  -Baseline 1.3-1.5, hypervolemic on exam and diuretic resumed 5/28 given increased Cr and BUN concerning for cardiorenal syndrome.   - Cr again up at 1.74 as above. Will consider holding diuresis but there is evidence for volume overload as well. Oxygenation also improving supporting continued diuresis.   -Repeat renal panel in am along with bnp     #Lactate elevation, clinically insignificant (resolved)     #Severe protein malnutrition  -Per RD assessment, supplement as recommended     - - Chronic conditions - -      #Severe OA, RA  #GERD: PPI     Code status: DNR/DNI. Overall poor prognosis and palliative care consulted.     Dispo: Pending clinical improvement.       VTE Prophylaxis:   Mechanical Order History:     None      Pharmalogical Order History:      Ordered     Dose Route Frequency Stop    05/25/23 1926  apixaban (ELIQUIS) tablet 2.5 mg         2.5 mg PO Every 12 Hours Scheduled --                  Dev Smith MD  [facility] ERist  05/29/23  15:44 EDT

## 2023-05-29 NOTE — THERAPY TREATMENT NOTE
Acute Care - Physical Therapy Treatment Note  CHANTEL Avelar     Patient Name: Anh Saldana  : 1934  MRN: [id number]  Today's Date: 2023      Visit Dx:     ICD-10-CM ICD-9-CM   1. Acute on chronic congestive heart failure, unspecified heart failure type  I50.9 428.0   2. Acute on chronic respiratory failure with hypoxia  J96.21 518.84     799.02     Patient Active Problem List   Diagnosis   • Osteoporosis, unspecified   • Hypertensive urgency   • Essential hypertension   • Dyspnea on exertion   • Acute on chronic heart failure with preserved ejection fraction (HFpEF)   • Persistent atrial fibrillation   • Hearing loss   • Acute on chronic heart failure with preserved ejection fraction (HFpEF)   • Acute respiratory failure with hypoxemia   • Severe Malnutrition (HCC)   • Chronic heart failure with preserved ejection fraction (HFpEF)   • Acute on chronic congestive heart failure, unspecified heart failure type     Past Medical History:   Diagnosis Date   • Breast cancer    • CHF (congestive heart failure)    • Chronic kidney disease     acute kidney failure   • [place] (hard of hearing)    • Hypertension    • Osteoarthritis    • Osteoporosis    • Rheumatoid arthritis    • Right-sided Bell's palsy    • Shortness of breath     sleeps with oxygen at night     Past Surgical History:   Procedure Laterality Date   • BREAST LUMPECTOMY Left     benign   • CATARACT EXTRACTION W/ INTRAOCULAR LENS  IMPLANT, BILATERAL Bilateral    • SHOULDER SURGERY Left     metal plate in left shoulder     PT Assessment (last 12 hours)     PT Evaluation and Treatment     Row Name 23 1400          Physical Therapy Time and Intention    Document Type therapy note (daily note)  -     Mode of Treatment physical therapy  -KP     Patient Effort fair  -KP     Symptoms Noted During/After Treatment other (see comments)  O2 dropped to 87 with inability to return to 90's.  Remained 88-89 and RN made aware.  -     Comment Patient presents  very hard of hearing and confused.  She was unable to carry a conversation and had difficulty following one step commands for exercises.  -     Row Name 05/29/23 1400          General Information    Patient Profile Reviewed yes  -     Existing Precautions/Restrictions fall;oxygen therapy device and L/min  -[facility] Name 05/29/23 1400          Cognition    Affect/Mental Status (Cognition) confused  -     Follows Commands (Cognition) follows one-step commands;25-49% accuracy;delayed response/completion;increased processing time needed;initiation impaired;physical/tactile prompts required;repetition of directions required;verbal cues/prompting required;visual cue  -     Personal Safety Interventions muscle strengthening facilitated;supervised activity  -[facility] Name 05/29/23 1400          Bed Mobility    Comment, (Bed Mobility) Not completed today due to O2 dropping with supine bed exercises  -[facility] Name 05/29/23 1400          Motor Skills    Therapeutic Exercise hip;ankle;knee  -[facility] Name 05/29/23 1400          Hip (Therapeutic Exercise)    Hip (Therapeutic Exercise) AAROM (active assistive range of motion)  -     Hip AAROM (Therapeutic Exercise) left;right;aBduction;aDduction;flexion;supine;10 repetitions  -[facility] Name 05/29/23 1400          Knee (Therapeutic Exercise)    Knee (Therapeutic Exercise) AAROM (active assistive range of motion)  -     Knee AAROM (Therapeutic Exercise) left;right;flexion;extension;supine;10 repetitions  -[facility] Name 05/29/23 1400          Ankle (Therapeutic Exercise)    Ankle (Therapeutic Exercise) AAROM (active assistive range of motion)  -     Ankle AAROM (Therapeutic Exercise) left;right;dorsiflexion;plantarflexion;10 repetitions  -     Row Name             Wound 05/25/23 2127 coccyx MASD (Moisture associated skin damage)    Wound - Properties Group Placement Date: 05/25/23  - Placement Time: 2127  - Present on Hospital Admission: Y  -HL  Location: coccyx  -HL Primary Wound Type: MASD  -LB    Retired Wound - Properties Group Placement Date: 05/25/23  -HL Placement Time: 2127 -HL Present on Hospital Admission: Y  -HL Location: coccyx  -HL Primary Wound Type: MASD  -LB    Retired Wound - Properties Group Date first assessed: 05/25/23  -HL Time first assessed: 2127  -HL Present on Hospital Admission: Y  -HL Location: coccyx  -HL Primary Wound Type: MASD  -LB    Row Name 05/29/23 1400          Plan of Care Review    Plan of Care Reviewed With patient  -KP     Outcome Evaluation PT treatment completed.  Patient performed supine bed exercises with AAROM.  She presents very hard of hearing with confusion and difficulty following commands.  -KP     Row Name 05/29/23 1400          Positioning and Restraints    Pre-Treatment Position in bed  -KP     Post Treatment Position bed  -KP     In Bed notified nsg;supine;call light within reach;encouraged to call for assist;exit alarm on;side rails up x3  -KP           User Key  (r) = Recorded By, (t) = Taken By, (c) = Cosigned By    Initials Name Provider Type    Nessa Hamilton, PT Physical Therapist    LB Cyndi Freedman, RN Registered Nurse    HL Toney Barnes, RN Registered Nurse                Physical Therapy Education     Title: PT OT SLP Therapies (Done)     Topic: Physical Therapy (Done)     Point: Mobility training (Done)     Learning Progress Summary           Patient Acceptance, E,TB, VU by KM at 5/26/2023 1510                   Point: Home exercise program (Done)     Learning Progress Summary           Patient Acceptance, E,TB, VU by KM at 5/26/2023 1510                   Point: Body mechanics (Done)     Learning Progress Summary           Patient Acceptance, E,TB, VU by KM at 5/26/2023 1510                   Point: Precautions (Done)     Learning Progress Summary           Patient Acceptance, E,TB, VU by KM at 5/26/2023 1510                               User Key     Initials Effective Dates Name  Provider Type Discipline     05/24/22 -  Bennett Roy, PT Physical Therapist PT              PT Recommendation and Plan     Plan of Care Reviewed With: patient  Outcome Evaluation: PT treatment completed.  Patient performed supine bed exercises with AAROM.  She presents very hard of hearing with confusion and difficulty following commands.       Time Calculation:    PT Charges     Row Name 05/29/23 1503             Time Calculation    PT Received On 05/29/23  -      PT Goal Re-Cert Due Date 06/09/23  -            User Key  (r) = Recorded By, (t) = Taken By, (c) = Cosigned By    Initials Name Provider Type    Nessa Hamilton, PT Physical Therapist              Therapy Charges for Today     Code Description Service Date Service Provider Modifiers Qty    79873695219 HC PT THERAPEUTIC ACT EA 15 MIN 5/29/2023 Nessa Singletary, PT GP 1          PT G-Codes  AM-PAC 6 Clicks Score (PT): 6    Nessa Singletary PT  5/29/2023

## 2023-05-29 NOTE — PLAN OF CARE
Goal Outcome Evaluation:    Pt is resting in bed with no s/s of distress noted. VSS. IV access maintained. 4 L NC with an O2 saturation of 90% or above. Wound care completed per order. Will continue with plan of care.

## 2023-05-30 NOTE — PLAN OF CARE
Goal Outcome Evaluation:   VSS on 2 L nasal cannula, SR on the monitor with runs of PSVT; provider aware, electrolytes WNL. No indicators of acute distress noted. Bed alarm on and call light within reach.                   Patient desatted this am around 0500 requiring 6 L nasal cannula to maintain saturation of 93%. CXR and stat ABG obtained. Jodee STARKS aware.

## 2023-05-30 NOTE — PROGRESS NOTES
[facility]  PROGRESS NOTE      Patient information:  Name: Anh Saldana  Age/Sex: 88 y.o. female  :  1934        PCP: Meme Medellin MD  Attending: Temo Brothers MD  MRN:  [id number]  Visit Number:  [id number]    LOS:  LOS: 5 days   CODE STATUS:    Code Status and Medical Interventions:   Ordered at: 23 1634     Medical Intervention Limits:    NO intubation (DNI)     Code Status (Patient has no pulse and is not breathing):    No CPR (Do Not Attempt to Resuscitate)     Medical Interventions (Patient has pulse or is breathing):    Limited Support       PROBLEM LIST:Principal Problem:    Acute on chronic congestive heart failure, unspecified heart failure type      Reason for Cardiology follow-up: Episodes of PAF and SVT    Subjective   ADMISSION INFORMATION:  Chief Complaint   Patient presents with   • Shortness of Breath   • Edema       HPI:  Anh Saldana is a 88 y.o. female has been admitted to [facility] ([facility]) with history of increasing shortness of breath and bilateral leg edema.  Patient is hearing impairment and somewhat difficult to get a reliable history.  Her past medical history includes HTN, CKD, RA,  persistent atrial fibrillation on Eliquis (however,her A-fib is paroxysmal during this hospital stay), HFpEF, (Echocardiogram done  showed LVEF-70%), moderate MR, mild AAS and mild AR, moderate to severe TR and severe pulmonary hypertension.      Interval History:   Patient is in room 313 B in COVID issolation and was examined by Dr. Jenkins.  Telemetry revealed SR 60's currently with 2 episodes of PSVT in last 24 hours.  Please see attached rhythm strips below.  Patient is lying in bed resting quietly.  No acute distress noted at this time.  She currently denies any chest pain, shortness of breath, or palpitations.  She does report she has developed a cough and a cough was noted while at bedside.  Potassium is 3.8, chloride  100, CO2 24.2, BUN is 71, creatinine is 1.87 which is a slight bump up from 1.74.  Her baseline this admission has been 1.20-1.97.  Magnesium is 2.3.  CBC done on 05/29/2023 which revealed a WBC of 8.63, hemoglobin of 12.7, and hematocrit of 40.3. CXR this AM revealed: Bilateral pleural effusions with bibasilar atelectasis or pneumonia which is similar when compared to 05/28/2023 per report.      ORDERS: start Amiodarone 400 mg PO daily x 7 days then decrease to 200 mg PO daily on June 06,2023.     Vital Signs  Temp:  [94.3 °F (34.6 °C)-97.2 °F (36.2 °C)] 94.8 °F (34.9 °C)  Heart Rate:  [65-81] 69  Resp:  [18-20] 19  BP: (106-147)/(64-97) 130/64  Flow (L/min):  [2-6] 6  Vital Signs (last 72 hrs)       05/27 0700  05/28 0659 05/28 0700  05/29 0659 05/29 0700  05/30 0659 05/30 0700 05/30 0952   Most Recent      Temp (°F) 97.2 -  97.6    97.7 -  98.5    94.3 -  97.8       94.8 (34.9) 05/30 0443    Heart Rate 58 -  176    58 -  72    65 -  81    67 -  69     69 05/30 0749    Resp 18 -  22    16 -  20    18 -  22    19 -  20     19 05/30 0749    /64 -  142/81    106/65 -  148/91    106/64 -  154/90      130/64     130/64 05/30 0720    SpO2 (%) 88 -  99    81 -  99    81 -  99    91 -  96     96 05/30 0749        Body mass index is 17.73 kg/m².    Intake/Output Summary (Last 24 hours) at 5/30/2023 0952  Last data filed at 5/30/2023 0800  Gross per 24 hour   Intake 120 ml   Output 100 ml   Net 20 ml     Objective  Objective     I have seen and examined Ms. Saldana today.  Physical Exam:      General Appearance:    Alert, cooperative, in no acute distress. [place], thin, fragile, advanced aged.   Head:    Normocephalic, without obvious abnormality, atraumatic.   Eyes:                          Conjunctivae and sclerae normal, no icterus, no pallor, corneas clear.   Neck:   No adenopathy, supple, trachea midline, no thyromegaly, no carotid bruit, no JVD.   Lungs:    Bibasilar scattered rales/crackles to auscultation,  respirations regular, even and unlabored.    Heart:    Regular rhythm and normal rate, normal S1 and S2, systolic murmur heard at the LLSB grade 3/6, no gallop, no rub, no click   Chest Wall:    No abnormalities observed.   Abdomen:     Normal bowel sounds, no masses, no organomegaly, soft nontender, nondistended, no guarding, no rebound tenderness.   Extremities:   Moves all extremities well, no edema, no cyanosis, no            redness.   Pulses:   Pulses palpable and equal bilaterally.   Skin:   No bleeding, bruising or rash.   Neurologic:   Alert and Oriented x 3, Speech Clear & comprehensive.       Results review   Results Review:   Results from last 7 days   Lab Units 05/29/23  0220 05/28/23  0209 05/26/23  0127 05/25/23  1640 05/25/23  1407   WBC 10*3/mm3 8.63 9.82 11.42* 10.59 10.73   HEMOGLOBIN g/dL 12.7 12.1 11.4* 11.6* 12.0   PLATELETS 10*3/mm3 270 277 242 190 271     Results from last 7 days   Lab Units 05/30/23  0035 05/29/23  0220 05/28/23  0209 05/27/23  0102 05/26/23  0127 05/25/23  1640 05/25/23  1407   SODIUM mmol/L 141 141 141 140 136 136 137   POTASSIUM mmol/L 3.8 4.4 3.7 3.9 3.5 4.4 3.9   CHLORIDE mmol/L 100 101 100 100 96* 98 100   CO2 mmol/L 24.2 22.9 23.4 25.8 26.5 27.3 27.2   BUN mg/dL 71* 64* 57* 51* 43* 44* 44*   CREATININE mg/dL 1.87* 1.74* 1.63* 1.45* 1.28* 1.34* 1.33*   CALCIUM mg/dL 8.9 9.1 9.1 9.1 9.0 9.0 9.1   GLUCOSE mg/dL 158* 126* 118* 152* 109* 109* 155*   ALT (SGPT) U/L 15 17 15 13 13 13 13   AST (SGOT) U/L 26 45* 35* 27 23 26 25     Results from last 7 days   Lab Units 05/25/23  1700 05/25/23  1407   HSTROP T ng/L 77* 78*     Lab Results   Component Value Date    PROBNP >70,000.0 (H) 05/29/2023    PROBNP 64,766.0 (H) 05/25/2023    PROBNP 66,260.0 (H) 05/16/2023     No results found.  Lab Results   Component Value Date    ABSOLUTELUNG 30 05/26/2023     Lab Results   Component Value Date    INR 1.30 (H) 05/10/2023    INR 0.98 11/10/2018     Lab Results   Component Value Date     MG 2.3 05/30/2023    MG 2.2 05/28/2023    MG 2.1 05/27/2023     Lab Results   Component Value Date    TSH 3.950 05/10/2023    TRIG 126 05/11/2023    HDL 56 05/11/2023    LDL 67 05/11/2023      Pain Management Panel          View : No data to display.                    Microbiology Results (last 10 days)     Procedure Component Value - Date/Time    COVID-19 and FLU A/B PCR - Swab, Nasopharynx [[id number]]  (Abnormal) Collected: 05/25/23 1637    Lab Status: Final result Specimen: Swab from Nasopharynx Updated: 05/25/23 1733     COVID19 Detected     Influenza A PCR Not Detected     Influenza B PCR Not Detected    Narrative:      Fact sheet for providers: https://www.fda.gov/media/020347/download    Fact sheet for patients: https://www.fda.gov/media/588902/download    Test performed by PCR.  Influenza A and Influenza B negative results should be considered presumptive in samples that have a positive SARS-CoV-2 result.    Competitive inhibition studies showed that SARS-CoV-2 virus, when present at concentrations above 3.6E+04 copies/mL, can inhibit the detection and amplification of influenza A and influenza B virus RNA if present at or below 1.8E+02 copies/mL or 4.9E+02 copies/mL, respectively, and may lead to false negative influenza virus results. If co-infection with influenza A or influenza B virus is suspected in samples with a positive SARS-CoV-2 result, the sample should be re-tested with another FDA cleared, approved, or authorized influenza test, if influenza virus detection would change clinical management.    Blood Culture - Blood, Hand, Right [[id number]]  (Normal) Collected: 05/25/23 1425    Lab Status: Preliminary result Specimen: Blood from Hand, Right Updated: 05/29/23 1431     Blood Culture No growth at 4 days    Blood Culture - Blood, Hand, Right [[id number]]  (Normal) Collected: 05/25/23 1407    Lab Status: Preliminary result Specimen: Blood from Hand, Right Updated: 05/29/23 1431     Blood Culture No  growth at 4 days         Imaging Results (Last 48 Hours)     Procedure Component Value Units Date/Time    XR Chest 1 View [[id number]] Collected: 05/30/23 0723     Updated: 05/30/23 0726    Narrative:      CLINICAL HISTORY: Worsening hypoxia.     COMPARISON: 5/28/2023.     TECHNIQUE: Single portable upright AP view of the chest was obtained.     FINDINGS: Pleural effusions are small and associated with bibasilar  airspace opacities.  The findings are overall similar compared to the  prior study, given differences in technique.  The heart size is  enlarged.  No pneumothorax is identified.  Surgical clips are noted in  the left axilla.  Fixation hardware in the proximal left humerus is  unchanged.  No acute osseous or upper abdominal abnormality is  definitely seen.       Impression:         BILATERAL PLEURAL EFFUSIONS WITH BIBASILAR ATELECTASIS OR PNEUMONIA,  SIMILAR COMPARED TO 5/28/2023.     This report was finalized on 5/30/2023 7:24 AM by Jami Cardona MD.             ECHO:  Results for orders placed during the hospital encounter of 05/10/23    Adult Transthoracic Echo Complete W/ Cont if Necessary Per Protocol    Interpretation Summary  •  Left ventricular systolic function is normal. Left ventricular ejection fraction appears to be 66 - 70%.  •  Left ventricular diastolic function is consistent with (grade I) impaired relaxation.  •  The right ventricular cavity is moderately dilated.  •  The left atrial cavity is mild to moderately dilated.  •  The right atrial cavity is severely  dilated.  •  There is moderate calcification of the aortic valve mainly affecting the non-coronary and left coronary cusp(s).  •  Severe mitral valve regurgitation is present.  •  Severe tricuspid valve regurgitation is present.  •  Estimated right ventricular systolic pressure from tricuspid regurgitation is markedly elevated ( 94 mmHg).  •  Severe pulmonary hypertension is present.      STRESS TEST:  No results found for this or any  previous visit.      HEART CATH:  No results found for this or any previous visit.      TELEMETRY:  SR 60's with 2 episodes of PSVT in last 24 hours                       I reviewed the patient's new clinical results.    Medication Review:   Current list of medications may not reflect those currently placed in orders that are not signed or are being held.     apixaban, 2.5 mg, Oral, Q12H  aspirin, 81 mg, Oral, Daily  atorvastatin, 20 mg, Oral, Nightly  budesonide, 0.5 mg, Nebulization, BID  dexamethasone sodium phosphate, 6 mg, Intravenous, Q24H  hydrALAZINE, 10 mg, Oral, Q8H  ipratropium, 0.5 mg, Nebulization, 4x Daily - RT  isosorbide mononitrate, 30 mg, Oral, Q24H  magic barrier cream, , Topical, TID  metoprolol tartrate, 25 mg, Oral, BID  nystatin, , Topical, Q12H  pantoprazole, 40 mg, Oral, QAM AC  remdesivir, 200 mg, Intravenous, Q24H   Followed by  [START ON 5/31/2023] remdesivir, 100 mg, Intravenous, Q24H  senna-docusate sodium, 2 tablet, Oral, BID  sodium chloride, 10 mL, Intravenous, Q12H      Pharmacy Consult - Remdesivir,       •  acetaminophen  •  senna-docusate sodium **AND** polyethylene glycol **AND** bisacodyl **AND** bisacodyl  •  Calcium Replacement - Follow Nurse / BPA Driven Protocol  •  Magnesium Standard Dose Replacement - Follow Nurse / BPA Driven Protocol  •  Pharmacy Consult - Remdesivir  •  Phosphorus Replacement - Follow Nurse / BPA Driven Protocol  •  Potassium Replacement - Follow Nurse / BPA Driven Protocol  •  [COMPLETED] Insert Peripheral IV **AND** sodium chloride  •  sodium chloride  •  sodium chloride    Assessment      1. Paroxysmal atrial flutter/fibrillation with intermittent rapid ventricle response.  2. Chronic HFpEF.  3. Acute COVID-19 infection with bibasilar pneumonia.  4. Recent non-STEMI (possibly type II due to tachycardia), patient asymptomatic      Plan     1. Increase metoprolol to tartrate dose to 37.5 mg p.o. twice daily.  2. Continue with Eliquis for stroke  prevention  3. If she has a recurrent episodes of persistent SVT/atrial flutter/fibrillation, may consider adding an antiarrhythmic drug.      I have discussed the patients findings and my recommendations with patient.    Electronically signed by ANA Kevin, 05/30/23, 10:11 AM EDT.    Electronically signed by Alex Jenkins MD, 05/30/23, 10:48 AM EDT.            Please note that portions of this note were completed with a voice recognition program.    Please note that portions of this note were copied and has been reviewed and is accurate as of 5/30/2023 .

## 2023-05-30 NOTE — PROGRESS NOTES
[facility] HOSPITALIST PROGRESS NOTE     Patient Identification:  Name:  Anh Saldana  Age:  88 y.o.  Sex:  female  :  1934  MRN:  [id number]  Visit Number:  [id number]  ROOM: [address] [place] [address]     Primary Care Provider:  Meme Medellin MD    Length of stay in inpatient status:  5    Subjective     Chief Compliant:    Chief Complaint   Patient presents with   • Shortness of Breath   • Edema       History of Presenting Illness:    I had goals of care discussion with patient's brother who was bedside. Patient was also included as much as we could with her hearing impairment. Palliative care also bedside. Discussed worsening of hypoxia and potential paths moving forward. Discussed comfort measures vs. Continuing current measures. Opted to continue for now but will discuss with other siblings. Patient was alert and interactive. O2 requirement up to 6L NC.     ROS:  Otherwise 10 point ROS negative other than documented above in HPI.     Objective     Current Hospital Meds:apixaban, 2.5 mg, Oral, Q12H  aspirin, 81 mg, Oral, Daily  atorvastatin, 20 mg, Oral, Nightly  budesonide, 0.5 mg, Nebulization, BID  dexamethasone sodium phosphate, 6 mg, Intravenous, Q24H  hydrALAZINE, 10 mg, Oral, Q8H  ipratropium, 0.5 mg, Nebulization, 4x Daily - RT  isosorbide mononitrate, 30 mg, Oral, Q24H  magic barrier cream, , Topical, TID  metoprolol tartrate, 37.5 mg, Oral, BID  nystatin, , Topical, Q12H  pantoprazole, 40 mg, Oral, QAM AC  [START ON 2023] remdesivir, 100 mg, Intravenous, Q24H  senna-docusate sodium, 2 tablet, Oral, BID  sodium chloride, 10 mL, Intravenous, Q12H    Pharmacy Consult - Remdesivir,         Current Antimicrobial Therapy:  Anti-Infectives (From admission, onward)    Ordered     Dose/Rate Route Frequency Start Stop    23 0831  remdesivir 100 mg in 270 mL NS        Ordering Provider: Dev Smith MD   See Hyperspace for full Linked Orders Report.    100 mg  over 60 Minutes Intravenous  Every 24 Hours 05/31/23 1000 06/04/23 0959    05/30/23 0831  remdesivir 200 mg in 290 mL NS        Ordering Provider: Dev Smith MD   See Hyperspace for full Linked Orders Report.    200 mg  over 60 Minutes Intravenous Every 24 Hours 05/30/23 1000 05/30/23 1155    05/25/23 1935  remdesivir 100 mg in 270 mL NS        Ordering Provider: Temo Brothers MD   See Hyperspace for full Linked Orders Report.    100 mg  over 60 Minutes Intravenous Every 24 Hours 05/26/23 2100 05/27/23 2317    05/25/23 1935  remdesivir 200 mg in 290 mL NS        Ordering Provider: Temo Brothers MD   See Hyperspace for full Linked Orders Report.    200 mg  over 60 Minutes Intravenous Once 05/25/23 2030 05/25/23 2145    05/25/23 1249  levoFLOXacin (LEVAQUIN) 750 mg/150 mL D5W (premix) (LEVAQUIN) 750 mg        Ordering Provider: Eris Hammond MD    750 mg  over 90 Minutes Intravenous Once 05/25/23 1305 05/25/23 1556        Current Diuretic Therapy:  Diuretics (From admission, onward)    Ordered     Dose/Rate Route Frequency Start Stop    05/30/23 0750  bumetanide (BUMEX) injection 2 mg        Ordering Provider: Dev Smith MD    2 mg Intravenous Once 05/30/23 0845 05/30/23 0906    05/25/23 1514  bumetanide (BUMEX) injection 2 mg        Ordering Provider: Eris Hammond MD    2 mg Intravenous Once 05/25/23 1530 05/25/23 1626        ----------------------------------------------------------------------------------------------------------------------  Vital Signs:  Temp:  [94.3 °F (34.6 °C)-94.8 °F (34.9 °C)] 94.8 °F (34.9 °C)  Heart Rate:  [58-73] 66  Resp:  [18-22] 22  BP: (102-147)/(57-97) 109/66  SpO2:  [81 %-99 %] 99 %  on  Flow (L/min):  [2-6] 6;   Device (Oxygen Therapy): nasal cannula  Body mass index is 17.73 kg/m².    Wt Readings from Last 3 Encounters:   05/30/23 42.5 kg (93 lb 12.8 oz)   05/19/23 41.2 kg (90 lb 12.8 oz)   07/26/21 42.6 kg (94 lb)     Intake & Output (last 3 days)       05/28 0701  05/29 0700 05/29  0701 05/30 0700 05/30 0701 05/31 0700    P.O. 290 120 160    I.V. (mL/kg) 910.4 (21.8)  296.6 (7)    Total Intake(mL/kg) 1200.4 (28.7) 120 (2.8) 456.6 (10.7)    Urine (mL/kg/hr) 400 (0.4) 100 (0.1) 250 (0.5)    Stool 0 0     Total Output 400 100 250    Net +800.4 +20 +206.6           Urine Unmeasured Occurrence 7 x 1 x     Stool Unmeasured Occurrence 5 x 1 x         Diet: Regular/House Diet; Feeding Assistance - Nursing; Texture: Pureed (NDD 1); Fluid Consistency: Thin (IDDSI 0)  ----------------------------------------------------------------------------------------------------------------------  Physical exam:  Constitutional:  Elderly appearing, chronically ill appearing, thin      HENT:  Head:  Normocephalic and atraumatic.  Mouth:  Moist mucous membranes.    Eyes:  Conjunctivae and EOM are normal. No scleral icterus.    Neck:  Neck supple.  No JVD present.    Cardiovascular:  Normal rate, regular rhythm and normal heart sounds with no murmur.  Pulmonary/Chest:  No respiratory distress, no wheezes, no crackles, with normal breath sounds and good air movement.  Abdominal:  Soft.  Bowel sounds are normal.  No distension and no tenderness.   Musculoskeletal:  Severe kyphosis with anterior head.   Neurological:  Alert and oriented to person. No focal deficit.   Skin:  Skin is warm and dry. No rash noted. No pallor.   Peripheral vascular:  Pulses in all 4 extremities with no clubbing, no cyanosis, no edema  ----------------------------------------------------------------------------------------------------------------------  Tele:    ----------------------------------------------------------------------------------------------------------------------  Results from last 7 days   Lab Units 05/30/23  0035 05/29/23  0220 05/28/23  0209 05/26/23  0127 05/25/23  1700 05/25/23  1640 05/25/23  1407   CRP mg/dL 2.61*  --  3.84*  --  3.71*  --   --    LACTATE mmol/L  --   --   --   --  1.1  --  2.1*   WBC 10*3/mm3  --  8.63  9.82 11.42*  --    < > 10.73   HEMOGLOBIN g/dL  --  12.7 12.1 11.4*  --    < > 12.0   HEMATOCRIT %  --  40.3 38.1 37.3  --    < > 39.0   MCV fL  --  104.7* 104.4* 105.7*  --    < > 107.7*   MCHC g/dL  --  31.5 31.8 30.6*  --    < > 30.8*   PLATELETS 10*3/mm3  --  270 277 242  --    < > 271    < > = values in this interval not displayed.     Results from last 7 days   Lab Units 05/30/23  0526   PH, ARTERIAL pH units 7.388   PO2 ART mm Hg 59.8*   PCO2, ARTERIAL mm Hg 44.9   HCO3 ART mmol/L 27.0*     Results from last 7 days   Lab Units 05/30/23  0035 05/29/23  0220 05/28/23  0209 05/27/23  0102   SODIUM mmol/L 141 141 141 140   POTASSIUM mmol/L 3.8 4.4 3.7 3.9   MAGNESIUM mg/dL 2.3  --  2.2 2.1   CHLORIDE mmol/L 100 101 100 100   CO2 mmol/L 24.2 22.9 23.4 25.8   BUN mg/dL 71* 64* 57* 51*   CREATININE mg/dL 1.87* 1.74* 1.63* 1.45*   CALCIUM mg/dL 8.9 9.1 9.1 9.1   PHOSPHORUS mg/dL  --   --  5.0*  --    GLUCOSE mg/dL 158* 126* 118* 152*   ALBUMIN g/dL 3.1* 3.1* 3.2* 3.2*   BILIRUBIN mg/dL 0.5 0.4 0.5 0.5   ALK PHOS U/L 102 102 109 108   AST (SGOT) U/L 26 45* 35* 27   ALT (SGPT) U/L 15 17 15 13   Estimated Creatinine Clearance: 14 mL/min (A) (by C-G formula based on SCr of 1.87 mg/dL (H)).  No results found for: AMMONIA  Results from last 7 days   Lab Units 05/25/23  1700 05/25/23  1407   HSTROP T ng/L 77* 78*     Results from last 7 days   Lab Units 05/29/23  0220 05/25/23  1407   PROBNP pg/mL >70,000.0* 64,766.0*         Glucose   Date/Time Value Ref Range Status   05/28/2023 0909 98 70 - 130 mg/dL Final     Comment:     Meter: [id number] : [id number] RODY MCMILLAN     Lab Results   Component Value Date    TSH 3.950 05/10/2023     No results found for: PREGTESTUR, PREGSERUM, HCG, HCGQUANT  Pain Management Panel          View : No data to display.                    Brief Urine Lab Results  (Last result in the past 365 days)      Color   Clarity   Blood   Leuk Est   Nitrite   Protein   CREAT   Urine HCG         05/10/23 1702 Yellow   Clear   Moderate (2+)   Small (1+)   Positive   100 mg/dL (2+)               Blood Culture   Date Value Ref Range Status   05/25/2023 No growth at 5 days  Final   05/25/2023 No growth at 5 days  Final     No results found for: URINECX  No results found for: WOUNDCX  No results found for: STOOLCX  No results found for: RESPCX  No results found for: AFBCX  Results from last 7 days   Lab Units 05/30/23  0035 05/28/23  0209 05/25/23  1929 05/25/23  1700 05/25/23  1407   PROCALCITONIN ng/mL 0.21  --  0.21  --   --    LACTATE mmol/L  --   --   --  1.1 2.1*   CRP mg/dL 2.61* 3.84*  --  3.71*  --        I have personally looked at the labs and they are summarized above.  ----------------------------------------------------------------------------------------------------------------------  Detailed radiology reports for the last 24 hours:    Imaging Results (Last 24 Hours)     Procedure Component Value Units Date/Time    XR Chest 1 View [[id number]] Collected: 05/30/23 0723     Updated: 05/30/23 0726    Narrative:      CLINICAL HISTORY: Worsening hypoxia.     COMPARISON: 5/28/2023.     TECHNIQUE: Single portable upright AP view of the chest was obtained.     FINDINGS: Pleural effusions are small and associated with bibasilar  airspace opacities.  The findings are overall similar compared to the  prior study, given differences in technique.  The heart size is  enlarged.  No pneumothorax is identified.  Surgical clips are noted in  the left axilla.  Fixation hardware in the proximal left humerus is  unchanged.  No acute osseous or upper abdominal abnormality is  definitely seen.       Impression:         BILATERAL PLEURAL EFFUSIONS WITH BIBASILAR ATELECTASIS OR PNEUMONIA,  SIMILAR COMPARED TO 5/28/2023.     This report was finalized on 5/30/2023 7:24 AM by Jami Cardona MD.           Assessment & Plan    #Acute on chronic hypoxic respiratory failure secondary to CV19 (1L chronically)  #Severe  PAH  #Extrathoracic respiratory compromise secondary to spinal kyphosis  -CV19 positive and patient symptomatic with worsened O2 requirement above baseline  -Remdesivir, dexamethasone 6mg daily, duonebs  -CRP down compared to discharge, procal wnl, holding abx  -CT chest consistent with viral PNA w/o dense lobar consolidation  -TTE reviewed from recent admission with significant PAH  -Ed provider concerned regarding HF exacerbation and s/p IV bumex however not clinical in HF exacerbation and no significant effusions on cxr. BNP elevated but stable. ECG with right heart strain pattern. Troponin elevated but flat.  -Palliative care consulted given end stage HF and malnutrition, likely hospice appropriate on discharge if able to recover from acute CV19 inf  -NC O2 titrated to >88%  -Oxygenation worsened overnight up to 6L NC.   - Plain film of chest similar to prior exams, effusions and multifocal airspace disease.   - Will give trial of 2mg IV bumex and monitor response.   - Continue dexamethasone 6 mg IV daily. Started remdesevir. We are toward the end of when it would be beneficial but suspect benefit outweight risk.      #Persistnet Troponin elevation secondary to CKD  -No evidence of acute ishcemia     #Persistent Afib w/rvr  -Initially improved with gentle IVF but now that LE edema developing will resume home PO diuretic  -Difficult to control SVT as she will have afib with rvr that's difficult to break but then once back to NSR will become bradycardic w/BP decline. Hypoxia also causing acute rhythm changes.  -Cardiology consulted, initially started diltiazem gtt but off this am. Cont metoprolol at reduced 25mg BID dose and can give additional 25mg tartrate prn for recurrent RVR episodes (home dose metoprolol 50mg BID)     #HFpEF, severe PAH  -TTE reviewed, EF preserved but with significant diastolic dysfunction and multiple severe valvular abnormalities.   - Cr up to 1.89 with continued >70k proBNP. Suspect  cardiorenal as we diurese as above. Nephrology consulted to help with diuretics.   -Resumed Imdur and hydral with holding parameters  -Redsvest 30 consistent with suspected compensated HF not in exacerbation.on arrival. Hypoxia purely driven by covid PNA     #CKD w/concern for early acute cardiorenal syndrome  -Baseline 1.3-1.5, hypervolemic on exam and diuretic resumed 5/28 given increased Cr and BUN concerning for cardiorenal syndrome.   - Cr again up at 1.89 as above. Suspect cardiorenal. Nephrology consulted.   -Repeat renal panel in am along with bnp     #Lactate elevation, clinically insignificant (resolved)     #Severe protein malnutrition  -Per RD assessment, supplement as recommended     - - Chronic conditions - -      #Severe OA, RA  #GERD: PPI     Code status: DNR/DNI. Overall poor prognosis and palliative care consulted.      Dispo: Pending clinical improvement.   VTE Prophylaxis:   Mechanical Order History:     None      Pharmalogical Order History:      Ordered     Dose Route Frequency Stop    05/25/23 1926  apixaban (ELIQUIS) tablet 2.5 mg         2.5 mg PO Every 12 Hours Scheduled --                  Dev Smith MD  [facility] Hospitalist  05/30/23  19:10 EDT

## 2023-05-30 NOTE — PLAN OF CARE
Goal Outcome Evaluation:    Pt is resting in bed with no s/s of distress noted. VSS. IV access maintained. 6 L NC with an O2 saturation of 90-93%. Will continue with plan of care.

## 2023-05-30 NOTE — PROGRESS NOTES
"Palliative Care Daily Progress Note     S: Medical record reviewed, followed up with SHELBY Hanna and Dr Smith. regarding patient's condition. Palliative went in pts room with Dr Smith to discuss with Anh and her brother Octavio her condition.  Anh was alert and communicative. Pt was hunched over, as is her baseline, she said she was not uncomfortable and appeared to have unlabored breathes at this time. She is [place] and masks made even more difficult to communicate with either pt or her brother. She did not appear to be in distress at that time.      O:   Palliative Performance Scale Score:     /66 (BP Location: Left arm, Patient Position: Lying)   Pulse 66   Temp 94.8 °F (34.9 °C) (Axillary)   Resp 22   Ht 154.9 cm (60.98\")   Wt 42.5 kg (93 lb 12.8 oz)   SpO2 99%   BMI 17.73 kg/m²     Intake/Output Summary (Last 24 hours) at 5/30/2023 1655  Last data filed at 5/30/2023 1600  Gross per 24 hour   Intake 396.61 ml   Output 100 ml   Net 296.61 ml       PE:  General Appearance:    Chronically ill appearing, thin, frail, weak, alert, cooperative, severe Kyphosis NAD   HEENT:    NC/AT, without obvious abnormality, EOMI, anicteric    Neck:   supple, trachea midline, no JVD   Lungs:     Slight Tachypnea, clear with no wheezes rhonchi or rales.    Heart:    Regular rate regular rhythm, normal S1 and S2, no M/R/G   Abdomen:     Soft, NT, ND, NABS    Extremities:   Moves all extremities, lower extremities weeping, no edema   Pulses:   Pulses palpable and equal bilaterally   Skin:   Warm, dry   Neurologic:   A/Ox3, cooperative   Psych:   Calm, appropriate           Meds: Reviewed and changes noted.    Labs:   Results from last 7 days   Lab Units 05/29/23  0220   WBC 10*3/mm3 8.63   HEMOGLOBIN g/dL 12.7   HEMATOCRIT % 40.3   PLATELETS 10*3/mm3 270     Results from last 7 days   Lab Units 05/30/23  0035   SODIUM mmol/L 141   POTASSIUM mmol/L 3.8   CHLORIDE mmol/L 100   CO2 mmol/L 24.2   BUN mg/dL 71* "   CREATININE mg/dL 1.87*   GLUCOSE mg/dL 158*   CALCIUM mg/dL 8.9     Results from last 7 days   Lab Units 05/30/23  0035   SODIUM mmol/L 141   POTASSIUM mmol/L 3.8   CHLORIDE mmol/L 100   CO2 mmol/L 24.2   BUN mg/dL 71*   CREATININE mg/dL 1.87*   CALCIUM mg/dL 8.9   BILIRUBIN mg/dL 0.5   ALK PHOS U/L 102   ALT (SGPT) U/L 15   AST (SGOT) U/L 26   GLUCOSE mg/dL 158*     Imaging Results (Last 72 Hours)     Procedure Component Value Units Date/Time    XR Chest 1 View [[id number]] Collected: 05/30/23 0723     Updated: 05/30/23 0726    Narrative:      CLINICAL HISTORY: Worsening hypoxia.     COMPARISON: 5/28/2023.     TECHNIQUE: Single portable upright AP view of the chest was obtained.     FINDINGS: Pleural effusions are small and associated with bibasilar  airspace opacities.  The findings are overall similar compared to the  prior study, given differences in technique.  The heart size is  enlarged.  No pneumothorax is identified.  Surgical clips are noted in  the left axilla.  Fixation hardware in the proximal left humerus is  unchanged.  No acute osseous or upper abdominal abnormality is  definitely seen.       Impression:         BILATERAL PLEURAL EFFUSIONS WITH BIBASILAR ATELECTASIS OR PNEUMONIA,  SIMILAR COMPARED TO 5/28/2023.     This report was finalized on 5/30/2023 7:24 AM by Jami Cardona MD.       XR Chest 1 View [[id number]] Collected: 05/28/23 0515     Updated: 05/28/23 0518    Narrative:      Procedure: Portable chest x-ray examination performed on 05/28/2023.  Single view. Portable technique.     HISTORY: Possible viral pneumonia.     FINDINGS:     Enlarged heart size.  Small right and left pleural effusion.  Hazy airspace opacities in the right upper lobe, right lower lobe, and  left lower lobe most consistent with underlying multifocal pneumonia  with possible contribution from compressive atelectasis from the  effusions.  Mild central pulmonary vascular congestion with mild edema.  Fixation plate and  screws at the proximal left humerus.  Severe kyphosis limits assessment of the upper chest on the right due to  overlying structures.  No free air in the upper abdomen.       Impression:         1.  Enlarged heart size.  2.  Mild edema.  3.  Small right and left pleural effusion.  4.  No pneumothorax.  5.  Multifocal pneumonia with contribution from atelectasis in the lower  lungs.  6.  No pneumomediastinum.  7.  No free air in the upper abdomen.     This report was finalized on 5/28/2023 5:16 AM by Tuan Coto MD.               Diagnostics: Reviewed    A: Anh Saldana is a 88 y.o. female admitted on 5/25/2023  Due to shortness of breath, cough and edema. Anh has a medical history of HFpEF, CKD stage III, hard of hearing, severe PAH, HTN, osteoarthritis, osteoporosis, rheumatoid arthritis, chronic respiratory failure on O2 at night. Per report Anh has been recently hospitalized for CHF exacerbation, e coli with abx and diuretics and was d/tyrone on 5/19 to [facility].Per Nursing facility staff pt had been experiencing progressive shortness of breath and lower extremity edema for several days before being sent to the ER. Pt was found to be hypoxic and to be COVID positive. Pt was admitted to tele floor.      P:  Palliative was able to have a discussion with Anh Awan and her brother Octavio at bedside.Dr Smith discussed her condition and treatment and both Dr Smith and I attempted to discuss continuing treatment versus comfort measures, it was difficult due to her [place] as well as Isolation mask for COVID made communication difficult. Octavio did say that he would speak with his remaining two siblings. SHELBY Hanna was able to obtain phone number for other brothers ROSALINDA and Santiago.    We will continue to follow along. Please do not hesitate to contact us regarding further sx mgmt or GOC needs, including after hours or on weekends via our on call provider at [phone].     Jennifer Daniels, APRN    5/30/2023

## 2023-05-30 NOTE — CASE MANAGEMENT/SOCIAL WORK
Discharge Planning Assessment   [place]     Patient Name: Anh Saldana  MRN: [id number]  Today's Date: 5/30/2023    Admit Date: 5/25/2023    Plan: Pt was admitted form [facility] on 5/25/23. [facility] who states pt does not have a bedhold but they will take pt back at discharge. Pt requires a pre-auth to be done before discharge. [facility] will  be able to [facility] pt.  SS to follow and assist with discharge planning.   Discharge Plan     Row Name 05/30/23 1626       Plan    Plan Pt was admitted form [facility] on 5/25/23. [facility] who states pt does not have a bedhold but they will take pt back at discharge. Pt requires a pre-auth to be done before discharge. [facility] they will  be able to take pt.  SS to follow and assist with discharge planning.              MRAIA C Loco

## 2023-05-30 NOTE — SIGNIFICANT NOTE
05/30/23 1041   OTHER   Discipline physical therapist   Rehab Time/Intention   Session Not Performed unable to treat, medical status change  (Due to increase in O2 demands therapy was requested to hold treatment this date.)

## 2023-05-31 NOTE — NURSING NOTE
Patient was in respiratory distress upon assessment with oxygen saturations in the mid 80s on 6 L nasal cannula. Jodee STARKS notified, Jodee came to bedside to discuss goals of care with patient's brother Octavio. Family requested something to help make the patient comfortable, but continue treatment. Morphine was administered per orders. This RN was notified that patient's heart rate was in the 40s at approximately 0415. This RN found the patient to have slowing pulse and decreasing respirations. BP unable to be obtained. HCPM and lead nurse notified. Family at bedside during patient expiration. Time of death called at 0436 by [facility].

## 2023-05-31 NOTE — DISCHARGE SUMMARY
[facility] HOSPITALISTS DISCHARGE SUMMARY    Patient Identification:  Name:  Anh Saldana  Age:  88 y.o.  Sex:  female  :  1934  MRN:  [id number]  Visit Number:  [id number]    Date of Admission: 2023  Date of Discharge:  2023    PCP: Meme Medellin MD    DISCHARGE DIAGNOSIS  #Acute on chronic hypoxic respiratory failure secondary to CV19 (1L chronically)  #Severe PAH  #Extrathoracic respiratory compromise secondary to spinal kyphosis  #Persistnet Troponin elevation secondary to CKD  #Persistent Afib w/rvr  #HFpEF, severe PAH  #CKD w/concern for early acute cardiorenal syndrome  #Lactate elevation, clinically insignificant (resolved)  #Severe protein malnutrition  #Severe OA, RA  #GERD    CONSULTS   Cardiology   Palliative care    PROCEDURES PERFORMED  None    HOSPITAL COURSE  Patient is a 88 y.o. female presented on  to [facility] complaining of shortness of breath.  Please see the admitting history and physical for further details.      Ms. Saldana is our 89 yo F with hx HFpEF, CKD stage III, hard of hearing, severe PAH, HTN, osteoarthritis, osteoporosis, rheumatoid arthritis, chronic respiratory failure on O2 at night presenting for cough and progressive hypoxia from SNF. Patient had been recently admitted at our facility 5/10- for CHF exacerbation. Patient was diuresed with improvement and sent back to SNF. Patient returned with shortness of breath and hypoxia. Patient found to have COVID and imaging consistent with COVID pneumonia. Patient has also been diuresed. Patient has been on dexamethasone and remdesivir. Patient's O2 requirement increasing but stable when seen yesterday at 6L NC. Family had been reasonably considering making patient comfort measures given significant cardiac comorbidity. Patient became dyspneic and hypoxic overnight last night. While family considered making her comfort care only they were agreeable for morphine for air hunger.  Patient did not improve and became bradycardic and arrested. Patient was DNR/DNI.     VITAL SIGNS:  Temp:  [97.6 °F (36.4 °C)-97.9 °F (36.6 °C)] 97.7 °F (36.5 °C)  Heart Rate:  [58-94] 70  Resp:  [19-22] 20  BP: (102-118)/(57-66) 108/60  SpO2:  [85 %-99 %] 86 %  on  Flow (L/min):  [4-6] 6;   Device (Oxygen Therapy): humidified;nasal cannula    Body mass index is 17.73 kg/m².  Wt Readings from Last 3 Encounters:   23 42.5 kg (93 lb 12.8 oz)   23 41.2 kg (90 lb 12.8 oz)   21 42.6 kg (94 lb)       PHYSICAL EXAM:  TOD exam performed by night team, this exam is from :  Constitutional:  Elderly appearing, chronically ill appearing, thin      HENT:  Head:  Normocephalic and atraumatic.  Mouth:  Moist mucous membranes.    Eyes:  Conjunctivae and EOM are normal. No scleral icterus.    Neck:  Neck supple.  No JVD present.    Cardiovascular:  Normal rate, regular rhythm and normal heart sounds with no murmur.  Pulmonary/Chest:  No respiratory distress, no wheezes, no crackles, with normal breath sounds and good air movement.  Abdominal:  Soft.  Bowel sounds are normal.  No distension and no tenderness.   Musculoskeletal:  Severe kyphosis with anterior head.   Neurological:  Alert and oriented to person. No focal deficit.   Skin:  Skin is warm and dry. No rash noted. No pallor.   Peripheral vascular:  Pulses in all 4 extremities with no clubbing, no cyanosis, no edema    DISCHARGE DISPOSITION       DISCHARGE MEDICATIONS:     Discharge Medications      ASK your doctor about these medications      Instructions Start Date   acetaminophen 500 MG tablet  Commonly known as: TYLENOL   500 mg, Oral, Every 6 Hours PRN      apixaban 2.5 MG tablet tablet  Commonly known as: ELIQUIS   2.5 mg, Oral, Every 12 Hours Scheduled      arformoterol 15 MCG/2ML nebulizer solution  Commonly known as: BROVANA   15 mcg, Nebulization, 2 Times Daily - RT      aspirin 81 MG EC tablet   81 mg, Oral, Daily      atorvastatin 20  MG tablet  Commonly known as: LIPITOR   20 mg, Oral, Nightly      bisacodyl 10 MG suppository  Commonly known as: DULCOLAX   10 mg, Rectal, Daily PRN      budesonide 0.5 MG/2ML nebulizer solution  Commonly known as: PULMICORT   0.5 mg, Nebulization, 2 Times Daily      bumetanide 1 MG tablet  Commonly known as: BUMEX   1 mg, Oral, Every 48 Hours      hydrALAZINE 10 MG tablet  Commonly known as: APRESOLINE  Ask about: Which instructions should I use?   10 mg, Oral, 3 Times Daily      isosorbide mononitrate 30 MG 24 hr tablet  Commonly known as: IMDUR   30 mg, Oral, Every 24 Hours Scheduled      lactulose 10 GM/15ML solution  Commonly known as: CHRONULAC   20 g, Oral, Daily PRN      metoprolol tartrate 50 MG tablet  Commonly known as: LOPRESSOR   50 mg, Oral, 2 Times Daily      pantoprazole 40 MG EC tablet  Commonly known as: PROTONIX   40 mg, Oral, Daily      polyethylene glycol 17 g packet  Commonly known as: MIRALAX   17 g, Oral, Daily      predniSONE 5 MG tablet  Commonly known as: DELTASONE   2.5 mg, Oral, Daily      sennosides-docusate 8.6-50 MG per tablet  Commonly known as: PERICOLACE   1 tablet, Oral, 2 Times Daily                    TEST  RESULTS PENDING AT DISCHARGE       CODE STATUS  Code Status and Medical Interventions:   Ordered at: 05/25/23 1634     Medical Intervention Limits:    NO intubation (DNI)     Code Status (Patient has no pulse and is not breathing):    No CPR (Do Not Attempt to Resuscitate)     Medical Interventions (Patient has pulse or is breathing):    Limited Support       The ASCVD Risk score (Ross BLACKWELL, et al., 2019) failed to calculate for the following reasons:    The 2019 ASCVD risk score is only valid for ages 40 to 79    The patient has a prior MI or stroke diagnosis     Dev Smith MD  [facility]  05/31/23  07:24 EDT    Please note that this discharge summary required more than 30 minutes to complete.

## 2023-05-31 NOTE — ACP (ADVANCE CARE PLANNING)
#Advanced Care Planning  - Due to patient's advanced stage critical illness requiring admission to the hospital I have approached conversations regarding ACP.  - Pertinent diagnoses to this discussion include acute on chronic respiratory failure, severe PAH, transthoracic respiratory compromise secondary to spinal kyphosis, COVID-19 positive, pneumonia, heart failure, CKD  - At the time of our discussion the patient, family member (brother, Octavio), and RN Missy Suarez were present  - I have consented the patient and family member regarding their willingness to discuss ACP services and they have agreed.    - Our discussion included details of comfort care, and current treatment options.  Discussed that the patient has continued to decline despite maximal treatment for current illnesses.  Patient actively tachypneic with significant work of breathing.  O2 sat intermittently ranging between mid 70s to 90% despite being on humidified 6 L O2 per nasal cannula.  Brother notes that he knows patient would not to be placed on BiPAP if respiratory situation declined.  Discussed with brother that we can continue with current treatment, but if the patient would not like to be placed on BiPAP, and her oxygen saturation keeps declining, the most we can do is maxed out O2 per nasal cannula.  Discussed the possibility of comfort care, and discussed that the goals are to keep the patient comfortable and withdraw current treatment.  Family member does not wish to withdraw current treatment, but would like to see the patient more comfortable than she has now.  Discussed that administering medication for pain/shortness of breath such as morphine will likely decrease respiratory drive and could end in respiratory distress/decline.  Discussed that if this were to happen, the patient would be comfortable at that time and not visibly struggling in the way that she is now.  Patient's family member verbalized understanding and states he would  like to try medication to make her more comfortable even if it risk her respiratory decline.  However, he does not wish to withdraw care and move to comfort measures yet.  Offered to consult with the  but family member declined.  During conversation, try to keep patient involved but participation was limited due to difficulty hearing/dementia.  Patient continually saying that she would like to rest, and complaining of shortness of breath and pain.  RN Missy Suarez present for ACP conversation.    - I have spent 35 minutes of time in face-to-face conversation on the above described ACP services